# Patient Record
Sex: FEMALE | Race: WHITE | NOT HISPANIC OR LATINO | Employment: OTHER | URBAN - METROPOLITAN AREA
[De-identification: names, ages, dates, MRNs, and addresses within clinical notes are randomized per-mention and may not be internally consistent; named-entity substitution may affect disease eponyms.]

---

## 2019-09-20 ENCOUNTER — HOSPITAL ENCOUNTER (INPATIENT)
Facility: HOSPITAL | Age: 64
LOS: 4 days | Discharge: HOME | DRG: 433 | End: 2019-09-26
Attending: EMERGENCY MEDICINE | Admitting: INTERNAL MEDICINE
Payer: COMMERCIAL

## 2019-09-20 DIAGNOSIS — D64.9 ANEMIA, UNSPECIFIED TYPE: ICD-10-CM

## 2019-09-20 DIAGNOSIS — N30.00 ACUTE CYSTITIS WITHOUT HEMATURIA: ICD-10-CM

## 2019-09-20 DIAGNOSIS — N17.9 AKI (ACUTE KIDNEY INJURY): Primary | ICD-10-CM

## 2019-09-20 DIAGNOSIS — R53.1 WEAKNESS: ICD-10-CM

## 2019-09-20 DIAGNOSIS — K70.31 ALCOHOLIC CIRRHOSIS OF LIVER WITH ASCITES: ICD-10-CM

## 2019-09-20 DIAGNOSIS — K70.31 ASCITES DUE TO ALCOHOLIC CIRRHOSIS: ICD-10-CM

## 2019-09-20 DIAGNOSIS — N17.9 ACUTE RENAL FAILURE SUPERIMPOSED ON STAGE 4 CHRONIC KIDNEY DISEASE: ICD-10-CM

## 2019-09-20 DIAGNOSIS — N18.4 ACUTE RENAL FAILURE SUPERIMPOSED ON STAGE 4 CHRONIC KIDNEY DISEASE: ICD-10-CM

## 2019-09-20 PROBLEM — K74.60 CIRRHOSIS: Status: ACTIVE | Noted: 2019-09-20

## 2019-09-20 PROBLEM — K21.9 ESOPHAGEAL REFLUX DISEASE: Status: ACTIVE | Noted: 2019-09-20

## 2019-09-20 LAB
ALBUMIN SERPL BCP-MCNC: 2.4 G/DL (ref 3.5–5.2)
ALP SERPL-CCNC: 91 U/L (ref 55–135)
ALT SERPL W/O P-5'-P-CCNC: 17 U/L (ref 10–44)
AMMONIA PLAS-SCNC: 31 UMOL/L (ref 10–50)
ANION GAP SERPL CALC-SCNC: 10 MMOL/L (ref 8–16)
AST SERPL-CCNC: 31 U/L (ref 10–40)
BACTERIA #/AREA URNS HPF: ABNORMAL /HPF
BASOPHILS # BLD AUTO: 0.02 K/UL (ref 0–0.2)
BASOPHILS NFR BLD: 0.2 % (ref 0–1.9)
BILIRUB SERPL-MCNC: 0.7 MG/DL (ref 0.1–1)
BILIRUB UR QL STRIP: ABNORMAL
BUN SERPL-MCNC: 32 MG/DL (ref 8–23)
CALCIUM SERPL-MCNC: 8.2 MG/DL (ref 8.7–10.5)
CHLORIDE SERPL-SCNC: 102 MMOL/L (ref 95–110)
CLARITY UR: CLEAR
CO2 SERPL-SCNC: 19 MMOL/L (ref 23–29)
COLOR UR: YELLOW
CREAT SERPL-MCNC: 3.8 MG/DL (ref 0.5–1.4)
CREAT UR-MCNC: 91.2 MG/DL (ref 15–325)
DIFFERENTIAL METHOD: ABNORMAL
EOSINOPHIL # BLD AUTO: 0.3 K/UL (ref 0–0.5)
EOSINOPHIL NFR BLD: 2.9 % (ref 0–8)
ERYTHROCYTE [DISTWIDTH] IN BLOOD BY AUTOMATED COUNT: 16.5 % (ref 11.5–14.5)
EST. GFR  (AFRICAN AMERICAN): 14 ML/MIN/1.73 M^2
EST. GFR  (NON AFRICAN AMERICAN): 12 ML/MIN/1.73 M^2
GLUCOSE SERPL-MCNC: 105 MG/DL (ref 70–110)
GLUCOSE UR QL STRIP: NEGATIVE
HCT VFR BLD AUTO: 24 % (ref 37–48.5)
HGB BLD-MCNC: 8.3 G/DL (ref 12–16)
HGB UR QL STRIP: ABNORMAL
INR PPP: 1.5 (ref 0.8–1.2)
KETONES UR QL STRIP: NEGATIVE
LEUKOCYTE ESTERASE UR QL STRIP: ABNORMAL
LIPASE SERPL-CCNC: 65 U/L (ref 4–60)
LYMPHOCYTES # BLD AUTO: 1.5 K/UL (ref 1–4.8)
LYMPHOCYTES NFR BLD: 15.1 % (ref 18–48)
MCH RBC QN AUTO: 29.5 PG (ref 27–31)
MCHC RBC AUTO-ENTMCNC: 34.6 G/DL (ref 32–36)
MCV RBC AUTO: 85 FL (ref 82–98)
MICROSCOPIC COMMENT: ABNORMAL
MONOCYTES # BLD AUTO: 1.2 K/UL (ref 0.3–1)
MONOCYTES NFR BLD: 12.2 % (ref 4–15)
NEUTROPHILS # BLD AUTO: 6.7 K/UL (ref 1.8–7.7)
NEUTROPHILS NFR BLD: 69.9 % (ref 38–73)
NITRITE UR QL STRIP: POSITIVE
PH UR STRIP: 6 [PH] (ref 5–8)
PLATELET # BLD AUTO: 225 K/UL (ref 150–350)
PMV BLD AUTO: 9.7 FL (ref 9.2–12.9)
POTASSIUM SERPL-SCNC: 3.7 MMOL/L (ref 3.5–5.1)
PROT SERPL-MCNC: 6.3 G/DL (ref 6–8.4)
PROT UR QL STRIP: ABNORMAL
PROTHROMBIN TIME: 15.6 SEC (ref 9–12.5)
RBC # BLD AUTO: 2.81 M/UL (ref 4–5.4)
RBC #/AREA URNS HPF: 1 /HPF (ref 0–4)
SODIUM SERPL-SCNC: 131 MMOL/L (ref 136–145)
SODIUM UR-SCNC: <20 MMOL/L (ref 20–250)
SP GR UR STRIP: 1.02 (ref 1–1.03)
URN SPEC COLLECT METH UR: ABNORMAL
UROBILINOGEN UR STRIP-ACNC: ABNORMAL EU/DL
WBC # BLD AUTO: 9.58 K/UL (ref 3.9–12.7)
WBC #/AREA URNS HPF: 1 /HPF (ref 0–5)
YEAST URNS QL MICRO: ABNORMAL

## 2019-09-20 PROCEDURE — 85025 COMPLETE CBC W/AUTO DIFF WBC: CPT

## 2019-09-20 PROCEDURE — 93010 EKG 12-LEAD: ICD-10-PCS | Mod: ,,, | Performed by: INTERNAL MEDICINE

## 2019-09-20 PROCEDURE — G0378 HOSPITAL OBSERVATION PER HR: HCPCS

## 2019-09-20 PROCEDURE — 36415 COLL VENOUS BLD VENIPUNCTURE: CPT

## 2019-09-20 PROCEDURE — 85610 PROTHROMBIN TIME: CPT

## 2019-09-20 PROCEDURE — 84540 ASSAY OF URINE/UREA-N: CPT

## 2019-09-20 PROCEDURE — 82570 ASSAY OF URINE CREATININE: CPT

## 2019-09-20 PROCEDURE — 93005 ELECTROCARDIOGRAM TRACING: CPT

## 2019-09-20 PROCEDURE — 63600175 PHARM REV CODE 636 W HCPCS: Performed by: EMERGENCY MEDICINE

## 2019-09-20 PROCEDURE — 83690 ASSAY OF LIPASE: CPT

## 2019-09-20 PROCEDURE — 87086 URINE CULTURE/COLONY COUNT: CPT

## 2019-09-20 PROCEDURE — 87077 CULTURE AEROBIC IDENTIFY: CPT

## 2019-09-20 PROCEDURE — 99285 EMERGENCY DEPT VISIT HI MDM: CPT | Mod: 25

## 2019-09-20 PROCEDURE — 87088 URINE BACTERIA CULTURE: CPT

## 2019-09-20 PROCEDURE — 93010 ELECTROCARDIOGRAM REPORT: CPT | Mod: ,,, | Performed by: INTERNAL MEDICINE

## 2019-09-20 PROCEDURE — 87186 SC STD MICRODIL/AGAR DIL: CPT

## 2019-09-20 PROCEDURE — 81000 URINALYSIS NONAUTO W/SCOPE: CPT

## 2019-09-20 PROCEDURE — 82140 ASSAY OF AMMONIA: CPT

## 2019-09-20 PROCEDURE — 96361 HYDRATE IV INFUSION ADD-ON: CPT | Performed by: INTERNAL MEDICINE

## 2019-09-20 PROCEDURE — 84300 ASSAY OF URINE SODIUM: CPT

## 2019-09-20 PROCEDURE — 80053 COMPREHEN METABOLIC PANEL: CPT

## 2019-09-20 PROCEDURE — 63600175 PHARM REV CODE 636 W HCPCS: Performed by: NURSE PRACTITIONER

## 2019-09-20 RX ORDER — LACTULOSE 10 G/15ML
10 SOLUTION ORAL DAILY
Status: DISCONTINUED | OUTPATIENT
Start: 2019-09-21 | End: 2019-09-26 | Stop reason: HOSPADM

## 2019-09-20 RX ORDER — SODIUM CHLORIDE 9 MG/ML
1000 INJECTION, SOLUTION INTRAVENOUS
Status: COMPLETED | OUTPATIENT
Start: 2019-09-20 | End: 2019-09-20

## 2019-09-20 RX ORDER — LACTULOSE 10 G/15ML
SOLUTION ORAL; RECTAL DAILY
COMMUNITY
End: 2020-01-16 | Stop reason: SDUPTHER

## 2019-09-20 RX ORDER — HYDROXYZINE HYDROCHLORIDE 25 MG/1
25 TABLET, FILM COATED ORAL 3 TIMES DAILY PRN
Status: ON HOLD | COMMUNITY
End: 2020-10-31 | Stop reason: HOSPADM

## 2019-09-20 RX ORDER — PANTOPRAZOLE SODIUM 40 MG/1
40 TABLET, DELAYED RELEASE ORAL DAILY
Status: ON HOLD | COMMUNITY
End: 2019-09-20

## 2019-09-20 RX ORDER — FUROSEMIDE 40 MG/1
40 TABLET ORAL 3 TIMES DAILY
Status: ON HOLD | COMMUNITY
End: 2019-09-26 | Stop reason: SDUPTHER

## 2019-09-20 RX ORDER — LANOLIN ALCOHOL/MO/W.PET/CERES
400 CREAM (GRAM) TOPICAL DAILY
COMMUNITY

## 2019-09-20 RX ORDER — SODIUM CHLORIDE 9 MG/ML
INJECTION, SOLUTION INTRAVENOUS CONTINUOUS
Status: DISCONTINUED | OUTPATIENT
Start: 2019-09-20 | End: 2019-09-21

## 2019-09-20 RX ORDER — FLUOXETINE HYDROCHLORIDE 20 MG/1
20 CAPSULE ORAL DAILY
COMMUNITY

## 2019-09-20 RX ORDER — SODIUM CITRATE AND CITRIC ACID MONOHYDRATE 334; 500 MG/5ML; MG/5ML
15 SOLUTION ORAL DAILY
COMMUNITY
End: 2019-10-09

## 2019-09-20 RX ORDER — FLUOXETINE HYDROCHLORIDE 20 MG/1
20 CAPSULE ORAL DAILY
Status: DISCONTINUED | OUTPATIENT
Start: 2019-09-21 | End: 2019-09-26 | Stop reason: HOSPADM

## 2019-09-20 RX ORDER — POTASSIUM CITRATE, TRISODIUM CITRATE DIHYDRATE AND CITRIC ACID MONOHYDRATE 550; 500; 334 MG/5ML; MG/5ML; MG/5ML
15 SOLUTION ORAL
Status: ON HOLD | COMMUNITY
End: 2019-09-20

## 2019-09-20 RX ORDER — MULTIVITAMIN
1 TABLET ORAL DAILY
COMMUNITY

## 2019-09-20 RX ORDER — LACTULOSE 10 G/15ML
20 SOLUTION ORAL DAILY
Status: DISCONTINUED | OUTPATIENT
Start: 2019-09-21 | End: 2019-09-20

## 2019-09-20 RX ORDER — FAMOTIDINE 20 MG/1
20 TABLET, FILM COATED ORAL DAILY
Status: DISCONTINUED | OUTPATIENT
Start: 2019-09-21 | End: 2019-09-21

## 2019-09-20 RX ADMIN — SODIUM CHLORIDE 1000 ML: 0.9 INJECTION, SOLUTION INTRAVENOUS at 03:09

## 2019-09-20 RX ADMIN — SODIUM CHLORIDE 50 ML/HR: 0.9 INJECTION, SOLUTION INTRAVENOUS at 09:09

## 2019-09-20 NOTE — H&P
[facility] Hospital Medicine  History & Physical    Patient Name: Kylah Ortiz  MRN: [id number]  Admission Date: 9/20/2019  Attending Physician: Regino Ceballos MD   Primary Care Provider: Santos Beaver MD         Patient information was obtained from patient, caregiver / friend and ER records.     Subjective:     Principal Problem:DARRIAN (acute kidney injury)    Chief Complaint:   Chief Complaint   Patient presents with    Abnormal Lab        HPI: Ms Ortiz is a 63 year old female with PMHx alcohol cirrhosis, ascites and CKD who presented to [facility] Emergency Room after have abnormal labs reported from clinic. Emergency Room physican  discussed thecase with Dr. Anaya who state d that the patient creatinine went from 1.5 to 3.6 and recommends admission. Associated symptoms include increase abdominal distention and bilateral leg swelling. Patient denies associated symptoms of chest pain, increase shortness of breath, fever, chills, nausea, vomiting or diaphoresis. Sitter is at bedside and assisting in answering questions. She is being placed in Observations under the care of Hospital Medicine.     Past Medical History:   Diagnosis Date    Alcoholic cirrhosis     Alcoholic dementia     Anemia     Ascites     CKD (chronic kidney disease)     Esophageal reflux     Esophageal varices     Hepatic encephalopathy     Hiatal hernia        Past Surgical History:   Procedure Laterality Date    COLONOSCOPY      ESOPHAGOGASTRODUODENOSCOPY      HYSTERECTOMY         Review of patient's allergies indicates:   Allergen Reactions    Hydrocodone-acetaminophen        No current facility-administered medications on file prior to encounter.      Current Outpatient Medications on File Prior to Encounter   Medication Sig    FLUoxetine 20 MG capsule Take 20 mg by mouth once daily.    furosemide (LASIX) 40 MG tablet Take 40 mg by mouth 3 (three) times daily.    hydrOXYzine HCl (ATARAX) 25 MG tablet  Take 25 mg by mouth 3 (three) times daily as needed.     lactulose (CHRONULAC) 10 gram/15 mL solution Take by mouth once daily.     magnesium oxide (MAG-OX) 400 mg (241.3 mg magnesium) tablet Take 400 mg by mouth once daily.    multivitamin (THERAGRAN) per tablet Take 1 tablet by mouth once daily.    ranitidine (ZANTAC) 300 MG tablet Take 300 mg by mouth every evening.    sodium citrate-citric acid 500-334 mg/5 ml (BICITRA) 500-334 mg/5 mL solution Take 15 mLs by mouth once daily.    [DISCONTINUED] pantoprazole (PROTONIX) 40 MG tablet Take 40 mg by mouth once daily.    [DISCONTINUED] potassium & sodium citrate-citric acid (POLYCITRA) 550-500-334 mg/5 mL Soln Take 15 mLs by mouth 4 (four) times daily with meals and nightly.     Family History     Problem Relation (Age of Onset)    Arthritis Mother    Cancer Mother        Tobacco Use    Smoking status: Never Smoker    Smokeless tobacco: Never Used   Substance and Sexual Activity    Alcohol use: Yes    Drug use: Not on file    Sexual activity: Not on file     Review of Systems   Constitutional: Positive for activity change and fatigue. Negative for chills and fever.   HENT: Negative for congestion, rhinorrhea and sinus pressure.    Respiratory: Negative for apnea, cough, choking, chest tightness, shortness of breath, wheezing and stridor.    Cardiovascular: Positive for leg swelling. Negative for chest pain and palpitations.   Gastrointestinal: Positive for abdominal distention. Negative for abdominal pain, diarrhea, nausea and vomiting.   Endocrine: Negative for cold intolerance and heat intolerance.   Genitourinary: Negative for difficulty urinating and hematuria.        Decrease urine output    Musculoskeletal: Negative for arthralgias and joint swelling.   Skin: Negative for color change, pallor and rash.   Neurological: Positive for weakness. Negative for dizziness, seizures, numbness and headaches.   Psychiatric/Behavioral: Negative for agitation. The  patient is not nervous/anxious.      Objective:     Vital Signs (Most Recent):  Temp: 98.3 °F (36.8 °C) (09/20/19 1721)  Pulse: 87 (09/20/19 1721)  Resp: 16 (09/20/19 1721)  BP: 113/71 (09/20/19 1721)  SpO2: 98 % (09/20/19 1721) Vital Signs (24h Range):  Temp:  [98.3 °F (36.8 °C)-98.5 °F (36.9 °C)] 98.3 °F (36.8 °C)  Pulse:  [83-88] 87  Resp:  [13-28] 16  SpO2:  [98 %-99 %] 98 %  BP: (108-127)/(69-78) 113/71     Weight: 77.6 kg (171 lb 1.6 oz)  Body mass index is 27.62 kg/m².    Physical Exam   Constitutional: No distress.   HENT:   Mouth/Throat: No oropharyngeal exudate.   Eyes: Right eye exhibits no discharge. Left eye exhibits no discharge.   Neck: No JVD present.   Abdominal: Bowel sounds are normal. She exhibits distension. She exhibits no mass. There is no tenderness. There is no rebound and no guarding. No hernia.   Significantly increase amount of ascites present    Musculoskeletal: She exhibits edema (+3 pitting edema bilateral  lower ext ).   Skin: Skin is warm. Capillary refill takes less than 2 seconds. She is not diaphoretic. There is pallor.   Nursing note and vitals reviewed.          Significant Labs:   CBC:   Recent Labs   Lab 09/20/19  1335   WBC 9.58   HGB 8.3*   HCT 24.0*        CMP:   Recent Labs   Lab 09/20/19  1335   *   K 3.7      CO2 19*      BUN 32*   CREATININE 3.8*   CALCIUM 8.2*   PROT 6.3   ALBUMIN 2.4*   BILITOT 0.7   ALKPHOS 91   AST 31   ALT 17   ANIONGAP 10   EGFRNONAA 12*     Lipase:   Recent Labs   Lab 09/20/19  1335   LIPASE 65*       Significant Imaging:     Imaging Results          X-Ray Chest AP Portable (Final result)  Result time 09/20/19 14:05:58    Final result by Thuan Pillai MD (09/20/19 14:05:58)                 Impression:      Patchy interstitial opacities within the left lower lobe could reflect early infiltrate.  Small left pleural effusion not excluded.      Electronically signed by: Thuan Pillai,  MD  Date:    09/20/2019  Time:    14:05             Narrative:    EXAMINATION:  XR CHEST AP PORTABLE    CLINICAL HISTORY:  cough;    FINDINGS:  Single view of the chest.    Cardiac silhouette is normal.  Patchy interstitial opacities within the left lower lobe could reflect early infiltrate.  Small left pleural effusion not excluded. No evidence of pneumothorax.  Bones demonstrate mild degenerative changes.  Remote fracture deformity right clavicle suspected.                              Assessment/Plan:     * DARRIAN (acute kidney injury)  Place in observation   Gentle hydration   Consult Nephrology   Urine studies   Avoid Nephrotoxic agent   Hold diuretic       Ascites due to alcoholic cirrhosis  Continue hold lactulose   INR   Ammonia level   Hold lasix due to DARRIAN   Monitor   Low sodium diet   US Guide Paracentesis   Consider GI consult         Esophageal reflux disease  Pepcid         Anemia  HGB 8.4  Probable related to Chronic disease   No current signs of Bleeding   Monitor       VTE Risk Mitigation (From admission, onward)        Ordered     Place sequential compression device  Until discontinued      09/20/19 6082             Eben Jimenez NP  Department of Hospital Medicine   [facility]

## 2019-09-20 NOTE — ED PROVIDER NOTES
SCRIBE #1 NOTE: I, Corinne Mack, am scribing for, and in the presence of, Christopher Masters Do, MD. I have scribed the entire note.      History      Chief Complaint   Patient presents with    Abnormal Lab       Review of patient's allergies indicates:   Allergen Reactions    Hydrocodone-acetaminophen         HPI   HPI    9/20/2019, 1:09 PM   History obtained from the patient      History of Present Illness: Kylah Ortiz is a 63 y.o. female patient who presents to the Emergency Department for evaluation. Pt had blood work yesterday done by her Hepatologist and was referred to the ED for further evaluation because it was shown that the pt's kidney function was worsening. Pt otherwise has no complaints in the ED. No associated sxs. Patient denies any fever, chills, CP, SOB, N/V/D, abd pain, back pain, neck pain, HA, dizziness, confusion, and all other sxs at this time. No prior Tx. No further complaints or concerns at this time.         Arrival mode: Personal vehicle    PCP: Santos Beaver MD       Past Medical History:  Past Medical History:   Diagnosis Date    Alcoholic cirrhosis     Alcoholic dementia     Anemia     Ascites     CKD (chronic kidney disease)     Esophageal reflux     Esophageal varices     Hepatic encephalopathy     Hiatal hernia        Past Surgical History:  Past Surgical History:   Procedure Laterality Date    COLONOSCOPY      ESOPHAGOGASTRODUODENOSCOPY      HYSTERECTOMY           Family History:  Family History   Problem Relation Age of Onset    Cancer Mother     Arthritis Mother        Social History:  Social History     Tobacco Use    Smoking status: Never Smoker    Smokeless tobacco: Never Used   Substance and Sexual Activity    Alcohol use: Yes    Drug use: Not on file    Sexual activity: Not on file       ROS   Review of Systems   Constitutional: Negative for chills and fever.        (+) abnormal labs   Respiratory: Negative for cough and shortness of breath.   "  Cardiovascular: Negative for chest pain and leg swelling.   Gastrointestinal: Negative for abdominal pain, diarrhea, nausea and vomiting.   Musculoskeletal: Negative for back pain, neck pain and neck stiffness.   Skin: Negative for rash and wound.   Neurological: Negative for dizziness, light-headedness, numbness and headaches.   All other systems reviewed and are negative.    Physical Exam      Initial Vitals   BP Pulse Resp Temp SpO2   09/20/19 1311 09/20/19 1311 09/20/19 1311 09/20/19 1316 09/20/19 1311   127/78 88 (!) 28 98.5 °F (36.9 °C) 98 %      MAP       --                 Physical Exam  Nursing Notes and Vital Signs Reviewed.  Constitutional: Patient is in no acute distress. Well-developed and well-nourished.  Head: Atraumatic. Normocephalic.  Eyes: EOM intact. Conjunctivae are not pale. No scleral icterus.  ENT: Mucous membranes are moist. Oropharynx is clear and symmetric.    Neck: Supple. Full ROM. No lymphadenopathy.  Cardiovascular: Regular rate. Regular rhythm. No murmurs, rubs, or gallops. Distal pulses are 2+ and symmetric.  Pulmonary/Chest: No respiratory distress. Clear to auscultation bilaterally. No wheezing or rales.  Abdominal: Soft and distended with fluid.  There is no tenderness.  No rebound, guarding, or rigidity.   Musculoskeletal: Moves all extremities. No obvious deformities. 1-2+ BLE edema.   Skin: Warm and dry.  Neurological:  Alert, awake, and appropriate.  Normal speech.  No acute focal neurological deficits are appreciated.  Psychiatric: Normal affect. Good eye contact. Appropriate in content.    ED Course    Procedures  ED Vital Signs:  Vitals:    09/20/19 1311 09/20/19 1316 09/20/19 1402 09/20/19 1602   BP: 127/78 127/78 110/69 108/71   Pulse: 88 86 83 87   Resp: (!) 28 15 13 15   Temp:  98.5 °F (36.9 °C)     TempSrc:  Oral     SpO2: 98% 98% 99% 99%   Weight:  77.6 kg (171 lb 1.6 oz)     Height:  5' 6" (1.676 m)      09/20/19 1721   BP: 113/71   Pulse: 87   Resp: 16   Temp: " 98.3 °F (36.8 °C)   TempSrc: Oral   SpO2: 98%   Weight:    Height:        Abnormal Lab Results:  Labs Reviewed   CBC W/ AUTO DIFFERENTIAL - Abnormal; Notable for the following components:       Result Value    RBC 2.81 (*)     Hemoglobin 8.3 (*)     Hematocrit 24.0 (*)     RDW 16.5 (*)     Mono # 1.2 (*)     Lymph% 15.1 (*)     All other components within normal limits   COMPREHENSIVE METABOLIC PANEL - Abnormal; Notable for the following components:    Sodium 131 (*)     CO2 19 (*)     BUN, Bld 32 (*)     Creatinine 3.8 (*)     Calcium 8.2 (*)     Albumin 2.4 (*)     eGFR if  14 (*)     eGFR if non  12 (*)     All other components within normal limits   LIPASE - Abnormal; Notable for the following components:    Lipase 65 (*)     All other components within normal limits   URINALYSIS, REFLEX TO URINE CULTURE        All Lab Results:  Results for orders placed or performed during the hospital encounter of 09/20/19   CBC W/ AUTO DIFFERENTIAL   Result Value Ref Range    WBC 9.58 3.90 - 12.70 K/uL    RBC 2.81 (L) 4.00 - 5.40 M/uL    Hemoglobin 8.3 (L) 12.0 - 16.0 g/dL    Hematocrit 24.0 (L) 37.0 - 48.5 %    Mean Corpuscular Volume 85 82 - 98 fL    Mean Corpuscular Hemoglobin 29.5 27.0 - 31.0 pg    Mean Corpuscular Hemoglobin Conc 34.6 32.0 - 36.0 g/dL    RDW 16.5 (H) 11.5 - 14.5 %    Platelets 225 150 - 350 K/uL    MPV 9.7 9.2 - 12.9 fL    Gran # (ANC) 6.7 1.8 - 7.7 K/uL    Lymph # 1.5 1.0 - 4.8 K/uL    Mono # 1.2 (H) 0.3 - 1.0 K/uL    Eos # 0.3 0.0 - 0.5 K/uL    Baso # 0.02 0.00 - 0.20 K/uL    Gran% 69.9 38.0 - 73.0 %    Lymph% 15.1 (L) 18.0 - 48.0 %    Mono% 12.2 4.0 - 15.0 %    Eosinophil% 2.9 0.0 - 8.0 %    Basophil% 0.2 0.0 - 1.9 %    Differential Method Automated    Comp. Metabolic Panel   Result Value Ref Range    Sodium 131 (L) 136 - 145 mmol/L    Potassium 3.7 3.5 - 5.1 mmol/L    Chloride 102 95 - 110 mmol/L    CO2 19 (L) 23 - 29 mmol/L    Glucose 105 70 - 110 mg/dL    BUN, Bld 32  (H) 8 - 23 mg/dL    Creatinine 3.8 (H) 0.5 - 1.4 mg/dL    Calcium 8.2 (L) 8.7 - 10.5 mg/dL    Total Protein 6.3 6.0 - 8.4 g/dL    Albumin 2.4 (L) 3.5 - 5.2 g/dL    Total Bilirubin 0.7 0.1 - 1.0 mg/dL    Alkaline Phosphatase 91 55 - 135 U/L    AST 31 10 - 40 U/L    ALT 17 10 - 44 U/L    Anion Gap 10 8 - 16 mmol/L    eGFR if African American 14 (A) >60 mL/min/1.73 m^2    eGFR if non African American 12 (A) >60 mL/min/1.73 m^2   Lipase   Result Value Ref Range    Lipase 65 (H) 4 - 60 U/L       Imaging Results:  Imaging Results          X-Ray Chest AP Portable (Final result)  Result time 09/20/19 14:05:58    Final result by Thuan Pillai MD (09/20/19 14:05:58)                 Impression:      Patchy interstitial opacities within the left lower lobe could reflect early infiltrate.  Small left pleural effusion not excluded.      Electronically signed by: Thuan Pillai MD  Date:    09/20/2019  Time:    14:05             Narrative:    EXAMINATION:  XR CHEST AP PORTABLE    CLINICAL HISTORY:  cough;    FINDINGS:  Single view of the chest.    Cardiac silhouette is normal.  Patchy interstitial opacities within the left lower lobe could reflect early infiltrate.  Small left pleural effusion not excluded. No evidence of pneumothorax.  Bones demonstrate mild degenerative changes.  Remote fracture deformity right clavicle suspected.                                        The Emergency Provider reviewed the vital signs and test results, which are outlined above.    ED Discussion     2:56 PM: Dr. Lance discussed the pt's case with Dr. Anaya (Hepatology) who states that the pt's creatinine went from 1.5 to 3.6 and recommends admission.    3:30 PM: Discussed case with JOSEMANUEL Powell (Hospital Medicine). Dr. Ceballos agrees with current care and management of pt and accepts admission.   Admitting Service: Hospital medicine   Admitting Physician: Dr. Ceballos  Admit to: Obs Med Tele    3:32 PM: Re-evaluated pt. I have discussed  test results, shared treatment plan, and the need for admission with patient and family at bedside. Pt and family express understanding at this time and agree with all information. All questions answered. Pt and family have no further questions or concerns at this time. Pt is ready for admit.      ED Medication(s):  Medications   0.9%  NaCl infusion (1,000 mLs Intravenous New Bag 9/20/19 1919)          Medication List      ASK your doctor about these medications    FLUoxetine 20 MG capsule     furosemide 40 MG tablet  Commonly known as:  LASIX     hydrOXYzine HCl 25 MG tablet  Commonly known as:  ATARAX     lactulose 10 gram/15 mL solution  Commonly known as:  CHRONULAC     magnesium oxide 400 mg (241.3 mg magnesium) tablet  Commonly known as:  MAG-OX     multivitamin per tablet  Commonly known as:  THERAGRAN     potassium & sodium citrate-citric acid 550-500-334 mg/5 mL Soln  Commonly known as:  POLYCITRA     ranitidine 300 MG tablet  Commonly known as:  ZANTAC                  Medical Decision Making           Medical Decision Making:   Clinical Tests:   Lab Tests: Ordered and Reviewed  Radiological Study: Ordered and Reviewed  Medical Tests: Ordered and Reviewed           Scribe Attestation:   Scribe #1: I performed the above scribed service and the documentation accurately describes the services I performed. I attest to the accuracy of the note 09/20/2019.    Attending:   Physician Attestation Statement for Scribe #1: I, Christopher Masters Do, MD, personally performed the services described in this documentation, as scribed by Corinne Mack, in my presence, and it is both accurate and complete.          Clinical Impression       ICD-10-CM ICD-9-CM   1. DARRIAN (acute kidney injury) N17.9 584.9   2. Weakness R53.1 780.79       Disposition:   Disposition: Placed in Observation  Condition: Fair           Christopher Masters Do, MD  09/20/19 3927

## 2019-09-20 NOTE — ASSESSMENT & PLAN NOTE
Place in observation   Gentle hydration   Consult Nephrology   Urine studies   Avoid Nephrotoxic agent   Hold diuretic

## 2019-09-20 NOTE — ASSESSMENT & PLAN NOTE
Continue hold lactulose   INR   Ammonia level   Hold lasix due to DARRIAN   Monitor   Low sodium diet   US Guide Paracentesis

## 2019-09-20 NOTE — SUBJECTIVE & OBJECTIVE
Past Medical History:   Diagnosis Date    Alcoholic cirrhosis     Alcoholic dementia     Anemia     Ascites     CKD (chronic kidney disease)     Esophageal reflux     Esophageal varices     Hepatic encephalopathy     Hiatal hernia        Past Surgical History:   Procedure Laterality Date    COLONOSCOPY      ESOPHAGOGASTRODUODENOSCOPY      HYSTERECTOMY         Review of patient's allergies indicates:   Allergen Reactions    Hydrocodone-acetaminophen        No current facility-administered medications on file prior to encounter.      Current Outpatient Medications on File Prior to Encounter   Medication Sig    FLUoxetine 20 MG capsule Take 20 mg by mouth once daily.    furosemide (LASIX) 40 MG tablet Take 40 mg by mouth 3 (three) times daily.    hydrOXYzine HCl (ATARAX) 25 MG tablet Take 25 mg by mouth 3 (three) times daily as needed.     lactulose (CHRONULAC) 10 gram/15 mL solution Take by mouth once daily.     magnesium oxide (MAG-OX) 400 mg (241.3 mg magnesium) tablet Take 400 mg by mouth once daily.    multivitamin (THERAGRAN) per tablet Take 1 tablet by mouth once daily.    ranitidine (ZANTAC) 300 MG tablet Take 300 mg by mouth every evening.    sodium citrate-citric acid 500-334 mg/5 ml (BICITRA) 500-334 mg/5 mL solution Take 15 mLs by mouth once daily.    [DISCONTINUED] pantoprazole (PROTONIX) 40 MG tablet Take 40 mg by mouth once daily.    [DISCONTINUED] potassium & sodium citrate-citric acid (POLYCITRA) 550-500-334 mg/5 mL Soln Take 15 mLs by mouth 4 (four) times daily with meals and nightly.     Family History     Problem Relation (Age of Onset)    Arthritis Mother    Cancer Mother        Tobacco Use    Smoking status: Never Smoker    Smokeless tobacco: Never Used   Substance and Sexual Activity    Alcohol use: Yes    Drug use: Not on file    Sexual activity: Not on file     Review of Systems   Constitutional: Positive for activity change and fatigue. Negative for chills and  fever.   HENT: Negative for congestion, rhinorrhea and sinus pressure.    Respiratory: Negative for apnea, cough, choking, chest tightness, shortness of breath, wheezing and stridor.    Cardiovascular: Positive for leg swelling. Negative for chest pain and palpitations.   Gastrointestinal: Positive for abdominal distention. Negative for abdominal pain, diarrhea, nausea and vomiting.   Endocrine: Negative for cold intolerance and heat intolerance.   Genitourinary: Negative for difficulty urinating and hematuria.        Decrease urine output    Musculoskeletal: Negative for arthralgias and joint swelling.   Skin: Negative for color change, pallor and rash.   Neurological: Positive for weakness. Negative for dizziness, seizures, numbness and headaches.   Psychiatric/Behavioral: Negative for agitation. The patient is not nervous/anxious.      Objective:     Vital Signs (Most Recent):  Temp: 98.3 °F (36.8 °C) (09/20/19 1721)  Pulse: 87 (09/20/19 1721)  Resp: 16 (09/20/19 1721)  BP: 113/71 (09/20/19 1721)  SpO2: 98 % (09/20/19 1721) Vital Signs (24h Range):  Temp:  [98.3 °F (36.8 °C)-98.5 °F (36.9 °C)] 98.3 °F (36.8 °C)  Pulse:  [83-88] 87  Resp:  [13-28] 16  SpO2:  [98 %-99 %] 98 %  BP: (108-127)/(69-78) 113/71     Weight: 77.6 kg (171 lb 1.6 oz)  Body mass index is 27.62 kg/m².    Physical Exam   Constitutional: No distress.   HENT:   Mouth/Throat: No oropharyngeal exudate.   Eyes: Right eye exhibits no discharge. Left eye exhibits no discharge.   Neck: No JVD present.   Abdominal: Bowel sounds are normal. She exhibits distension. She exhibits no mass. There is no tenderness. There is no rebound and no guarding. No hernia.   Significantly increase amount of ascites present    Musculoskeletal: She exhibits edema (+3 pitting edema bilateral  lower ext ).   Skin: Skin is warm. Capillary refill takes less than 2 seconds. She is not diaphoretic. There is pallor.   Nursing note and vitals reviewed.          Significant Labs:    CBC:   Recent Labs   Lab 09/20/19  1335   WBC 9.58   HGB 8.3*   HCT 24.0*        CMP:   Recent Labs   Lab 09/20/19  1335   *   K 3.7      CO2 19*      BUN 32*   CREATININE 3.8*   CALCIUM 8.2*   PROT 6.3   ALBUMIN 2.4*   BILITOT 0.7   ALKPHOS 91   AST 31   ALT 17   ANIONGAP 10   EGFRNONAA 12*     Lipase:   Recent Labs   Lab 09/20/19  1335   LIPASE 65*       Significant Imaging:     Imaging Results          X-Ray Chest AP Portable (Final result)  Result time 09/20/19 14:05:58    Final result by Thuan Pillai MD (09/20/19 14:05:58)                 Impression:      Patchy interstitial opacities within the left lower lobe could reflect early infiltrate.  Small left pleural effusion not excluded.      Electronically signed by: Thuan Pillai MD  Date:    09/20/2019  Time:    14:05             Narrative:    EXAMINATION:  XR CHEST AP PORTABLE    CLINICAL HISTORY:  cough;    FINDINGS:  Single view of the chest.    Cardiac silhouette is normal.  Patchy interstitial opacities within the left lower lobe could reflect early infiltrate.  Small left pleural effusion not excluded. No evidence of pneumothorax.  Bones demonstrate mild degenerative changes.  Remote fracture deformity right clavicle suspected.

## 2019-09-20 NOTE — PLAN OF CARE
Problem: Adult Inpatient Plan of Care  Goal: Plan of Care Review  Patient is oriented to person only. Spells of confusion noted. Easily redirected. Coban wrap to IV on left hand, patient has history of pulling out IV. She threatened to pull out this IV. Monitoring closely. Denies pain. Showed signs of discomfort while eating. No issues swallowing. VSS. Nurse's hat in toilet for urine catch. Patient instructed to urinate in hat when she feels the urge. Patient verbalized understanding. Chart check completed. Will continue to monitor.

## 2019-09-20 NOTE — HPI
Ms Ortiz is a 63 year old female with PMHx alcohol cirrhosis, ascites and CKD who presented to [facility] Room after have abnormal labs reported from clinic. Emergency Room physican  discussed thecase with Dr. Anaya who stated that the patient creatinine went from 1.5 to 3.6 and recommends admission. Associated symptoms include increase abdominal distention and bilateral leg swelling. Patient denies associated symptoms of chest pain, increase shortness of breath, fever, chills, nausea, vomiting or diaphoresis. Sitter is at bedside and assisting in answering questions. She is being placed in Observations under the care of Hospital Medicine.

## 2019-09-21 PROBLEM — N39.0 UTI (URINARY TRACT INFECTION): Status: ACTIVE | Noted: 2019-09-21

## 2019-09-21 PROBLEM — N18.4 CHRONIC KIDNEY DISEASE (CKD), STAGE IV (SEVERE): Status: ACTIVE | Noted: 2019-09-21

## 2019-09-21 LAB
ALBUMIN SERPL BCP-MCNC: 2 G/DL (ref 3.5–5.2)
ALP SERPL-CCNC: 73 U/L (ref 55–135)
ALT SERPL W/O P-5'-P-CCNC: 15 U/L (ref 10–44)
ANION GAP SERPL CALC-SCNC: 9 MMOL/L (ref 8–16)
APPEARANCE FLD: NORMAL
AST SERPL-CCNC: 22 U/L (ref 10–40)
BASOPHILS # BLD AUTO: 0.01 K/UL (ref 0–0.2)
BASOPHILS NFR BLD: 0.2 % (ref 0–1.9)
BILIRUB SERPL-MCNC: 0.8 MG/DL (ref 0.1–1)
BODY FLD TYPE: NORMAL
BUN SERPL-MCNC: 31 MG/DL (ref 8–23)
CALCIUM SERPL-MCNC: 7.8 MG/DL (ref 8.7–10.5)
CHLORIDE SERPL-SCNC: 103 MMOL/L (ref 95–110)
CO2 SERPL-SCNC: 22 MMOL/L (ref 23–29)
COLOR FLD: YELLOW
CREAT SERPL-MCNC: 3.6 MG/DL (ref 0.5–1.4)
DIFFERENTIAL METHOD: ABNORMAL
EOSINOPHIL # BLD AUTO: 0.1 K/UL (ref 0–0.5)
EOSINOPHIL NFR BLD: 1.9 % (ref 0–8)
ERYTHROCYTE [DISTWIDTH] IN BLOOD BY AUTOMATED COUNT: 16.4 % (ref 11.5–14.5)
EST. GFR  (AFRICAN AMERICAN): 15 ML/MIN/1.73 M^2
EST. GFR  (NON AFRICAN AMERICAN): 13 ML/MIN/1.73 M^2
GLUCOSE SERPL-MCNC: 91 MG/DL (ref 70–110)
HCT VFR BLD AUTO: 20.8 % (ref 37–48.5)
HGB BLD-MCNC: 7.2 G/DL (ref 12–16)
LYMPHOCYTES # BLD AUTO: 1.3 K/UL (ref 1–4.8)
LYMPHOCYTES NFR BLD: 20 % (ref 18–48)
LYMPHOCYTES NFR FLD MANUAL: 32 %
MCH RBC QN AUTO: 29.6 PG (ref 27–31)
MCHC RBC AUTO-ENTMCNC: 34.6 G/DL (ref 32–36)
MCV RBC AUTO: 86 FL (ref 82–98)
MONOCYTES # BLD AUTO: 0.8 K/UL (ref 0.3–1)
MONOCYTES NFR BLD: 12.3 % (ref 4–15)
MONOS+MACROS NFR FLD MANUAL: 47 %
NEUTROPHILS # BLD AUTO: 4.1 K/UL (ref 1.8–7.7)
NEUTROPHILS NFR BLD: 65.9 % (ref 38–73)
NEUTROPHILS NFR FLD MANUAL: 21 %
PLATELET # BLD AUTO: 188 K/UL (ref 150–350)
PMV BLD AUTO: 9.7 FL (ref 9.2–12.9)
POTASSIUM SERPL-SCNC: 4 MMOL/L (ref 3.5–5.1)
PROT SERPL-MCNC: 5.4 G/DL (ref 6–8.4)
RBC # BLD AUTO: 2.43 M/UL (ref 4–5.4)
SODIUM SERPL-SCNC: 134 MMOL/L (ref 136–145)
UUN UR-MCNC: 201 MG/DL (ref 140–1050)
WBC # BLD AUTO: 6.24 K/UL (ref 3.9–12.7)
WBC # FLD: 70 /CU MM

## 2019-09-21 PROCEDURE — 63600175 PHARM REV CODE 636 W HCPCS: Performed by: NURSE PRACTITIONER

## 2019-09-21 PROCEDURE — 25000003 PHARM REV CODE 250: Performed by: NURSE PRACTITIONER

## 2019-09-21 PROCEDURE — 96374 THER/PROPH/DIAG INJ IV PUSH: CPT | Performed by: INTERNAL MEDICINE

## 2019-09-21 PROCEDURE — 99000 SPECIMEN HANDLING OFFICE-LAB: CPT

## 2019-09-21 PROCEDURE — P9047 ALBUMIN (HUMAN), 25%, 50ML: HCPCS | Mod: JG | Performed by: NURSE PRACTITIONER

## 2019-09-21 PROCEDURE — 99251 PR INITIAL INPATIENT CONSULT,LEVL I: CPT | Mod: ,,, | Performed by: INTERNAL MEDICINE

## 2019-09-21 PROCEDURE — 99205 OFFICE O/P NEW HI 60 MIN: CPT | Mod: ,,, | Performed by: INTERNAL MEDICINE

## 2019-09-21 PROCEDURE — 80053 COMPREHEN METABOLIC PANEL: CPT

## 2019-09-21 PROCEDURE — 99251 PR INITIAL INPATIENT CONSULT,LEVL I: ICD-10-PCS | Mod: ,,, | Performed by: INTERNAL MEDICINE

## 2019-09-21 PROCEDURE — 85025 COMPLETE CBC W/AUTO DIFF WBC: CPT

## 2019-09-21 PROCEDURE — 63600175 PHARM REV CODE 636 W HCPCS: Mod: JG | Performed by: NURSE PRACTITIONER

## 2019-09-21 PROCEDURE — 89051 BODY FLUID CELL COUNT: CPT

## 2019-09-21 PROCEDURE — G0378 HOSPITAL OBSERVATION PER HR: HCPCS

## 2019-09-21 PROCEDURE — 96361 HYDRATE IV INFUSION ADD-ON: CPT | Performed by: INTERNAL MEDICINE

## 2019-09-21 PROCEDURE — 36415 COLL VENOUS BLD VENIPUNCTURE: CPT

## 2019-09-21 PROCEDURE — 99205 PR OFFICE/OUTPT VISIT, NEW, LEVL V, 60-74 MIN: ICD-10-PCS | Mod: ,,, | Performed by: INTERNAL MEDICINE

## 2019-09-21 PROCEDURE — 96375 TX/PRO/DX INJ NEW DRUG ADDON: CPT | Performed by: INTERNAL MEDICINE

## 2019-09-21 RX ORDER — PANTOPRAZOLE SODIUM 40 MG/1
40 TABLET, DELAYED RELEASE ORAL DAILY
Status: DISCONTINUED | OUTPATIENT
Start: 2019-09-21 | End: 2019-09-26 | Stop reason: HOSPADM

## 2019-09-21 RX ORDER — ALBUMIN HUMAN 250 G/1000ML
25 SOLUTION INTRAVENOUS ONCE
Status: COMPLETED | OUTPATIENT
Start: 2019-09-21 | End: 2019-09-21

## 2019-09-21 RX ADMIN — LACTULOSE 10 G: 20 SOLUTION ORAL at 11:09

## 2019-09-21 RX ADMIN — ALBUMIN HUMAN 25 G: 0.25 SOLUTION INTRAVENOUS at 04:09

## 2019-09-21 RX ADMIN — PANTOPRAZOLE SODIUM 40 MG: 40 TABLET, DELAYED RELEASE ORAL at 03:09

## 2019-09-21 RX ADMIN — CEFTRIAXONE SODIUM 2 G: 2 INJECTION, POWDER, FOR SOLUTION INTRAMUSCULAR; INTRAVENOUS at 03:09

## 2019-09-21 RX ADMIN — FLUOXETINE 20 MG: 20 CAPSULE ORAL at 11:09

## 2019-09-21 RX ADMIN — FAMOTIDINE 20 MG: 20 TABLET ORAL at 11:09

## 2019-09-21 RX ADMIN — SODIUM CHLORIDE 50 ML/HR: 0.9 INJECTION, SOLUTION INTRAVENOUS at 11:09

## 2019-09-21 NOTE — PLAN OF CARE
Problem: Adult Inpatient Plan of Care  Goal: Plan of Care Review  Outcome: Ongoing (interventions implemented as appropriate)  Pt resting in bed comfortably. AAO to self, place. Pt not oriented to time. Follows commands.  NSR on heart monitor. VSS.  No pain noted at this time, pt free from falls.  +2, +3 pitting edema to BLE. ABD distended, rounded. Paracentesis in am.  Plan of care reviewed with pt. Pt verbalizes understanding but requires frequent reminders.  Head of bed elevated, wheels locked, side rails up x2, call bell within reach, and instructed to call for assistance.

## 2019-09-21 NOTE — ASSESSMENT & PLAN NOTE
- Hgb 7.2  - No active s/s of bleeding  - Daily CBC  - Monitor and transfuse as needed to keep Hgb >7.0

## 2019-09-21 NOTE — SUBJECTIVE & OBJECTIVE
Past Medical History:   Diagnosis Date    Alcoholic cirrhosis     Alcoholic dementia     Anemia     Ascites     CKD (chronic kidney disease)     Esophageal reflux     Esophageal varices     Hepatic encephalopathy     Hiatal hernia        Past Surgical History:   Procedure Laterality Date    COLONOSCOPY      ESOPHAGOGASTRODUODENOSCOPY      HYSTERECTOMY         Review of patient's allergies indicates:   Allergen Reactions    Hydrocodone-acetaminophen      Family History     Problem Relation (Age of Onset)    Arthritis Mother    Cancer Mother        Tobacco Use    Smoking status: Never Smoker    Smokeless tobacco: Never Used   Substance and Sexual Activity    Alcohol use: Not Currently    Drug use: Not on file    Sexual activity: Not on file     Review of Systems   Constitutional: Negative.    HENT: Negative.    Eyes: Negative.    Respiratory: Negative.    Cardiovascular: Negative.    Gastrointestinal: Negative.    Endocrine: Negative.    Genitourinary: Negative.    Musculoskeletal: Negative.    Allergic/Immunologic: Negative.    Neurological: Negative.    Hematological: Negative.    Psychiatric/Behavioral: Negative.      Objective:     Vital Signs (Most Recent):  Temp: 98.4 °F (36.9 °C) (09/21/19 0746)  Pulse: 82 (09/21/19 0746)  Resp: 20 (09/21/19 0746)  BP: 100/68 (09/21/19 0746)  SpO2: 95 % (09/21/19 0746) Vital Signs (24h Range):  Temp:  [97.9 °F (36.6 °C)-98.7 °F (37.1 °C)] 98.4 °F (36.9 °C)  Pulse:  [82-89] 82  Resp:  [13-28] 20  SpO2:  [93 %-99 %] 95 %  BP: ()/(57-78) 100/68     Weight: 77.6 kg (171 lb 1.6 oz) (09/20/19 1316)  Body mass index is 27.62 kg/m².      Intake/Output Summary (Last 24 hours) at 9/21/2019 1043  Last data filed at 9/20/2019 1900  Gross per 24 hour   Intake 328.33 ml   Output --   Net 328.33 ml       Lines/Drains/Airways     Peripheral Intravenous Line                 Peripheral IV - Single Lumen 09/20/19 1335 20 G Left Forearm less than 1 day                 Physical Exam   Constitutional: She is oriented to person, place, and time. She appears well-developed and well-nourished.   HENT:   Head: Normocephalic and atraumatic.   Eyes: Pupils are equal, round, and reactive to light. EOM are normal.   Neck: Normal range of motion. Neck supple.   Cardiovascular: Normal rate and regular rhythm.   Pulmonary/Chest: Effort normal and breath sounds normal.   Abdominal: Soft. Bowel sounds are normal.   Ascites present   Musculoskeletal: Normal range of motion. She exhibits edema.   Neurological: She is alert and oriented to person, place, and time.   Skin: Skin is warm and dry.   Psychiatric: She has a normal mood and affect. Her behavior is normal.       Significant Labs:  CBC:   Recent Labs   Lab 09/20/19  1335 09/21/19  0504   WBC 9.58 6.24   HGB 8.3* 7.2*   HCT 24.0* 20.8*    188     CMP:   Recent Labs   Lab 09/21/19  0504   GLU 91   CALCIUM 7.8*   ALBUMIN 2.0*   PROT 5.4*   *   K 4.0   CO2 22*      BUN 31*   CREATININE 3.6*   ALKPHOS 73   ALT 15   AST 22   BILITOT 0.8     Coagulation:   Recent Labs   Lab 09/20/19  1825   INR 1.5*       Significant Imaging:  Imaging results within the past 24 hours have been reviewed.

## 2019-09-21 NOTE — HOSPITAL COURSE
"On 9/20 patient was placed in OBS secondary to DARRIAN and ascites due to alcoholic cirrhosis.  Per ER records, Dr. Anaya was contacted and it is reported that patient's Cr increased from 1.5 to 3.6 and GI recommended admission.  Patient's home diuretics were held and she was started on gentle IV hydration with NS at 50 cc/hour.    As of 9/21 Cr decreased from 3.8 to 3.6.  Per Care Everywhere, Cr in August was noted to be 2.4.  Patient is a poor historian and cannot provide any details on her home medications, or which physician she follows with as an outpatient.  No records are noted within the [facility] system.  Per Care Everywhere, patient follows with GI Dr. Rod.  Patient underwent a paracentesis today with a reported 5L removed.  GI consulted for additional recommendations and recommended empiric ABX coverage for UTI +/- PNA, and to continue lactulose, hold diuretics, and obtain US doppler to r/o liver lesion and PVT.  UA shows many bacteria.  UC pending.  Will start on empiric Rocephiin for now and adjust based on cultures.  Nephrology consult pending.      As of 9/22 patient resting in bed with no new complaints.  GI and Nephrology following.  No SBP on paracentesis.  Abdominal US showed patent portal vein; Cirrhosis without solid-appearing mass lesion evident.  Ascites and cholelithiasis.  GI discussed potential need for transplant in the future with patient who would "like to think about it".  Will need outpatient f/u with Dr. Rod upon DC to arrange.  Still with significant ascites and 4+ pitting edema to BLE.  Awaiting additional recs from Nephrology.  UC pending, continue empiric Rocephin for now.     As of 9/23 Cr stable at 3.3.  Patient is s/p repeat paracentesis today with removal of 6L.  GI and Nephrology continues to follow.  Receiving Albumin.  MELD stable.  Still with 4+ pitting edema to BLE.  Final UC shows Ecoli sensitive to Rocephin.  Will plan to treat with 5 days of ABX.    As of 9/24 Hgb " "decreased to 6.4.  No active s/s of bleeding.  VS remain stable.  Will transfuse 1 unit PRBC today.  GI and nephrology continues to follow.  Per GI patient can be discharged home from their standpoint with outpatient follow-up with Dr. Anaya.  Met with caregiver Tatyana who is present at the bedside today.  Tatyana is aware of the need for 24 hr care given concerns for memory loss, and states that she will provide such care upon discharge.  Per GI patient will be arranged outpatient follow-up with Dr. Anaya.  Will plan to monitor overnight and if okay with Nephrology anticipate DC home tomorrow if she remains stable and counts improve.    As of 9/25 Patient required another paracentesis today with removal of 8L.  Case d/w GI, will plan to give Albumin today and if she remains stable anticipate DC home tomorrow.  Hgb improved to 8.3 s/p 1 unit PRBCs.  Patient will need to follow closely with GI and would benefit from scheduled outpatient paracentesis as needed to avoid re-hospitalization.  Per Nephrology recommendations, patient will DC home on Lasix 40 mg daily.       As of 9/26 Patient "feels fine", and is ready to DC home with caregiver Tatyana today.  Cr improved to 2.3.  MELD stable.  VS have remained stable.  BLE edema has resolved.  Patient is s/p repeat paracentesis yesterday with removal of 8L and improvement in ascites.  Case d/w Dr. Urbano, ok to DC home today from Nephrology standpoint with outpatient f/u with her nephrologist Dr. Aaron with [facility].  Per nephrology recs, will DC home on Lasix 40 mg daily instead of TID as before.  Case d/w GI who will arrange outpatient f/u ASAP for close monitoring.  Caregiver Tatyana is aware of the need for 24 hour care and f/u.  Case d/w Dr. Hebert.  Patient seen and examined and deemed medically stable to DC home today.  Medications reconciled for DC home.     Tatyana can be reached at [phone].   "

## 2019-09-21 NOTE — ASSESSMENT & PLAN NOTE
1. DARRIAN on CKD stage 4 :  Baseline serum creatinine about 2.5 mg/dL, acute on chronic kidney failure likely due to raised intra-abdominal pressure from significant ascites, status post large volume paracentesis for about 5 L, probably repeat same again tomorrow, received IV albumin, will stop IV fluids significant peripheral edema noted,  Hepatorenal syndrome is a possibility, urine sodium less than 20, will discuss with hepatology about possible workup for liver and kidney transplant,    2.  Hypertension - blood pressure stable, on low side,    3.  End-stage liver disease due to alcoholic cirrhosis, GI notes reviewed,    4.  Possible urinary tract infection - continue antibiotics, cultures pending,    5.  Anemia - multifactorial, PRBC transfusion when indicated,    6.  Mild hyponatremia - consistent with end-stage liver disease

## 2019-09-21 NOTE — PROGRESS NOTES
[facility] Hospital Medicine  Progress Note    Patient Name: Kylah Ortiz  MRN: [id number]  Patient Class: OP- Observation   Admission Date: 9/20/2019  Length of Stay: 0 days  Attending Physician: Regino Ceballos MD  Primary Care Provider: Santos Beaver MD        Subjective:     Principal Problem:DARRIAN (acute kidney injury)        HPI:  Ms Ortiz is a 63 year old female with PMHx alcohol cirrhosis, ascites and CKD who presented to [facility] Emergency Room after have abnormal labs reported from clinic. Emergency Room physican  discussed thecase with Dr. Anaya who state d that the patient creatinine went from 1.5 to 3.6 and recommends admission. Associated symptoms include increase abdominal distention and bilateral leg swelling. Patient denies associated symptoms of chest pain, increase shortness of breath, fever, chills, nausea, vomiting or diaphoresis. Sitter is at bedside and assisting in answering questions. She is being placed in Observations under the care of Hospital Medicine.     Overview/Hospital Course:  On 9/20 patient was placed in OBS secondary to DARRIAN and ascites due to alcoholic cirrhosis.  Per ER records, Dr. Anaya was contacted and it is reported that patient's Cr increased from 1.5 to 3.6 and GI recommended admission.  Patient's home diuretics were held and she was started on gentle IV hydration with NS at 50 cc/hour.    As of 9/21 Cr decreased from 3.8 to 3.6.  Per Care Everywhere, Cr in August was noted to be 2.4.  Patient is a poor historian and cannot provide any details on her home medications, or which physician she follows with as an outpatient.  No records are noted within the [facility] system.  Per Care Everywhere, patient follows with GI Dr. Rod.  Patient underwent a paracentesis today with a reported 5L removed.  GI consulted for additional recommendations and recommended empiric ABX coverage for UTI +/- PNA, and to continue lactulose, hold diuretics, and  obtain US doppler to r/o liver lesion and PVT.  UA shows many bacteria.  UC pending.  Will start on empiric Rocephiin for now and adjust based on cultures.  Nephrology consult pending.      Interval History:  No acute events overnight.  Currently resting comfortably in bed in NAD s/p paracentesis.  Cr decreased from 3.8 to 3.6.  Per Care Everywhere, Cr in August was noted to be 2.4.  Patient is a poor historian and cannot provide any details on her home medications, or which physician she follows with as an outpatient.  [facility].  Per Care Everywhere, patient follows with GI Dr. oRd.  Patient underwent a paracentesis today with a reported 5L removed.  GI consulted for additional recommendations and recommended empiric ABX coverage for UTI +/- PNA, and to continue lactulose, hold diuretics, and obtain US doppler to r/o liver lesion and PVT.  UA shows many bacteria.  UC pending.  Will start on empiric Rocephiin for now and adjust based on cultures.  Nephrology consult pending.      Review of Systems   Constitutional: Positive for activity change and fatigue. Negative for chills and fever.   HENT: Negative for congestion, rhinorrhea and sinus pressure.    Respiratory: Negative for apnea, cough, choking, chest tightness, shortness of breath, wheezing and stridor.    Cardiovascular: Positive for leg swelling. Negative for chest pain and palpitations.   Gastrointestinal: Positive for abdominal distention. Negative for abdominal pain, diarrhea, nausea and vomiting.   Endocrine: Negative for cold intolerance and heat intolerance.   Genitourinary: Negative for difficulty urinating and hematuria.        Decrease urine output    Musculoskeletal: Negative for arthralgias and joint swelling.   Skin: Negative for color change, pallor and rash.   Neurological: Positive for weakness. Negative for dizziness, seizures, numbness and headaches.   Psychiatric/Behavioral: Negative for agitation.  The patient is not nervous/anxious.      Objective:     Vital Signs (Most Recent):  Temp: 98 °F (36.7 °C) (09/21/19 1249)  Pulse: 80 (09/21/19 1249)  Resp: 18 (09/21/19 1249)  BP: 120/71 (09/21/19 1249)  SpO2: 97 % (09/21/19 1249) Vital Signs (24h Range):  Temp:  [97.9 °F (36.6 °C)-98.7 °F (37.1 °C)] 98 °F (36.7 °C)  Pulse:  [68-89] 80  Resp:  [13-20] 18  SpO2:  [93 %-99 %] 97 %  BP: ()/(57-78) 120/71     Weight: 77.6 kg (171 lb 1.6 oz)  Body mass index is 27.62 kg/m².    Intake/Output Summary (Last 24 hours) at 9/21/2019 1358  Last data filed at 9/21/2019 0800  Gross per 24 hour   Intake 508.33 ml   Output --   Net 508.33 ml      Physical Exam   Constitutional: She appears well-developed and well-nourished. No distress.   HENT:   Head: Normocephalic and atraumatic.   Mouth/Throat: Oropharynx is clear and moist. No oropharyngeal exudate.   Eyes: Pupils are equal, round, and reactive to light. Conjunctivae and EOM are normal. Right eye exhibits no discharge. Left eye exhibits no discharge.   Neck: Normal range of motion. Neck supple. No JVD present.   Cardiovascular: Normal rate, regular rhythm, normal heart sounds and intact distal pulses. Exam reveals no gallop and no friction rub.   No murmur heard.  Pulmonary/Chest: Effort normal and breath sounds normal. No respiratory distress. She has no wheezes. She has no rales. She exhibits no tenderness.   Comfortable on RA.   Abdominal: Soft. Bowel sounds are normal. She exhibits distension. She exhibits no mass. There is no tenderness. There is no rebound and no guarding. No hernia.   + ascites   Musculoskeletal: Normal range of motion. She exhibits edema. She exhibits no tenderness.   4+ pitting edema to BLE.   Neurological: She is alert.   Awake and alert, oriented to person and place.  Follows commands appropriately.     Skin: Skin is warm and dry. Capillary refill takes less than 2 seconds. No rash noted. She is not diaphoretic. No erythema. There is pallor.    Psychiatric: She has a normal mood and affect. Her behavior is normal. Judgment and thought content normal.   Nursing note and vitals reviewed.      Significant Labs:   CBC:   Recent Labs   Lab 09/20/19  1335 09/21/19  0504   WBC 9.58 6.24   HGB 8.3* 7.2*   HCT 24.0* 20.8*    188     CMP:   Recent Labs   Lab 09/20/19  1335 09/21/19  0504   * 134*   K 3.7 4.0    103   CO2 19* 22*    91   BUN 32* 31*   CREATININE 3.8* 3.6*   CALCIUM 8.2* 7.8*   PROT 6.3 5.4*   ALBUMIN 2.4* 2.0*   BILITOT 0.7 0.8   ALKPHOS 91 73   AST 31 22   ALT 17 15   ANIONGAP 10 9   EGFRNONAA 12* 13*     Coagulation:   Recent Labs   Lab 09/20/19  1825   INR 1.5*     Urine Studies:   Recent Labs   Lab 09/20/19  1855   COLORU Yellow   APPEARANCEUA Clear   PHUR 6.0   SPECGRAV 1.020   PROTEINUA Trace*   GLUCUA Negative   KETONESU Negative   BILIRUBINUA 2+*   OCCULTUA 1+*   NITRITE Positive*   UROBILINOGEN 2.0-3.0*   LEUKOCYTESUR Trace*   RBCUA 1   WBCUA 1   BACTERIA Many*       Significant Imaging: I have reviewed all pertinent imaging results/findings within the past 24 hours.      Assessment/Plan:      * DARRIAN (acute kidney injury)  - in setting of chronic liver disease/cirrhosis  - Placed in observation   - Hold home diuretics  - Continue gentle hydration   - Nephrology consult pending for additional recs  - Avoid Nephrotoxic agent   - Daily BMP      Ascites due to alcoholic cirrhosis  - Followed outpatient by Dr. Rod  - s/p paracentesis today with a reported 5L removed; will give Albumin 25 g x1 today   - GI consulted here and recommended to continue Lactulose, titrate to 3-4 BMs/day  - Hold home diuretics given #1  - Doppler US pending to r/o liver lesion and PVT  - Daily labs  - Monitor    MELD-Na score: 25 at 9/21/2019  5:04 AM  MELD score: 23 at 9/21/2019  5:04 AM  Calculated from:  Serum Creatinine: 3.6 mg/dL at 9/21/2019  5:04 AM  Serum Sodium: 134 mmol/L at 9/21/2019  5:04 AM  Total Bilirubin: 0.8 mg/dL  (Rounded to 1 mg/dL) at 9/21/2019  5:04 AM  INR(ratio): 1.5 at 9/20/2019  6:25 PM  Age: 63 years        UTI (urinary tract infection)  - UA shows + nitrites and many bacteria  - UC pending  - Start empiric Rocephin  - Adjust ABX based on final culture      Anemia  - Hgb 7.2  - No active s/s of bleeding  - Daily CBC  - Monitor and transfuse as needed to keep Hgb >7.0      Esophageal reflux disease  - PPI          VTE Risk Mitigation (From admission, onward)        Ordered     Place sequential compression device  Until discontinued      09/20/19 4362                Margarita Nesbitt, NOMI, ACNP-BC  Department of Hospital Medicine   [facility]

## 2019-09-21 NOTE — HPI
Kylah Ortiz is a 63-year-old  woman with history of end-stage liver disease due to alcohol abuse, chronic kidney disease, her serum creatinine was 2.5 mg/dL about a month ago, patient presents to the hospital for generalized weakness, acute on chronic renal failure noted, serum creatinine increased to 3.6 mg/dL, significant ascites noted on exam, also with some peripheral edema, status post paracentesis for about 5 L fluids today, empirically on antibiotics , we were consulted for acute on chronic renal failure.

## 2019-09-21 NOTE — CONSULTS
[facility] -   Nephrology  Consult Note      Patient Name: Kylah Ortiz  MRN: [id number]  Admission Date: 9/20/2019  Hospital Length of Stay: 0 days  Attending Provider: Regino Ceballos MD   Primary Care Physician: Santos Beaver MD  Principal Problem:DARRIAN (acute kidney injury)    Consults  Subjective:     HPI: Kylah Ortiz is a 63-year-old  woman with history of end-stage liver disease due to alcohol abuse, chronic kidney disease, her serum creatinine was 2.5 mg/dL about a month ago, patient presents to the hospital for generalized weakness, acute on chronic renal failure noted, serum creatinine increased to 3.6 mg/dL, significant ascites noted on exam, also with some peripheral edema, status post paracentesis for about 5 L fluids today, empirically on antibiotics , we were consulted for acute on chronic renal failure.    Past Medical History:   Diagnosis Date    Alcoholic cirrhosis     Alcoholic dementia     Anemia     Ascites     CKD (chronic kidney disease)     Esophageal reflux     Esophageal varices     Hepatic encephalopathy     Hiatal hernia        Past Surgical History:   Procedure Laterality Date    COLONOSCOPY      ESOPHAGOGASTRODUODENOSCOPY      HYSTERECTOMY         Review of patient's allergies indicates:   Allergen Reactions    Hydrocodone-acetaminophen      Current Facility-Administered Medications   Medication Frequency    cefTRIAXone (ROCEPHIN) 2 g in dextrose 5 % 50 mL IVPB Q24H    FLUoxetine capsule 20 mg Daily    lactulose 20 gram/30 mL solution Soln 10 g Daily    pantoprazole EC tablet 40 mg Daily     Family History     Problem Relation (Age of Onset)    Arthritis Mother    Cancer Mother        Tobacco Use    Smoking status: Never Smoker    Smokeless tobacco: Never Used   Substance and Sexual Activity    Alcohol use: Not Currently    Drug use: Not on file    Sexual activity: Not on file     Review of Systems   Constitutional: Positive  for activity change. Negative for appetite change, fatigue and fever.   HENT: Negative for congestion, facial swelling, sore throat, trouble swallowing and voice change.    Eyes: Negative for redness and visual disturbance.   Respiratory: Negative for apnea, cough, chest tightness, shortness of breath and wheezing.    Cardiovascular: Negative for chest pain, palpitations and leg swelling.   Gastrointestinal: Positive for abdominal distention. Negative for blood in stool, constipation, diarrhea, nausea and vomiting.   Genitourinary: Negative for decreased urine volume, difficulty urinating, dysuria, flank pain, frequency, hematuria, pelvic pain and urgency.   Musculoskeletal: Negative for back pain, gait problem and joint swelling.   Skin: Negative for color change and rash.   Neurological: Positive for weakness. Negative for dizziness, syncope and headaches.   Hematological: Does not bruise/bleed easily.   Psychiatric/Behavioral: Negative for agitation, behavioral problems and confusion. The patient is not nervous/anxious.      Objective:     Vital Signs (Most Recent):  Temp: 98 °F (36.7 °C) (09/21/19 1624)  Pulse: 76 (09/21/19 1624)  Resp: 16 (09/21/19 1624)  BP: (!) 95/59 (09/21/19 1624)  SpO2: 98 % (09/21/19 1624) Vital Signs (24h Range):  Temp:  [97.9 °F (36.6 °C)-98.7 °F (37.1 °C)] 98 °F (36.7 °C)  Pulse:  [68-89] 76  Resp:  [14-20] 16  SpO2:  [93 %-98 %] 98 %  BP: ()/(57-78) 95/59     Weight: 77.6 kg (171 lb 1.6 oz) (09/20/19 1316)  Body mass index is 27.62 kg/m².  Body surface area is 1.9 meters squared.    I/O last 3 completed shifts:  In: 328.3 [I.V.:328.3]  Out: -     Physical Exam   Constitutional: She is oriented to person, place, and time. She appears well-developed. She appears ill.   HENT:   Head: Normocephalic and atraumatic.   Mouth/Throat: Oropharynx is clear and moist. No oropharyngeal exudate.   Eyes: Pupils are equal, round, and reactive to light. Conjunctivae and EOM are normal. No  scleral icterus.   Neck: Normal range of motion. Neck supple. No JVD present. Carotid bruit is not present. No tracheal deviation present. No thyroid mass and no thyromegaly present.   Cardiovascular: Normal rate, regular rhythm, normal heart sounds and intact distal pulses. Exam reveals no gallop and no friction rub.   No murmur heard.  Pulmonary/Chest: Breath sounds normal. No respiratory distress. She has no wheezes. She has no rales. She exhibits no tenderness.   Reduced BS in bases   Abdominal: Soft. Bowel sounds are normal. She exhibits distension. She exhibits no abdominal bruit, no ascites and no mass. There is no hepatosplenomegaly. There is no tenderness. There is no rebound, no guarding and no CVA tenderness.   Musculoskeletal: She exhibits edema. She exhibits no tenderness.   Lymphadenopathy:     She has no cervical adenopathy.   Neurological: She is oriented to person, place, and time. No cranial nerve deficit. Coordination normal.   Skin: Skin is warm and intact. No rash noted. No erythema. No pallor.   Psychiatric: She has a normal mood and affect.       Significant Labs:  CBC:   Recent Labs   Lab 09/21/19  0504   WBC 6.24   RBC 2.43*   HGB 7.2*   HCT 20.8*      MCV 86   MCH 29.6   MCHC 34.6     CMP:   Recent Labs   Lab 09/21/19  0504   GLU 91   CALCIUM 7.8*   ALBUMIN 2.0*   PROT 5.4*   *   K 4.0   CO2 22*      BUN 31*   CREATININE 3.6*   ALKPHOS 73   ALT 15   AST 22   BILITOT 0.8     Coagulation:   Recent Labs   Lab 09/20/19  1825   INR 1.5*     LFTs:   Recent Labs   Lab 09/21/19  0504   ALT 15   AST 22   ALKPHOS 73   BILITOT 0.8   PROT 5.4*   ALBUMIN 2.0*     All labs within the past 24 hours have been reviewed.    Significant Imaging:  Reviewed     Lab Results   Component Value Date    CALCIUM 7.8 (L) 09/21/2019       Lab Results   Component Value Date    ALBUMIN 2.0 (L) 09/21/2019           Assessment/Plan:     Chronic kidney disease (CKD), stage IV (severe)  1. DARRIAN on CKD stage  4 :  Baseline serum creatinine about 2.5 mg/dL, acute on chronic kidney failure likely due to raised intra-abdominal pressure from significant ascites, status post large volume paracentesis for about 5 L, probably repeat same again tomorrow, received IV albumin, will stop IV fluids significant peripheral edema noted,  Hepatorenal syndrome is a possibility, urine sodium less than 20, will discuss with hepatology about possible workup for liver and kidney transplant,    2.  Hypertension - blood pressure stable, on low side,    3.  End-stage liver disease due to alcoholic cirrhosis, GI notes reviewed,    4.  Possible urinary tract infection - continue antibiotics, cultures pending,    5.  Anemia - multifactorial, PRBC transfusion when indicated,    6.  Mild hyponatremia - consistent with end-stage liver disease        Thank you for your consult. I will follow-up with patient. Please contact us if you have any additional questions.     Total time spent 70 minutes including time needed to review the records,  patient  evaluation, documentation, face-to-face discussion with the patient, primary team,     more than 50% of the time was spent on coordination of care and counseling.       Noe Urbano MD   Nephrology  [facility]

## 2019-09-21 NOTE — ASSESSMENT & PLAN NOTE
- in setting of chronic liver disease/cirrhosis  - Placed in observation   - Hold home diuretics  - Continue gentle hydration   - Nephrology consult pending for additional recs  - Avoid Nephrotoxic agent   - Daily BMP

## 2019-09-21 NOTE — SUBJECTIVE & OBJECTIVE
Past Medical History:   Diagnosis Date    Alcoholic cirrhosis     Alcoholic dementia     Anemia     Ascites     CKD (chronic kidney disease)     Esophageal reflux     Esophageal varices     Hepatic encephalopathy     Hiatal hernia        Past Surgical History:   Procedure Laterality Date    COLONOSCOPY      ESOPHAGOGASTRODUODENOSCOPY      HYSTERECTOMY         Review of patient's allergies indicates:   Allergen Reactions    Hydrocodone-acetaminophen      Current Facility-Administered Medications   Medication Frequency    cefTRIAXone (ROCEPHIN) 2 g in dextrose 5 % 50 mL IVPB Q24H    FLUoxetine capsule 20 mg Daily    lactulose 20 gram/30 mL solution Soln 10 g Daily    pantoprazole EC tablet 40 mg Daily     Family History     Problem Relation (Age of Onset)    Arthritis Mother    Cancer Mother        Tobacco Use    Smoking status: Never Smoker    Smokeless tobacco: Never Used   Substance and Sexual Activity    Alcohol use: Not Currently    Drug use: Not on file    Sexual activity: Not on file     Review of Systems   Constitutional: Positive for activity change. Negative for appetite change, fatigue and fever.   HENT: Negative for congestion, facial swelling, sore throat, trouble swallowing and voice change.    Eyes: Negative for redness and visual disturbance.   Respiratory: Negative for apnea, cough, chest tightness, shortness of breath and wheezing.    Cardiovascular: Negative for chest pain, palpitations and leg swelling.   Gastrointestinal: Positive for abdominal distention. Negative for blood in stool, constipation, diarrhea, nausea and vomiting.   Genitourinary: Negative for decreased urine volume, difficulty urinating, dysuria, flank pain, frequency, hematuria, pelvic pain and urgency.   Musculoskeletal: Negative for back pain, gait problem and joint swelling.   Skin: Negative for color change and rash.   Neurological: Positive for weakness. Negative for dizziness, syncope and headaches.    Hematological: Does not bruise/bleed easily.   Psychiatric/Behavioral: Negative for agitation, behavioral problems and confusion. The patient is not nervous/anxious.      Objective:     Vital Signs (Most Recent):  Temp: 98 °F (36.7 °C) (09/21/19 1624)  Pulse: 76 (09/21/19 1624)  Resp: 16 (09/21/19 1624)  BP: (!) 95/59 (09/21/19 1624)  SpO2: 98 % (09/21/19 1624) Vital Signs (24h Range):  Temp:  [97.9 °F (36.6 °C)-98.7 °F (37.1 °C)] 98 °F (36.7 °C)  Pulse:  [68-89] 76  Resp:  [14-20] 16  SpO2:  [93 %-98 %] 98 %  BP: ()/(57-78) 95/59     Weight: 77.6 kg (171 lb 1.6 oz) (09/20/19 1316)  Body mass index is 27.62 kg/m².  Body surface area is 1.9 meters squared.    I/O last 3 completed shifts:  In: 328.3 [I.V.:328.3]  Out: -     Physical Exam   Constitutional: She is oriented to person, place, and time. She appears well-developed. She appears ill.   HENT:   Head: Normocephalic and atraumatic.   Mouth/Throat: Oropharynx is clear and moist. No oropharyngeal exudate.   Eyes: Pupils are equal, round, and reactive to light. Conjunctivae and EOM are normal. No scleral icterus.   Neck: Normal range of motion. Neck supple. No JVD present. Carotid bruit is not present. No tracheal deviation present. No thyroid mass and no thyromegaly present.   Cardiovascular: Normal rate, regular rhythm, normal heart sounds and intact distal pulses. Exam reveals no gallop and no friction rub.   No murmur heard.  Pulmonary/Chest: Breath sounds normal. No respiratory distress. She has no wheezes. She has no rales. She exhibits no tenderness.   Reduced BS in bases   Abdominal: Soft. Bowel sounds are normal. She exhibits distension. She exhibits no abdominal bruit, no ascites and no mass. There is no hepatosplenomegaly. There is no tenderness. There is no rebound, no guarding and no CVA tenderness.   Musculoskeletal: She exhibits edema. She exhibits no tenderness.   Lymphadenopathy:     She has no cervical adenopathy.   Neurological: She is  oriented to person, place, and time. No cranial nerve deficit. Coordination normal.   Skin: Skin is warm and intact. No rash noted. No erythema. No pallor.   Psychiatric: She has a normal mood and affect.       Significant Labs:  CBC:   Recent Labs   Lab 09/21/19  0504   WBC 6.24   RBC 2.43*   HGB 7.2*   HCT 20.8*      MCV 86   MCH 29.6   MCHC 34.6     CMP:   Recent Labs   Lab 09/21/19  0504   GLU 91   CALCIUM 7.8*   ALBUMIN 2.0*   PROT 5.4*   *   K 4.0   CO2 22*      BUN 31*   CREATININE 3.6*   ALKPHOS 73   ALT 15   AST 22   BILITOT 0.8     Coagulation:   Recent Labs   Lab 09/20/19  1825   INR 1.5*     LFTs:   Recent Labs   Lab 09/21/19  0504   ALT 15   AST 22   ALKPHOS 73   BILITOT 0.8   PROT 5.4*   ALBUMIN 2.0*     All labs within the past 24 hours have been reviewed.    Significant Imaging:  Reviewed     Lab Results   Component Value Date    CALCIUM 7.8 (L) 09/21/2019       Lab Results   Component Value Date    ALBUMIN 2.0 (L) 09/21/2019

## 2019-09-21 NOTE — HPI
"This is a 63 year old  female with a reported history of alcohol cirrhosis (followed by an outside physician) who presents for abnormal creatinine (from 1.5 to 3.8). MELD is 25. She is a poor historian and is having problems remembering her medical history.     UA on admission showed positive nitrates with trace leukocyte esterase. Paracentesis was performed yesterday - WBC/diff pending. CXR shows LLL mild opacity. Urine sodium is < 20. She reports making urine.     She states she lives alone in [place]. She is a retired . She quit tobacco and alcohol "months ago" because she wasn't feeling "well". She previously drank [place] Mist heavily/daily per patient - she cannot quantify an amount at this time. No GI surgical history per patient. She is uncertain of whether she has ever had an EGD or colonoscopy. Family history includes mother with breast cancer.     Reports indicate that she was previously on an ARB, Aldactone, and Lasix. These medications have been held. She denies confusion or known encephalopathy.       "

## 2019-09-21 NOTE — PLAN OF CARE
Problem: Adult Inpatient Plan of Care  Goal: Plan of Care Review  Pt stable and remains injury free. Pt remains pain free. Pt tolerating diet. Pt ambulates with assist. No signs and symptoms of acute distress. Will continue to monitor.

## 2019-09-21 NOTE — ASSESSMENT & PLAN NOTE
- UA shows + nitrites and many bacteria  - UC pending  - Start empiric Rocephin  - Adjust ABX based on final culture

## 2019-09-21 NOTE — ASSESSMENT & PLAN NOTE
Normocytic. No reports or evidence for bleeding. Denies johann blood loss.   · Continue to monitor.   · Will need outpatient investigation to ensure EGD/colonsocopy are up to date.   · Recommend PPI therapy for GI protection.

## 2019-09-21 NOTE — SUBJECTIVE & OBJECTIVE
Interval History:  No acute events overnight.  Currently resting comfortably in bed in NAD s/p paracentesis.  Cr decreased from 3.8 to 3.6.  Per Care Everywhere, Cr in August was noted to be 2.4.  Patient is a poor historian and cannot provide any details on her home medications, or which physician she follows with as an outpatient.  No records are noted within the [facility] system.  Per Care Everywhere, patient follows with GI Dr. Rod.  Patient underwent a paracentesis today with a reported 5L removed.  GI consulted for additional recommendations and recommended empiric ABX coverage for UTI +/- PNA, and to continue lactulose, hold diuretics, and obtain US doppler to r/o liver lesion and PVT.  UA shows many bacteria.  UC pending.  Will start on empiric Rocephiin for now and adjust based on cultures.  Nephrology consult pending.      Review of Systems   Constitutional: Positive for activity change and fatigue. Negative for chills and fever.   HENT: Negative for congestion, rhinorrhea and sinus pressure.    Respiratory: Negative for apnea, cough, choking, chest tightness, shortness of breath, wheezing and stridor.    Cardiovascular: Positive for leg swelling. Negative for chest pain and palpitations.   Gastrointestinal: Positive for abdominal distention. Negative for abdominal pain, diarrhea, nausea and vomiting.   Endocrine: Negative for cold intolerance and heat intolerance.   Genitourinary: Negative for difficulty urinating and hematuria.        Decrease urine output    Musculoskeletal: Negative for arthralgias and joint swelling.   Skin: Negative for color change, pallor and rash.   Neurological: Positive for weakness. Negative for dizziness, seizures, numbness and headaches.   Psychiatric/Behavioral: Negative for agitation. The patient is not nervous/anxious.      Objective:     Vital Signs (Most Recent):  Temp: 98 °F (36.7 °C) (09/21/19 1249)  Pulse: 80 (09/21/19 1249)  Resp: 18 (09/21/19 1249)  BP: 120/71  (09/21/19 1249)  SpO2: 97 % (09/21/19 1249) Vital Signs (24h Range):  Temp:  [97.9 °F (36.6 °C)-98.7 °F (37.1 °C)] 98 °F (36.7 °C)  Pulse:  [68-89] 80  Resp:  [13-20] 18  SpO2:  [93 %-99 %] 97 %  BP: ()/(57-78) 120/71     Weight: 77.6 kg (171 lb 1.6 oz)  Body mass index is 27.62 kg/m².    Intake/Output Summary (Last 24 hours) at 9/21/2019 1358  Last data filed at 9/21/2019 0800  Gross per 24 hour   Intake 508.33 ml   Output --   Net 508.33 ml      Physical Exam   Constitutional: She appears well-developed and well-nourished. No distress.   HENT:   Head: Normocephalic and atraumatic.   Mouth/Throat: Oropharynx is clear and moist. No oropharyngeal exudate.   Eyes: Pupils are equal, round, and reactive to light. Conjunctivae and EOM are normal. Right eye exhibits no discharge. Left eye exhibits no discharge.   Neck: Normal range of motion. Neck supple. No JVD present.   Cardiovascular: Normal rate, regular rhythm, normal heart sounds and intact distal pulses. Exam reveals no gallop and no friction rub.   No murmur heard.  Pulmonary/Chest: Effort normal and breath sounds normal. No respiratory distress. She has no wheezes. She has no rales. She exhibits no tenderness.   Comfortable on RA.   Abdominal: Soft. Bowel sounds are normal. She exhibits distension. She exhibits no mass. There is no tenderness. There is no rebound and no guarding. No hernia.   + ascites   Musculoskeletal: Normal range of motion. She exhibits edema. She exhibits no tenderness.   4+ pitting edema to BLE.   Neurological: She is alert.   Awake and alert, oriented to person and place.  Follows commands appropriately.     Skin: Skin is warm and dry. Capillary refill takes less than 2 seconds. No rash noted. She is not diaphoretic. No erythema. There is pallor.   Psychiatric: She has a normal mood and affect. Her behavior is normal. Judgment and thought content normal.   Nursing note and vitals reviewed.      Significant Labs:   CBC:   Recent Labs    Lab 09/20/19  1335 09/21/19  0504   WBC 9.58 6.24   HGB 8.3* 7.2*   HCT 24.0* 20.8*    188     CMP:   Recent Labs   Lab 09/20/19  1335 09/21/19  0504   * 134*   K 3.7 4.0    103   CO2 19* 22*    91   BUN 32* 31*   CREATININE 3.8* 3.6*   CALCIUM 8.2* 7.8*   PROT 6.3 5.4*   ALBUMIN 2.4* 2.0*   BILITOT 0.7 0.8   ALKPHOS 91 73   AST 31 22   ALT 17 15   ANIONGAP 10 9   EGFRNONAA 12* 13*     Coagulation:   Recent Labs   Lab 09/20/19  1825   INR 1.5*     Urine Studies:   Recent Labs   Lab 09/20/19  1855   COLORU Yellow   APPEARANCEUA Clear   PHUR 6.0   SPECGRAV 1.020   PROTEINUA Trace*   GLUCUA Negative   KETONESU Negative   BILIRUBINUA 2+*   OCCULTUA 1+*   NITRITE Positive*   UROBILINOGEN 2.0-3.0*   LEUKOCYTESUR Trace*   RBCUA 1   WBCUA 1   BACTERIA Many*       Significant Imaging: I have reviewed all pertinent imaging results/findings within the past 24 hours.

## 2019-09-21 NOTE — ASSESSMENT & PLAN NOTE
- Followed outpatient by Dr. Rod  - s/p paracentesis today with a reported 5L removed; will give Albumin 25 g x1 today   - GI consulted here and recommended to continue Lactulose, titrate to 3-4 BMs/day  - Hold home diuretics given #1  - Doppler US pending to r/o liver lesion and PVT  - Daily labs  - Monitor    MELD-Na score: 25 at 9/21/2019  5:04 AM  MELD score: 23 at 9/21/2019  5:04 AM  Calculated from:  Serum Creatinine: 3.6 mg/dL at 9/21/2019  5:04 AM  Serum Sodium: 134 mmol/L at 9/21/2019  5:04 AM  Total Bilirubin: 0.8 mg/dL (Rounded to 1 mg/dL) at 9/21/2019  5:04 AM  INR(ratio): 1.5 at 9/20/2019  6:25 PM  Age: 63 years

## 2019-09-21 NOTE — ASSESSMENT & PLAN NOTE
Acute on chronic decompensated cirrhosis. Suspect secondary to infectious etiology in setting of diuretic therapy. Potential sources include UTI (see UA) and/or pulmonary (possible infiltrate on CXR). Her liver disease is complicated by: ascites with encephalopathy (however this could be worsened with acute infection). MELD 25. She is followed by an outside GI therefore prior liver disease work up is not in our system.   · Recommend empiric antibiotic coverage for UTI +/- PNA.  · Awaiting diagnostic paracentesis work up.  · Hold diuresis at this time.   · Agree with lactulose - titrate to 2-3 BM's per day.   · Low sodium diet.   · Agree with nephrology consultation. She may benefit from albumin therapy given low urine sodium.   · Get US doppler to rule out liver lesion and PVT which can contribute to decompensation.       MELD-Na score: 25 at 9/21/2019  5:04 AM  MELD score: 23 at 9/21/2019  5:04 AM  Calculated from:  Serum Creatinine: 3.6 mg/dL at 9/21/2019  5:04 AM  Serum Sodium: 134 mmol/L at 9/21/2019  5:04 AM  Total Bilirubin: 0.8 mg/dL (Rounded to 1 mg/dL) at 9/21/2019  5:04 AM  INR(ratio): 1.5 at 9/20/2019  6:25 PM  Age: 63 years

## 2019-09-21 NOTE — CONSULTS
"[facility] -   Gastroenterology  Consult Note    Patient Name: Kylah Ortiz  MRN: [id number]  Admission Date: 9/20/2019  Hospital Length of Stay: 0 days  Code Status: Full Code   Attending Provider: Regino Ceballos MD   Consulting Provider: Katharina Vasquez MD  Primary Care Physician: Santos Beaver MD  Principal Problem:DARRIAN (acute kidney injury)    Inpatient consult to Gastroenterology  Consult performed by: Katharina Vasquez MD  Consult ordered by: Margarita Nesbitt NP        Subjective:     HPI:  This is a 63 year old  female with a reported history of alcohol cirrhosis (followed by an outside physician) who presents for abnormal creatinine (from 1.5 to 3.8). MELD is 25. She is a poor historian and is having problems remembering her medical history.     UA on admission showed positive nitrates with trace leukocyte esterase. Paracentesis was performed yesterday - WBC/diff pending. CXR shows LLL mild opacity. Urine sodium is < 20. She reports making urine.     She states she lives alone in [place]. She is a retired . She quit tobacco and alcohol "months ago" because she wasn't feeling "well". She previously drank French Mist heavily/daily per patient - she cannot quantify an amount at this time. No GI surgical history per patient. She is uncertain of whether she has ever had an EGD or colonoscopy. Family history includes mother with breast cancer.     Reports indicate that she was previously on an ARB, Aldactone, and Lasix. These medications have been held. She denies confusion or known encephalopathy.         Past Medical History:   Diagnosis Date    Alcoholic cirrhosis     Alcoholic dementia     Anemia     Ascites     CKD (chronic kidney disease)     Esophageal reflux     Esophageal varices     Hepatic encephalopathy     Hiatal hernia        Past Surgical History:   Procedure Laterality Date    COLONOSCOPY      ESOPHAGOGASTRODUODENOSCOPY      " HYSTERECTOMY         Review of patient's allergies indicates:   Allergen Reactions    Hydrocodone-acetaminophen      Family History     Problem Relation (Age of Onset)    Arthritis Mother    Cancer Mother        Tobacco Use    Smoking status: Never Smoker    Smokeless tobacco: Never Used   Substance and Sexual Activity    Alcohol use: Not Currently    Drug use: Not on file    Sexual activity: Not on file     Review of Systems   Constitutional: Negative.    HENT: Negative.    Eyes: Negative.    Respiratory: Negative.    Cardiovascular: Negative.    Gastrointestinal: Negative.    Endocrine: Negative.    Genitourinary: Negative.    Musculoskeletal: Negative.    Allergic/Immunologic: Negative.    Neurological: Negative.    Hematological: Negative.    Psychiatric/Behavioral: Negative.      Objective:     Vital Signs (Most Recent):  Temp: 98.4 °F (36.9 °C) (09/21/19 0746)  Pulse: 82 (09/21/19 0746)  Resp: 20 (09/21/19 0746)  BP: 100/68 (09/21/19 0746)  SpO2: 95 % (09/21/19 0746) Vital Signs (24h Range):  Temp:  [97.9 °F (36.6 °C)-98.7 °F (37.1 °C)] 98.4 °F (36.9 °C)  Pulse:  [82-89] 82  Resp:  [13-28] 20  SpO2:  [93 %-99 %] 95 %  BP: ()/(57-78) 100/68     Weight: 77.6 kg (171 lb 1.6 oz) (09/20/19 1316)  Body mass index is 27.62 kg/m².      Intake/Output Summary (Last 24 hours) at 9/21/2019 1043  Last data filed at 9/20/2019 1900  Gross per 24 hour   Intake 328.33 ml   Output --   Net 328.33 ml       Lines/Drains/Airways     Peripheral Intravenous Line                 Peripheral IV - Single Lumen 09/20/19 1335 20 G Left Forearm less than 1 day                Physical Exam   Constitutional: She is oriented to person, place, and time. She appears well-developed and well-nourished.   HENT:   Head: Normocephalic and atraumatic.   Eyes: Pupils are equal, round, and reactive to light. EOM are normal.   Neck: Normal range of motion. Neck supple.   Cardiovascular: Normal rate and regular rhythm.   Pulmonary/Chest:  Effort normal and breath sounds normal.   Abdominal: Soft. Bowel sounds are normal.   Ascites present   Musculoskeletal: Normal range of motion. She exhibits edema.   Neurological: She is alert and oriented to person, place, and time.   Skin: Skin is warm and dry.   Psychiatric: She has a normal mood and affect. Her behavior is normal.       Significant Labs:  CBC:   Recent Labs   Lab 09/20/19  1335 09/21/19  0504   WBC 9.58 6.24   HGB 8.3* 7.2*   HCT 24.0* 20.8*    188     CMP:   Recent Labs   Lab 09/21/19  0504   GLU 91   CALCIUM 7.8*   ALBUMIN 2.0*   PROT 5.4*   *   K 4.0   CO2 22*      BUN 31*   CREATININE 3.6*   ALKPHOS 73   ALT 15   AST 22   BILITOT 0.8     Coagulation:   Recent Labs   Lab 09/20/19  1825   INR 1.5*       Significant Imaging:  Imaging results within the past 24 hours have been reviewed.    Assessment/Plan:     Ascites due to alcoholic cirrhosis  Acute on chronic decompensated cirrhosis. Suspect secondary to infectious etiology in setting of diuretic therapy. Potential sources include UTI (see UA) and/or pulmonary (possible infiltrate on CXR). Her liver disease is complicated by: ascites with encephalopathy (however this could be worsened with acute infection). MELD 25. She is followed by an outside GI therefore prior liver disease work up is not in our system.   · Recommend empiric antibiotic coverage for UTI +/- PNA.  · Awaiting diagnostic paracentesis work up.  · Hold diuresis at this time.   · Agree with lactulose - titrate to 2-3 BM's per day.   · Low sodium diet.   · Agree with nephrology consultation. She may benefit from albumin therapy given low urine sodium.   · Get US doppler to rule out liver lesion and PVT which can contribute to decompensation.       MELD-Na score: 25 at 9/21/2019  5:04 AM  MELD score: 23 at 9/21/2019  5:04 AM  Calculated from:  Serum Creatinine: 3.6 mg/dL at 9/21/2019  5:04 AM  Serum Sodium: 134 mmol/L at 9/21/2019  5:04 AM  Total Bilirubin: 0.8  mg/dL (Rounded to 1 mg/dL) at 9/21/2019  5:04 AM  INR(ratio): 1.5 at 9/20/2019  6:25 PM  Age: 63 years      Anemia  Normocytic. No reports or evidence for bleeding. Denies johann blood loss.   · Continue to monitor.   · Will need outpatient investigation to ensure EGD/colonsocopy are up to date.   · Recommend PPI therapy for GI protection.         Thank you for your consult. I will follow-up with patient. Please contact us if you have any additional questions.    Katharina Vasquez MD  Gastroenterology  [facility] -

## 2019-09-21 NOTE — H&P (VIEW-ONLY)
[facility] -   Nephrology  Consult Note      Patient Name: Kylah Ortiz  MRN: [id number]  Admission Date: 9/20/2019  Hospital Length of Stay: 0 days  Attending Provider: Regino Ceballos MD   Primary Care Physician: Santos Beaver MD  Principal Problem:DARRIAN (acute kidney injury)    Consults  Subjective:     HPI: Kylah Ortiz is a 63-year-old  woman with history of end-stage liver disease due to alcohol abuse, chronic kidney disease, her serum creatinine was 2.5 mg/dL about a month ago, patient presents to the hospital for generalized weakness, acute on chronic renal failure noted, serum creatinine increased to 3.6 mg/dL, significant ascites noted on exam, also with some peripheral edema, status post paracentesis for about 5 L fluids today, empirically on antibiotics , we were consulted for acute on chronic renal failure.    Past Medical History:   Diagnosis Date    Alcoholic cirrhosis     Alcoholic dementia     Anemia     Ascites     CKD (chronic kidney disease)     Esophageal reflux     Esophageal varices     Hepatic encephalopathy     Hiatal hernia        Past Surgical History:   Procedure Laterality Date    COLONOSCOPY      ESOPHAGOGASTRODUODENOSCOPY      HYSTERECTOMY         Review of patient's allergies indicates:   Allergen Reactions    Hydrocodone-acetaminophen      Current Facility-Administered Medications   Medication Frequency    cefTRIAXone (ROCEPHIN) 2 g in dextrose 5 % 50 mL IVPB Q24H    FLUoxetine capsule 20 mg Daily    lactulose 20 gram/30 mL solution Soln 10 g Daily    pantoprazole EC tablet 40 mg Daily     Family History     Problem Relation (Age of Onset)    Arthritis Mother    Cancer Mother        Tobacco Use    Smoking status: Never Smoker    Smokeless tobacco: Never Used   Substance and Sexual Activity    Alcohol use: Not Currently    Drug use: Not on file    Sexual activity: Not on file     Review of Systems   Constitutional: Positive  for activity change. Negative for appetite change, fatigue and fever.   HENT: Negative for congestion, facial swelling, sore throat, trouble swallowing and voice change.    Eyes: Negative for redness and visual disturbance.   Respiratory: Negative for apnea, cough, chest tightness, shortness of breath and wheezing.    Cardiovascular: Negative for chest pain, palpitations and leg swelling.   Gastrointestinal: Positive for abdominal distention. Negative for blood in stool, constipation, diarrhea, nausea and vomiting.   Genitourinary: Negative for decreased urine volume, difficulty urinating, dysuria, flank pain, frequency, hematuria, pelvic pain and urgency.   Musculoskeletal: Negative for back pain, gait problem and joint swelling.   Skin: Negative for color change and rash.   Neurological: Positive for weakness. Negative for dizziness, syncope and headaches.   Hematological: Does not bruise/bleed easily.   Psychiatric/Behavioral: Negative for agitation, behavioral problems and confusion. The patient is not nervous/anxious.      Objective:     Vital Signs (Most Recent):  Temp: 98 °F (36.7 °C) (09/21/19 1624)  Pulse: 76 (09/21/19 1624)  Resp: 16 (09/21/19 1624)  BP: (!) 95/59 (09/21/19 1624)  SpO2: 98 % (09/21/19 1624) Vital Signs (24h Range):  Temp:  [97.9 °F (36.6 °C)-98.7 °F (37.1 °C)] 98 °F (36.7 °C)  Pulse:  [68-89] 76  Resp:  [14-20] 16  SpO2:  [93 %-98 %] 98 %  BP: ()/(57-78) 95/59     Weight: 77.6 kg (171 lb 1.6 oz) (09/20/19 1316)  Body mass index is 27.62 kg/m².  Body surface area is 1.9 meters squared.    I/O last 3 completed shifts:  In: 328.3 [I.V.:328.3]  Out: -     Physical Exam   Constitutional: She is oriented to person, place, and time. She appears well-developed. She appears ill.   HENT:   Head: Normocephalic and atraumatic.   Mouth/Throat: Oropharynx is clear and moist. No oropharyngeal exudate.   Eyes: Pupils are equal, round, and reactive to light. Conjunctivae and EOM are normal. No  scleral icterus.   Neck: Normal range of motion. Neck supple. No JVD present. Carotid bruit is not present. No tracheal deviation present. No thyroid mass and no thyromegaly present.   Cardiovascular: Normal rate, regular rhythm, normal heart sounds and intact distal pulses. Exam reveals no gallop and no friction rub.   No murmur heard.  Pulmonary/Chest: Breath sounds normal. No respiratory distress. She has no wheezes. She has no rales. She exhibits no tenderness.   Reduced BS in bases   Abdominal: Soft. Bowel sounds are normal. She exhibits distension. She exhibits no abdominal bruit, no ascites and no mass. There is no hepatosplenomegaly. There is no tenderness. There is no rebound, no guarding and no CVA tenderness.   Musculoskeletal: She exhibits edema. She exhibits no tenderness.   Lymphadenopathy:     She has no cervical adenopathy.   Neurological: She is oriented to person, place, and time. No cranial nerve deficit. Coordination normal.   Skin: Skin is warm and intact. No rash noted. No erythema. No pallor.   Psychiatric: She has a normal mood and affect.       Significant Labs:  CBC:   Recent Labs   Lab 09/21/19  0504   WBC 6.24   RBC 2.43*   HGB 7.2*   HCT 20.8*      MCV 86   MCH 29.6   MCHC 34.6     CMP:   Recent Labs   Lab 09/21/19  0504   GLU 91   CALCIUM 7.8*   ALBUMIN 2.0*   PROT 5.4*   *   K 4.0   CO2 22*      BUN 31*   CREATININE 3.6*   ALKPHOS 73   ALT 15   AST 22   BILITOT 0.8     Coagulation:   Recent Labs   Lab 09/20/19  1825   INR 1.5*     LFTs:   Recent Labs   Lab 09/21/19  0504   ALT 15   AST 22   ALKPHOS 73   BILITOT 0.8   PROT 5.4*   ALBUMIN 2.0*     All labs within the past 24 hours have been reviewed.    Significant Imaging:  Reviewed     Lab Results   Component Value Date    CALCIUM 7.8 (L) 09/21/2019       Lab Results   Component Value Date    ALBUMIN 2.0 (L) 09/21/2019           Assessment/Plan:     Chronic kidney disease (CKD), stage IV (severe)  1. DARRIAN on CKD stage  4 :  Baseline serum creatinine about 2.5 mg/dL, acute on chronic kidney failure likely due to raised intra-abdominal pressure from significant ascites, status post large volume paracentesis for about 5 L, probably repeat same again tomorrow, received IV albumin, will stop IV fluids significant peripheral edema noted,  Hepatorenal syndrome is a possibility, urine sodium less than 20, will discuss with hepatology about possible workup for liver and kidney transplant,    2.  Hypertension - blood pressure stable, on low side,    3.  End-stage liver disease due to alcoholic cirrhosis, GI notes reviewed,    4.  Possible urinary tract infection - continue antibiotics, cultures pending,    5.  Anemia - multifactorial, PRBC transfusion when indicated,    6.  Mild hyponatremia - consistent with end-stage liver disease        Thank you for your consult. I will follow-up with patient. Please contact us if you have any additional questions.     Total time spent 70 minutes including time needed to review the records,  patient  evaluation, documentation, face-to-face discussion with the patient, primary team,     more than 50% of the time was spent on coordination of care and counseling.       Noe Urbano MD   Nephrology  [facility] [place]

## 2019-09-21 NOTE — PROCEDURES
"Kylah Ortiz is a 63 y.o. female patient.    Temp: 98.4 °F (36.9 °C) (09/21/19 0746)  Pulse: 82 (09/21/19 0746)  Resp: 20 (09/21/19 0746)  BP: 100/68 (09/21/19 0746)  SpO2: 95 % (09/21/19 0746)  Weight: 77.6 kg (171 lb 1.6 oz) (09/20/19 1316)  Height: 5' 6" (167.6 cm) (09/20/19 1316)       Procedures   Successful paracentesis without complication. See imaging report for details.     Mickey LOPEZ May  9/21/2019    "

## 2019-09-22 PROBLEM — N18.4 ACUTE RENAL FAILURE SUPERIMPOSED ON STAGE 4 CHRONIC KIDNEY DISEASE: Status: ACTIVE | Noted: 2019-09-20

## 2019-09-22 LAB
ALBUMIN SERPL BCP-MCNC: 2.2 G/DL (ref 3.5–5.2)
ALP SERPL-CCNC: 72 U/L (ref 55–135)
ALT SERPL W/O P-5'-P-CCNC: 15 U/L (ref 10–44)
ANION GAP SERPL CALC-SCNC: 8 MMOL/L (ref 8–16)
AST SERPL-CCNC: 24 U/L (ref 10–40)
BASOPHILS # BLD AUTO: 0.02 K/UL (ref 0–0.2)
BASOPHILS NFR BLD: 0.3 % (ref 0–1.9)
BILIRUB SERPL-MCNC: 0.7 MG/DL (ref 0.1–1)
BUN SERPL-MCNC: 31 MG/DL (ref 8–23)
CALCIUM SERPL-MCNC: 7.9 MG/DL (ref 8.7–10.5)
CHLORIDE SERPL-SCNC: 102 MMOL/L (ref 95–110)
CO2 SERPL-SCNC: 22 MMOL/L (ref 23–29)
CREAT SERPL-MCNC: 3.4 MG/DL (ref 0.5–1.4)
DIFFERENTIAL METHOD: ABNORMAL
EOSINOPHIL # BLD AUTO: 0.2 K/UL (ref 0–0.5)
EOSINOPHIL NFR BLD: 3.4 % (ref 0–8)
ERYTHROCYTE [DISTWIDTH] IN BLOOD BY AUTOMATED COUNT: 16.2 % (ref 11.5–14.5)
EST. GFR  (AFRICAN AMERICAN): 16 ML/MIN/1.73 M^2
EST. GFR  (NON AFRICAN AMERICAN): 14 ML/MIN/1.73 M^2
GLUCOSE SERPL-MCNC: 89 MG/DL (ref 70–110)
HCT VFR BLD AUTO: 23.9 % (ref 37–48.5)
HGB BLD-MCNC: 8.1 G/DL (ref 12–16)
INR PPP: 1.5 (ref 0.8–1.2)
LYMPHOCYTES # BLD AUTO: 1.2 K/UL (ref 1–4.8)
LYMPHOCYTES NFR BLD: 17.6 % (ref 18–48)
MCH RBC QN AUTO: 29.1 PG (ref 27–31)
MCHC RBC AUTO-ENTMCNC: 33.9 G/DL (ref 32–36)
MCV RBC AUTO: 86 FL (ref 82–98)
MONOCYTES # BLD AUTO: 0.7 K/UL (ref 0.3–1)
MONOCYTES NFR BLD: 9.7 % (ref 4–15)
NEUTROPHILS # BLD AUTO: 4.7 K/UL (ref 1.8–7.7)
NEUTROPHILS NFR BLD: 69.1 % (ref 38–73)
PLATELET # BLD AUTO: 213 K/UL (ref 150–350)
PMV BLD AUTO: 9.4 FL (ref 9.2–12.9)
POTASSIUM SERPL-SCNC: 3.8 MMOL/L (ref 3.5–5.1)
PROT SERPL-MCNC: 5.5 G/DL (ref 6–8.4)
PROTHROMBIN TIME: 16.3 SEC (ref 9–12.5)
RBC # BLD AUTO: 2.78 M/UL (ref 4–5.4)
SODIUM SERPL-SCNC: 132 MMOL/L (ref 136–145)
WBC # BLD AUTO: 6.81 K/UL (ref 3.9–12.7)

## 2019-09-22 PROCEDURE — 85610 PROTHROMBIN TIME: CPT

## 2019-09-22 PROCEDURE — 25000003 PHARM REV CODE 250: Performed by: NURSE PRACTITIONER

## 2019-09-22 PROCEDURE — 80053 COMPREHEN METABOLIC PANEL: CPT

## 2019-09-22 PROCEDURE — 11000001 HC ACUTE MED/SURG PRIVATE ROOM

## 2019-09-22 PROCEDURE — 36415 COLL VENOUS BLD VENIPUNCTURE: CPT

## 2019-09-22 PROCEDURE — 99222 PR INITIAL HOSPITAL CARE,LEVL II: ICD-10-PCS | Mod: ,,, | Performed by: INTERNAL MEDICINE

## 2019-09-22 PROCEDURE — 99232 PR SUBSEQUENT HOSPITAL CARE,LEVL II: ICD-10-PCS | Mod: ,,, | Performed by: INTERNAL MEDICINE

## 2019-09-22 PROCEDURE — 85025 COMPLETE CBC W/AUTO DIFF WBC: CPT

## 2019-09-22 PROCEDURE — 63600175 PHARM REV CODE 636 W HCPCS: Performed by: NURSE PRACTITIONER

## 2019-09-22 PROCEDURE — 99222 1ST HOSP IP/OBS MODERATE 55: CPT | Mod: ,,, | Performed by: INTERNAL MEDICINE

## 2019-09-22 PROCEDURE — 99232 SBSQ HOSP IP/OBS MODERATE 35: CPT | Mod: ,,, | Performed by: INTERNAL MEDICINE

## 2019-09-22 RX ADMIN — FLUOXETINE 20 MG: 20 CAPSULE ORAL at 08:09

## 2019-09-22 RX ADMIN — PANTOPRAZOLE SODIUM 40 MG: 40 TABLET, DELAYED RELEASE ORAL at 08:09

## 2019-09-22 RX ADMIN — LACTULOSE 10 G: 20 SOLUTION ORAL at 08:09

## 2019-09-22 RX ADMIN — CEFTRIAXONE SODIUM 2 G: 2 INJECTION, POWDER, FOR SOLUTION INTRAMUSCULAR; INTRAVENOUS at 02:09

## 2019-09-22 NOTE — ASSESSMENT & PLAN NOTE
- Hgb stable at 8.1  - No active s/s of bleeding  - Daily CBC  - Monitor and transfuse as needed to keep Hgb >7.0

## 2019-09-22 NOTE — ASSESSMENT & PLAN NOTE
- UA shows + nitrites and many bacteria  - UC pending  - Continue empiric Rocephin  - Adjust ABX based on final culture

## 2019-09-22 NOTE — SUBJECTIVE & OBJECTIVE
Subjective:     Interval History: No problems overnight. Feeling better.     Review of Systems   Constitutional: Negative.    HENT: Negative.    Eyes: Negative.    Respiratory: Negative.    Cardiovascular: Negative.    Gastrointestinal: Negative.    Endocrine: Negative.    Genitourinary: Negative.    Musculoskeletal: Negative.    Allergic/Immunologic: Negative.    Neurological: Negative.    Hematological: Negative.    Psychiatric/Behavioral: Negative.      Objective:     Vital Signs (Most Recent):  Temp: 98.1 °F (36.7 °C) (09/22/19 0717)  Pulse: 84 (09/22/19 0717)  Resp: 16 (09/22/19 0717)  BP: 106/68 (09/22/19 0717)  SpO2: 96 % (09/22/19 0717) Vital Signs (24h Range):  Temp:  [97.7 °F (36.5 °C)-98.1 °F (36.7 °C)] 98.1 °F (36.7 °C)  Pulse:  [68-85] 84  Resp:  [16-18] 16  SpO2:  [92 %-98 %] 96 %  BP: ()/(58-72) 106/68     Weight: 77.6 kg (171 lb 1.6 oz) (09/20/19 1316)  Body mass index is 27.62 kg/m².      Intake/Output Summary (Last 24 hours) at 9/22/2019 1107  Last data filed at 9/21/2019 1715  Gross per 24 hour   Intake 200 ml   Output 300 ml   Net -100 ml       Lines/Drains/Airways     Peripheral Intravenous Line                 Peripheral IV - Single Lumen 09/20/19 1335 20 G Left Forearm 1 day                Physical Exam   Constitutional: She is oriented to person, place, and time. She appears well-developed and well-nourished.   HENT:   Head: Normocephalic and atraumatic.   Eyes: Pupils are equal, round, and reactive to light. EOM are normal.   Neck: Normal range of motion. Neck supple.   Cardiovascular: Normal rate and regular rhythm.   Pulmonary/Chest: Effort normal and breath sounds normal.   Abdominal: Soft. Bowel sounds are normal.   Ascites present   Musculoskeletal: Normal range of motion. She exhibits edema.   Neurological: She is alert and oriented to person, place, and time.   Skin: Skin is warm and dry.   Psychiatric: She has a normal mood and affect. Her behavior is normal.        Significant Labs:  CBC:   Recent Labs   Lab 09/20/19  1335 09/21/19  0504 09/22/19  0525   WBC 9.58 6.24 6.81   HGB 8.3* 7.2* 8.1*   HCT 24.0* 20.8* 23.9*    188 213     CMP:   Recent Labs   Lab 09/22/19  0525   GLU 89   CALCIUM 7.9*   ALBUMIN 2.2*   PROT 5.5*   *   K 3.8   CO2 22*      BUN 31*   CREATININE 3.4*   ALKPHOS 72   ALT 15   AST 24   BILITOT 0.7     Coagulation:   Recent Labs   Lab 09/22/19  0524   INR 1.5*         Significant Imaging:  Imaging results within the past 24 hours have been reviewed.

## 2019-09-22 NOTE — SUBJECTIVE & OBJECTIVE
"Interval History:  No acute events overnight.  Currently resting comfortably in bed in NAD and with no new complaints.  GI and Nephrology following.  No SBP on paracentesis.  Abdominal US showed patent portal vein; Cirrhosis without solid-appearing mass lesion evident. Ascites and cholelithiasis.  GI discussed potential need for transplant in the future with patient who would "like to think about it".  Will need outpatient f/u with Dr. Rod upon DC to arrange.  Still with significant ascites and 4+ pitting edema to BLE.  Awaiting additional recs from Nephrology.   pending, continue empiric Rocephin for now.     Review of Systems   Constitutional: Positive for activity change and fatigue. Negative for chills and fever.   HENT: Negative for congestion, rhinorrhea and sinus pressure.    Respiratory: Negative for apnea, cough, choking, chest tightness, shortness of breath, wheezing and stridor.    Cardiovascular: Positive for leg swelling. Negative for chest pain and palpitations.   Gastrointestinal: Positive for abdominal distention. Negative for abdominal pain, diarrhea, nausea and vomiting.   Endocrine: Negative for cold intolerance and heat intolerance.   Genitourinary: Negative for difficulty urinating and hematuria.        Decrease urine output    Musculoskeletal: Negative for arthralgias and joint swelling.   Skin: Negative for color change, pallor and rash.   Neurological: Positive for weakness. Negative for dizziness, seizures, numbness and headaches.   Psychiatric/Behavioral: Negative for agitation. The patient is not nervous/anxious.      Objective:     Vital Signs (Most Recent):  Temp: 98.2 °F (36.8 °C) (09/22/19 1156)  Pulse: 96 (09/22/19 1156)  Resp: 16 (09/22/19 1156)  BP: 96/60 (09/22/19 1156)  SpO2: 98 % (09/22/19 1156) Vital Signs (24h Range):  Temp:  [97.7 °F (36.5 °C)-98.2 °F (36.8 °C)] 98.2 °F (36.8 °C)  Pulse:  [75-96] 96  Resp:  [16-18] 16  SpO2:  [92 %-98 %] 98 %  BP: ()/(58-72) 96/60 "     Weight: 77.6 kg (171 lb 1.6 oz)  Body mass index is 27.62 kg/m².    Intake/Output Summary (Last 24 hours) at 9/22/2019 1212  Last data filed at 9/21/2019 1715  Gross per 24 hour   Intake --   Output 300 ml   Net -300 ml      Physical Exam   Constitutional: She appears well-developed and well-nourished. No distress.   HENT:   Head: Normocephalic and atraumatic.   Mouth/Throat: Oropharynx is clear and moist. No oropharyngeal exudate.   Eyes: Pupils are equal, round, and reactive to light. Conjunctivae and EOM are normal. Right eye exhibits no discharge. Left eye exhibits no discharge.   Neck: Normal range of motion. Neck supple. No JVD present.   Cardiovascular: Normal rate, regular rhythm, normal heart sounds and intact distal pulses. Exam reveals no gallop and no friction rub.   No murmur heard.  Pulmonary/Chest: Effort normal and breath sounds normal. No respiratory distress. She has no wheezes. She has no rales. She exhibits no tenderness.   Comfortable on RA.   Abdominal: Soft. Bowel sounds are normal. She exhibits distension. She exhibits no mass. There is no tenderness. There is no rebound and no guarding. No hernia.   + ascites   Musculoskeletal: Normal range of motion. She exhibits edema. She exhibits no tenderness.   4+ pitting edema to BLE.   Neurological: She is alert.   Awake and alert, oriented to person and place.  Follows commands appropriately.     Skin: Skin is warm and dry. Capillary refill takes less than 2 seconds. No rash noted. She is not diaphoretic. No erythema. There is pallor.   Psychiatric: She has a normal mood and affect. Her behavior is normal. Judgment and thought content normal.   Nursing note and vitals reviewed.      Significant Labs:   CBC:   Recent Labs   Lab 09/20/19  1335 09/21/19  0504 09/22/19  0525   WBC 9.58 6.24 6.81   HGB 8.3* 7.2* 8.1*   HCT 24.0* 20.8* 23.9*    188 213     CMP:   Recent Labs   Lab 09/20/19  1335 09/21/19  0504 09/22/19  0525   * 134* 132*    K 3.7 4.0 3.8    103 102   CO2 19* 22* 22*    91 89   BUN 32* 31* 31*   CREATININE 3.8* 3.6* 3.4*   CALCIUM 8.2* 7.8* 7.9*   PROT 6.3 5.4* 5.5*   ALBUMIN 2.4* 2.0* 2.2*   BILITOT 0.7 0.8 0.7   ALKPHOS 91 73 72   AST 31 22 24   ALT 17 15 15   ANIONGAP 10 9 8   EGFRNONAA 12* 13* 14*     Coagulation:   Recent Labs   Lab 09/22/19  0524   INR 1.5*     Urine Culture: No results for input(s): LABURIN in the last 48 hours.    Significant Imaging: I have reviewed all pertinent imaging results/findings within the past 24 hours.

## 2019-09-22 NOTE — ASSESSMENT & PLAN NOTE
"- Followed outpatient by Dr. Rod  - s/p paracentesis on 9/21 with a reported 5L removed; received 25g of Albumin post procedure  - No evidence of SBP  - GI consulted here and recommended to continue Lactulose, titrate to 3-4 BMs/day  - Hold home diuretics given #1  - Repeat paracentesis as needed  - Abdominal US showed patent portal vein; Cirrhosis without solid-appearing mass lesion evident. Ascites and cholelithiasis.    - GI discussed potential need for transplant in the future with patient who would "like to think about it".  Will need outpatient f/u with Dr. Rod upon DC to arrange.   - Daily labs  - Monitor    MELD-Na score: 26 at 9/22/2019  5:25 AM  MELD score: 23 at 9/22/2019  5:25 AM  Calculated from:  Serum Creatinine: 3.4 mg/dL at 9/22/2019  5:25 AM  Serum Sodium: 132 mmol/L at 9/22/2019  5:25 AM  Total Bilirubin: 0.7 mg/dL (Rounded to 1 mg/dL) at 9/22/2019  5:25 AM  INR(ratio): 1.5 at 9/22/2019  5:24 AM  Age: 63 years      "

## 2019-09-22 NOTE — PROGRESS NOTES
[facility]  Nephrology  Progress Note    Patient Name: Kylah Ortiz  MRN: [id number]  Admission Date: 9/20/2019  Hospital Length of Stay: 0 days  Attending Provider: Regino Ceballos MD   Primary Care Physician: Santos Beaver MD  Principal Problem:Acute renal failure superimposed on stage 4 chronic kidney disease    Subjective:     HPI: Kylah Ortiz is a 63-year-old  woman with history of end-stage liver disease due to alcohol abuse, chronic kidney disease, her serum creatinine was 2.5 mg/dL about a month ago, patient presents to the hospital for generalized weakness, acute on chronic renal failure noted, serum creatinine increased to 3.6 mg/dL, significant ascites noted on exam, also with some peripheral edema, status post paracentesis for about 5 L fluids today, empirically on antibiotics , we were consulted for acute on chronic renal failure.    Interval History:     9/22/19 - no acute events, denies complaints     Review of patient's allergies indicates:   Allergen Reactions    Hydrocodone-acetaminophen      Current Facility-Administered Medications   Medication Frequency    cefTRIAXone (ROCEPHIN) 2 g in dextrose 5 % 50 mL IVPB Q24H    FLUoxetine capsule 20 mg Daily    lactulose 20 gram/30 mL solution Soln 10 g Daily    pantoprazole EC tablet 40 mg Daily       Objective:     Vital Signs (Most Recent):  Temp: 98.2 °F (36.8 °C) (09/22/19 1156)  Pulse: 96 (09/22/19 1156)  Resp: 16 (09/22/19 1156)  BP: 96/60 (09/22/19 1156)  SpO2: 98 % (09/22/19 1156) Vital Signs (24h Range):  Temp:  [97.7 °F (36.5 °C)-98.2 °F (36.8 °C)] 98.2 °F (36.8 °C)  Pulse:  [75-96] 96  Resp:  [16-18] 16  SpO2:  [92 %-98 %] 98 %  BP: ()/(58-72) 96/60     Weight: 77.6 kg (171 lb 1.6 oz) (09/20/19 1316)  Body mass index is 27.62 kg/m².  Body surface area is 1.9 meters squared.    I/O last 3 completed shifts:  In: 380 [P.O.:380]  Out: 300 [Urine:300]    Physical Exam   Constitutional: She is  oriented to person, place, and time. She appears well-developed. She appears ill.   HENT:   Head: Normocephalic and atraumatic.   Mouth/Throat: Oropharynx is clear and moist. No oropharyngeal exudate.   Eyes: Pupils are equal, round, and reactive to light. Conjunctivae and EOM are normal. No scleral icterus.   Neck: Normal range of motion. Neck supple. No JVD present. Carotid bruit is not present. No tracheal deviation present. No thyroid mass and no thyromegaly present.   Cardiovascular: Normal rate, regular rhythm, normal heart sounds and intact distal pulses. Exam reveals no gallop and no friction rub.   No murmur heard.  Pulmonary/Chest: Breath sounds normal. No respiratory distress. She has no wheezes. She has no rales. She exhibits no tenderness.   Reduced BS in bases   Abdominal: Soft. Bowel sounds are normal. She exhibits distension. She exhibits no abdominal bruit, no ascites and no mass. There is no hepatosplenomegaly. There is no tenderness. There is no rebound, no guarding and no CVA tenderness.   Musculoskeletal: She exhibits edema. She exhibits no tenderness.   Lymphadenopathy:     She has no cervical adenopathy.   Neurological: She is oriented to person, place, and time. No cranial nerve deficit. Coordination normal.   Skin: Skin is warm and intact. No rash noted. No erythema. No pallor.   Psychiatric: She has a normal mood and affect.       Significant Labs:  CBC:   Recent Labs   Lab 09/22/19 0525   WBC 6.81   RBC 2.78*   HGB 8.1*   HCT 23.9*      MCV 86   MCH 29.1   MCHC 33.9     CMP:   Recent Labs   Lab 09/22/19 0525   GLU 89   CALCIUM 7.9*   ALBUMIN 2.2*   PROT 5.5*   *   K 3.8   CO2 22*      BUN 31*   CREATININE 3.4*   ALKPHOS 72   ALT 15   AST 24   BILITOT 0.7     Coagulation:   Recent Labs   Lab 09/22/19  0524   INR 1.5*     LFTs:   Recent Labs   Lab 09/22/19  0525   ALT 15   AST 24   ALKPHOS 72   BILITOT 0.7   PROT 5.5*   ALBUMIN 2.2*     All labs within the past 24 hours  have been reviewed.     Significant Imaging:  Reviewed    Lab Results   Component Value Date    CALCIUM 7.9 (L) 09/22/2019       Lab Results   Component Value Date    ALBUMIN 2.2 (L) 09/22/2019         Assessment/Plan:     * Acute renal failure superimposed on stage 4 chronic kidney disease  1. DARRIAN on CKD stage 4 :  Baseline serum creatinine about 2.5 mg/dL, acute on chronic kidney failure likely due to raised intra-abdominal pressure from significant ascites, status post large volume paracentesis for about 5 L, needs a  repeat same ,    Hepatorenal syndrome is a possibility, urine sodium less than 20, needs w/u for liver and kidney transplant as out pt ,      2.  Hypertension - blood pressure stable, on low side,     3.  End-stage liver disease due to alcoholic cirrhosis, GI notes reviewed,     4.  E Coli,  urinary tract infection - continue  Rocephin, sensivity pending,     5.  Anemia - multifactorial, PRBC transfusion when indicated,     6.  Mild hyponatremia - consistent with end-stage liver disease           Thank you for your consult. I will follow-up with patient. Please contact us if you have any additional questions.     Total time spent 40 minutes including time needed to review the records,  patient  evaluation, documentation, face-to-face discussion with the patient,  primary team,   more than 50% of the time was spent on coordination of care and counseling.       Noe Urbano MD  Nephrology  [facility]

## 2019-09-22 NOTE — SUBJECTIVE & OBJECTIVE
Interval History:     9/22/19 - no acute events, denies complaints     Review of patient's allergies indicates:   Allergen Reactions    Hydrocodone-acetaminophen      Current Facility-Administered Medications   Medication Frequency    cefTRIAXone (ROCEPHIN) 2 g in dextrose 5 % 50 mL IVPB Q24H    FLUoxetine capsule 20 mg Daily    lactulose 20 gram/30 mL solution Soln 10 g Daily    pantoprazole EC tablet 40 mg Daily       Objective:     Vital Signs (Most Recent):  Temp: 98.2 °F (36.8 °C) (09/22/19 1156)  Pulse: 96 (09/22/19 1156)  Resp: 16 (09/22/19 1156)  BP: 96/60 (09/22/19 1156)  SpO2: 98 % (09/22/19 1156) Vital Signs (24h Range):  Temp:  [97.7 °F (36.5 °C)-98.2 °F (36.8 °C)] 98.2 °F (36.8 °C)  Pulse:  [75-96] 96  Resp:  [16-18] 16  SpO2:  [92 %-98 %] 98 %  BP: ()/(58-72) 96/60     Weight: 77.6 kg (171 lb 1.6 oz) (09/20/19 1316)  Body mass index is 27.62 kg/m².  Body surface area is 1.9 meters squared.    I/O last 3 completed shifts:  In: 380 [P.O.:380]  Out: 300 [Urine:300]    Physical Exam   Constitutional: She is oriented to person, place, and time. She appears well-developed. She appears ill.   HENT:   Head: Normocephalic and atraumatic.   Mouth/Throat: Oropharynx is clear and moist. No oropharyngeal exudate.   Eyes: Pupils are equal, round, and reactive to light. Conjunctivae and EOM are normal. No scleral icterus.   Neck: Normal range of motion. Neck supple. No JVD present. Carotid bruit is not present. No tracheal deviation present. No thyroid mass and no thyromegaly present.   Cardiovascular: Normal rate, regular rhythm, normal heart sounds and intact distal pulses. Exam reveals no gallop and no friction rub.   No murmur heard.  Pulmonary/Chest: Breath sounds normal. No respiratory distress. She has no wheezes. She has no rales. She exhibits no tenderness.   Reduced BS in bases   Abdominal: Soft. Bowel sounds are normal. She exhibits distension. She exhibits no abdominal bruit, no ascites and no  mass. There is no hepatosplenomegaly. There is no tenderness. There is no rebound, no guarding and no CVA tenderness.   Musculoskeletal: She exhibits edema. She exhibits no tenderness.   Lymphadenopathy:     She has no cervical adenopathy.   Neurological: She is oriented to person, place, and time. No cranial nerve deficit. Coordination normal.   Skin: Skin is warm and intact. No rash noted. No erythema. No pallor.   Psychiatric: She has a normal mood and affect.       Significant Labs:  CBC:   Recent Labs   Lab 09/22/19  0525   WBC 6.81   RBC 2.78*   HGB 8.1*   HCT 23.9*      MCV 86   MCH 29.1   MCHC 33.9     CMP:   Recent Labs   Lab 09/22/19 0525   GLU 89   CALCIUM 7.9*   ALBUMIN 2.2*   PROT 5.5*   *   K 3.8   CO2 22*      BUN 31*   CREATININE 3.4*   ALKPHOS 72   ALT 15   AST 24   BILITOT 0.7     Coagulation:   Recent Labs   Lab 09/22/19 0524   INR 1.5*     LFTs:   Recent Labs   Lab 09/22/19  0525   ALT 15   AST 24   ALKPHOS 72   BILITOT 0.7   PROT 5.5*   ALBUMIN 2.2*     All labs within the past 24 hours have been reviewed.     Significant Imaging:  Reviewed    Lab Results   Component Value Date    CALCIUM 7.9 (L) 09/22/2019       Lab Results   Component Value Date    ALBUMIN 2.2 (L) 09/22/2019

## 2019-09-22 NOTE — SIGNIFICANT EVENT
Case d/w charge nurse and with Umm in radiology regarding the need for repeat therapeutic paracentesis given persistent ascites.  Order placed for IR to re-evaluate for repeat paracentesis tomorrow.

## 2019-09-22 NOTE — ASSESSMENT & PLAN NOTE
- in setting of chronic liver disease/cirrhosis  - Cr in August 2.5, currently 3.4  - Hold home diuretics  - Received 25g of Albumin post-paracentesis on 9/21  - Nephrology following; Hepatorenal syndrome is a possibility, awaiting additional recommendations  - Avoid Nephrotoxic agents  - Strict I and O  - Daily BMP

## 2019-09-22 NOTE — ASSESSMENT & PLAN NOTE
Acute on chronic decompensated cirrhosis. Suspect secondary to infectious etiology in setting of diuretic therapy. Potential sources include UTI (see UA) and/or pulmonary (possible infiltrate on CXR). Her liver disease is complicated by: ascites with encephalopathy (however this could be worsened with acute infection). MELD 25--> 26. She is followed by an outside GI therefore prior liver disease work up is not in our system. Doppler us is unremarkable.   · Empiric antibiotic coverage for UTI.  · No SBP on paracentesis.   · Hold diuresis at this time.   · Agree with lactulose - titrate to 2-3 BM's per day.   · Low sodium diet.   · Discussed possibility of future transplant with patient. This was the first conversation she has had regarding transplant and would like to think more about it. She does not think that alcohl cessation would be a problem. We can re-visit this conversation with her in the future, ideally in the outpatient setting.       MELD-Na score: 26 at 9/22/2019  5:25 AM  MELD score: 23 at 9/22/2019  5:25 AM  Calculated from:  Serum Creatinine: 3.4 mg/dL at 9/22/2019  5:25 AM  Serum Sodium: 132 mmol/L at 9/22/2019  5:25 AM  Total Bilirubin: 0.7 mg/dL (Rounded to 1 mg/dL) at 9/22/2019  5:25 AM  INR(ratio): 1.5 at 9/22/2019  5:24 AM  Age: 63 years

## 2019-09-22 NOTE — PROGRESS NOTES
[facility] -   Gastroenterology  Progress Note    Patient Name: Kylah Ortiz  MRN: [id number]  Admission Date: 9/20/2019  Hospital Length of Stay: 0 days  Code Status: Full Code   Attending Provider: Regino Ceballos MD  Consulting Provider: Katharina Vasquez MD  Primary Care Physician: Santos Beaver MD  Principal Problem: DARRIAN (acute kidney injury)      Subjective:     Interval History: No problems overnight. Feeling better.     Review of Systems   Constitutional: Negative.    HENT: Negative.    Eyes: Negative.    Respiratory: Negative.    Cardiovascular: Negative.    Gastrointestinal: Negative.    Endocrine: Negative.    Genitourinary: Negative.    Musculoskeletal: Negative.    Allergic/Immunologic: Negative.    Neurological: Negative.    Hematological: Negative.    Psychiatric/Behavioral: Negative.      Objective:     Vital Signs (Most Recent):  Temp: 98.1 °F (36.7 °C) (09/22/19 0717)  Pulse: 84 (09/22/19 0717)  Resp: 16 (09/22/19 0717)  BP: 106/68 (09/22/19 0717)  SpO2: 96 % (09/22/19 0717) Vital Signs (24h Range):  Temp:  [97.7 °F (36.5 °C)-98.1 °F (36.7 °C)] 98.1 °F (36.7 °C)  Pulse:  [68-85] 84  Resp:  [16-18] 16  SpO2:  [92 %-98 %] 96 %  BP: ()/(58-72) 106/68     Weight: 77.6 kg (171 lb 1.6 oz) (09/20/19 1316)  Body mass index is 27.62 kg/m².      Intake/Output Summary (Last 24 hours) at 9/22/2019 1107  Last data filed at 9/21/2019 1715  Gross per 24 hour   Intake 200 ml   Output 300 ml   Net -100 ml       Lines/Drains/Airways     Peripheral Intravenous Line                 Peripheral IV - Single Lumen 09/20/19 1335 20 G Left Forearm 1 day                Physical Exam   Constitutional: She is oriented to person, place, and time. She appears well-developed and well-nourished.   HENT:   Head: Normocephalic and atraumatic.   Eyes: Pupils are equal, round, and reactive to light. EOM are normal.   Neck: Normal range of motion. Neck supple.   Cardiovascular: Normal rate and regular  rhythm.   Pulmonary/Chest: Effort normal and breath sounds normal.   Abdominal: Soft. Bowel sounds are normal.   Ascites present   Musculoskeletal: Normal range of motion. She exhibits edema.   Neurological: She is alert and oriented to person, place, and time.   Skin: Skin is warm and dry.   Psychiatric: She has a normal mood and affect. Her behavior is normal.       Significant Labs:  CBC:   Recent Labs   Lab 09/20/19  1335 09/21/19  0504 09/22/19  0525   WBC 9.58 6.24 6.81   HGB 8.3* 7.2* 8.1*   HCT 24.0* 20.8* 23.9*    188 213     CMP:   Recent Labs   Lab 09/22/19  0525   GLU 89   CALCIUM 7.9*   ALBUMIN 2.2*   PROT 5.5*   *   K 3.8   CO2 22*      BUN 31*   CREATININE 3.4*   ALKPHOS 72   ALT 15   AST 24   BILITOT 0.7     Coagulation:   Recent Labs   Lab 09/22/19  0524   INR 1.5*         Significant Imaging:  Imaging results within the past 24 hours have been reviewed.    Assessment/Plan:     Ascites due to alcoholic cirrhosis  Acute on chronic decompensated cirrhosis. Suspect secondary to infectious etiology in setting of diuretic therapy. Potential sources include UTI (see UA) and/or pulmonary (possible infiltrate on CXR). Her liver disease is complicated by: ascites with encephalopathy (however this could be worsened with acute infection). MELD 25--> 26. She is followed by an outside GI therefore prior liver disease work up is not in our system. Doppler us is unremarkable.   · Empiric antibiotic coverage for UTI.  · No SBP on paracentesis.   · Hold diuresis at this time.   · Agree with lactulose - titrate to 2-3 BM's per day.   · Low sodium diet.   · Discussed possibility of future transplant with patient. This was the first conversation she has had regarding transplant and would like to think more about it. She does not think that alcohl cessation would be a problem. We can re-visit this conversation with her in the future, ideally in the outpatient setting.       MELD-Na score: 26 at  9/22/2019  5:25 AM  MELD score: 23 at 9/22/2019  5:25 AM  Calculated from:  Serum Creatinine: 3.4 mg/dL at 9/22/2019  5:25 AM  Serum Sodium: 132 mmol/L at 9/22/2019  5:25 AM  Total Bilirubin: 0.7 mg/dL (Rounded to 1 mg/dL) at 9/22/2019  5:25 AM  INR(ratio): 1.5 at 9/22/2019  5:24 AM  Age: 63 years          Thank you for your consult. I will follow-up with patient. Please contact us if you have any additional questions.    Katharina Vasquez MD  Gastroenterology  [facility]

## 2019-09-22 NOTE — PROGRESS NOTES
[facility] Hospital Medicine  Progress Note    Patient Name: Kylah Ortiz  MRN: [id number]  Patient Class: OP- Observation   Admission Date: 9/20/2019  Length of Stay: 0 days  Attending Physician: Regino Ceballos MD  Primary Care Provider: Santos Beaver MD        Subjective:     Principal Problem:Acute renal failure superimposed on stage 4 chronic kidney disease        HPI:  Ms Ortiz is a 63 year old female with PMHx alcohol cirrhosis, ascites and CKD who presented to [facility] Emergency Room after have abnormal labs reported from clinic. Emergency Room physican  discussed thecase with Dr. Anaya who state d that the patient creatinine went from 1.5 to 3.6 and recommends admission. Associated symptoms include increase abdominal distention and bilateral leg swelling. Patient denies associated symptoms of chest pain, increase shortness of breath, fever, chills, nausea, vomiting or diaphoresis. Sitter is at bedside and assisting in answering questions. She is being placed in Observations under the care of Hospital Medicine.     Overview/Hospital Course:  On 9/20 patient was placed in OBS secondary to DARRIAN and ascites due to alcoholic cirrhosis.  Per ER records, Dr. Anaya was contacted and it is reported that patient's Cr increased from 1.5 to 3.6 and GI recommended admission.  Patient's home diuretics were held and she was started on gentle IV hydration with NS at 50 cc/hour.    As of 9/21 Cr decreased from 3.8 to 3.6.  Per Care Everywhere, Cr in August was noted to be 2.4.  Patient is a poor historian and cannot provide any details on her home medications, or which physician she follows with as an outpatient.  No records are noted within the [facility] system.  Per Care Everywhere, patient follows with GI Dr. Rod.  Patient underwent a paracentesis today with a reported 5L removed.  GI consulted for additional recommendations and recommended empiric ABX coverage for UTI +/- PNA, and to  "continue lactulose, hold diuretics, and obtain US doppler to r/o liver lesion and PVT.  UA shows many bacteria.  UC pending.  Will start on empiric Rocephiin for now and adjust based on cultures.  Nephrology consult pending.      As of 9/22 patient resting in bed with no new complaints.  GI and Nephrology following.  No SBP on paracentesis.  Abdominal US showed patent portal vein; Cirrhosis without solid-appearing mass lesion evident.  Ascites and cholelithiasis.  GI discussed potential need for transplant in the future with patient who would "like to think about it".  Will need outpatient f/u with Dr. Rod upon SD to arrange.  Still with significant ascites and 4+ pitting edema to BLE.  Awaiting additional recs from Nephrology.  UC pending, continue empiric Rocephin for now.     Interval History:  No acute events overnight.  Currently resting comfortably in bed in NAD and with no new complaints.  GI and Nephrology following.  No SBP on paracentesis.  Abdominal US showed patent portal vein; Cirrhosis without solid-appearing mass lesion evident. Ascites and cholelithiasis.  GI discussed potential need for transplant in the future with patient who would "like to think about it".  Will need outpatient f/u with Dr. Rod upon DC to arrange.  Still with significant ascites and 4+ pitting edema to BLE.  Awaiting additional recs from Nephrology.  UC pending, continue empiric Rocephin for now.     Review of Systems   Constitutional: Positive for activity change and fatigue. Negative for chills and fever.   HENT: Negative for congestion, rhinorrhea and sinus pressure.    Respiratory: Negative for apnea, cough, choking, chest tightness, shortness of breath, wheezing and stridor.    Cardiovascular: Positive for leg swelling. Negative for chest pain and palpitations.   Gastrointestinal: Positive for abdominal distention. Negative for abdominal pain, diarrhea, nausea and vomiting.   Endocrine: Negative for cold " intolerance and heat intolerance.   Genitourinary: Negative for difficulty urinating and hematuria.        Decrease urine output    Musculoskeletal: Negative for arthralgias and joint swelling.   Skin: Negative for color change, pallor and rash.   Neurological: Positive for weakness. Negative for dizziness, seizures, numbness and headaches.   Psychiatric/Behavioral: Negative for agitation. The patient is not nervous/anxious.      Objective:     Vital Signs (Most Recent):  Temp: 98.2 °F (36.8 °C) (09/22/19 1156)  Pulse: 96 (09/22/19 1156)  Resp: 16 (09/22/19 1156)  BP: 96/60 (09/22/19 1156)  SpO2: 98 % (09/22/19 1156) Vital Signs (24h Range):  Temp:  [97.7 °F (36.5 °C)-98.2 °F (36.8 °C)] 98.2 °F (36.8 °C)  Pulse:  [75-96] 96  Resp:  [16-18] 16  SpO2:  [92 %-98 %] 98 %  BP: ()/(58-72) 96/60     Weight: 77.6 kg (171 lb 1.6 oz)  Body mass index is 27.62 kg/m².    Intake/Output Summary (Last 24 hours) at 9/22/2019 1212  Last data filed at 9/21/2019 1715  Gross per 24 hour   Intake --   Output 300 ml   Net -300 ml      Physical Exam   Constitutional: She appears well-developed and well-nourished. No distress.   HENT:   Head: Normocephalic and atraumatic.   Mouth/Throat: Oropharynx is clear and moist. No oropharyngeal exudate.   Eyes: Pupils are equal, round, and reactive to light. Conjunctivae and EOM are normal. Right eye exhibits no discharge. Left eye exhibits no discharge.   Neck: Normal range of motion. Neck supple. No JVD present.   Cardiovascular: Normal rate, regular rhythm, normal heart sounds and intact distal pulses. Exam reveals no gallop and no friction rub.   No murmur heard.  Pulmonary/Chest: Effort normal and breath sounds normal. No respiratory distress. She has no wheezes. She has no rales. She exhibits no tenderness.   Comfortable on RA.   Abdominal: Soft. Bowel sounds are normal. She exhibits distension. She exhibits no mass. There is no tenderness. There is no rebound and no guarding. No hernia.    + ascites   Musculoskeletal: Normal range of motion. She exhibits edema. She exhibits no tenderness.   4+ pitting edema to BLE.   Neurological: She is alert.   Awake and alert, oriented to person and place.  Follows commands appropriately.     Skin: Skin is warm and dry. Capillary refill takes less than 2 seconds. No rash noted. She is not diaphoretic. No erythema. There is pallor.   Psychiatric: She has a normal mood and affect. Her behavior is normal. Judgment and thought content normal.   Nursing note and vitals reviewed.      Significant Labs:   CBC:   Recent Labs   Lab 09/20/19  1335 09/21/19  0504 09/22/19  0525   WBC 9.58 6.24 6.81   HGB 8.3* 7.2* 8.1*   HCT 24.0* 20.8* 23.9*    188 213     CMP:   Recent Labs   Lab 09/20/19  1335 09/21/19  0504 09/22/19  0525   * 134* 132*   K 3.7 4.0 3.8    103 102   CO2 19* 22* 22*    91 89   BUN 32* 31* 31*   CREATININE 3.8* 3.6* 3.4*   CALCIUM 8.2* 7.8* 7.9*   PROT 6.3 5.4* 5.5*   ALBUMIN 2.4* 2.0* 2.2*   BILITOT 0.7 0.8 0.7   ALKPHOS 91 73 72   AST 31 22 24   ALT 17 15 15   ANIONGAP 10 9 8   EGFRNONAA 12* 13* 14*     Coagulation:   Recent Labs   Lab 09/22/19  0524   INR 1.5*     Urine Culture: No results for input(s): LABURIN in the last 48 hours.    Significant Imaging: I have reviewed all pertinent imaging results/findings within the past 24 hours.      Assessment/Plan:      * Acute renal failure superimposed on stage 4 chronic kidney disease  - in setting of chronic liver disease/cirrhosis  - Cr in August 2.5, currently 3.4  - Hold home diuretics  - Received 25g of Albumin post-paracentesis on 9/21  - Nephrology following; Hepatorenal syndrome is a possibility, awaiting additional recommendations  - Avoid Nephrotoxic agents  - Strict I and O  - Daily BMP      Ascites due to alcoholic cirrhosis  - Followed outpatient by Dr. Rod  - s/p paracentesis on 9/21 with a reported 5L removed; received 25g of Albumin post procedure  - No evidence  "of SBP  - GI consulted here and recommended to continue Lactulose, titrate to 3-4 BMs/day  - Hold home diuretics given #1  - Repeat paracentesis as needed  - Abdominal US showed patent portal vein; Cirrhosis without solid-appearing mass lesion evident. Ascites and cholelithiasis.    - GI discussed potential need for transplant in the future with patient who would "like to think about it".  Will need outpatient f/u with Dr. Rod upon DC to arrange.   - Daily labs  - Monitor    MELD-Na score: 26 at 9/22/2019  5:25 AM  MELD score: 23 at 9/22/2019  5:25 AM  Calculated from:  Serum Creatinine: 3.4 mg/dL at 9/22/2019  5:25 AM  Serum Sodium: 132 mmol/L at 9/22/2019  5:25 AM  Total Bilirubin: 0.7 mg/dL (Rounded to 1 mg/dL) at 9/22/2019  5:25 AM  INR(ratio): 1.5 at 9/22/2019  5:24 AM  Age: 63 years        UTI (urinary tract infection)  - UA shows + nitrites and many bacteria  - UC pending  - Continue empiric Rocephin  - Adjust ABX based on final culture      Anemia  - Hgb stable at 8.1  - No active s/s of bleeding  - Daily CBC  - Monitor and transfuse as needed to keep Hgb >7.0      Esophageal reflux disease  - PPI        VTE Risk Mitigation (From admission, onward)         Ordered     Place sequential compression device  Until discontinued      09/20/19 3771                      Margarita Nesbitt, NOMI, ACNP-BC  Department of Hospital Medicine   [facility]  "

## 2019-09-22 NOTE — ASSESSMENT & PLAN NOTE
1. DARRIAN on CKD stage 4 :  Baseline serum creatinine about 2.5 mg/dL, acute on chronic kidney failure likely due to raised intra-abdominal pressure from significant ascites, status post large volume paracentesis for about 5 L, needs a  repeat same ,    Hepatorenal syndrome is a possibility, urine sodium less than 20, needs w/u for liver and kidney transplant as out pt ,      2.  Hypertension - blood pressure stable, on low side,     3.  End-stage liver disease due to alcoholic cirrhosis, GI notes reviewed,     4.  E Coli,  urinary tract infection - continue Rocephin, sensivity pending,     5.  Anemia - multifactorial, PRBC transfusion when indicated,     6.  Mild hyponatremia - consistent with end-stage liver disease

## 2019-09-23 PROBLEM — K70.31 ALCOHOLIC CIRRHOSIS OF LIVER WITH ASCITES: Status: ACTIVE | Noted: 2019-09-23

## 2019-09-23 LAB
ALBUMIN SERPL BCP-MCNC: 2.1 G/DL (ref 3.5–5.2)
ALP SERPL-CCNC: 70 U/L (ref 55–135)
ALT SERPL W/O P-5'-P-CCNC: 18 U/L (ref 10–44)
ANION GAP SERPL CALC-SCNC: 10 MMOL/L (ref 8–16)
AST SERPL-CCNC: 27 U/L (ref 10–40)
BACTERIA UR CULT: ABNORMAL
BASOPHILS # BLD AUTO: 0.02 K/UL (ref 0–0.2)
BASOPHILS NFR BLD: 0.3 % (ref 0–1.9)
BILIRUB SERPL-MCNC: 0.6 MG/DL (ref 0.1–1)
BUN SERPL-MCNC: 29 MG/DL (ref 8–23)
CALCIUM SERPL-MCNC: 8.2 MG/DL (ref 8.7–10.5)
CHLORIDE SERPL-SCNC: 104 MMOL/L (ref 95–110)
CO2 SERPL-SCNC: 20 MMOL/L (ref 23–29)
CREAT SERPL-MCNC: 3.3 MG/DL (ref 0.5–1.4)
DIFFERENTIAL METHOD: ABNORMAL
EOSINOPHIL # BLD AUTO: 0.3 K/UL (ref 0–0.5)
EOSINOPHIL NFR BLD: 4.2 % (ref 0–8)
ERYTHROCYTE [DISTWIDTH] IN BLOOD BY AUTOMATED COUNT: 16.4 % (ref 11.5–14.5)
EST. GFR  (AFRICAN AMERICAN): 16 ML/MIN/1.73 M^2
EST. GFR  (NON AFRICAN AMERICAN): 14 ML/MIN/1.73 M^2
GLUCOSE SERPL-MCNC: 93 MG/DL (ref 70–110)
HCT VFR BLD AUTO: 22 % (ref 37–48.5)
HGB BLD-MCNC: 7.5 G/DL (ref 12–16)
INR PPP: 1.5 (ref 0.8–1.2)
LYMPHOCYTES # BLD AUTO: 1.2 K/UL (ref 1–4.8)
LYMPHOCYTES NFR BLD: 18.1 % (ref 18–48)
MCH RBC QN AUTO: 29.1 PG (ref 27–31)
MCHC RBC AUTO-ENTMCNC: 34.1 G/DL (ref 32–36)
MCV RBC AUTO: 85 FL (ref 82–98)
MONOCYTES # BLD AUTO: 0.9 K/UL (ref 0.3–1)
MONOCYTES NFR BLD: 13.8 % (ref 4–15)
NEUTROPHILS # BLD AUTO: 4.3 K/UL (ref 1.8–7.7)
NEUTROPHILS NFR BLD: 63.9 % (ref 38–73)
PATH INTERP FLD-IMP: NORMAL
PLATELET # BLD AUTO: 192 K/UL (ref 150–350)
PMV BLD AUTO: 9.8 FL (ref 9.2–12.9)
POTASSIUM SERPL-SCNC: 3.6 MMOL/L (ref 3.5–5.1)
PROT SERPL-MCNC: 5.2 G/DL (ref 6–8.4)
PROTHROMBIN TIME: 16.1 SEC (ref 9–12.5)
RBC # BLD AUTO: 2.58 M/UL (ref 4–5.4)
SODIUM SERPL-SCNC: 134 MMOL/L (ref 136–145)
WBC # BLD AUTO: 6.74 K/UL (ref 3.9–12.7)

## 2019-09-23 PROCEDURE — 99232 SBSQ HOSP IP/OBS MODERATE 35: CPT | Mod: ,,, | Performed by: PHYSICIAN ASSISTANT

## 2019-09-23 PROCEDURE — 85025 COMPLETE CBC W/AUTO DIFF WBC: CPT

## 2019-09-23 PROCEDURE — 63600175 PHARM REV CODE 636 W HCPCS: Mod: JG | Performed by: PHYSICIAN ASSISTANT

## 2019-09-23 PROCEDURE — 63600175 PHARM REV CODE 636 W HCPCS: Performed by: NURSE PRACTITIONER

## 2019-09-23 PROCEDURE — 99232 PR SUBSEQUENT HOSPITAL CARE,LEVL II: ICD-10-PCS | Mod: ,,, | Performed by: PHYSICIAN ASSISTANT

## 2019-09-23 PROCEDURE — 11000001 HC ACUTE MED/SURG PRIVATE ROOM

## 2019-09-23 PROCEDURE — 25000003 PHARM REV CODE 250: Performed by: NURSE PRACTITIONER

## 2019-09-23 PROCEDURE — 80053 COMPREHEN METABOLIC PANEL: CPT

## 2019-09-23 PROCEDURE — 99232 PR SUBSEQUENT HOSPITAL CARE,LEVL II: ICD-10-PCS | Mod: ,,, | Performed by: INTERNAL MEDICINE

## 2019-09-23 PROCEDURE — 36415 COLL VENOUS BLD VENIPUNCTURE: CPT

## 2019-09-23 PROCEDURE — 99232 SBSQ HOSP IP/OBS MODERATE 35: CPT | Mod: ,,, | Performed by: INTERNAL MEDICINE

## 2019-09-23 PROCEDURE — P9047 ALBUMIN (HUMAN), 25%, 50ML: HCPCS | Mod: JG | Performed by: PHYSICIAN ASSISTANT

## 2019-09-23 PROCEDURE — 85610 PROTHROMBIN TIME: CPT

## 2019-09-23 RX ORDER — ALBUMIN HUMAN 250 G/1000ML
300 SOLUTION INTRAVENOUS DAILY
Status: COMPLETED | OUTPATIENT
Start: 2019-09-23 | End: 2019-09-24

## 2019-09-23 RX ORDER — ALBUMIN HUMAN 250 G/1000ML
300 SOLUTION INTRAVENOUS DAILY
Status: DISCONTINUED | OUTPATIENT
Start: 2019-09-23 | End: 2019-09-23

## 2019-09-23 RX ORDER — ALBUMIN HUMAN 250 G/1000ML
1 SOLUTION INTRAVENOUS DAILY
Status: DISCONTINUED | OUTPATIENT
Start: 2019-09-23 | End: 2019-09-23

## 2019-09-23 RX ADMIN — LACTULOSE 10 G: 20 SOLUTION ORAL at 08:09

## 2019-09-23 RX ADMIN — ALBUMIN (HUMAN) 300 ML: 25 SOLUTION INTRAVENOUS at 01:09

## 2019-09-23 RX ADMIN — CEFTRIAXONE SODIUM 2 G: 2 INJECTION, POWDER, FOR SOLUTION INTRAMUSCULAR; INTRAVENOUS at 04:09

## 2019-09-23 RX ADMIN — PANTOPRAZOLE SODIUM 40 MG: 40 TABLET, DELAYED RELEASE ORAL at 08:09

## 2019-09-23 RX ADMIN — FLUOXETINE 20 MG: 20 CAPSULE ORAL at 08:09

## 2019-09-23 NOTE — SUBJECTIVE & OBJECTIVE
Interval History:     9/23/19 - s/p paracentesis, 6 L , feels better ,     9/22/19 - no acute events, denies complaints     Review of patient's allergies indicates:   Allergen Reactions    Hydrocodone-acetaminophen      Current Facility-Administered Medications   Medication Frequency    albumin human 25% bottle 300 mL Daily    cefTRIAXone (ROCEPHIN) 2 g in dextrose 5 % 50 mL IVPB Q24H    FLUoxetine capsule 20 mg Daily    lactulose 20 gram/30 mL solution Soln 10 g Daily    pantoprazole EC tablet 40 mg Daily       Objective:     Vital Signs (Most Recent):  Temp: 98 °F (36.7 °C) (09/23/19 0716)  Pulse: 88 (09/23/19 0716)  Resp: 16 (09/23/19 0716)  BP: 113/69 (09/23/19 0716)  SpO2: 96 % (09/23/19 0716) Vital Signs (24h Range):  Temp:  [97.9 °F (36.6 °C)-98.8 °F (37.1 °C)] 98 °F (36.7 °C)  Pulse:  [79-99] 88  Resp:  [15-18] 16  SpO2:  [96 %-97 %] 96 %  BP: ()/(63-74) 113/69     Weight: 77.6 kg (171 lb 1.6 oz) (09/20/19 1316)  Body mass index is 27.62 kg/m².  Body surface area is 1.9 meters squared.    I/O last 3 completed shifts:  In: 400 [P.O.:300; IV Piggyback:100]  Out: -     Physical Exam   Constitutional: She is oriented to person, place, and time. She appears well-developed.   HENT:   Head: Normocephalic and atraumatic.   Mouth/Throat: Oropharynx is clear and moist. No oropharyngeal exudate.   Eyes: Pupils are equal, round, and reactive to light. Conjunctivae and EOM are normal. No scleral icterus.   Neck: Normal range of motion. Neck supple. No JVD present. Carotid bruit is not present. No tracheal deviation present. No thyroid mass and no thyromegaly present.   Cardiovascular: Normal rate, regular rhythm, normal heart sounds and intact distal pulses. Exam reveals no gallop and no friction rub.   No murmur heard.  Pulmonary/Chest: Breath sounds normal. No respiratory distress. She has no wheezes. She has no rales. She exhibits no tenderness.   Reduced BS in bases   Abdominal: Soft. Bowel sounds are  normal. She exhibits distension. She exhibits no abdominal bruit, no ascites and no mass. There is no hepatosplenomegaly. There is no tenderness. There is no rebound, no guarding and no CVA tenderness.   Musculoskeletal: She exhibits edema. She exhibits no tenderness.   Lymphadenopathy:     She has no cervical adenopathy.   Neurological: She is oriented to person, place, and time. No cranial nerve deficit. Coordination normal.   Skin: Skin is warm and intact. No rash noted. No erythema. No pallor.   Psychiatric: She has a normal mood and affect.       Significant Labs:  CBC:   Recent Labs   Lab 09/23/19 0517   WBC 6.74   RBC 2.58*   HGB 7.5*   HCT 22.0*      MCV 85   MCH 29.1   MCHC 34.1     CMP:   Recent Labs   Lab 09/23/19 0517   GLU 93   CALCIUM 8.2*   ALBUMIN 2.1*   PROT 5.2*   *   K 3.6   CO2 20*      BUN 29*   CREATININE 3.3*   ALKPHOS 70   ALT 18   AST 27   BILITOT 0.6     Coagulation:   Recent Labs   Lab 09/23/19 0517   INR 1.5*     LFTs:   Recent Labs   Lab 09/23/19 0517   ALT 18   AST 27   ALKPHOS 70   BILITOT 0.6   PROT 5.2*   ALBUMIN 2.1*     All labs within the past 24 hours have been reviewed.     Significant Imaging:  Reviewed    Lab Results   Component Value Date    CALCIUM 8.2 (L) 09/23/2019       Lab Results   Component Value Date    ALBUMIN 2.1 (L) 09/23/2019

## 2019-09-23 NOTE — INTERVAL H&P NOTE
The patient has been examined and the H&P has been reviewed:    I concur with the findings and no changes have occurred since H&P was written.        Active Hospital Problems    Diagnosis  POA    *Acute renal failure superimposed on stage 4 chronic kidney disease [N17.9, N18.4]  Yes    UTI (urinary tract infection) [N39.0]  Yes    Anemia [D64.9]  Yes    Ascites due to alcoholic cirrhosis [K70.31]  Yes    Esophageal reflux disease [K21.9]  Yes      Resolved Hospital Problems   No resolved problems to display.

## 2019-09-23 NOTE — OP NOTE
Pre Op Diagnosis: ascites     Post Op Diagnosis: same     Procedure:  para     Procedure performed by: Rosas RAINEY, Sherlyn GREGORY     Written Informed Consent Obtained: Yes     Specimen Removed:  yes     Estimated Blood Loss:  minimal     Findings: Local anesthesia.     The patient tolerated the procedure well and there were no complications.      Sterile technique was performed in the RLQ, lidocaine was used as a local anesthetic.   6 liters of light rajan fluid removed for therapeutic purposes.  Pt tolerated the procedure well without immediate complications.  Please see radiologist report for details. F/u with PCP and/or ordering physician.

## 2019-09-23 NOTE — ASSESSMENT & PLAN NOTE
- in setting of chronic liver disease/cirrhosis  - Cr in August 2.5, currently 3.3  - Hold home diuretics  - Received 25g of Albumin post-paracentesis on 9/21   - Continue Albumin given concern for hepatorenal syndrome  - Nephrology following  - Avoid Nephrotoxic agents  - Strict I and O  - Daily BMP

## 2019-09-23 NOTE — ASSESSMENT & PLAN NOTE
"- Followed outpatient by Dr. Rod  - s/p paracentesis on 9/21 with a reported 5L removed; received 25g of Albumin post procedure  - No evidence of SBP  - Repeat paracentesis on 9/23 with removal of 6L   - Continue Albumin given concern for hepatorenal syndrome  - GI following  - Hold home diuretics given #1  - Repeat paracentesis as needed  - Abdominal US showed patent portal vein; Cirrhosis without solid-appearing mass lesion evident. Ascites and cholelithiasis.    - GI discussed potential need for transplant in the future with patient who would "like to think about it".  Will need outpatient f/u with Dr. Rod upon DC to arrange.   - Daily labs  - Monitor    MELD-Na score: 24 at 9/23/2019  5:17 AM  MELD score: 22 at 9/23/2019  5:17 AM  Calculated from:  Serum Creatinine: 3.3 mg/dL at 9/23/2019  5:17 AM  Serum Sodium: 134 mmol/L at 9/23/2019  5:17 AM  Total Bilirubin: 0.6 mg/dL (Rounded to 1 mg/dL) at 9/23/2019  5:17 AM  INR(ratio): 1.5 at 9/23/2019  5:17 AM  Age: 63 years      "

## 2019-09-23 NOTE — PLAN OF CARE
Pt denies pain. Abdomen is distended. No acute respiratory distress noted. VS are stable. No injuries. Will continue to monitor. 12 hour chart check is completed.

## 2019-09-23 NOTE — PROGRESS NOTES
[facility] Hospital Medicine  Progress Note    Patient Name: Kylah Ortiz  MRN: [id number]  Patient Class: IP- Inpatient   Admission Date: 9/20/2019  Length of Stay: 1 days  Attending Physician: Regino Ceballos MD  Primary Care Provider: Santos Beaver MD        Subjective:     Principal Problem:Acute renal failure superimposed on stage 4 chronic kidney disease        HPI:  Ms Ortiz is a 63 year old female with PMHx alcohol cirrhosis, ascites and CKD who presented to [facility] Emergency Room after have abnormal labs reported from clinic. Emergency Room physican  discussed thecase with Dr. Anaya who state d that the patient creatinine went from 1.5 to 3.6 and recommends admission. Associated symptoms include increase abdominal distention and bilateral leg swelling. Patient denies associated symptoms of chest pain, increase shortness of breath, fever, chills, nausea, vomiting or diaphoresis. Sitter is at bedside and assisting in answering questions. She is being placed in Observations under the care of Hospital Medicine.     Overview/Hospital Course:  On 9/20 patient was placed in OBS secondary to DARRIAN and ascites due to alcoholic cirrhosis.  Per ER records, Dr. Anaya was contacted and it is reported that patient's Cr increased from 1.5 to 3.6 and GI recommended admission.  Patient's home diuretics were held and she was started on gentle IV hydration with NS at 50 cc/hour.    As of 9/21 Cr decreased from 3.8 to 3.6.  Per Care Everywhere, Cr in August was noted to be 2.4.  Patient is a poor historian and cannot provide any details on her home medications, or which physician she follows with as an outpatient.  No records are noted within the [facility] system.  Per Care Everywhere, patient follows with GI Dr. Rod.  Patient underwent a paracentesis today with a reported 5L removed.  GI consulted for additional recommendations and recommended empiric ABX coverage for UTI +/- PNA, and to  "continue lactulose, hold diuretics, and obtain US doppler to r/o liver lesion and PVT.  UA shows many bacteria.  UC pending.  Will start on empiric Rocephiin for now and adjust based on cultures.  Nephrology consult pending.      As of 9/22 patient resting in bed with no new complaints.  GI and Nephrology following.  No SBP on paracentesis.  Abdominal US showed patent portal vein; Cirrhosis without solid-appearing mass lesion evident.  Ascites and cholelithiasis.  GI discussed potential need for transplant in the future with patient who would "like to think about it".  Will need outpatient f/u with Dr. Rod upon [place] to arrange.  Still with significant ascites and 4+ pitting edema to BLE.  Awaiting additional recs from Nephrology.  UC pending, continue empiric Rocephin for now.     As of 9/23 Cr stable at 3.3.  Patient is s/p repeat paracentesis today with removal of 6L.  GI and Nephrology continues to follow.  Receiving Albumin.  MELD stable.  Still with 4+ pitting edema to BLE.  Final UC shows Ecoli sensitive to Rocephin.  Will plan to treat with 5 days of ABX.    Interval History:  No acute events overnight.  Currently resting comfortably in bed in NAD.   Cr stable at 3.3.  Patient is s/p repeat paracentesis today with removal of 6L.  GI and Nephrology continues to follow.  Receiving Albumin.  MELD stable.  Still with 4+ pitting edema to BLE.  Final UC shows Ecoli sensitive to Rocephin.  Will plan to treat with 5 days of ABX.    Review of Systems   Constitutional: Positive for activity change and fatigue. Negative for chills and fever.   HENT: Negative for congestion, rhinorrhea and sinus pressure.    Respiratory: Negative for apnea, cough, choking, chest tightness, shortness of breath, wheezing and stridor.    Cardiovascular: Positive for leg swelling. Negative for chest pain and palpitations.   Gastrointestinal: Positive for abdominal distention. Negative for abdominal pain, diarrhea, nausea and vomiting. "   Endocrine: Negative for cold intolerance and heat intolerance.   Genitourinary: Negative for difficulty urinating and hematuria.        Decrease urine output    Musculoskeletal: Negative for arthralgias and joint swelling.   Skin: Negative for color change, pallor and rash.   Neurological: Positive for weakness. Negative for dizziness, seizures, numbness and headaches.   Psychiatric/Behavioral: Negative for agitation. The patient is not nervous/anxious.      Objective:     Vital Signs (Most Recent):  Temp: 98.2 °F (36.8 °C) (09/23/19 1634)  Pulse: 89 (09/23/19 1634)  Resp: 18 (09/23/19 1634)  BP: (!) 88/54 (09/23/19 1634)  SpO2: 97 % (09/23/19 1634) Vital Signs (24h Range):  Temp:  [97.9 °F (36.6 °C)-98.8 °F (37.1 °C)] 98.2 °F (36.8 °C)  Pulse:  [79-99] 89  Resp:  [15-18] 18  SpO2:  [96 %-97 %] 97 %  BP: ()/(54-74) 88/54     Weight: 77.6 kg (171 lb 1.6 oz)  Body mass index is 27.62 kg/m².    Intake/Output Summary (Last 24 hours) at 9/23/2019 1636  Last data filed at 9/23/2019 0516  Gross per 24 hour   Intake 400 ml   Output --   Net 400 ml      Physical Exam   Constitutional: She appears well-developed and well-nourished. No distress.   HENT:   Head: Normocephalic and atraumatic.   Mouth/Throat: Oropharynx is clear and moist. No oropharyngeal exudate.   Eyes: Pupils are equal, round, and reactive to light. Conjunctivae and EOM are normal. Right eye exhibits no discharge. Left eye exhibits no discharge.   Neck: Normal range of motion. Neck supple. No JVD present.   Cardiovascular: Normal rate, regular rhythm, normal heart sounds and intact distal pulses. Exam reveals no gallop and no friction rub.   No murmur heard.  Pulmonary/Chest: Effort normal and breath sounds normal. No respiratory distress. She has no wheezes. She has no rales. She exhibits no tenderness.   Comfortable on RA.   Abdominal: Soft. Bowel sounds are normal. She exhibits distension. She exhibits no mass. There is no tenderness. There is no  rebound and no guarding. No hernia.   Improved ascites s/p paracentesis.   Musculoskeletal: Normal range of motion. She exhibits edema. She exhibits no tenderness.   4+ pitting edema to BLE.   Neurological: She is alert.   Awake and alert, oriented to person and place.  Follows commands appropriately.     Skin: Skin is warm and dry. Capillary refill takes less than 2 seconds. No rash noted. She is not diaphoretic. No erythema. There is pallor.   Psychiatric: She has a normal mood and affect. Her behavior is normal. Judgment and thought content normal.   Nursing note and vitals reviewed.      Significant Labs:   CBC:   Recent Labs   Lab 09/22/19 0525 09/23/19 0517   WBC 6.81 6.74   HGB 8.1* 7.5*   HCT 23.9* 22.0*    192     CMP:   Recent Labs   Lab 09/22/19 0525 09/23/19 0517   * 134*   K 3.8 3.6    104   CO2 22* 20*   GLU 89 93   BUN 31* 29*   CREATININE 3.4* 3.3*   CALCIUM 7.9* 8.2*   PROT 5.5* 5.2*   ALBUMIN 2.2* 2.1*   BILITOT 0.7 0.6   ALKPHOS 72 70   AST 24 27   ALT 15 18   ANIONGAP 8 10   EGFRNONAA 14* 14*     Coagulation:   Recent Labs   Lab 09/23/19 0517   INR 1.5*     Urine Culture: No results for input(s): LABURIN in the last 48 hours.    Significant Imaging: I have reviewed all pertinent imaging results/findings within the past 24 hours.      Assessment/Plan:      * Acute renal failure superimposed on stage 4 chronic kidney disease  - in setting of chronic liver disease/cirrhosis  - Cr in August 2.5, currently 3.3  - Hold home diuretics  - Received 25g of Albumin post-paracentesis on 9/21   - Continue Albumin given concern for hepatorenal syndrome  - Nephrology following  - Avoid Nephrotoxic agents  - Strict I and O  - Daily BMP      Ascites due to alcoholic cirrhosis  - Followed outpatient by Dr. Rod  - s/p paracentesis on 9/21 with a reported 5L removed; received 25g of Albumin post procedure  - No evidence of SBP  - Repeat paracentesis on 9/23 with removal of 6L   -  "Continue Albumin given concern for hepatorenal syndrome  - GI following  - Hold home diuretics given #1  - Repeat paracentesis as needed  - Abdominal US showed patent portal vein; Cirrhosis without solid-appearing mass lesion evident. Ascites and cholelithiasis.    - GI discussed potential need for transplant in the future with patient who would "like to think about it".  Will need outpatient f/u with Dr. Rod upon DC to arrange.   - Daily labs  - Monitor    MELD-Na score: 24 at 9/23/2019  5:17 AM  MELD score: 22 at 9/23/2019  5:17 AM  Calculated from:  Serum Creatinine: 3.3 mg/dL at 9/23/2019  5:17 AM  Serum Sodium: 134 mmol/L at 9/23/2019  5:17 AM  Total Bilirubin: 0.6 mg/dL (Rounded to 1 mg/dL) at 9/23/2019  5:17 AM  INR(ratio): 1.5 at 9/23/2019  5:17 AM  Age: 63 years        UTI (urinary tract infection)  - Final UC shows Ecoli sensitive to Rocephin  - Continue Rocephin for a total of 5 days        Anemia  - Hgb stable at 7.5  - No active s/s of bleeding  - Daily CBC  - Monitor and transfuse as needed to keep Hgb >7.0      Esophageal reflux disease  - PPI          VTE Risk Mitigation (From admission, onward)         Ordered     Place sequential compression device  Until discontinued      09/20/19 1228                      Margarita Nesbitt, NOMI, ACNP-BC  Department of Hospital Medicine   [facility]  "

## 2019-09-23 NOTE — PLAN OF CARE
Pt sent to the hospital from clinic appointment due to abnormal labs.  Pt also exhibiting weakness and instability.  The pt is alert and oriented to person and place, but memory recall appears affected.  Pt unable to recall events leading up to hospitalization and how she arrived at the hospital.  The pt reports that she was previously living alone and was independent with ADLs.  The pt has a friend who assists the pt at home as needed with basic needs.  At this time the pt does not anticipate any CM needs.  CM provided a transitional care folder, information on advanced directives, information on pharmacy bedside delivery, and discharge planning begins on admission with contact information for any needs/questions.      D/C plan: home        09/23/19 1022   Discharge Assessment   Assessment Type Discharge Planning Assessment   Confirmed/corrected address and phone number on facesheet? Yes   Assessment information obtained from? Patient;Medical Record   Expected Length of Stay (days)   (tbd)   Communicated expected length of stay with patient/caregiver yes   Prior to hospitilization cognitive status: Alert/Oriented   Prior to hospitalization functional status: Independent   Current cognitive status: Alert/Oriented   Current Functional Status: Independent   Facility Arrived From: home    Lives With alone   Able to Return to Prior Arrangements yes   Is patient able to care for self after discharge? Yes   Who are your caregiver(s) and their phone number(s)? Elsa Chavarria, friend: [phone]   Patient's perception of discharge disposition home or selfcare   Readmission Within the Last 30 Days no previous admission in last 30 days   Patient currently being followed by outpatient case management? No   Patient currently receives any other outside agency services? No   Equipment Currently Used at Home none   Do you have any problems affording any of your prescribed medications? No   Is the patient taking medications as  prescribed? yes   Does the patient have transportation home? Yes   Transportation Anticipated family or friend will provide   Does the patient receive services at the Coumadin Clinic? No   Discharge Plan A Home   Discharge Plan B Home   DME Needed Upon Discharge  none   Patient/Family in Agreement with Plan yes

## 2019-09-23 NOTE — ASSESSMENT & PLAN NOTE
Patient with UTI being treated.   Reviewed Nephrology note from yesterday. Urin sodium <20. Will start IV albumin.   Continue to hold diuretics.

## 2019-09-23 NOTE — SUBJECTIVE & OBJECTIVE
Subjective:     Interval History:  No acute events overnight. S/p para this morning with 6 L removed. She continues to have a slow decrease in creatinine. She has no complaints today. She denies nausea, vomiting or abdominal pain. She thinks she had a BM but she's not sure.     Review of Systems   Constitutional:        See Interval History for daily ROS.      Objective:     Vital Signs (Most Recent):  Temp: 98 °F (36.7 °C) (09/23/19 0716)  Pulse: 88 (09/23/19 0716)  Resp: 16 (09/23/19 0716)  BP: 113/69 (09/23/19 0716)  SpO2: 96 % (09/23/19 0716) Vital Signs (24h Range):  Temp:  [97.9 °F (36.6 °C)-98.8 °F (37.1 °C)] 98 °F (36.7 °C)  Pulse:  [79-99] 88  Resp:  [15-18] 16  SpO2:  [96 %-98 %] 96 %  BP: ()/(60-74) 113/69     Weight: 77.6 kg (171 lb 1.6 oz) (09/20/19 1316)  Body mass index is 27.62 kg/m².      Intake/Output Summary (Last 24 hours) at 9/23/2019 1145  Last data filed at 9/23/2019 0516  Gross per 24 hour   Intake 400 ml   Output --   Net 400 ml       Lines/Drains/Airways     Peripheral Intravenous Line                 Peripheral IV - Single Lumen 09/20/19 1335 20 G Left Forearm 2 days                Physical Exam   Constitutional: She is oriented to person, place, and time. She appears well-developed and well-nourished. No distress.   HENT:   Head: Normocephalic and atraumatic.   Eyes: EOM are normal.   Cardiovascular: Normal rate and regular rhythm.   Pulmonary/Chest: Effort normal and breath sounds normal. No respiratory distress. She has no wheezes.   Abdominal: Soft. Bowel sounds are normal. She exhibits no distension. There is no tenderness.   Neurological: She is alert and oriented to person, place, and time. No cranial nerve deficit.   No asterixis.    Skin: She is not diaphoretic.   Psychiatric: Her behavior is normal.       Significant Labs:  CBC:   Recent Labs   Lab 09/22/19 0525 09/23/19  0517   WBC 6.81 6.74   HGB 8.1* 7.5*   HCT 23.9* 22.0*    192     CMP:   Recent Labs   Lab  09/23/19  0517   GLU 93   CALCIUM 8.2*   ALBUMIN 2.1*   PROT 5.2*   *   K 3.6   CO2 20*      BUN 29*   CREATININE 3.3*   ALKPHOS 70   ALT 18   AST 27   BILITOT 0.6     Coagulation:   Recent Labs   Lab 09/23/19  0517   INR 1.5*         Significant Imaging:  Imaging results within the past 24 hours have been reviewed.

## 2019-09-23 NOTE — CONSULTS
Chart reviewed and patient appears to be a candidate for a therapeutic paracentesis.  Will perform asap.  Thank you for the consult.

## 2019-09-23 NOTE — PROGRESS NOTES
[facility]  Gastroenterology  Progress Note    Patient Name: Kylah Ortiz  MRN: [id number]  Admission Date: 9/20/2019  Hospital Length of Stay: 1 days  Code Status: Full Code   Attending Provider: Regino Ceballos MD  Consulting Provider: Milton Sepulveda PA-C  Primary Care Physician: Santos Beaver MD  Principal Problem: Acute renal failure superimposed on stage 4 chronic kidney disease      Subjective:     Interval History:  No acute events overnight. S/p para this morning with 6 L removed. She continues to have a slow decrease in creatinine. She has no complaints today. She denies nausea, vomiting or abdominal pain. She thinks she had a BM but she's not sure.     Review of Systems   Constitutional:        See Interval History for daily ROS.      Objective:     Vital Signs (Most Recent):  Temp: 98 °F (36.7 °C) (09/23/19 0716)  Pulse: 88 (09/23/19 0716)  Resp: 16 (09/23/19 0716)  BP: 113/69 (09/23/19 0716)  SpO2: 96 % (09/23/19 0716) Vital Signs (24h Range):  Temp:  [97.9 °F (36.6 °C)-98.8 °F (37.1 °C)] 98 °F (36.7 °C)  Pulse:  [79-99] 88  Resp:  [15-18] 16  SpO2:  [96 %-98 %] 96 %  BP: ()/(60-74) 113/69     Weight: 77.6 kg (171 lb 1.6 oz) (09/20/19 1316)  Body mass index is 27.62 kg/m².      Intake/Output Summary (Last 24 hours) at 9/23/2019 1145  Last data filed at 9/23/2019 0516  Gross per 24 hour   Intake 400 ml   Output --   Net 400 ml       Lines/Drains/Airways     Peripheral Intravenous Line                 Peripheral IV - Single Lumen 09/20/19 1335 20 G Left Forearm 2 days                Physical Exam   Constitutional: She is oriented to person, place, and time. She appears well-developed and well-nourished. No distress.   HENT:   Head: Normocephalic and atraumatic.   Eyes: EOM are normal.   Cardiovascular: Normal rate and regular rhythm.   Pulmonary/Chest: Effort normal and breath sounds normal. No respiratory distress. She has no wheezes.   Abdominal: Soft. Bowel sounds  are normal. She exhibits no distension. There is no tenderness.   Neurological: She is alert and oriented to person, place, and time. No cranial nerve deficit.   No asterixis.    Skin: She is not diaphoretic.   Psychiatric: Her behavior is normal.       Significant Labs:  CBC:   Recent Labs   Lab 09/22/19 0525 09/23/19 0517   WBC 6.81 6.74   HGB 8.1* 7.5*   HCT 23.9* 22.0*    192     CMP:   Recent Labs   Lab 09/23/19 0517   GLU 93   CALCIUM 8.2*   ALBUMIN 2.1*   PROT 5.2*   *   K 3.6   CO2 20*      BUN 29*   CREATININE 3.3*   ALKPHOS 70   ALT 18   AST 27   BILITOT 0.6     Coagulation:   Recent Labs   Lab 09/23/19 0517   INR 1.5*         Significant Imaging:  Imaging results within the past 24 hours have been reviewed.    Assessment/Plan:     * Acute renal failure superimposed on stage 4 chronic kidney disease  Patient with UTI being treated.   Reviewed Nephrology note from yesterday. Urin sodium <20. Will start IV albumin.   Continue to hold diuretics.      Alcoholic cirrhosis of liver with ascites  Check INR and CMP daily. MELD stable.   S/p paracentesis today. Continue with low sodium diet. Hold diuretics.    She is a poor historian and memory seems poor, but no asterixis and ammonia was normal.     MELD-Na score: 24 at 9/23/2019  5:17 AM  MELD score: 22 at 9/23/2019  5:17 AM  Calculated from:  Serum Creatinine: 3.3 mg/dL at 9/23/2019  5:17 AM  Serum Sodium: 134 mmol/L at 9/23/2019  5:17 AM  Total Bilirubin: 0.6 mg/dL (Rounded to 1 mg/dL) at 9/23/2019  5:17 AM  INR(ratio): 1.5 at 9/23/2019  5:17 AM  Age: 63 years      Anemia  Normocytic. No reports or evidence for bleeding. Denies overt blood loss.   Continue to monitor and transfuse as indicated.         Thank you for your consult. I will follow-up with patient. Please contact us if you have any additional questions.    Milton Sepulveda PA-C  Gastroenterology  [facility]

## 2019-09-23 NOTE — SUBJECTIVE & OBJECTIVE
Interval History:  No acute events overnight.  Currently resting comfortably in bed in NAD.   Cr stable at 3.3.  Patient is s/p repeat paracentesis today with removal of 6L.  GI and Nephrology continues to follow.  Receiving Albumin.  MELD stable.  Still with 4+ pitting edema to BLE.  Final UC shows Ecoli sensitive to Rocephin.  Will plan to treat with 5 days of ABX.    Review of Systems   Constitutional: Positive for activity change and fatigue. Negative for chills and fever.   HENT: Negative for congestion, rhinorrhea and sinus pressure.    Respiratory: Negative for apnea, cough, choking, chest tightness, shortness of breath, wheezing and stridor.    Cardiovascular: Positive for leg swelling. Negative for chest pain and palpitations.   Gastrointestinal: Positive for abdominal distention. Negative for abdominal pain, diarrhea, nausea and vomiting.   Endocrine: Negative for cold intolerance and heat intolerance.   Genitourinary: Negative for difficulty urinating and hematuria.        Decrease urine output    Musculoskeletal: Negative for arthralgias and joint swelling.   Skin: Negative for color change, pallor and rash.   Neurological: Positive for weakness. Negative for dizziness, seizures, numbness and headaches.   Psychiatric/Behavioral: Negative for agitation. The patient is not nervous/anxious.      Objective:     Vital Signs (Most Recent):  Temp: 98.2 °F (36.8 °C) (09/23/19 1634)  Pulse: 89 (09/23/19 1634)  Resp: 18 (09/23/19 1634)  BP: (!) 88/54 (09/23/19 1634)  SpO2: 97 % (09/23/19 1634) Vital Signs (24h Range):  Temp:  [97.9 °F (36.6 °C)-98.8 °F (37.1 °C)] 98.2 °F (36.8 °C)  Pulse:  [79-99] 89  Resp:  [15-18] 18  SpO2:  [96 %-97 %] 97 %  BP: ()/(54-74) 88/54     Weight: 77.6 kg (171 lb 1.6 oz)  Body mass index is 27.62 kg/m².    Intake/Output Summary (Last 24 hours) at 9/23/2019 1636  Last data filed at 9/23/2019 0516  Gross per 24 hour   Intake 400 ml   Output --   Net 400 ml      Physical Exam    Constitutional: She appears well-developed and well-nourished. No distress.   HENT:   Head: Normocephalic and atraumatic.   Mouth/Throat: Oropharynx is clear and moist. No oropharyngeal exudate.   Eyes: Pupils are equal, round, and reactive to light. Conjunctivae and EOM are normal. Right eye exhibits no discharge. Left eye exhibits no discharge.   Neck: Normal range of motion. Neck supple. No JVD present.   Cardiovascular: Normal rate, regular rhythm, normal heart sounds and intact distal pulses. Exam reveals no gallop and no friction rub.   No murmur heard.  Pulmonary/Chest: Effort normal and breath sounds normal. No respiratory distress. She has no wheezes. She has no rales. She exhibits no tenderness.   Comfortable on RA.   Abdominal: Soft. Bowel sounds are normal. She exhibits distension. She exhibits no mass. There is no tenderness. There is no rebound and no guarding. No hernia.   Improved ascites s/p paracentesis.   Musculoskeletal: Normal range of motion. She exhibits edema. She exhibits no tenderness.   4+ pitting edema to BLE.   Neurological: She is alert.   Awake and alert, oriented to person and place.  Follows commands appropriately.     Skin: Skin is warm and dry. Capillary refill takes less than 2 seconds. No rash noted. She is not diaphoretic. No erythema. There is pallor.   Psychiatric: She has a normal mood and affect. Her behavior is normal. Judgment and thought content normal.   Nursing note and vitals reviewed.      Significant Labs:   CBC:   Recent Labs   Lab 09/22/19  0525 09/23/19  0517   WBC 6.81 6.74   HGB 8.1* 7.5*   HCT 23.9* 22.0*    192     CMP:   Recent Labs   Lab 09/22/19 0525 09/23/19  0517   * 134*   K 3.8 3.6    104   CO2 22* 20*   GLU 89 93   BUN 31* 29*   CREATININE 3.4* 3.3*   CALCIUM 7.9* 8.2*   PROT 5.5* 5.2*   ALBUMIN 2.2* 2.1*   BILITOT 0.7 0.6   ALKPHOS 72 70   AST 24 27   ALT 15 18   ANIONGAP 8 10   EGFRNONAA 14* 14*     Coagulation:   Recent Labs    Lab 09/23/19  0517   INR 1.5*     Urine Culture: No results for input(s): LABURIN in the last 48 hours.    Significant Imaging: I have reviewed all pertinent imaging results/findings within the past 24 hours.

## 2019-09-23 NOTE — ASSESSMENT & PLAN NOTE
Normocytic. No reports or evidence for bleeding. Denies overt blood loss.   Continue to monitor and transfuse as indicated.

## 2019-09-23 NOTE — ASSESSMENT & PLAN NOTE
1. DARRIAN on CKD stage 4 :  Baseline serum creatinine about 2.5 mg/dL, acute on chronic kidney failure likely due to raised intra-abdominal pressure from significant ascites, status post large volume paracentesis for about 5 L,  And 6 L , follow renal fn ,     Hepatorenal syndrome is a possibility, urine sodium less than 20,       2.  Hypertension - blood pressure controlled      3.  End-stage liver disease due to alcoholic cirrhosis, GI notes reviewed,     4.  E Coli,  urinary tract infection - continue Rocephin,     5.  Anemia - multifactorial, PRBC transfusion when indicated,     6.  Mild hyponatremia - consistent with end-stage liver disease

## 2019-09-23 NOTE — PROGRESS NOTES
[facility] -   Nephrology  Progress Note    Patient Name: Kylah Ortiz  MRN: [id number]  Admission Date: 9/20/2019  Hospital Length of Stay: 1 days  Attending Provider: Regino Ceballos MD   Primary Care Physician: Santos Beaver MD  Principal Problem:Acute renal failure superimposed on stage 4 chronic kidney disease    Subjective:     HPI: Kylah Ortiz is a 63-year-old  woman with history of end-stage liver disease due to alcohol abuse, chronic kidney disease, her serum creatinine was 2.5 mg/dL about a month ago, patient presents to the hospital for generalized weakness, acute on chronic renal failure noted, serum creatinine increased to 3.6 mg/dL, significant ascites noted on exam, also with some peripheral edema, status post paracentesis for about 5 L fluids today, empirically on antibiotics , we were consulted for acute on chronic renal failure.    Interval History:     9/23/19 - s/p paracentesis, 6 L , feels better ,     9/22/19 - no acute events, denies complaints     Review of patient's allergies indicates:   Allergen Reactions    Hydrocodone-acetaminophen      Current Facility-Administered Medications   Medication Frequency    albumin human 25% bottle 300 mL Daily    cefTRIAXone (ROCEPHIN) 2 g in dextrose 5 % 50 mL IVPB Q24H    FLUoxetine capsule 20 mg Daily    lactulose 20 gram/30 mL solution Soln 10 g Daily    pantoprazole EC tablet 40 mg Daily       Objective:     Vital Signs (Most Recent):  Temp: 98 °F (36.7 °C) (09/23/19 0716)  Pulse: 88 (09/23/19 0716)  Resp: 16 (09/23/19 0716)  BP: 113/69 (09/23/19 0716)  SpO2: 96 % (09/23/19 0716) Vital Signs (24h Range):  Temp:  [97.9 °F (36.6 °C)-98.8 °F (37.1 °C)] 98 °F (36.7 °C)  Pulse:  [79-99] 88  Resp:  [15-18] 16  SpO2:  [96 %-97 %] 96 %  BP: ()/(63-74) 113/69     Weight: 77.6 kg (171 lb 1.6 oz) (09/20/19 1316)  Body mass index is 27.62 kg/m².  Body surface area is 1.9 meters squared.    I/O last 3 completed  shifts:  In: 400 [P.O.:300; IV Piggyback:100]  Out: -     Physical Exam   Constitutional: She is oriented to person, place, and time. She appears well-developed.   HENT:   Head: Normocephalic and atraumatic.   Mouth/Throat: Oropharynx is clear and moist. No oropharyngeal exudate.   Eyes: Pupils are equal, round, and reactive to light. Conjunctivae and EOM are normal. No scleral icterus.   Neck: Normal range of motion. Neck supple. No JVD present. Carotid bruit is not present. No tracheal deviation present. No thyroid mass and no thyromegaly present.   Cardiovascular: Normal rate, regular rhythm, normal heart sounds and intact distal pulses. Exam reveals no gallop and no friction rub.   No murmur heard.  Pulmonary/Chest: Breath sounds normal. No respiratory distress. She has no wheezes. She has no rales. She exhibits no tenderness.   Reduced BS in bases   Abdominal: Soft. Bowel sounds are normal. She exhibits distension. She exhibits no abdominal bruit, no ascites and no mass. There is no hepatosplenomegaly. There is no tenderness. There is no rebound, no guarding and no CVA tenderness.   Musculoskeletal: She exhibits edema. She exhibits no tenderness.   Lymphadenopathy:     She has no cervical adenopathy.   Neurological: She is oriented to person, place, and time. No cranial nerve deficit. Coordination normal.   Skin: Skin is warm and intact. No rash noted. No erythema. No pallor.   Psychiatric: She has a normal mood and affect.       Significant Labs:  CBC:   Recent Labs   Lab 09/23/19 0517   WBC 6.74   RBC 2.58*   HGB 7.5*   HCT 22.0*      MCV 85   MCH 29.1   MCHC 34.1     CMP:   Recent Labs   Lab 09/23/19 0517   GLU 93   CALCIUM 8.2*   ALBUMIN 2.1*   PROT 5.2*   *   K 3.6   CO2 20*      BUN 29*   CREATININE 3.3*   ALKPHOS 70   ALT 18   AST 27   BILITOT 0.6     Coagulation:   Recent Labs   Lab 09/23/19 0517   INR 1.5*     LFTs:   Recent Labs   Lab 09/23/19 0517   ALT 18   AST 27   ALKPHOS 70    BILITOT 0.6   PROT 5.2*   ALBUMIN 2.1*     All labs within the past 24 hours have been reviewed.     Significant Imaging:  Reviewed    Lab Results   Component Value Date    CALCIUM 8.2 (L) 09/23/2019       Lab Results   Component Value Date    ALBUMIN 2.1 (L) 09/23/2019         Assessment/Plan:     * Acute renal failure superimposed on stage 4 chronic kidney disease  1. DARRIAN on CKD stage 4 :  Baseline serum creatinine about 2.5 mg/dL, acute on chronic kidney failure likely due to raised intra-abdominal pressure from significant ascites, status post large volume paracentesis for about 5 L,  And 6 L , follow renal fn ,     Hepatorenal syndrome is a possibility, urine sodium less than 20,       2.  Hypertension - blood pressure controlled      3.  End-stage liver disease due to alcoholic cirrhosis, GI notes reviewed,     4.  E Coli,  urinary tract infection - continue Rocephin,     5.  Anemia - multifactorial, PRBC transfusion when indicated,     6.  Mild hyponatremia - consistent with end-stage liver disease           Thank you for your consult. I will follow-up with patient. Please contact us if you have any additional questions.     Total time spent 30 minutes including time needed to review the records,  patient  evaluation, documentation, face-to-face discussion with the patient, primary team,    more than 50% of the time was spent on coordination of care and counseling.       Noe Urbano MD  Nephrology  [facility] [place]

## 2019-09-24 ENCOUNTER — TELEPHONE (OUTPATIENT)
Dept: GASTROENTEROLOGY | Facility: CLINIC | Age: 64
End: 2019-09-24

## 2019-09-24 LAB
ABO + RH BLD: NORMAL
ALBUMIN SERPL BCP-MCNC: 2.8 G/DL (ref 3.5–5.2)
ALP SERPL-CCNC: 56 U/L (ref 55–135)
ALT SERPL W/O P-5'-P-CCNC: 19 U/L (ref 10–44)
ANION GAP SERPL CALC-SCNC: 9 MMOL/L (ref 8–16)
AST SERPL-CCNC: 30 U/L (ref 10–40)
BASOPHILS # BLD AUTO: 0.01 K/UL (ref 0–0.2)
BASOPHILS NFR BLD: 0.2 % (ref 0–1.9)
BILIRUB SERPL-MCNC: 0.4 MG/DL (ref 0.1–1)
BLD GP AB SCN CELLS X3 SERPL QL: NORMAL
BLD PROD TYP BPU: NORMAL
BLOOD UNIT EXPIRATION DATE: NORMAL
BLOOD UNIT TYPE CODE: 6200
BLOOD UNIT TYPE: NORMAL
BUN SERPL-MCNC: 28 MG/DL (ref 8–23)
CALCIUM SERPL-MCNC: 8 MG/DL (ref 8.7–10.5)
CHLORIDE SERPL-SCNC: 104 MMOL/L (ref 95–110)
CO2 SERPL-SCNC: 21 MMOL/L (ref 23–29)
CODING SYSTEM: NORMAL
CREAT SERPL-MCNC: 3.1 MG/DL (ref 0.5–1.4)
DIFFERENTIAL METHOD: ABNORMAL
DISPENSE STATUS: NORMAL
EOSINOPHIL # BLD AUTO: 0.2 K/UL (ref 0–0.5)
EOSINOPHIL NFR BLD: 3.6 % (ref 0–8)
ERYTHROCYTE [DISTWIDTH] IN BLOOD BY AUTOMATED COUNT: 16.6 % (ref 11.5–14.5)
EST. GFR  (AFRICAN AMERICAN): 18 ML/MIN/1.73 M^2
EST. GFR  (NON AFRICAN AMERICAN): 15 ML/MIN/1.73 M^2
GLUCOSE SERPL-MCNC: 103 MG/DL (ref 70–110)
HCT VFR BLD AUTO: 19.2 % (ref 37–48.5)
HGB BLD-MCNC: 6.4 G/DL (ref 12–16)
INR PPP: 1.7 (ref 0.8–1.2)
LYMPHOCYTES # BLD AUTO: 1 K/UL (ref 1–4.8)
LYMPHOCYTES NFR BLD: 15.6 % (ref 18–48)
MCH RBC QN AUTO: 28.6 PG (ref 27–31)
MCHC RBC AUTO-ENTMCNC: 33.3 G/DL (ref 32–36)
MCV RBC AUTO: 86 FL (ref 82–98)
MONOCYTES # BLD AUTO: 1 K/UL (ref 0.3–1)
MONOCYTES NFR BLD: 15.6 % (ref 4–15)
NEUTROPHILS # BLD AUTO: 4 K/UL (ref 1.8–7.7)
NEUTROPHILS NFR BLD: 65.3 % (ref 38–73)
NUM UNITS TRANS PACKED RBC: NORMAL
PLATELET # BLD AUTO: 180 K/UL (ref 150–350)
PMV BLD AUTO: 9.9 FL (ref 9.2–12.9)
POTASSIUM SERPL-SCNC: 3.4 MMOL/L (ref 3.5–5.1)
PROT SERPL-MCNC: 4.9 G/DL (ref 6–8.4)
PROTHROMBIN TIME: 17.7 SEC (ref 9–12.5)
RBC # BLD AUTO: 2.24 M/UL (ref 4–5.4)
SODIUM SERPL-SCNC: 134 MMOL/L (ref 136–145)
WBC # BLD AUTO: 6.1 K/UL (ref 3.9–12.7)

## 2019-09-24 PROCEDURE — 36415 COLL VENOUS BLD VENIPUNCTURE: CPT

## 2019-09-24 PROCEDURE — P9047 ALBUMIN (HUMAN), 25%, 50ML: HCPCS | Mod: JG | Performed by: PHYSICIAN ASSISTANT

## 2019-09-24 PROCEDURE — 99233 PR SUBSEQUENT HOSPITAL CARE,LEVL III: ICD-10-PCS | Mod: ,,, | Performed by: INTERNAL MEDICINE

## 2019-09-24 PROCEDURE — P9016 RBC LEUKOCYTES REDUCED: HCPCS

## 2019-09-24 PROCEDURE — 85610 PROTHROMBIN TIME: CPT

## 2019-09-24 PROCEDURE — 63600175 PHARM REV CODE 636 W HCPCS: Mod: JG | Performed by: PHYSICIAN ASSISTANT

## 2019-09-24 PROCEDURE — 85025 COMPLETE CBC W/AUTO DIFF WBC: CPT

## 2019-09-24 PROCEDURE — 25000003 PHARM REV CODE 250: Performed by: INTERNAL MEDICINE

## 2019-09-24 PROCEDURE — 99233 SBSQ HOSP IP/OBS HIGH 50: CPT | Mod: ,,, | Performed by: INTERNAL MEDICINE

## 2019-09-24 PROCEDURE — 11000001 HC ACUTE MED/SURG PRIVATE ROOM

## 2019-09-24 PROCEDURE — 86901 BLOOD TYPING SEROLOGIC RH(D): CPT

## 2019-09-24 PROCEDURE — 99223 PR INITIAL HOSPITAL CARE,LEVL III: ICD-10-PCS | Mod: ,,, | Performed by: PHYSICIAN ASSISTANT

## 2019-09-24 PROCEDURE — 36430 TRANSFUSION BLD/BLD COMPNT: CPT

## 2019-09-24 PROCEDURE — 25000003 PHARM REV CODE 250: Performed by: NURSE PRACTITIONER

## 2019-09-24 PROCEDURE — 99223 1ST HOSP IP/OBS HIGH 75: CPT | Mod: ,,, | Performed by: PHYSICIAN ASSISTANT

## 2019-09-24 PROCEDURE — 63600175 PHARM REV CODE 636 W HCPCS: Performed by: NURSE PRACTITIONER

## 2019-09-24 PROCEDURE — 86920 COMPATIBILITY TEST SPIN: CPT

## 2019-09-24 PROCEDURE — 80053 COMPREHEN METABOLIC PANEL: CPT

## 2019-09-24 RX ORDER — HYDROCODONE BITARTRATE AND ACETAMINOPHEN 500; 5 MG/1; MG/1
TABLET ORAL
Status: DISCONTINUED | OUTPATIENT
Start: 2019-09-24 | End: 2019-09-26 | Stop reason: HOSPADM

## 2019-09-24 RX ORDER — POTASSIUM CHLORIDE 20 MEQ/1
40 TABLET, EXTENDED RELEASE ORAL ONCE
Status: COMPLETED | OUTPATIENT
Start: 2019-09-24 | End: 2019-09-24

## 2019-09-24 RX ADMIN — LACTULOSE 10 G: 20 SOLUTION ORAL at 08:09

## 2019-09-24 RX ADMIN — CEFTRIAXONE SODIUM 2 G: 2 INJECTION, POWDER, FOR SOLUTION INTRAMUSCULAR; INTRAVENOUS at 03:09

## 2019-09-24 RX ADMIN — FLUOXETINE 20 MG: 20 CAPSULE ORAL at 08:09

## 2019-09-24 RX ADMIN — POTASSIUM CHLORIDE 40 MEQ: 20 TABLET, EXTENDED RELEASE ORAL at 08:09

## 2019-09-24 RX ADMIN — PANTOPRAZOLE SODIUM 40 MG: 40 TABLET, DELAYED RELEASE ORAL at 08:09

## 2019-09-24 RX ADMIN — ALBUMIN (HUMAN) 300 ML: 25 SOLUTION INTRAVENOUS at 09:09

## 2019-09-24 NOTE — ASSESSMENT & PLAN NOTE
"- Followed outpatient by Dr. Rod, will transition care to Dr. Anaya upon DC  - s/p paracentesis on 9/21 with a reported 5L removed; received 25g of Albumin post procedure  - No evidence of SBP  - Repeat paracentesis on 9/23 with removal of 6L   - Continue Albumin given concern for hepatorenal syndrome  - GI following  - Hold home diuretics given #1  - Repeat paracentesis as needed  - Abdominal US showed patent portal vein; Cirrhosis without solid-appearing mass lesion evident. Ascites and cholelithiasis.    - GI discussed potential need for transplant in the future with patient who would "like to think about it".  Will need outpatient f/u with Dr. Anaya upon DC to arrange.   - Daily labs  - Monitor    MELD-Na score: 25 at 9/24/2019  5:21 AM  MELD score: 23 at 9/24/2019  5:21 AM  Calculated from:  Serum Creatinine: 3.1 mg/dL at 9/24/2019  5:21 AM  Serum Sodium: 134 mmol/L at 9/24/2019  5:21 AM  Total Bilirubin: 0.4 mg/dL (Rounded to 1 mg/dL) at 9/24/2019  5:21 AM  INR(ratio): 1.7 at 9/24/2019  5:21 AM  Age: 63 years      "

## 2019-09-24 NOTE — SUBJECTIVE & OBJECTIVE
Interval History:     9/24/19 - no acute events, receiving pRBC tx     9/23/19 - s/p paracentesis, 6 L , feels better ,     9/22/19 - no acute events, denies complaints     Review of patient's allergies indicates:   Allergen Reactions    Hydrocodone-acetaminophen      Current Facility-Administered Medications   Medication Frequency    0.9%  NaCl infusion (for blood administration) Q24H PRN    cefTRIAXone (ROCEPHIN) 2 g in dextrose 5 % 50 mL IVPB Q24H    FLUoxetine capsule 20 mg Daily    lactulose 20 gram/30 mL solution Soln 10 g Daily    pantoprazole EC tablet 40 mg Daily       Objective:     Vital Signs (Most Recent):  Temp: 98.7 °F (37.1 °C) (09/24/19 1128)  Pulse: 86 (09/24/19 1128)  Resp: 20 (09/24/19 1128)  BP: 115/71 (09/24/19 1128)  SpO2: 97 % (09/24/19 1128)  O2 Device (Oxygen Therapy): room air (09/24/19 1128) Vital Signs (24h Range):  Temp:  [97.9 °F (36.6 °C)-98.7 °F (37.1 °C)] 98.7 °F (37.1 °C)  Pulse:  [] 86  Resp:  [15-20] 20  SpO2:  [97 %-100 %] 97 %  BP: ()/(53-76) 115/71     Weight: 77.6 kg (171 lb 1.6 oz) (09/20/19 1316)  Body mass index is 27.62 kg/m².  Body surface area is 1.9 meters squared.    I/O last 3 completed shifts:  In: 1060 [P.O.:960; IV Piggyback:100]  Out: 1 [Stool:1]    Physical Exam   Constitutional: She is oriented to person, place, and time. She appears well-developed.   HENT:   Head: Normocephalic and atraumatic.   Mouth/Throat: Oropharynx is clear and moist. No oropharyngeal exudate.   Eyes: Pupils are equal, round, and reactive to light. Conjunctivae and EOM are normal. No scleral icterus.   Neck: Normal range of motion. Neck supple. No JVD present. Carotid bruit is not present. No tracheal deviation present. No thyroid mass and no thyromegaly present.   Cardiovascular: Normal rate, regular rhythm, normal heart sounds and intact distal pulses. Exam reveals no gallop and no friction rub.   No murmur heard.  Pulmonary/Chest: Breath sounds normal. No respiratory  distress. She has no wheezes. She has no rales. She exhibits no tenderness.   Reduced BS in bases   Abdominal: Soft. Bowel sounds are normal. She exhibits distension. She exhibits no abdominal bruit, no ascites and no mass. There is no hepatosplenomegaly. There is no tenderness. There is no rebound, no guarding and no CVA tenderness.   Musculoskeletal: She exhibits edema. She exhibits no tenderness.   Lymphadenopathy:     She has no cervical adenopathy.   Neurological: She is oriented to person, place, and time. No cranial nerve deficit. Coordination normal.   Skin: Skin is warm and intact. No rash noted. No erythema. No pallor.   Psychiatric: She has a normal mood and affect.       Significant Labs:  CBC:   Recent Labs   Lab 09/24/19 0521   WBC 6.10   RBC 2.24*   HGB 6.4*   HCT 19.2*      MCV 86   MCH 28.6   MCHC 33.3     CMP:   Recent Labs   Lab 09/24/19 0521      CALCIUM 8.0*   ALBUMIN 2.8*   PROT 4.9*   *   K 3.4*   CO2 21*      BUN 28*   CREATININE 3.1*   ALKPHOS 56   ALT 19   AST 30   BILITOT 0.4     Coagulation:   Recent Labs   Lab 09/24/19 0521   INR 1.7*     LFTs:   Recent Labs   Lab 09/24/19 0521   ALT 19   AST 30   ALKPHOS 56   BILITOT 0.4   PROT 4.9*   ALBUMIN 2.8*     All labs within the past 24 hours have been reviewed.     Significant Imaging:  Reviewed    Lab Results   Component Value Date    CALCIUM 8.0 (L) 09/24/2019       Lab Results   Component Value Date    ALBUMIN 2.8 (L) 09/24/2019

## 2019-09-24 NOTE — ASSESSMENT & PLAN NOTE
Patient with UTI being treated.   Urine sodium <20. Today is day 2 of IV albumin.   Continue to hold diuretics.    Nephrology following.

## 2019-09-24 NOTE — TELEPHONE ENCOUNTER
Per Dr. Anaya, please schedule the patient with Louise for a post-hospital follow up and Héctor for a second follow up in a few weeks.   Thanks,   Milton

## 2019-09-24 NOTE — SUBJECTIVE & OBJECTIVE
Subjective:     Interval History:   No acute events overnight. She is tolerating her diet. She had a decreased in Hgb after albumin. No overt bleeding reported. She is getting one unit of blood. Creatinine continues to decrease.     Review of Systems   Constitutional:        See Interval History for daily ROS.      Objective:     Vital Signs (Most Recent):  Temp: 98.6 °F (37 °C) (09/24/19 1247)  Pulse: 86 (09/24/19 1247)  Resp: 19 (09/24/19 1247)  BP: 110/68 (09/24/19 1247)  SpO2: 99 % (09/24/19 1247) Vital Signs (24h Range):  Temp:  [97.9 °F (36.6 °C)-98.7 °F (37.1 °C)] 98.6 °F (37 °C)  Pulse:  [] 86  Resp:  [15-20] 19  SpO2:  [97 %-100 %] 99 %  BP: ()/(53-76) 110/68     Weight: 77.6 kg (171 lb 1.6 oz) (09/20/19 1316)  Body mass index is 27.62 kg/m².      Intake/Output Summary (Last 24 hours) at 9/24/2019 1259  Last data filed at 9/24/2019 1247  Gross per 24 hour   Intake 925 ml   Output 1 ml   Net 924 ml       Lines/Drains/Airways     Peripheral Intravenous Line                 Peripheral IV - Single Lumen 09/20/19 1335 20 G Left Forearm 3 days                Physical Exam   Constitutional: She is oriented to person, place, and time. She appears well-developed and well-nourished. No distress.   HENT:   Head: Normocephalic and atraumatic.   Eyes: EOM are normal.   Cardiovascular: Normal rate and regular rhythm.   Pulmonary/Chest: Effort normal and breath sounds normal. No respiratory distress. She has no wheezes.   Abdominal: Soft. Bowel sounds are normal. She exhibits distension. There is no tenderness.   Musculoskeletal: She exhibits edema.   Neurological: She is alert and oriented to person, place, and time. No cranial nerve deficit.   Skin: She is not diaphoretic.   Psychiatric: Her behavior is normal.       Significant Labs:  CBC:   Recent Labs   Lab 09/23/19  0517 09/24/19  0521   WBC 6.74 6.10   HGB 7.5* 6.4*   HCT 22.0* 19.2*    180     CMP:   Recent Labs   Lab 09/24/19  0521       CALCIUM 8.0*   ALBUMIN 2.8*   PROT 4.9*   *   K 3.4*   CO2 21*      BUN 28*   CREATININE 3.1*   ALKPHOS 56   ALT 19   AST 30   BILITOT 0.4     Coagulation:   Recent Labs   Lab 09/24/19  0521   INR 1.7*         Significant Imaging:  Imaging results within the past 24 hours have been reviewed.

## 2019-09-24 NOTE — PROGRESS NOTES
[facility] -   Nephrology  Progress Note    Patient Name: Kylah Ortiz  MRN: [id number]  Admission Date: 9/20/2019  Hospital Length of Stay: 2 days  Attending Provider: Regino Ceballos MD   Primary Care Physician: Santos Beaver MD  Principal Problem:Acute renal failure superimposed on stage 4 chronic kidney disease    Subjective:     HPI: Kylah Ortiz is a 63-year-old  woman with history of end-stage liver disease due to alcohol abuse, chronic kidney disease, her serum creatinine was 2.5 mg/dL about a month ago, patient presents to the hospital for generalized weakness, acute on chronic renal failure noted, serum creatinine increased to 3.6 mg/dL, significant ascites noted on exam, also with some peripheral edema, status post paracentesis for about 5 L fluids today, empirically on antibiotics , we were consulted for acute on chronic renal failure.    Interval History:     9/24/19 - no acute events, receiving pRBC tx     9/23/19 - s/p paracentesis, 6 L , feels better ,     9/22/19 - no acute events, denies complaints     Review of patient's allergies indicates:   Allergen Reactions    Hydrocodone-acetaminophen      Current Facility-Administered Medications   Medication Frequency    0.9%  NaCl infusion (for blood administration) Q24H PRN    cefTRIAXone (ROCEPHIN) 2 g in dextrose 5 % 50 mL IVPB Q24H    FLUoxetine capsule 20 mg Daily    lactulose 20 gram/30 mL solution Soln 10 g Daily    pantoprazole EC tablet 40 mg Daily       Objective:     Vital Signs (Most Recent):  Temp: 98.7 °F (37.1 °C) (09/24/19 1128)  Pulse: 86 (09/24/19 1128)  Resp: 20 (09/24/19 1128)  BP: 115/71 (09/24/19 1128)  SpO2: 97 % (09/24/19 1128)  O2 Device (Oxygen Therapy): room air (09/24/19 1128) Vital Signs (24h Range):  Temp:  [97.9 °F (36.6 °C)-98.7 °F (37.1 °C)] 98.7 °F (37.1 °C)  Pulse:  [] 86  Resp:  [15-20] 20  SpO2:  [97 %-100 %] 97 %  BP: ()/(53-76) 115/71     Weight: 77.6 kg  (171 lb 1.6 oz) (09/20/19 1316)  Body mass index is 27.62 kg/m².  Body surface area is 1.9 meters squared.    I/O last 3 completed shifts:  In: 1060 [P.O.:960; IV Piggyback:100]  Out: 1 [Stool:1]    Physical Exam   Constitutional: She is oriented to person, place, and time. She appears well-developed.   HENT:   Head: Normocephalic and atraumatic.   Mouth/Throat: Oropharynx is clear and moist. No oropharyngeal exudate.   Eyes: Pupils are equal, round, and reactive to light. Conjunctivae and EOM are normal. No scleral icterus.   Neck: Normal range of motion. Neck supple. No JVD present. Carotid bruit is not present. No tracheal deviation present. No thyroid mass and no thyromegaly present.   Cardiovascular: Normal rate, regular rhythm, normal heart sounds and intact distal pulses. Exam reveals no gallop and no friction rub.   No murmur heard.  Pulmonary/Chest: Breath sounds normal. No respiratory distress. She has no wheezes. She has no rales. She exhibits no tenderness.   Reduced BS in bases   Abdominal: Soft. Bowel sounds are normal. She exhibits distension. She exhibits no abdominal bruit, no ascites and no mass. There is no hepatosplenomegaly. There is no tenderness. There is no rebound, no guarding and no CVA tenderness.   Musculoskeletal: She exhibits edema. She exhibits no tenderness.   Lymphadenopathy:     She has no cervical adenopathy.   Neurological: She is oriented to person, place, and time. No cranial nerve deficit. Coordination normal.   Skin: Skin is warm and intact. No rash noted. No erythema. No pallor.   Psychiatric: She has a normal mood and affect.       Significant Labs:  CBC:   Recent Labs   Lab 09/24/19  0521   WBC 6.10   RBC 2.24*   HGB 6.4*   HCT 19.2*      MCV 86   MCH 28.6   MCHC 33.3     CMP:   Recent Labs   Lab 09/24/19  0521      CALCIUM 8.0*   ALBUMIN 2.8*   PROT 4.9*   *   K 3.4*   CO2 21*      BUN 28*   CREATININE 3.1*   ALKPHOS 56   ALT 19   AST 30   BILITOT  0.4     Coagulation:   Recent Labs   Lab 09/24/19  0521   INR 1.7*     LFTs:   Recent Labs   Lab 09/24/19  0521   ALT 19   AST 30   ALKPHOS 56   BILITOT 0.4   PROT 4.9*   ALBUMIN 2.8*     All labs within the past 24 hours have been reviewed.     Significant Imaging:  Reviewed    Lab Results   Component Value Date    CALCIUM 8.0 (L) 09/24/2019       Lab Results   Component Value Date    ALBUMIN 2.8 (L) 09/24/2019         Assessment/Plan:     * Acute renal failure superimposed on stage 4 chronic kidney disease  1. DARRIAN on CKD stage 4 :  Baseline serum creatinine about 2.5 mg/dL, acute on chronic kidney failure likely due to raised intra-abdominal pressure from significant ascites, status post large volume paracentesis for about 5 L,  And 6 L , follow renal fn , slightly better, Cr 3 today ,     Hepatorenal syndrome is a possibility, urine sodium less than 20,       2.  Hypertension - blood pressure controlled      3.  End-stage liver disease due to alcoholic cirrhosis, GI notes reviewed,     4.  E Coli,  urinary tract infection - continue Rocephin,     5.  Anemia - multifactorial, PRBC transfusion today ,      6.  Mild hyponatremia - consistent with end-stage liver disease    7. Hypokalemia - replete k            Thank you for your consult. I will follow-up with patient. Please contact us if you have any additional questions.     Total time spent 40 minutes including time needed to review the records,  patient  evaluation, documentation, face-to-face discussion with the patient,  care giver, primary team, more than 50% of the time was spent on coordination of care and counseling.       Noe Urbano MD  Nephrology  [facility] -

## 2019-09-24 NOTE — PROGRESS NOTES
[facility] Hospital Medicine  Progress Note    Patient Name: Kylah Ortiz  MRN: [id number]  Patient Class: IP- Inpatient   Admission Date: 9/20/2019  Length of Stay: 2 days  Attending Physician: Regino Ceballos MD  Primary Care Provider: Santos Beaver MD        Subjective:     Principal Problem:Acute renal failure superimposed on stage 4 chronic kidney disease        HPI:  Ms Ortiz is a 63 year old female with PMHx alcohol cirrhosis, ascites and CKD who presented to [facility] Emergency Room after have abnormal labs reported from clinic. Emergency Room physican  discussed thecase with Dr. Anaya who state d that the patient creatinine went from 1.5 to 3.6 and recommends admission. Associated symptoms include increase abdominal distention and bilateral leg swelling. Patient denies associated symptoms of chest pain, increase shortness of breath, fever, chills, nausea, vomiting or diaphoresis. Sitter is at bedside and assisting in answering questions. She is being placed in Observations under the care of Hospital Medicine.     Overview/Hospital Course:  On 9/20 patient was placed in OBS secondary to DARRIAN and ascites due to alcoholic cirrhosis.  Per ER records, Dr. Anaya was contacted and it is reported that patient's Cr increased from 1.5 to 3.6 and GI recommended admission.  Patient's home diuretics were held and she was started on gentle IV hydration with NS at 50 cc/hour.    As of 9/21 Cr decreased from 3.8 to 3.6.  Per Care Everywhere, Cr in August was noted to be 2.4.  Patient is a poor historian and cannot provide any details on her home medications, or which physician she follows with as an outpatient.  No records are noted within the [facility] system.  Per Care Everywhere, patient follows with GI Dr. Rod.  Patient underwent a paracentesis today with a reported 5L removed.  GI consulted for additional recommendations and recommended empiric ABX coverage for UTI +/- PNA, and to  "continue lactulose, hold diuretics, and obtain US doppler to r/o liver lesion and PVT.  UA shows many bacteria.  UC pending.  Will start on empiric Rocephiin for now and adjust based on cultures.  Nephrology consult pending.      As of 9/22 patient resting in bed with no new complaints.  GI and Nephrology following.  No SBP on paracentesis.  Abdominal US showed patent portal vein; Cirrhosis without solid-appearing mass lesion evident.  Ascites and cholelithiasis.  GI discussed potential need for transplant in the future with patient who would "like to think about it".  Will need outpatient f/u with Dr. Rod upon DC to arrange.  Still with significant ascites and 4+ pitting edema to BLE.  Awaiting additional recs from Nephrology.  UC pending, continue empiric Rocephin for now.     As of 9/23 Cr stable at 3.3.  Patient is s/p repeat paracentesis today with removal of 6L.  GI and Nephrology continues to follow.  Receiving Albumin.  MELD stable.  Still with 4+ pitting edema to BLE.  Final UC shows Ecoli sensitive to Rocephin.  Will plan to treat with 5 days of ABX.    As of 9/24 Hgb decreased to 6.4.  No active s/s of bleeding.  VS remain stable.  Will transfuse 1 unit PRBC today.  GI and nephrology continues to follow.  Per GI patient can be discharged home from their standpoint with outpatient follow-up with Dr. Anaya.  Met with caregiver Tatyana who is present at the bedside today.  Tatyana is aware of the need for 24 hr care given concerns for memory loss, and states that she will provide such care upon discharge.  Per GI patient will be arranged outpatient follow-up with Dr. nAaya.  Will plan to monitor overnight and if okay with Nephrology anticipate DC home tomorrow if she remains stable and counts improve.    Interval History:  No acute events overnight.  Resting comfortably in bed with caregiver Tatyana at bedside.   Hgb decreased to 6.4.  No active s/s of bleeding.  VS remain stable.  Will transfuse 1 unit " PRBC today.  GI and nephrology continues to follow.  Per GI patient can be discharged home from their standpoint with outpatient follow-up with Dr. Anaya.  Met with caregiver Tatyana who is present at the bedside today.  Tatyana is aware of the need for 24 hr care given concerns for memory loss, and states that she will provide such care upon discharge.  Per GI patient will be arranged outpatient follow-up with Dr. Anaya.  Will plan to monitor overnight and if okay with Nephrology anticipate DC home tomorrow if she remains stable and counts improve.    Review of Systems   Constitutional: Positive for activity change and fatigue. Negative for chills and fever.   HENT: Negative for congestion, rhinorrhea and sinus pressure.    Respiratory: Negative for apnea, cough, choking, chest tightness, shortness of breath, wheezing and stridor.    Cardiovascular: Positive for leg swelling. Negative for chest pain and palpitations.   Gastrointestinal: Positive for abdominal distention. Negative for abdominal pain, diarrhea, nausea and vomiting.   Endocrine: Negative for cold intolerance and heat intolerance.   Genitourinary: Negative for difficulty urinating and hematuria.        Decrease urine output    Musculoskeletal: Negative for arthralgias and joint swelling.   Skin: Negative for color change, pallor and rash.   Neurological: Positive for weakness. Negative for dizziness, seizures, numbness and headaches.   Psychiatric/Behavioral: Negative for agitation. The patient is not nervous/anxious.      Objective:     Vital Signs (Most Recent):  Temp: 98.9 °F (37.2 °C) (09/24/19 1555)  Pulse: 86 (09/24/19 1555)  Resp: 18 (09/24/19 1555)  BP: 111/67 (09/24/19 1555)  SpO2: 98 % (09/24/19 1555) Vital Signs (24h Range):  Temp:  [97.9 °F (36.6 °C)-98.9 °F (37.2 °C)] 98.9 °F (37.2 °C)  Pulse:  [] 86  Resp:  [15-21] 18  SpO2:  [97 %-100 %] 98 %  BP: ()/(53-76) 111/67     Weight: 77.6 kg (171 lb 1.6 oz)  Body mass index is 27.62  kg/m².    Intake/Output Summary (Last 24 hours) at 9/24/2019 1654  Last data filed at 9/24/2019 1600  Gross per 24 hour   Intake 1045 ml   Output 400 ml   Net 645 ml      Physical Exam   Constitutional: She appears well-developed and well-nourished. No distress.   HENT:   Head: Normocephalic and atraumatic.   Mouth/Throat: Oropharynx is clear and moist. No oropharyngeal exudate.   Eyes: Pupils are equal, round, and reactive to light. Conjunctivae and EOM are normal. Right eye exhibits no discharge. Left eye exhibits no discharge.   Neck: Normal range of motion. Neck supple. No JVD present.   Cardiovascular: Normal rate, regular rhythm, normal heart sounds and intact distal pulses. Exam reveals no gallop and no friction rub.   No murmur heard.  Pulmonary/Chest: Effort normal and breath sounds normal. No respiratory distress. She has no wheezes. She has no rales. She exhibits no tenderness.   Comfortable on RA.   Abdominal: Soft. Bowel sounds are normal. She exhibits distension. She exhibits no mass. There is no tenderness. There is no rebound and no guarding. No hernia.   Improved ascites s/p paracentesis.   Musculoskeletal: Normal range of motion. She exhibits edema. She exhibits no tenderness.   4+ pitting edema to BLE.   Neurological: She is alert.   Awake and alert, oriented to person and place.  Follows commands appropriately.     Skin: Skin is warm and dry. Capillary refill takes less than 2 seconds. No rash noted. She is not diaphoretic. No erythema. There is pallor.   Psychiatric: She has a normal mood and affect. Her behavior is normal. Judgment and thought content normal.   Nursing note and vitals reviewed.      Significant Labs:   CBC:   Recent Labs   Lab 09/23/19  0517 09/24/19 0521   WBC 6.74 6.10   HGB 7.5* 6.4*   HCT 22.0* 19.2*    180     CMP:   Recent Labs   Lab 09/23/19 0517 09/24/19 0521   * 134*   K 3.6 3.4*    104   CO2 20* 21*   GLU 93 103   BUN 29* 28*   CREATININE 3.3*  "3.1*   CALCIUM 8.2* 8.0*   PROT 5.2* 4.9*   ALBUMIN 2.1* 2.8*   BILITOT 0.6 0.4   ALKPHOS 70 56   AST 27 30   ALT 18 19   ANIONGAP 10 9   EGFRNONAA 14* 15*     Coagulation:   Recent Labs   Lab 09/24/19  0521   INR 1.7*       Significant Imaging: I have reviewed all pertinent imaging results/findings within the past 24 hours.      Assessment/Plan:      * Acute renal failure superimposed on stage 4 chronic kidney disease  - in setting of chronic liver disease/cirrhosis  - Cr in August 2.5, currently 3.1  - Hold home diuretics  - Received 25g of Albumin post-paracentesis on 9/21   - Continue Albumin given concern for hepatorenal syndrome  - Nephrology following with additional recommendations pending  - Avoid Nephrotoxic agents  - Strict I and O  - Daily BMP  - Will plan to monitor overnight and if okay with Nephrology anticipate DC home tomorrow if she remains stable and counts improve.        Ascites due to alcoholic cirrhosis  - Followed outpatient by Dr. Rod, will transition care to Dr. Anaya upon DC  - s/p paracentesis on 9/21 with a reported 5L removed; received 25g of Albumin post procedure  - No evidence of SBP  - Repeat paracentesis on 9/23 with removal of 6L   - Continue Albumin given concern for hepatorenal syndrome  - GI following  - Hold home diuretics given #1  - Repeat paracentesis as needed  - Abdominal US showed patent portal vein; Cirrhosis without solid-appearing mass lesion evident. Ascites and cholelithiasis.    - GI discussed potential need for transplant in the future with patient who would "like to think about it".  Will need outpatient f/u with Dr. Anaya upon DC to arrange.   - Daily labs  - Monitor    MELD-Na score: 25 at 9/24/2019  5:21 AM  MELD score: 23 at 9/24/2019  5:21 AM  Calculated from:  Serum Creatinine: 3.1 mg/dL at 9/24/2019  5:21 AM  Serum Sodium: 134 mmol/L at 9/24/2019  5:21 AM  Total Bilirubin: 0.4 mg/dL (Rounded to 1 mg/dL) at 9/24/2019  5:21 AM  INR(ratio): 1.7 at " 9/24/2019  5:21 AM  Age: 63 years        UTI (urinary tract infection)  - Final UC shows Ecoli sensitive to Rocephin  - Continue Rocephin for a total of 5 days        Anemia  - Hgb 6.4  - No active s/s of bleeding  - Transfuse 1 unit PRBC today  - Daily CBC  - Monitor and transfuse as needed to keep Hgb >7.0  - Will plan to monitor overnight and if okay with Nephrology anticipate DC home tomorrow if she remains stable and counts improve.        Esophageal reflux disease  - PPI        VTE Risk Mitigation (From admission, onward)         Ordered     Place sequential compression device  Until discontinued      09/20/19 2873                      Margarita Nesbitt, NOMI, ACNP-BC  Department of Hospital Medicine   [facility]

## 2019-09-24 NOTE — PROGRESS NOTES
[facility]  Gastroenterology  Progress Note    Patient Name: Kylah Ortiz  MRN: [id number]  Admission Date: 9/20/2019  Hospital Length of Stay: 2 days  Code Status: Full Code   Attending Provider: Regino Ceballos MD  Consulting Provider: Milton Sepulveda PA-C  Primary Care Physician: Santos Beaver MD  Principal Problem: Acute renal failure superimposed on stage 4 chronic kidney disease      Subjective:     Interval History:   No acute events overnight. She is tolerating her diet. She had a decreased in Hgb after albumin. No overt bleeding reported. She is getting one unit of blood. Creatinine continues to decrease.     Review of Systems   Constitutional:        See Interval History for daily ROS.      Objective:     Vital Signs (Most Recent):  Temp: 98.6 °F (37 °C) (09/24/19 1247)  Pulse: 86 (09/24/19 1247)  Resp: 19 (09/24/19 1247)  BP: 110/68 (09/24/19 1247)  SpO2: 99 % (09/24/19 1247) Vital Signs (24h Range):  Temp:  [97.9 °F (36.6 °C)-98.7 °F (37.1 °C)] 98.6 °F (37 °C)  Pulse:  [] 86  Resp:  [15-20] 19  SpO2:  [97 %-100 %] 99 %  BP: ()/(53-76) 110/68     Weight: 77.6 kg (171 lb 1.6 oz) (09/20/19 1316)  Body mass index is 27.62 kg/m².      Intake/Output Summary (Last 24 hours) at 9/24/2019 1259  Last data filed at 9/24/2019 1247  Gross per 24 hour   Intake 925 ml   Output 1 ml   Net 924 ml       Lines/Drains/Airways     Peripheral Intravenous Line                 Peripheral IV - Single Lumen 09/20/19 1335 20 G Left Forearm 3 days                Physical Exam   Constitutional: She is oriented to person, place, and time. She appears well-developed and well-nourished. No distress.   HENT:   Head: Normocephalic and atraumatic.   Eyes: EOM are normal.   Cardiovascular: Normal rate and regular rhythm.   Pulmonary/Chest: Effort normal and breath sounds normal. No respiratory distress. She has no wheezes.   Abdominal: Soft. Bowel sounds are normal. She exhibits distension. There  is no tenderness.   Musculoskeletal: She exhibits edema.   Neurological: She is alert and oriented to person, place, and time. No cranial nerve deficit.   Skin: She is not diaphoretic.   Psychiatric: Her behavior is normal.       Significant Labs:  CBC:   Recent Labs   Lab 09/23/19 0517 09/24/19 0521   WBC 6.74 6.10   HGB 7.5* 6.4*   HCT 22.0* 19.2*    180     CMP:   Recent Labs   Lab 09/24/19 0521      CALCIUM 8.0*   ALBUMIN 2.8*   PROT 4.9*   *   K 3.4*   CO2 21*      BUN 28*   CREATININE 3.1*   ALKPHOS 56   ALT 19   AST 30   BILITOT 0.4     Coagulation:   Recent Labs   Lab 09/24/19 0521   INR 1.7*         Significant Imaging:  Imaging results within the past 24 hours have been reviewed.    Assessment/Plan:     * Acute renal failure superimposed on stage 4 chronic kidney disease  Patient with UTI being treated.   Urine sodium <20. Today is day 2 of IV albumin.   Continue to hold diuretics.    Nephrology following.     Alcoholic cirrhosis of liver with ascites  Check INR and CMP daily.  S/p paracentesis yesterday, but becoming distended again. Continue with low sodium diet. Hold diuretics.    Ok with discharge when Nephrology thinks appropriate.     MELD-Na score: 25 at 9/24/2019  5:21 AM  MELD score: 23 at 9/24/2019  5:21 AM  Calculated from:  Serum Creatinine: 3.1 mg/dL at 9/24/2019  5:21 AM  Serum Sodium: 134 mmol/L at 9/24/2019  5:21 AM  Total Bilirubin: 0.4 mg/dL (Rounded to 1 mg/dL) at 9/24/2019  5:21 AM  INR(ratio): 1.7 at 9/24/2019  5:21 AM  Age: 63 years      Anemia  Hgb down some overnight, likely related to albumin. No overt bleeding reported.   Agree with transfusion. Continue to monitor and transfuse additional units as indicated.         Thank you for your consult. I will follow-up with patient. Please contact us if you have any additional questions.    Milton Sepulveda PA-C  Gastroenterology  [facility]

## 2019-09-24 NOTE — ASSESSMENT & PLAN NOTE
1. DARRIAN on CKD stage 4 :  Baseline serum creatinine about 2.5 mg/dL, acute on chronic kidney failure likely due to raised intra-abdominal pressure from significant ascites, status post large volume paracentesis for about 5 L,  And 6 L , follow renal fn , slightly better, Cr 3 today ,     Hepatorenal syndrome is a possibility, urine sodium less than 20,       2.  Hypertension - blood pressure controlled      3.  End-stage liver disease due to alcoholic cirrhosis, GI notes reviewed,     4.  E Coli,  urinary tract infection - continue Rocephin,     5.  Anemia - multifactorial, PRBC transfusion today ,      6.  Mild hyponatremia - consistent with end-stage liver disease    7. Hypokalemia - replete k

## 2019-09-24 NOTE — ASSESSMENT & PLAN NOTE
- Hgb 6.4  - No active s/s of bleeding  - Transfuse 1 unit PRBC today  - Daily CBC  - Monitor and transfuse as needed to keep Hgb >7.0  - Will plan to monitor overnight and if okay with Nephrology anticipate DC home tomorrow if she remains stable and counts improve.

## 2019-09-24 NOTE — NURSING
Blood consent signed. Dr Ceballos on floor and stated to give 1 bottle of albumin now and then infuse 1 unit PRBCs then resume other 2 bottles of albumin following

## 2019-09-24 NOTE — ASSESSMENT & PLAN NOTE
Check INR and CMP daily.  S/p paracentesis yesterday, but becoming distended again. Continue with low sodium diet. Hold diuretics.      MELD-Na score: 25 at 9/24/2019  5:21 AM  MELD score: 23 at 9/24/2019  5:21 AM  Calculated from:  Serum Creatinine: 3.1 mg/dL at 9/24/2019  5:21 AM  Serum Sodium: 134 mmol/L at 9/24/2019  5:21 AM  Total Bilirubin: 0.4 mg/dL (Rounded to 1 mg/dL) at 9/24/2019  5:21 AM  INR(ratio): 1.7 at 9/24/2019  5:21 AM  Age: 63 years

## 2019-09-24 NOTE — ASSESSMENT & PLAN NOTE
Hgb down some overnight, likely related to albumin. No overt bleeding reported.   Agree with transfusion. Continue to monitor and transfuse additional units as indicated.

## 2019-09-24 NOTE — SUBJECTIVE & OBJECTIVE
Interval History:  No acute events overnight.  Resting comfortably in bed with caregiver Tatyana at bedside.   Hgb decreased to 6.4.  No active s/s of bleeding.  VS remain stable.  Will transfuse 1 unit PRBC today.  GI and nephrology continues to follow.  Per GI patient can be discharged home from their standpoint with outpatient follow-up with Dr. Anaya.  Met with caregiver Tatyana who is present at the bedside today.  Tatyana is aware of the need for 24 hr care given concerns for memory loss, and states that she will provide such care upon discharge.  Per GI patient will be arranged outpatient follow-up with Dr. Anaya.  Will plan to monitor overnight and if okay with Nephrology anticipate DC home tomorrow if she remains stable and counts improve.    Review of Systems   Constitutional: Positive for activity change and fatigue. Negative for chills and fever.   HENT: Negative for congestion, rhinorrhea and sinus pressure.    Respiratory: Negative for apnea, cough, choking, chest tightness, shortness of breath, wheezing and stridor.    Cardiovascular: Positive for leg swelling. Negative for chest pain and palpitations.   Gastrointestinal: Positive for abdominal distention. Negative for abdominal pain, diarrhea, nausea and vomiting.   Endocrine: Negative for cold intolerance and heat intolerance.   Genitourinary: Negative for difficulty urinating and hematuria.        Decrease urine output    Musculoskeletal: Negative for arthralgias and joint swelling.   Skin: Negative for color change, pallor and rash.   Neurological: Positive for weakness. Negative for dizziness, seizures, numbness and headaches.   Psychiatric/Behavioral: Negative for agitation. The patient is not nervous/anxious.      Objective:     Vital Signs (Most Recent):  Temp: 98.9 °F (37.2 °C) (09/24/19 1555)  Pulse: 86 (09/24/19 1555)  Resp: 18 (09/24/19 1555)  BP: 111/67 (09/24/19 1555)  SpO2: 98 % (09/24/19 1555) Vital Signs (24h Range):  Temp:  [97.9 °F  (36.6 °C)-98.9 °F (37.2 °C)] 98.9 °F (37.2 °C)  Pulse:  [] 86  Resp:  [15-21] 18  SpO2:  [97 %-100 %] 98 %  BP: ()/(53-76) 111/67     Weight: 77.6 kg (171 lb 1.6 oz)  Body mass index is 27.62 kg/m².    Intake/Output Summary (Last 24 hours) at 9/24/2019 1654  Last data filed at 9/24/2019 1600  Gross per 24 hour   Intake 1045 ml   Output 400 ml   Net 645 ml      Physical Exam   Constitutional: She appears well-developed and well-nourished. No distress.   HENT:   Head: Normocephalic and atraumatic.   Mouth/Throat: Oropharynx is clear and moist. No oropharyngeal exudate.   Eyes: Pupils are equal, round, and reactive to light. Conjunctivae and EOM are normal. Right eye exhibits no discharge. Left eye exhibits no discharge.   Neck: Normal range of motion. Neck supple. No JVD present.   Cardiovascular: Normal rate, regular rhythm, normal heart sounds and intact distal pulses. Exam reveals no gallop and no friction rub.   No murmur heard.  Pulmonary/Chest: Effort normal and breath sounds normal. No respiratory distress. She has no wheezes. She has no rales. She exhibits no tenderness.   Comfortable on RA.   Abdominal: Soft. Bowel sounds are normal. She exhibits distension. She exhibits no mass. There is no tenderness. There is no rebound and no guarding. No hernia.   Improved ascites s/p paracentesis.   Musculoskeletal: Normal range of motion. She exhibits edema. She exhibits no tenderness.   4+ pitting edema to BLE.   Neurological: She is alert.   Awake and alert, oriented to person and place.  Follows commands appropriately.     Skin: Skin is warm and dry. Capillary refill takes less than 2 seconds. No rash noted. She is not diaphoretic. No erythema. There is pallor.   Psychiatric: She has a normal mood and affect. Her behavior is normal. Judgment and thought content normal.   Nursing note and vitals reviewed.      Significant Labs:   CBC:   Recent Labs   Lab 09/23/19  0517 09/24/19  0521   WBC 6.74 6.10   HGB  7.5* 6.4*   HCT 22.0* 19.2*    180     CMP:   Recent Labs   Lab 09/23/19  0517 09/24/19  0521   * 134*   K 3.6 3.4*    104   CO2 20* 21*   GLU 93 103   BUN 29* 28*   CREATININE 3.3* 3.1*   CALCIUM 8.2* 8.0*   PROT 5.2* 4.9*   ALBUMIN 2.1* 2.8*   BILITOT 0.6 0.4   ALKPHOS 70 56   AST 27 30   ALT 18 19   ANIONGAP 10 9   EGFRNONAA 14* 15*     Coagulation:   Recent Labs   Lab 09/24/19  0521   INR 1.7*       Significant Imaging: I have reviewed all pertinent imaging results/findings within the past 24 hours.

## 2019-09-24 NOTE — ASSESSMENT & PLAN NOTE
- in setting of chronic liver disease/cirrhosis  - Cr in August 2.5, currently 3.1  - Hold home diuretics  - Received 25g of Albumin post-paracentesis on 9/21   - Continue Albumin given concern for hepatorenal syndrome  - Nephrology following with additional recommendations pending  - Avoid Nephrotoxic agents  - Strict I and O  - Daily BMP  - Will plan to monitor overnight and if okay with Nephrology anticipate DC home tomorrow if she remains stable and counts improve.

## 2019-09-24 NOTE — PLAN OF CARE
Fall precautions maintained. Pt free from injuries/falls.  Ambulates and repositions  C/o generalized pain and weakness. Blood infusion completed w/o reactions  POC and meds discussed w/ pt. Pt verbalized understanding.  Chart check done.   Will cont to monitor.

## 2019-09-25 LAB
ALBUMIN SERPL BCP-MCNC: 2.7 G/DL (ref 3.5–5.2)
ALP SERPL-CCNC: 63 U/L (ref 55–135)
ALT SERPL W/O P-5'-P-CCNC: 20 U/L (ref 10–44)
ANION GAP SERPL CALC-SCNC: 11 MMOL/L (ref 8–16)
AST SERPL-CCNC: 32 U/L (ref 10–40)
BASOPHILS # BLD AUTO: 0.02 K/UL (ref 0–0.2)
BASOPHILS NFR BLD: 0.3 % (ref 0–1.9)
BILIRUB SERPL-MCNC: 0.6 MG/DL (ref 0.1–1)
BUN SERPL-MCNC: 27 MG/DL (ref 8–23)
CALCIUM SERPL-MCNC: 8.4 MG/DL (ref 8.7–10.5)
CHLORIDE SERPL-SCNC: 107 MMOL/L (ref 95–110)
CO2 SERPL-SCNC: 16 MMOL/L (ref 23–29)
CREAT SERPL-MCNC: 2.7 MG/DL (ref 0.5–1.4)
DIFFERENTIAL METHOD: ABNORMAL
EOSINOPHIL # BLD AUTO: 0.3 K/UL (ref 0–0.5)
EOSINOPHIL NFR BLD: 4.4 % (ref 0–8)
ERYTHROCYTE [DISTWIDTH] IN BLOOD BY AUTOMATED COUNT: 17.8 % (ref 11.5–14.5)
EST. GFR  (AFRICAN AMERICAN): 21 ML/MIN/1.73 M^2
EST. GFR  (NON AFRICAN AMERICAN): 18 ML/MIN/1.73 M^2
GLUCOSE SERPL-MCNC: 87 MG/DL (ref 70–110)
HCT VFR BLD AUTO: 24.8 % (ref 37–48.5)
HGB BLD-MCNC: 8.3 G/DL (ref 12–16)
INR PPP: 1.5 (ref 0.8–1.2)
LYMPHOCYTES # BLD AUTO: 1.3 K/UL (ref 1–4.8)
LYMPHOCYTES NFR BLD: 18.4 % (ref 18–48)
MCH RBC QN AUTO: 27.9 PG (ref 27–31)
MCHC RBC AUTO-ENTMCNC: 33.5 G/DL (ref 32–36)
MCV RBC AUTO: 83 FL (ref 82–98)
MONOCYTES # BLD AUTO: 1.2 K/UL (ref 0.3–1)
MONOCYTES NFR BLD: 17.1 % (ref 4–15)
NEUTROPHILS # BLD AUTO: 4.1 K/UL (ref 1.8–7.7)
NEUTROPHILS NFR BLD: 60.1 % (ref 38–73)
PLATELET # BLD AUTO: 173 K/UL (ref 150–350)
PMV BLD AUTO: 9.6 FL (ref 9.2–12.9)
POTASSIUM SERPL-SCNC: 3.7 MMOL/L (ref 3.5–5.1)
PROT SERPL-MCNC: 5.2 G/DL (ref 6–8.4)
PROTHROMBIN TIME: 16.2 SEC (ref 9–12.5)
RBC # BLD AUTO: 2.98 M/UL (ref 4–5.4)
SODIUM SERPL-SCNC: 134 MMOL/L (ref 136–145)
WBC # BLD AUTO: 6.8 K/UL (ref 3.9–12.7)

## 2019-09-25 PROCEDURE — 11000001 HC ACUTE MED/SURG PRIVATE ROOM

## 2019-09-25 PROCEDURE — 85610 PROTHROMBIN TIME: CPT

## 2019-09-25 PROCEDURE — 99232 SBSQ HOSP IP/OBS MODERATE 35: CPT | Mod: ,,, | Performed by: INTERNAL MEDICINE

## 2019-09-25 PROCEDURE — 25000003 PHARM REV CODE 250: Performed by: NURSE PRACTITIONER

## 2019-09-25 PROCEDURE — 85025 COMPLETE CBC W/AUTO DIFF WBC: CPT

## 2019-09-25 PROCEDURE — 99232 PR SUBSEQUENT HOSPITAL CARE,LEVL II: ICD-10-PCS | Mod: ,,, | Performed by: NURSE PRACTITIONER

## 2019-09-25 PROCEDURE — 63600175 PHARM REV CODE 636 W HCPCS: Mod: JG | Performed by: NURSE PRACTITIONER

## 2019-09-25 PROCEDURE — 36415 COLL VENOUS BLD VENIPUNCTURE: CPT

## 2019-09-25 PROCEDURE — 80053 COMPREHEN METABOLIC PANEL: CPT

## 2019-09-25 PROCEDURE — P9047 ALBUMIN (HUMAN), 25%, 50ML: HCPCS | Mod: JG | Performed by: NURSE PRACTITIONER

## 2019-09-25 PROCEDURE — 99232 PR SUBSEQUENT HOSPITAL CARE,LEVL II: ICD-10-PCS | Mod: ,,, | Performed by: INTERNAL MEDICINE

## 2019-09-25 PROCEDURE — 99232 SBSQ HOSP IP/OBS MODERATE 35: CPT | Mod: ,,, | Performed by: NURSE PRACTITIONER

## 2019-09-25 RX ORDER — ALBUMIN HUMAN 250 G/1000ML
150 SOLUTION INTRAVENOUS ONCE
Status: COMPLETED | OUTPATIENT
Start: 2019-09-25 | End: 2019-09-25

## 2019-09-25 RX ADMIN — FLUOXETINE 20 MG: 20 CAPSULE ORAL at 08:09

## 2019-09-25 RX ADMIN — CEFTRIAXONE SODIUM 2 G: 2 INJECTION, POWDER, FOR SOLUTION INTRAMUSCULAR; INTRAVENOUS at 03:09

## 2019-09-25 RX ADMIN — PANTOPRAZOLE SODIUM 40 MG: 40 TABLET, DELAYED RELEASE ORAL at 08:09

## 2019-09-25 RX ADMIN — ALBUMIN (HUMAN) 150 ML: 25 SOLUTION INTRAVENOUS at 04:09

## 2019-09-25 RX ADMIN — LACTULOSE 10 G: 20 SOLUTION ORAL at 08:09

## 2019-09-25 NOTE — PROGRESS NOTES
[facility] -   Gastroenterology  Progress Note    Patient Name: Kylah Ortiz  MRN: [id number]  Admission Date: 9/20/2019  Hospital Length of Stay: 3 days  Code Status: Full Code   Attending Provider: Sumeet Stone, *  Consulting Provider: Stephanie Dejesus NP  Primary Care Physician: Santos Beaver MD  Principal Problem: Ascites due to alcoholic cirrhosis      Subjective:     Interval History: No acute events overnight. Will be discharged today. Fluid is recollecting. Repeat paracentesis.     Review of Systems   Constitutional: Negative for activity change and appetite change.        As per interval history above   Respiratory: Negative for cough and shortness of breath.    Cardiovascular: Negative for chest pain.   Gastrointestinal: Positive for abdominal distention. Negative for abdominal pain, anal bleeding, blood in stool, constipation, diarrhea, nausea and vomiting.   Skin: Negative for color change and rash.     Objective:     Vital Signs (Most Recent):  Temp: 98.2 °F (36.8 °C) (09/25/19 0703)  Pulse: 82 (09/25/19 0703)  Resp: 20 (09/25/19 0703)  BP: 105/72 (09/25/19 0703)  SpO2: 96 % (09/25/19 0400) Vital Signs (24h Range):  Temp:  [98.1 °F (36.7 °C)-98.9 °F (37.2 °C)] 98.2 °F (36.8 °C)  Pulse:  [82-91] 82  Resp:  [16-20] 20  SpO2:  [96 %-98 %] 96 %  BP: (100-111)/(63-72) 105/72     Weight: 77.6 kg (171 lb 1.6 oz) (09/20/19 1316)  Body mass index is 27.62 kg/m².      Intake/Output Summary (Last 24 hours) at 9/25/2019 1338  Last data filed at 9/25/2019 1214  Gross per 24 hour   Intake 1060 ml   Output 400 ml   Net 660 ml       Lines/Drains/Airways     Peripheral Intravenous Line                 Peripheral IV - Single Lumen 09/20/19 1335 20 G Left Forearm 5 days                Physical Exam   Constitutional: She appears well-developed. No distress.   Cardiovascular: Normal rate, regular rhythm and normal heart sounds. Exam reveals no friction rub.   No murmur  heard.  Pulmonary/Chest: Effort normal and breath sounds normal. No stridor. No respiratory distress.   Abdominal: Bowel sounds are normal. She exhibits distension.   Neurological: She is alert.   Skin: Skin is warm and dry.   Psychiatric: She has a normal mood and affect.       Significant Labs:  CBC:   Recent Labs   Lab 09/24/19  0521 09/25/19  0529   WBC 6.10 6.80   HGB 6.4* 8.3*   HCT 19.2* 24.8*    173     CMP:   Recent Labs   Lab 09/25/19 0529   GLU 87   CALCIUM 8.4*   ALBUMIN 2.7*   PROT 5.2*   *   K 3.7   CO2 16*      BUN 27*   CREATININE 2.7*   ALKPHOS 63   ALT 20   AST 32   BILITOT 0.6     Coagulation:   Recent Labs   Lab 09/25/19 0529   INR 1.5*         Significant Imaging:  Imaging results within the past 24 hours have been reviewed.    Assessment/Plan:     * Ascites due to alcoholic cirrhosis  Check INR and CMP daily. MELD trending down.   S/p paracentesis Monday, but becoming distended again. Continue with low sodium diet.     Discussed case with nephrology. Will restart Lasix at 40 mg daily. Creatinine improving. Could be diuretic resistant ascites. Recommend scheduled paracentesis.     MELD-Na score: 22 at 9/25/2019  5:29 AM  MELD score: 20 at 9/25/2019  5:29 AM  Calculated from:  Serum Creatinine: 2.7 mg/dL at 9/25/2019  5:29 AM  Serum Sodium: 134 mmol/L at 9/25/2019  5:29 AM  Total Bilirubin: 0.6 mg/dL (Rounded to 1 mg/dL) at 9/25/2019  5:29 AM  INR(ratio): 1.5 at 9/25/2019  5:29 AM  Age: 63 years    Alcoholic cirrhosis of liver with ascites  Check INR and CMP daily. MELD trending down.   S/p paracentesis Monday, but becoming distended again. Continue with low sodium diet.     Discussed case with nephrology. Will restart Lasix at 40 mg daily. Creatinine improving. Could be diuretic resistant ascites. Recommend scheduled paracentesis.   She will need follow up with hepatology next week. Message sent to hepatology staff to arrange follow up visit.      MELD-Na score: 22 at  9/25/2019  5:29 AM  MELD score: 20 at 9/25/2019  5:29 AM  Calculated from:  Serum Creatinine: 2.7 mg/dL at 9/25/2019  5:29 AM  Serum Sodium: 134 mmol/L at 9/25/2019  5:29 AM  Total Bilirubin: 0.6 mg/dL (Rounded to 1 mg/dL) at 9/25/2019  5:29 AM  INR(ratio): 1.5 at 9/25/2019  5:29 AM  Age: 63 years      Anemia  Received blood transfusion yesterday. Hgb increased to 8.3    Acute renal failure superimposed on stage 4 chronic kidney disease  Patient with UTI being treated.   Urine sodium <20.     Nephrology following. Will restart Lasix 40 mg daily       Thank you for your consult. I will sign off. Please contact us if you have any additional questions.    Stephanie Dejesus NP  Gastroenterology  [facility]

## 2019-09-25 NOTE — PLAN OF CARE
Fall precautions maintained. Pt free from injuries/falls.  Ambulates and repositions  Patient to have paracentesis today  POC and meds discussed w/ pt. Pt verbalized understanding.  Chart check done.   Will cont to monitor.

## 2019-09-25 NOTE — SUBJECTIVE & OBJECTIVE
Interval History:  No acute events overnight.  Currently resting comfortably in bed with loni Joe at .  Patient required another paracentesis today with removal of 8L.  Case d/w GI, will plan to give Albumin today and if she remains stable anticipate DC home tomorrow.  Patient will need to follow closely with GI and would benefit from scheduled outpatient paracentesis as needed to avoid re-hospitalization.  Per Nephrology recommendations, patient will DC home on Lasix 40 mg daily.       Review of Systems   Constitutional: Positive for activity change and fatigue. Negative for chills and fever.   HENT: Negative for congestion, rhinorrhea and sinus pressure.    Respiratory: Negative for apnea, cough, choking, chest tightness, shortness of breath, wheezing and stridor.    Cardiovascular: Positive for leg swelling. Negative for chest pain and palpitations.   Gastrointestinal: Positive for abdominal distention. Negative for abdominal pain, diarrhea, nausea and vomiting.   Endocrine: Negative for cold intolerance and heat intolerance.   Genitourinary: Negative for difficulty urinating and hematuria.        Decrease urine output    Musculoskeletal: Negative for arthralgias and joint swelling.   Skin: Negative for color change, pallor and rash.   Neurological: Positive for weakness. Negative for dizziness, seizures, numbness and headaches.   Psychiatric/Behavioral: Negative for agitation. The patient is not nervous/anxious.      Objective:     Vital Signs (Most Recent):  Temp: 98.1 °F (36.7 °C) (09/25/19 1523)  Pulse: 88 (09/25/19 1523)  Resp: 17 (09/25/19 1542)  BP: (!) 100/58 (09/25/19 1542)  SpO2: 97 % (09/25/19 1542) Vital Signs (24h Range):  Temp:  [98.1 °F (36.7 °C)-98.9 °F (37.2 °C)] 98.1 °F (36.7 °C)  Pulse:  [82-91] 88  Resp:  [16-20] 17  SpO2:  [96 %-98 %] 97 %  BP: ()/(56-72) 100/58     Weight: 77.6 kg (171 lb 1.6 oz)  Body mass index is 27.62 kg/m².    Intake/Output Summary (Last 24 hours) at  9/25/2019 1553  Last data filed at 9/25/2019 1214  Gross per 24 hour   Intake 1060 ml   Output --   Net 1060 ml      Physical Exam   Constitutional: She appears well-developed and well-nourished. No distress.   HENT:   Head: Normocephalic and atraumatic.   Mouth/Throat: Oropharynx is clear and moist. No oropharyngeal exudate.   Eyes: Pupils are equal, round, and reactive to light. Conjunctivae and EOM are normal. Right eye exhibits no discharge. Left eye exhibits no discharge.   Neck: Normal range of motion. Neck supple. No JVD present.   Cardiovascular: Normal rate, regular rhythm, normal heart sounds and intact distal pulses. Exam reveals no gallop and no friction rub.   No murmur heard.  Pulmonary/Chest: Effort normal and breath sounds normal. No respiratory distress. She has no wheezes. She has no rales. She exhibits no tenderness.   Comfortable on RA.   Abdominal: Soft. Bowel sounds are normal. She exhibits distension. She exhibits no mass. There is no tenderness. There is no rebound and no guarding. No hernia.   Improved ascites s/p paracentesis.   Musculoskeletal: Normal range of motion. She exhibits edema. She exhibits no tenderness.   4+ pitting edema to BLE.   Neurological: She is alert.   Awake and alert, oriented to person and place.  Follows commands appropriately.     Skin: Skin is warm and dry. Capillary refill takes less than 2 seconds. No rash noted. She is not diaphoretic. No erythema. There is pallor.   Psychiatric: She has a normal mood and affect. Her behavior is normal. Judgment and thought content normal.   Nursing note and vitals reviewed.      Significant Labs:   CBC:   Recent Labs   Lab 09/24/19  0521 09/25/19  0529   WBC 6.10 6.80   HGB 6.4* 8.3*   HCT 19.2* 24.8*    173     CMP:   Recent Labs   Lab 09/24/19  0521 09/25/19  0529   * 134*   K 3.4* 3.7    107   CO2 21* 16*    87   BUN 28* 27*   CREATININE 3.1* 2.7*   CALCIUM 8.0* 8.4*   PROT 4.9* 5.2*   ALBUMIN 2.8*  2.7*   BILITOT 0.4 0.6   ALKPHOS 56 63   AST 30 32   ALT 19 20   ANIONGAP 9 11   EGFRNONAA 15* 18*     Coagulation:   Recent Labs   Lab 09/25/19  0529   INR 1.5*       Significant Imaging: I have reviewed all pertinent imaging results/findings within the past 24 hours.

## 2019-09-25 NOTE — CHAPLAIN
Initial visit with patient.  Provided ministries of listening, presence, and prayer.  Took time to assess for any other spiritual needs and she currently didn't have any.  Spiritual care remains available as needed.    Chaplain Brian Soriano M.Div., [facility]

## 2019-09-25 NOTE — NURSING
Patient sitting up in bed watching tv. Requested breakfast tray be warmed up and orange juice. Requests recieved Denies other need or compalints

## 2019-09-25 NOTE — ASSESSMENT & PLAN NOTE
- Hgb 8.3 s/p 1 unit of PRBCs  - No active s/s of bleeding  - Daily CBC  - Monitor and transfuse as needed to keep Hgb >7.0  - Will plan to monitor overnight and if okay with Nephrology anticipate DC home tomorrow if she remains stable and counts improve.

## 2019-09-25 NOTE — PROGRESS NOTES
[facility]  Nephrology  Progress Note    Patient Name: Kylah Ortiz  MRN: [id number]  Admission Date: 9/20/2019  Hospital Length of Stay: 3 days  Attending Provider: Sumeet Stone, *   Primary Care Physician: Santos Beaver MD  Principal Problem:Acute renal failure superimposed on stage 4 chronic kidney disease    Subjective:     HPI: Kylah Ortiz is a 63-year-old  woman with history of end-stage liver disease due to alcohol abuse, chronic kidney disease, her serum creatinine was 2.5 mg/dL about a month ago, patient presents to the hospital for generalized weakness, acute on chronic renal failure noted, serum creatinine increased to 3.6 mg/dL, significant ascites noted on exam, also with some peripheral edema, status post paracentesis for about 5 L fluids today, empirically on antibiotics , we were consulted for acute on chronic renal failure.    Interval History:     9/25/19 - no acute events,     9/24/19 - no acute events, receiving pRBC tx     9/23/19 - s/p paracentesis, 6 L , feels better ,     9/22/19 - no acute events, denies complaints     Review of patient's allergies indicates:   Allergen Reactions    Hydrocodone-acetaminophen      Current Facility-Administered Medications   Medication Frequency    0.9%  NaCl infusion (for blood administration) Q24H PRN    cefTRIAXone (ROCEPHIN) 2 g in dextrose 5 % 50 mL IVPB Q24H    FLUoxetine capsule 20 mg Daily    lactulose 20 gram/30 mL solution Soln 10 g Daily    pantoprazole EC tablet 40 mg Daily       Objective:     Vital Signs (Most Recent):  Temp: 98.2 °F (36.8 °C) (09/25/19 0703)  Pulse: 82 (09/25/19 0703)  Resp: 20 (09/25/19 0703)  BP: 105/72 (09/25/19 0703)  SpO2: 96 % (09/25/19 0400)  O2 Device (Oxygen Therapy): room air (09/24/19 1247) Vital Signs (24h Range):  Temp:  [98.1 °F (36.7 °C)-98.9 °F (37.2 °C)] 98.2 °F (36.8 °C)  Pulse:  [82-91] 82  Resp:  [16-21] 20  SpO2:  [96 %-99 %] 96 %  BP:  (100-116)/(63-72) 105/72     Weight: 77.6 kg (171 lb 1.6 oz) (09/20/19 1316)  Body mass index is 27.62 kg/m².  Body surface area is 1.9 meters squared.    I/O last 3 completed shifts:  In: 1385 [P.O.:1020; Blood:265; IV Piggyback:100]  Out: 400 [Urine:400]    Physical Exam   Constitutional: She is oriented to person, place, and time. She appears well-developed.   HENT:   Head: Normocephalic and atraumatic.   Mouth/Throat: Oropharynx is clear and moist. No oropharyngeal exudate.   Eyes: Pupils are equal, round, and reactive to light. Conjunctivae and EOM are normal. No scleral icterus.   Neck: Normal range of motion. Neck supple. No JVD present. Carotid bruit is not present. No tracheal deviation present. No thyroid mass and no thyromegaly present.   Cardiovascular: Normal rate, regular rhythm, normal heart sounds and intact distal pulses. Exam reveals no gallop and no friction rub.   No murmur heard.  Pulmonary/Chest: Breath sounds normal. No respiratory distress. She has no wheezes. She has no rales. She exhibits no tenderness.   Reduced BS in bases   Abdominal: Soft. Bowel sounds are normal. She exhibits distension. She exhibits no abdominal bruit, no ascites and no mass. There is no hepatosplenomegaly. There is no tenderness. There is no rebound, no guarding and no CVA tenderness.   Musculoskeletal: She exhibits edema. She exhibits no tenderness.   Lymphadenopathy:     She has no cervical adenopathy.   Neurological: She is oriented to person, place, and time. No cranial nerve deficit. Coordination normal.   Skin: Skin is warm and intact. No rash noted. No erythema. No pallor.   Psychiatric: She has a normal mood and affect.       Significant Labs:  CBC:   Recent Labs   Lab 09/25/19  0529   WBC 6.80   RBC 2.98*   HGB 8.3*   HCT 24.8*      MCV 83   MCH 27.9   MCHC 33.5     CMP:   Recent Labs   Lab 09/25/19  0529   GLU 87   CALCIUM 8.4*   ALBUMIN 2.7*   PROT 5.2*   *   K 3.7   CO2 16*      BUN 27*    CREATININE 2.7*   ALKPHOS 63   ALT 20   AST 32   BILITOT 0.6     Coagulation:   Recent Labs   Lab 09/25/19  0529   INR 1.5*     LFTs:   Recent Labs   Lab 09/25/19  0529   ALT 20   AST 32   ALKPHOS 63   BILITOT 0.6   PROT 5.2*   ALBUMIN 2.7*     All labs within the past 24 hours have been reviewed.     Significant Imaging:  Reviewed    Lab Results   Component Value Date    CALCIUM 8.4 (L) 09/25/2019       Lab Results   Component Value Date    ALBUMIN 2.7 (L) 09/25/2019     Assessment/Plan:     * Acute renal failure superimposed on stage 4 chronic kidney disease  1. DARRIAN on CKD stage 4 :  Baseline serum creatinine about 2.5 mg/dL, acute on chronic kidney failure likely due to raised intra-abdominal pressure from significant ascites, status post large volume paracentesis for about 5 L,  And 6 L , follow renal fn , renal function improved, serum creatinine almost back to baseline at 2.7 mg/dL will, will discuss with GI department about 1 more paracentesis has her abdominal is distended,    She may have diuretic resistant ascites, patient was on furosemide 40 mg 3 times a day, will decrease dose to 40 mg daily at discharge to prevent intravascular volume depletion and acute kidney injury from dehydration,     Hepatorenal syndrome is a possibility, urine sodium less than 20,       2.  Hypertension - blood pressure controlled      3.  End-stage liver disease due to alcoholic cirrhosis, GI notes reviewed,     4.  E Coli,  urinary tract infection - continue Rocephin,     5.  Anemia - multifactorial, PRBC transfusion today ,      6.  Mild hyponatremia - consistent with end-stage liver disease    7. Hypokalemia - K better    8.  Okay to discharge home after paracentesis today if clinically stable and follow-up in the clinic with her nephrologist, I think she is following with Dr[facility],           Thank you for your consult. I will follow-up with patient. Please contact us if you have any additional  questions.     Total time spent 30 minutes including time needed to review the records,  patient  evaluation, documentation, face-to-face discussion with the patient, primary team and GI service, more than 50% of the time was spent on coordination of care and counseling.       Noe Urbano MD  Nephrology  [facility]

## 2019-09-25 NOTE — ASSESSMENT & PLAN NOTE
Check INR and CMP daily. MELD trending down.   S/p paracentesis Monday, but becoming distended again. Continue with low sodium diet.     Discussed case with nephrology. Will restart Lasix at 40 mg daily. Creatinine improving. Could be diuretic resistant ascites. Recommend scheduled paracentesis.   She will need follow up with hepatology next week. Message sent to hepatology staff to arrange follow up visit.      MELD-Na score: 22 at 9/25/2019  5:29 AM  MELD score: 20 at 9/25/2019  5:29 AM  Calculated from:  Serum Creatinine: 2.7 mg/dL at 9/25/2019  5:29 AM  Serum Sodium: 134 mmol/L at 9/25/2019  5:29 AM  Total Bilirubin: 0.6 mg/dL (Rounded to 1 mg/dL) at 9/25/2019  5:29 AM  INR(ratio): 1.5 at 9/25/2019  5:29 AM  Age: 63 years

## 2019-09-25 NOTE — SUBJECTIVE & OBJECTIVE
Interval History:     9/25/19 - no acute events,     9/24/19 - no acute events, receiving pRBC tx     9/23/19 - s/p paracentesis, 6 L , feels better ,     9/22/19 - no acute events, denies complaints     Review of patient's allergies indicates:   Allergen Reactions    Hydrocodone-acetaminophen      Current Facility-Administered Medications   Medication Frequency    0.9%  NaCl infusion (for blood administration) Q24H PRN    cefTRIAXone (ROCEPHIN) 2 g in dextrose 5 % 50 mL IVPB Q24H    FLUoxetine capsule 20 mg Daily    lactulose 20 gram/30 mL solution Soln 10 g Daily    pantoprazole EC tablet 40 mg Daily       Objective:     Vital Signs (Most Recent):  Temp: 98.2 °F (36.8 °C) (09/25/19 0703)  Pulse: 82 (09/25/19 0703)  Resp: 20 (09/25/19 0703)  BP: 105/72 (09/25/19 0703)  SpO2: 96 % (09/25/19 0400)  O2 Device (Oxygen Therapy): room air (09/24/19 1247) Vital Signs (24h Range):  Temp:  [98.1 °F (36.7 °C)-98.9 °F (37.2 °C)] 98.2 °F (36.8 °C)  Pulse:  [82-91] 82  Resp:  [16-21] 20  SpO2:  [96 %-99 %] 96 %  BP: (100-116)/(63-72) 105/72     Weight: 77.6 kg (171 lb 1.6 oz) (09/20/19 1316)  Body mass index is 27.62 kg/m².  Body surface area is 1.9 meters squared.    I/O last 3 completed shifts:  In: 1385 [P.O.:1020; Blood:265; IV Piggyback:100]  Out: 400 [Urine:400]    Physical Exam   Constitutional: She is oriented to person, place, and time. She appears well-developed.   HENT:   Head: Normocephalic and atraumatic.   Mouth/Throat: Oropharynx is clear and moist. No oropharyngeal exudate.   Eyes: Pupils are equal, round, and reactive to light. Conjunctivae and EOM are normal. No scleral icterus.   Neck: Normal range of motion. Neck supple. No JVD present. Carotid bruit is not present. No tracheal deviation present. No thyroid mass and no thyromegaly present.   Cardiovascular: Normal rate, regular rhythm, normal heart sounds and intact distal pulses. Exam reveals no gallop and no friction rub.   No murmur  heard.  Pulmonary/Chest: Breath sounds normal. No respiratory distress. She has no wheezes. She has no rales. She exhibits no tenderness.   Reduced BS in bases   Abdominal: Soft. Bowel sounds are normal. She exhibits distension. She exhibits no abdominal bruit, no ascites and no mass. There is no hepatosplenomegaly. There is no tenderness. There is no rebound, no guarding and no CVA tenderness.   Musculoskeletal: She exhibits edema. She exhibits no tenderness.   Lymphadenopathy:     She has no cervical adenopathy.   Neurological: She is oriented to person, place, and time. No cranial nerve deficit. Coordination normal.   Skin: Skin is warm and intact. No rash noted. No erythema. No pallor.   Psychiatric: She has a normal mood and affect.       Significant Labs:  CBC:   Recent Labs   Lab 09/25/19 0529   WBC 6.80   RBC 2.98*   HGB 8.3*   HCT 24.8*      MCV 83   MCH 27.9   MCHC 33.5     CMP:   Recent Labs   Lab 09/25/19 0529   GLU 87   CALCIUM 8.4*   ALBUMIN 2.7*   PROT 5.2*   *   K 3.7   CO2 16*      BUN 27*   CREATININE 2.7*   ALKPHOS 63   ALT 20   AST 32   BILITOT 0.6     Coagulation:   Recent Labs   Lab 09/25/19 0529   INR 1.5*     LFTs:   Recent Labs   Lab 09/25/19 0529   ALT 20   AST 32   ALKPHOS 63   BILITOT 0.6   PROT 5.2*   ALBUMIN 2.7*     All labs within the past 24 hours have been reviewed.     Significant Imaging:  Reviewed    Lab Results   Component Value Date    CALCIUM 8.4 (L) 09/25/2019       Lab Results   Component Value Date    ALBUMIN 2.7 (L) 09/25/2019

## 2019-09-25 NOTE — ASSESSMENT & PLAN NOTE
Check INR and CMP daily. MELD trending down.   S/p paracentesis Monday, but becoming distended again. Continue with low sodium diet.     Discussed case with nephrology. Will restart Lasix at 40 mg daily. Creatinine improving. Could be diuretic resistant ascites. Recommend scheduled paracentesis.     MELD-Na score: 22 at 9/25/2019  5:29 AM  MELD score: 20 at 9/25/2019  5:29 AM  Calculated from:  Serum Creatinine: 2.7 mg/dL at 9/25/2019  5:29 AM  Serum Sodium: 134 mmol/L at 9/25/2019  5:29 AM  Total Bilirubin: 0.6 mg/dL (Rounded to 1 mg/dL) at 9/25/2019  5:29 AM  INR(ratio): 1.5 at 9/25/2019  5:29 AM  Age: 63 years

## 2019-09-25 NOTE — NURSING
Patient sitting up in bed watching t.v. Denies needs or complaints. Paracentesis site LRQ dry and intact w band aid in place.BP remains stable .

## 2019-09-25 NOTE — SUBJECTIVE & OBJECTIVE
Subjective:     Interval History: No acute events overnight. Will be discharged today. Fluid is recollecting. Repeat paracentesis.     Review of Systems   Constitutional: Negative for activity change and appetite change.        As per interval history above   Respiratory: Negative for cough and shortness of breath.    Cardiovascular: Negative for chest pain.   Gastrointestinal: Positive for abdominal distention. Negative for abdominal pain, anal bleeding, blood in stool, constipation, diarrhea, nausea and vomiting.   Skin: Negative for color change and rash.     Objective:     Vital Signs (Most Recent):  Temp: 98.2 °F (36.8 °C) (09/25/19 0703)  Pulse: 82 (09/25/19 0703)  Resp: 20 (09/25/19 0703)  BP: 105/72 (09/25/19 0703)  SpO2: 96 % (09/25/19 0400) Vital Signs (24h Range):  Temp:  [98.1 °F (36.7 °C)-98.9 °F (37.2 °C)] 98.2 °F (36.8 °C)  Pulse:  [82-91] 82  Resp:  [16-20] 20  SpO2:  [96 %-98 %] 96 %  BP: (100-111)/(63-72) 105/72     Weight: 77.6 kg (171 lb 1.6 oz) (09/20/19 1316)  Body mass index is 27.62 kg/m².      Intake/Output Summary (Last 24 hours) at 9/25/2019 1338  Last data filed at 9/25/2019 1214  Gross per 24 hour   Intake 1060 ml   Output 400 ml   Net 660 ml       Lines/Drains/Airways     Peripheral Intravenous Line                 Peripheral IV - Single Lumen 09/20/19 1335 20 G Left Forearm 5 days                Physical Exam   Constitutional: She appears well-developed. No distress.   Cardiovascular: Normal rate, regular rhythm and normal heart sounds. Exam reveals no friction rub.   No murmur heard.  Pulmonary/Chest: Effort normal and breath sounds normal. No stridor. No respiratory distress.   Abdominal: Bowel sounds are normal. She exhibits distension.   Neurological: She is alert.   Skin: Skin is warm and dry.   Psychiatric: She has a normal mood and affect.       Significant Labs:  CBC:   Recent Labs   Lab 09/24/19  0521 09/25/19  0529   WBC 6.10 6.80   HGB 6.4* 8.3*   HCT 19.2* 24.8*     173     CMP:   Recent Labs   Lab 09/25/19  0529   GLU 87   CALCIUM 8.4*   ALBUMIN 2.7*   PROT 5.2*   *   K 3.7   CO2 16*      BUN 27*   CREATININE 2.7*   ALKPHOS 63   ALT 20   AST 32   BILITOT 0.6     Coagulation:   Recent Labs   Lab 09/25/19  0529   INR 1.5*         Significant Imaging:  Imaging results within the past 24 hours have been reviewed.

## 2019-09-25 NOTE — PROGRESS NOTES
[facility] Hospital Medicine  Progress Note    Patient Name: Kylah Ortiz  MRN: [id number]  Patient Class: IP- Inpatient   Admission Date: 9/20/2019  Length of Stay: 3 days  Attending Physician: Sumeet Stone, *  Primary Care Provider: Santos Beaver MD        Subjective:     Principal Problem:Ascites due to alcoholic cirrhosis        HPI:  Ms Ortiz is a 63 year old female with PMHx alcohol cirrhosis, ascites and CKD who presented to [facility] Emergency Room after have abnormal labs reported from clinic. Emergency Room physican  discussed thecase with Dr. Anaya who state d that the patient creatinine went from 1.5 to 3.6 and recommends admission. Associated symptoms include increase abdominal distention and bilateral leg swelling. Patient denies associated symptoms of chest pain, increase shortness of breath, fever, chills, nausea, vomiting or diaphoresis. Sitter is at bedside and assisting in answering questions. She is being placed in Observations under the care of Hospital Medicine.     Overview/Hospital Course:  On 9/20 patient was placed in OBS secondary to DARRIAN and ascites due to alcoholic cirrhosis.  Per ER records, Dr. Anaya was contacted and it is reported that patient's Cr increased from 1.5 to 3.6 and GI recommended admission.  Patient's home diuretics were held and she was started on gentle IV hydration with NS at 50 cc/hour.    As of 9/21 Cr decreased from 3.8 to 3.6.  Per Care Everywhere, Cr in August was noted to be 2.4.  Patient is a poor historian and cannot provide any details on her home medications, or which physician she follows with as an outpatient.  No records are noted within the [facility] system.  Per Care Everywhere, patient follows with GI Dr. Rod.  Patient underwent a paracentesis today with a reported 5L removed.  GI consulted for additional recommendations and recommended empiric ABX coverage for UTI +/- PNA, and to continue lactulose, hold diuretics,  "and obtain US doppler to r/o liver lesion and PVT.  UA shows many bacteria.  UC pending.  Will start on empiric Rocephiin for now and adjust based on cultures.  Nephrology consult pending.      As of 9/22 patient resting in bed with no new complaints.  GI and Nephrology following.  No SBP on paracentesis.  Abdominal US showed patent portal vein; Cirrhosis without solid-appearing mass lesion evident.  Ascites and cholelithiasis.  GI discussed potential need for transplant in the future with patient who would "like to think about it".  Will need outpatient f/u with Dr. Rod upon DC to arrange.  Still with significant ascites and 4+ pitting edema to BLE.  Awaiting additional recs from Nephrology.  UC pending, continue empiric Rocephin for now.     As of 9/23 Cr stable at 3.3.  Patient is s/p repeat paracentesis today with removal of 6L.  GI and Nephrology continues to follow.  Receiving Albumin.  MELD stable.  Still with 4+ pitting edema to BLE.  Final UC shows Ecoli sensitive to Rocephin.  Will plan to treat with 5 days of ABX.    As of 9/24 Hgb decreased to 6.4.  No active s/s of bleeding.  VS remain stable.  Will transfuse 1 unit PRBC today.  GI and nephrology continues to follow.  Per GI patient can be discharged home from their standpoint with outpatient follow-up with Dr. Anaya.  Met with caregiver Tatyana who is present at the bedside today.  Tatyana is aware of the need for 24 hr care given concerns for memory loss, and states that she will provide such care upon discharge.  Per GI patient will be arranged outpatient follow-up with Dr. Anaya.  Will plan to monitor overnight and if okay with Nephrology anticipate DC home tomorrow if she remains stable and counts improve.    As of 9/25 Patient required another paracentesis today with removal of 8L.  Case d/w GI, will plan to give Albumin today and if she remains stable anticipate DC home tomorrow.  Hgb improved to 8.3 s/p 1 unit PRBCs.  Patient will need to " follow closely with GI and would benefit from scheduled outpatient paracentesis as needed to avoid re-hospitalization.  Per Nephrology recommendations, patient will DC home on Lasix 40 mg daily.       Interval History:  No acute events overnight.  Currently resting comfortably in bed with loni Joe at .  Patient required another paracentesis today with removal of 8L.  Case d/w GI, will plan to give Albumin today and if she remains stable anticipate DC home tomorrow.  Patient will need to follow closely with GI and would benefit from scheduled outpatient paracentesis as needed to avoid re-hospitalization.  Per Nephrology recommendations, patient will DC home on Lasix 40 mg daily.       Review of Systems   Constitutional: Positive for activity change and fatigue. Negative for chills and fever.   HENT: Negative for congestion, rhinorrhea and sinus pressure.    Respiratory: Negative for apnea, cough, choking, chest tightness, shortness of breath, wheezing and stridor.    Cardiovascular: Positive for leg swelling. Negative for chest pain and palpitations.   Gastrointestinal: Positive for abdominal distention. Negative for abdominal pain, diarrhea, nausea and vomiting.   Endocrine: Negative for cold intolerance and heat intolerance.   Genitourinary: Negative for difficulty urinating and hematuria.        Decrease urine output    Musculoskeletal: Negative for arthralgias and joint swelling.   Skin: Negative for color change, pallor and rash.   Neurological: Positive for weakness. Negative for dizziness, seizures, numbness and headaches.   Psychiatric/Behavioral: Negative for agitation. The patient is not nervous/anxious.      Objective:     Vital Signs (Most Recent):  Temp: 98.1 °F (36.7 °C) (09/25/19 1523)  Pulse: 88 (09/25/19 1523)  Resp: 17 (09/25/19 1542)  BP: (!) 100/58 (09/25/19 1542)  SpO2: 97 % (09/25/19 1542) Vital Signs (24h Range):  Temp:  [98.1 °F (36.7 °C)-98.9 °F (37.2 °C)] 98.1 °F (36.7 °C)  Pulse:   [82-91] 88  Resp:  [16-20] 17  SpO2:  [96 %-98 %] 97 %  BP: ()/(56-72) 100/58     Weight: 77.6 kg (171 lb 1.6 oz)  Body mass index is 27.62 kg/m².    Intake/Output Summary (Last 24 hours) at 9/25/2019 1553  Last data filed at 9/25/2019 1214  Gross per 24 hour   Intake 1060 ml   Output --   Net 1060 ml      Physical Exam   Constitutional: She appears well-developed and well-nourished. No distress.   HENT:   Head: Normocephalic and atraumatic.   Mouth/Throat: Oropharynx is clear and moist. No oropharyngeal exudate.   Eyes: Pupils are equal, round, and reactive to light. Conjunctivae and EOM are normal. Right eye exhibits no discharge. Left eye exhibits no discharge.   Neck: Normal range of motion. Neck supple. No JVD present.   Cardiovascular: Normal rate, regular rhythm, normal heart sounds and intact distal pulses. Exam reveals no gallop and no friction rub.   No murmur heard.  Pulmonary/Chest: Effort normal and breath sounds normal. No respiratory distress. She has no wheezes. She has no rales. She exhibits no tenderness.   Comfortable on RA.   Abdominal: Soft. Bowel sounds are normal. She exhibits distension. She exhibits no mass. There is no tenderness. There is no rebound and no guarding. No hernia.   +ascites   Musculoskeletal: Normal range of motion. She exhibits edema. She exhibits no tenderness.   2+ pitting edema to BLE, markedly improved since admit  Neurological: She is alert.   Awake and alert, oriented to person and place.  Follows commands appropriately.     Skin: Skin is warm and dry. Capillary refill takes less than 2 seconds. No rash noted. She is not diaphoretic. No erythema. There is pallor.   Psychiatric: She has a normal mood and affect. Her behavior is normal. Judgment and thought content normal.   Nursing note and vitals reviewed.      Significant Labs:   CBC:   Recent Labs   Lab 09/24/19  0521 09/25/19  0529   WBC 6.10 6.80   HGB 6.4* 8.3*   HCT 19.2* 24.8*    173     CMP:  "  Recent Labs   Lab 09/24/19  0521 09/25/19  0529   * 134*   K 3.4* 3.7    107   CO2 21* 16*    87   BUN 28* 27*   CREATININE 3.1* 2.7*   CALCIUM 8.0* 8.4*   PROT 4.9* 5.2*   ALBUMIN 2.8* 2.7*   BILITOT 0.4 0.6   ALKPHOS 56 63   AST 30 32   ALT 19 20   ANIONGAP 9 11   EGFRNONAA 15* 18*     Coagulation:   Recent Labs   Lab 09/25/19  0529   INR 1.5*       Significant Imaging: I have reviewed all pertinent imaging results/findings within the past 24 hours.      Assessment/Plan:      * Ascites due to alcoholic cirrhosis  - Followed outpatient by Dr. Rod, will transition care to Dr. Anaya upon DC  - s/p paracentesis on 9/21 with a reported 5L removed  - No evidence of SBP   - Repeat paracentesis on 9/23 with removal of 6L   - Repeat paracentesis on 9/25 with removal of 8L  - Albumin post paracentesis today  - GI following  - Hold home diuretics given #1 and DC on Lasix 40 mg daily  - Repeat paracentesis as needed  - Abdominal US showed patent portal vein; Cirrhosis without solid-appearing mass lesion evident. Ascites and cholelithiasis.    - GI discussed potential need for transplant in the future with patient who would "like to think about it".  Will need outpatient f/u with Dr. Anaya upon DC to arrange.   - Daily labs  - Monitor    MELD-Na score: 22 at 9/25/2019  5:29 AM  MELD score: 20 at 9/25/2019  5:29 AM  Calculated from:  Serum Creatinine: 2.7 mg/dL at 9/25/2019  5:29 AM  Serum Sodium: 134 mmol/L at 9/25/2019  5:29 AM  Total Bilirubin: 0.6 mg/dL (Rounded to 1 mg/dL) at 9/25/2019  5:29 AM  INR(ratio): 1.5 at 9/25/2019  5:29 AM  Age: 63 years        Acute renal failure superimposed on stage 4 chronic kidney disease  - in setting of chronic liver disease/cirrhosis  - Cr in August 2.5, currently 2.7  - Hold home diuretics, per Nephrology plan is to DC on Lasix 40 mg daily in place of TID as before  - Nephrology following  - Avoid Nephrotoxic agents  - Strict I and O  - Daily BMP  - Will plan to " monitor overnight and if okay with Nephrology anticipate DC home tomorrow if she remains stable and counts improve.        UTI (urinary tract infection)  - Final UC shows Ecoli sensitive to Rocephin  - Continue Rocephin for a total of 5 days, to be completed today        Anemia  - Hgb 8.3 s/p 1 unit of PRBCs  - No active s/s of bleeding  - Daily CBC  - Monitor and transfuse as needed to keep Hgb >7.0  - Will plan to monitor overnight and if okay with Nephrology anticipate DC home tomorrow if she remains stable and counts improve.        Esophageal reflux disease  - PPI        VTE Risk Mitigation (From admission, onward)         Ordered     Place sequential compression device  Until discontinued      09/20/19 8705                      Margarita Nesbitt, NOMI, ACNP-BC  Department of Hospital Medicine   [facility]

## 2019-09-25 NOTE — ASSESSMENT & PLAN NOTE
"- Followed outpatient by Dr. Rod, will transition care to Dr. Anaya upon DC  - s/p paracentesis on 9/21 with a reported 5L removed  - No evidence of SBP   - Repeat paracentesis on 9/23 with removal of 6L   - Repeat paracentesis on 9/25 with removal of 8L  - Albumin post paracentesis today  - GI following  - Hold home diuretics given #1 and DC on Lasix 40 mg daily  - Repeat paracentesis as needed  - Abdominal US showed patent portal vein; Cirrhosis without solid-appearing mass lesion evident. Ascites and cholelithiasis.    - GI discussed potential need for transplant in the future with patient who would "like to think about it".  Will need outpatient f/u with Dr. Aanya upon DC to arrange.   - Daily labs  - Monitor    MELD-Na score: 22 at 9/25/2019  5:29 AM  MELD score: 20 at 9/25/2019  5:29 AM  Calculated from:  Serum Creatinine: 2.7 mg/dL at 9/25/2019  5:29 AM  Serum Sodium: 134 mmol/L at 9/25/2019  5:29 AM  Total Bilirubin: 0.6 mg/dL (Rounded to 1 mg/dL) at 9/25/2019  5:29 AM  INR(ratio): 1.5 at 9/25/2019  5:29 AM  Age: 63 years      "

## 2019-09-25 NOTE — ASSESSMENT & PLAN NOTE
- in setting of chronic liver disease/cirrhosis  - Cr in August 2.5, currently 2.7  - Hold home diuretics, per Nephrology plan is to DC on Lasix 40 mg daily in place of TID as before  - Nephrology following  - Avoid Nephrotoxic agents  - Strict I and O  - Daily BMP  - Will plan to monitor overnight and if okay with Nephrology anticipate DC home tomorrow if she remains stable and counts improve.

## 2019-09-25 NOTE — OP NOTE
Pre Op Diagnosis: ascites     Post Op Diagnosis: same     Procedure:  para     Procedure performed by: Rosas RAINEY, Sherlyn GREGORY     Written Informed Consent Obtained: Yes     Specimen Removed:  yes     Estimated Blood Loss:  minimal     Findings: Local anesthesia.     The patient tolerated the procedure well and there were no complications.      Sterile technique was performed in the LLQ, lidocaine was used as a local anesthetic.   7.5 liters of rajan fluid removed for therapeutic purposes.  Pt tolerated the procedure well without immediate complications.  Please see radiologist report for details. F/u with PCP and/or ordering physician.

## 2019-09-25 NOTE — ASSESSMENT & PLAN NOTE
1. DARRIAN on CKD stage 4 :  Baseline serum creatinine about 2.5 mg/dL, acute on chronic kidney failure likely due to raised intra-abdominal pressure from significant ascites, status post large volume paracentesis for about 5 L,  And 6 L , follow renal fn , renal function improved, serum creatinine almost back to baseline at 2.7 mg/dL will, will discuss with GI department about 1 more paracentesis has her abdominal is distended,    She may have diuretic resistant ascites, patient was on furosemide 40 mg 3 times a day, will decrease dose to 40 mg daily at discharge to prevent intravascular volume depletion and acute kidney injury from dehydration,     Hepatorenal syndrome is a possibility, urine sodium less than 20,       2.  Hypertension - blood pressure controlled      3.  End-stage liver disease due to alcoholic cirrhosis, GI notes reviewed,     4.  E Coli,  urinary tract infection - continue Rocephin,     5.  Anemia - multifactorial, PRBC transfusion today ,      6.  Mild hyponatremia - consistent with end-stage liver disease    7. Hypokalemia - K better    8.  Okay to discharge home after paracentesis today if clinically stable and follow-up in the clinic with her nephrologist, I think she is following with Dr. Aaron from [facility],

## 2019-09-25 NOTE — NURSING
Patient lying on left side sleeping . No s/s of distress noted. Call light in reach. Bed alarm in use. Bed in low locked position

## 2019-09-25 NOTE — PLAN OF CARE
Remains free from injury. Denies pain. Monitoring lab work. Vital signs stable. Chart reviewed. Will continue to monitor.

## 2019-09-25 NOTE — ASSESSMENT & PLAN NOTE
Patient with UTI being treated.   Urine sodium <20.     Nephrology following. Will restart Lasix 40 mg daily

## 2019-09-25 NOTE — NURSING
Paracentesis complete. Will hold DC until am due to 8L fluid being pulled off. Amdin albumin and monitor BP . Patient sitting up in bed watching tv denies pain. Requested and received vanilla pudding.

## 2019-09-25 NOTE — ASSESSMENT & PLAN NOTE
- Final UC shows Ecoli sensitive to Rocephin  - Continue Rocephin for a total of 5 days, to be completed today

## 2019-09-26 VITALS
RESPIRATION RATE: 18 BRPM | WEIGHT: 171.13 LBS | HEART RATE: 90 BPM | BODY MASS INDEX: 27.5 KG/M2 | SYSTOLIC BLOOD PRESSURE: 98 MMHG | OXYGEN SATURATION: 95 % | DIASTOLIC BLOOD PRESSURE: 63 MMHG | HEIGHT: 66 IN | TEMPERATURE: 99 F

## 2019-09-26 LAB
ALBUMIN SERPL BCP-MCNC: 2.6 G/DL (ref 3.5–5.2)
ALP SERPL-CCNC: 59 U/L (ref 55–135)
ALT SERPL W/O P-5'-P-CCNC: 20 U/L (ref 10–44)
ANION GAP SERPL CALC-SCNC: 9 MMOL/L (ref 8–16)
AST SERPL-CCNC: 34 U/L (ref 10–40)
BASOPHILS # BLD AUTO: 0.01 K/UL (ref 0–0.2)
BASOPHILS NFR BLD: 0.2 % (ref 0–1.9)
BILIRUB SERPL-MCNC: 0.5 MG/DL (ref 0.1–1)
BUN SERPL-MCNC: 26 MG/DL (ref 8–23)
CALCIUM SERPL-MCNC: 8.2 MG/DL (ref 8.7–10.5)
CHLORIDE SERPL-SCNC: 106 MMOL/L (ref 95–110)
CO2 SERPL-SCNC: 19 MMOL/L (ref 23–29)
CREAT SERPL-MCNC: 2.3 MG/DL (ref 0.5–1.4)
DIFFERENTIAL METHOD: ABNORMAL
EOSINOPHIL # BLD AUTO: 0.2 K/UL (ref 0–0.5)
EOSINOPHIL NFR BLD: 3.4 % (ref 0–8)
ERYTHROCYTE [DISTWIDTH] IN BLOOD BY AUTOMATED COUNT: 17.9 % (ref 11.5–14.5)
EST. GFR  (AFRICAN AMERICAN): 25 ML/MIN/1.73 M^2
EST. GFR  (NON AFRICAN AMERICAN): 22 ML/MIN/1.73 M^2
GLUCOSE SERPL-MCNC: 90 MG/DL (ref 70–110)
HCT VFR BLD AUTO: 24.3 % (ref 37–48.5)
HGB BLD-MCNC: 8.3 G/DL (ref 12–16)
INR PPP: 1.5 (ref 0.8–1.2)
LYMPHOCYTES # BLD AUTO: 1.1 K/UL (ref 1–4.8)
LYMPHOCYTES NFR BLD: 17.6 % (ref 18–48)
MCH RBC QN AUTO: 28.6 PG (ref 27–31)
MCHC RBC AUTO-ENTMCNC: 34.2 G/DL (ref 32–36)
MCV RBC AUTO: 84 FL (ref 82–98)
MONOCYTES # BLD AUTO: 1 K/UL (ref 0.3–1)
MONOCYTES NFR BLD: 15.7 % (ref 4–15)
NEUTROPHILS # BLD AUTO: 4.1 K/UL (ref 1.8–7.7)
NEUTROPHILS NFR BLD: 63.1 % (ref 38–73)
PLATELET # BLD AUTO: 153 K/UL (ref 150–350)
PMV BLD AUTO: 9.7 FL (ref 9.2–12.9)
POTASSIUM SERPL-SCNC: 3.6 MMOL/L (ref 3.5–5.1)
PROT SERPL-MCNC: 4.6 G/DL (ref 6–8.4)
PROTHROMBIN TIME: 16.4 SEC (ref 9–12.5)
RBC # BLD AUTO: 2.9 M/UL (ref 4–5.4)
SODIUM SERPL-SCNC: 134 MMOL/L (ref 136–145)
WBC # BLD AUTO: 6.48 K/UL (ref 3.9–12.7)

## 2019-09-26 PROCEDURE — 80053 COMPREHEN METABOLIC PANEL: CPT

## 2019-09-26 PROCEDURE — 99232 PR SUBSEQUENT HOSPITAL CARE,LEVL II: ICD-10-PCS | Mod: ,,, | Performed by: INTERNAL MEDICINE

## 2019-09-26 PROCEDURE — 36415 COLL VENOUS BLD VENIPUNCTURE: CPT

## 2019-09-26 PROCEDURE — 25000003 PHARM REV CODE 250: Performed by: NURSE PRACTITIONER

## 2019-09-26 PROCEDURE — 99232 SBSQ HOSP IP/OBS MODERATE 35: CPT | Mod: ,,, | Performed by: INTERNAL MEDICINE

## 2019-09-26 PROCEDURE — 85610 PROTHROMBIN TIME: CPT

## 2019-09-26 PROCEDURE — 85025 COMPLETE CBC W/AUTO DIFF WBC: CPT

## 2019-09-26 RX ORDER — FUROSEMIDE 40 MG/1
40 TABLET ORAL DAILY
Qty: 30 TABLET | Refills: 0 | Status: SHIPPED | OUTPATIENT
Start: 2019-09-26 | End: 2019-11-19

## 2019-09-26 RX ADMIN — FLUOXETINE 20 MG: 20 CAPSULE ORAL at 09:09

## 2019-09-26 RX ADMIN — LACTULOSE 10 G: 20 SOLUTION ORAL at 09:09

## 2019-09-26 RX ADMIN — PANTOPRAZOLE SODIUM 40 MG: 40 TABLET, DELAYED RELEASE ORAL at 09:09

## 2019-09-26 NOTE — PLAN OF CARE
Fall precautions maintained. Pt free from injuries/falls.  Ambulates and repositions  Adequate for dc home  POC and meds discussed w/ pt. Pt verbalized understanding.  Chart check done.   Will cont to monitor.

## 2019-09-26 NOTE — SUBJECTIVE & OBJECTIVE
Interval History:     9/26/19 - feels better, s/p paracentesis ,     9/25/19 - no acute events,     9/24/19 - no acute events, receiving pRBC tx     9/23/19 - s/p paracentesis, 6 L , feels better ,     9/22/19 - no acute events, denies complaints     Review of patient's allergies indicates:   Allergen Reactions    Hydrocodone-acetaminophen      Current Facility-Administered Medications   Medication Frequency    0.9%  NaCl infusion (for blood administration) Q24H PRN    FLUoxetine capsule 20 mg Daily    lactulose 20 gram/30 mL solution Soln 10 g Daily    pantoprazole EC tablet 40 mg Daily       Objective:     Vital Signs (Most Recent):  Temp: 98.6 °F (37 °C) (09/26/19 0727)  Pulse: 79 (09/26/19 0727)  Resp: 18 (09/26/19 0727)  BP: 108/64 (09/26/19 0727)  SpO2: 98 % (09/26/19 0727)  O2 Device (Oxygen Therapy): room air (09/24/19 1247) Vital Signs (24h Range):  Temp:  [97.4 °F (36.3 °C)-98.6 °F (37 °C)] 98.6 °F (37 °C)  Pulse:  [79-92] 79  Resp:  [17-18] 18  SpO2:  [97 %-98 %] 98 %  BP: ()/(50-66) 108/64     Weight: 77.6 kg (171 lb 1.6 oz) (09/20/19 1316)  Body mass index is 27.62 kg/m².  Body surface area is 1.9 meters squared.    I/O last 3 completed shifts:  In: 700 [P.O.:600; IV Piggyback:100]  Out: -     Physical Exam   Constitutional: She is oriented to person, place, and time. She appears well-developed.   HENT:   Head: Normocephalic and atraumatic.   Mouth/Throat: Oropharynx is clear and moist. No oropharyngeal exudate.   Eyes: Pupils are equal, round, and reactive to light. Conjunctivae and EOM are normal. No scleral icterus.   Neck: Normal range of motion. Neck supple. No JVD present. Carotid bruit is not present. No tracheal deviation present. No thyroid mass and no thyromegaly present.   Cardiovascular: Normal rate, regular rhythm, normal heart sounds and intact distal pulses. Exam reveals no gallop and no friction rub.   No murmur heard.  Pulmonary/Chest: Breath sounds normal. No respiratory  distress. She has no wheezes. She has no rales. She exhibits no tenderness.   Reduced BS in bases   Abdominal: Soft. Bowel sounds are normal. She exhibits distension. She exhibits no abdominal bruit, no ascites and no mass. There is no hepatosplenomegaly. There is no tenderness. There is no rebound, no guarding and no CVA tenderness.   Musculoskeletal: She exhibits edema. She exhibits no tenderness.   Lymphadenopathy:     She has no cervical adenopathy.   Neurological: She is oriented to person, place, and time. No cranial nerve deficit. Coordination normal.   Skin: Skin is warm and intact. No rash noted. No erythema. No pallor.   Psychiatric: She has a normal mood and affect.       Significant Labs:  CBC:   Recent Labs   Lab 09/26/19 0441   WBC 6.48   RBC 2.90*   HGB 8.3*   HCT 24.3*      MCV 84   MCH 28.6   MCHC 34.2     CMP:   Recent Labs   Lab 09/26/19 0441   GLU 90   CALCIUM 8.2*   ALBUMIN 2.6*   PROT 4.6*   *   K 3.6   CO2 19*      BUN 26*   CREATININE 2.3*   ALKPHOS 59   ALT 20   AST 34   BILITOT 0.5     Coagulation:   Recent Labs   Lab 09/26/19 0441   INR 1.5*     LFTs:   Recent Labs   Lab 09/26/19 0441   ALT 20   AST 34   ALKPHOS 59   BILITOT 0.5   PROT 4.6*   ALBUMIN 2.6*     All labs within the past 24 hours have been reviewed.     Significant Imaging:  Reviewed    Lab Results   Component Value Date    CALCIUM 8.2 (L) 09/26/2019       Lab Results   Component Value Date    ALBUMIN 2.6 (L) 09/26/2019

## 2019-09-26 NOTE — PLAN OF CARE
No acute distress noted. Safety measures are in place. Call bell within reach. Pain being managed. Pt free of falls. Will continue to monitor. Chart check complete.

## 2019-09-26 NOTE — PROGRESS NOTES
[facility] -   Nephrology  Progress Note    Patient Name: Kylah Ortiz  MRN: [id number]  Admission Date: 9/20/2019  Hospital Length of Stay: 4 days  Attending Provider: Sumeet Stone, *   Primary Care Physician: Santos Beaver MD  Principal Problem:Ascites due to alcoholic cirrhosis    Subjective:     HPI: Kylah Ortiz is a 63-year-old  woman with history of end-stage liver disease due to alcohol abuse, chronic kidney disease, her serum creatinine was 2.5 mg/dL about a month ago, patient presents to the hospital for generalized weakness, acute on chronic renal failure noted, serum creatinine increased to 3.6 mg/dL, significant ascites noted on exam, also with some peripheral edema, status post paracentesis for about 5 L fluids today, empirically on antibiotics , we were consulted for acute on chronic renal failure.    Interval History:     9/26/19 - feels better, s/p paracentesis ,     9/25/19 - no acute events,     9/24/19 - no acute events, receiving pRBC tx     9/23/19 - s/p paracentesis, 6 L , feels better ,     9/22/19 - no acute events, denies complaints     Review of patient's allergies indicates:   Allergen Reactions    Hydrocodone-acetaminophen      Current Facility-Administered Medications   Medication Frequency    0.9%  NaCl infusion (for blood administration) Q24H PRN    FLUoxetine capsule 20 mg Daily    lactulose 20 gram/30 mL solution Soln 10 g Daily    pantoprazole EC tablet 40 mg Daily       Objective:     Vital Signs (Most Recent):  Temp: 98.6 °F (37 °C) (09/26/19 0727)  Pulse: 79 (09/26/19 0727)  Resp: 18 (09/26/19 0727)  BP: 108/64 (09/26/19 0727)  SpO2: 98 % (09/26/19 0727)  O2 Device (Oxygen Therapy): room air (09/24/19 1247) Vital Signs (24h Range):  Temp:  [97.4 °F (36.3 °C)-98.6 °F (37 °C)] 98.6 °F (37 °C)  Pulse:  [79-92] 79  Resp:  [17-18] 18  SpO2:  [97 %-98 %] 98 %  BP: ()/(50-66) 108/64     Weight: 77.6 kg (171 lb 1.6 oz) (09/20/19  1316)  Body mass index is 27.62 kg/m².  Body surface area is 1.9 meters squared.    I/O last 3 completed shifts:  In: 700 [P.O.:600; IV Piggyback:100]  Out: -     Physical Exam   Constitutional: She is oriented to person, place, and time. She appears well-developed.   HENT:   Head: Normocephalic and atraumatic.   Mouth/Throat: Oropharynx is clear and moist. No oropharyngeal exudate.   Eyes: Pupils are equal, round, and reactive to light. Conjunctivae and EOM are normal. No scleral icterus.   Neck: Normal range of motion. Neck supple. No JVD present. Carotid bruit is not present. No tracheal deviation present. No thyroid mass and no thyromegaly present.   Cardiovascular: Normal rate, regular rhythm, normal heart sounds and intact distal pulses. Exam reveals no gallop and no friction rub.   No murmur heard.  Pulmonary/Chest: Breath sounds normal. No respiratory distress. She has no wheezes. She has no rales. She exhibits no tenderness.   Reduced BS in bases   Abdominal: Soft. Bowel sounds are normal. She exhibits distension. She exhibits no abdominal bruit, no ascites and no mass. There is no hepatosplenomegaly. There is no tenderness. There is no rebound, no guarding and no CVA tenderness.   Musculoskeletal: She exhibits edema. She exhibits no tenderness.   Lymphadenopathy:     She has no cervical adenopathy.   Neurological: She is oriented to person, place, and time. No cranial nerve deficit. Coordination normal.   Skin: Skin is warm and intact. No rash noted. No erythema. No pallor.   Psychiatric: She has a normal mood and affect.       Significant Labs:  CBC:   Recent Labs   Lab 09/26/19  0441   WBC 6.48   RBC 2.90*   HGB 8.3*   HCT 24.3*      MCV 84   MCH 28.6   MCHC 34.2     CMP:   Recent Labs   Lab 09/26/19  0441   GLU 90   CALCIUM 8.2*   ALBUMIN 2.6*   PROT 4.6*   *   K 3.6   CO2 19*      BUN 26*   CREATININE 2.3*   ALKPHOS 59   ALT 20   AST 34   BILITOT 0.5     Coagulation:   Recent Labs    Lab 09/26/19  0441   INR 1.5*     LFTs:   Recent Labs   Lab 09/26/19  0441   ALT 20   AST 34   ALKPHOS 59   BILITOT 0.5   PROT 4.6*   ALBUMIN 2.6*     All labs within the past 24 hours have been reviewed.     Significant Imaging:  Reviewed    Lab Results   Component Value Date    CALCIUM 8.2 (L) 09/26/2019       Lab Results   Component Value Date    ALBUMIN 2.6 (L) 09/26/2019         Assessment/Plan:     Acute renal failure superimposed on stage 4 chronic kidney disease  1. DARRIAN on CKD stage 4 :  Baseline serum creatinine about 2.5 mg/dL, acute on chronic kidney failure likely due to raised intra-abdominal pressure from significant ascites, status post large volume paracentesis for about 5 L,  And 6 L , follow renal fn , renal function improved, serum creatinine almost back to baseline at 2.2 mg/dl,     Will need regular paracentesis on discharge , likely weekly atleast at this time,     She may have diuretic resistant ascites, patient was on furosemide 40 mg 3 times a day, will decrease dose to 40 mg daily at discharge to prevent intravascular volume depletion and acute kidney injury from dehydration,      2.  Hypertension - blood pressure controlled      3.  End-stage liver disease due to alcoholic cirrhosis, GI notes reviewed,     4.  E Coli,  urinary tract infection - continue Rocephin,     5.  Anemia - multifactorial,  s/p PRBC transfusion     6.  Mild hyponatremia - consistent with end-stage liver disease    7. Hypokalemia - K better    8.  Okay to discharge home , I think she is following with [facility],           Thank you for your consult. I will sign off. Please contact us if you have any additional questions.     Total time spent 30 minutes including time needed to review the records,  patient  evaluation, documentation, face-to-face discussion with the patient, primary team,    more than 50% of the time was spent on coordination of care and counseling.       Noe Urbano,  MD  Nephrology  [facility]

## 2019-09-26 NOTE — DISCHARGE SUMMARY
[facility] Hospital Medicine  Discharge Summary      Patient Name: Kylah Ortiz  MRN: [id number]  Admission Date: 9/20/2019  Hospital Length of Stay: 4 days  Discharge Date and Time:  09/26/2019 3:47 PM  Attending Physician: Sumeet Stone, *   Discharging Provider: Margarita Nesbitt NP  Primary Care Provider: Santos Beaver MD      HPI:   Ms Ortiz is a 63 year old female with PMHx alcohol cirrhosis, ascites and CKD who presented to [facility] Emergency Room after have abnormal labs reported from clinic. Emergency Room physican  discussed thecase with Dr. Anaya who state d that the patient creatinine went from 1.5 to 3.6 and recommends admission. Associated symptoms include increase abdominal distention and bilateral leg swelling. Patient denies associated symptoms of chest pain, increase shortness of breath, fever, chills, nausea, vomiting or diaphoresis. Sitter is at bedside and assisting in answering questions. She is being placed in Observations under the care of Hospital Medicine.     * No surgery found *      Hospital Course:   On 9/20 patient was placed in OBS secondary to DARRIAN and ascites due to alcoholic cirrhosis.  Per ER records, Dr. Anaya was contacted and it is reported that patient's Cr increased from 1.5 to 3.6 and GI recommended admission.  Patient's home diuretics were held and she was started on gentle IV hydration with NS at 50 cc/hour.    As of 9/21 Cr decreased from 3.8 to 3.6.  Per Care Everywhere, Cr in August was noted to be 2.4.  Patient is a poor historian and cannot provide any details on her home medications, or which physician she follows with as an outpatient.  No records are noted within the [facility] system.  Per Care Everywhere, patient follows with GI Dr. Rod.  Patient underwent a paracentesis today with a reported 5L removed.  GI consulted for additional recommendations and recommended empiric ABX coverage for UTI +/- PNA, and to continue  "lactulose, hold diuretics, and obtain US doppler to r/o liver lesion and PVT.  UA shows many bacteria.  UC pending.  Will start on empiric Rocephiin for now and adjust based on cultures.  Nephrology consult pending.      As of 9/22 patient resting in bed with no new complaints.  GI and Nephrology following.  No SBP on paracentesis.  Abdominal US showed patent portal vein; Cirrhosis without solid-appearing mass lesion evident.  Ascites and cholelithiasis.  GI discussed potential need for transplant in the future with patient who would "like to think about it".  Will need outpatient f/u with Dr. Rod upon DC to arrange.  Still with significant ascites and 4+ pitting edema to BLE.  Awaiting additional recs from Nephrology.  UC pending, continue empiric Rocephin for now.     As of 9/23 Cr stable at 3.3.  Patient is s/p repeat paracentesis today with removal of 6L.  GI and Nephrology continues to follow.  Receiving Albumin.  MELD stable.  Still with 4+ pitting edema to BLE.  Final UC shows Ecoli sensitive to Rocephin.  Will plan to treat with 5 days of ABX.    As of 9/24 Hgb decreased to 6.4.  No active s/s of bleeding.  VS remain stable.  Will transfuse 1 unit PRBC today.  GI and nephrology continues to follow.  Per GI patient can be discharged home from their standpoint with outpatient follow-up with Dr. Anaya.  Met with caregiver Tatyana who is present at the bedside today.  Tatyana is aware of the need for 24 hr care given concerns for memory loss, and states that she will provide such care upon discharge.  Per GI patient will be arranged outpatient follow-up with Dr. Anaya.  Will plan to monitor overnight and if okay with Nephrology anticipate DC home tomorrow if she remains stable and counts improve.    As of 9/25 Patient required another paracentesis today with removal of 8L.  Case d/w GI, will plan to give Albumin today and if she remains stable anticipate DC home tomorrow.  Hgb improved to 8.3 s/p 1 unit " "PRBCs.  Patient will need to follow closely with GI and would benefit from scheduled outpatient paracentesis as needed to avoid re-hospitalization.  Per Nephrology recommendations, patient will DC home on Lasix 40 mg daily.       As of 9/26 Patient "feels fine", and is ready to DC home with caregiver Tatyana today.  Cr improved to 2.3.  MELD stable.  VS have remained stable.  BLE edema has resolved.  Patient is s/p repeat paracentesis yesterday with removal of 8L and improvement in ascites.  Case d/w Dr. Urbano ok to DC home today from Nephrology standpoint with outpatient f/u with her nephrologist Dr. Aaron with [facility].  Per nephrology recs, will DC home on Lasix 40 mg daily instead of TID as before.  Case d/w GI who will arrange outpatient f/u ASAP for close monitoring.  Caregiver Tatyana is aware of the need for 24 hour care and f/u.  Case d/w Dr. Hebert.  Patient seen and examined and deemed medically stable to DC home today.  Medications reconciled for DC home.     Tatyana can be reached at [phone].      Consults:   Consults (From admission, onward)        Status Ordering Provider     Inpatient consult to Gastroenterology  Once     Provider:  Katharina Vasquez MD    Completed LESA ARREDONDO     Inpatient consult to Interventional Radiology  Once     Provider:  (Not yet assigned)    Completed LESA ARREDONDO          No new Assessment & Plan notes have been filed under this hospital service since the last note was generated.  Service: Hospital Medicine    Final Active Diagnoses:    Diagnosis Date Noted POA    PRINCIPAL PROBLEM:  Ascites due to alcoholic cirrhosis [K70.31] 09/20/2019 Yes    Acute renal failure superimposed on stage 4 chronic kidney disease [N17.9, N18.4] 09/20/2019 Yes    UTI (urinary tract infection) [N39.0] 09/21/2019 Yes    Anemia [D64.9] 09/20/2019 Yes    Esophageal reflux disease [K21.9] 09/20/2019 Yes      Problems Resolved During this Admission: "       Discharged Condition: good    Disposition: Home or Self Care    Follow Up:  Follow-up Information     Santos Beaver MD In 3 days.    Specialty:  Nuclear Medicine  Why:  Follow up with PCP within 3 days for post hospital evaluation and monitoring.  Contact information:  [address] [address]  [phone]             GIA Martell In 3 days.    Specialty:  Gastroenterology  Why:  Follow up with GI as soon as possible for post hospital evaluation and monitoring.  Contact information:  [address] [facility]  [place] [address] [phone]             Frandy Aaron MD In 1 week.    Specialty:  Nephrology  Why:  Follow up with nephrology within 1 week for post hospital evaluation and monitoring.  Contact information:  [address]  [facility]  [place] [address] [phone]                 Patient Instructions:      Diet Cardiac   Order Comments: Limit sodium intake to 2 grams every 24 hours.     Notify your health care provider if you experience any of the following:  temperature >100.4     Notify your health care provider if you experience any of the following:  persistent nausea and vomiting or diarrhea     Notify your health care provider if you experience any of the following:  severe uncontrolled pain     Notify your health care provider if you experience any of the following:  difficulty breathing or increased cough     Notify your health care provider if you experience any of the following:  persistent dizziness, light-headedness, or visual disturbances     Notify your health care provider if you experience any of the following:  increased confusion or weakness     Notify your health care provider if you experience any of the following:   Order Comments: Worsening lower extremity or abdominal swelling.     Activity as tolerated       Significant Diagnostic Studies: Labs:   BMP:   Recent Labs   Lab 09/25/19  0529 09/26/19  0441   GLU 87 90   * 134*    K 3.7 3.6    106   CO2 16* 19*   BUN 27* 26*   CREATININE 2.7* 2.3*   CALCIUM 8.4* 8.2*   , CMP   Recent Labs   Lab 09/25/19  0529 09/26/19  0441   * 134*   K 3.7 3.6    106   CO2 16* 19*   GLU 87 90   BUN 27* 26*   CREATININE 2.7* 2.3*   CALCIUM 8.4* 8.2*   PROT 5.2* 4.6*   ALBUMIN 2.7* 2.6*   BILITOT 0.6 0.5   ALKPHOS 63 59   AST 32 34   ALT 20 20   ANIONGAP 11 9   ESTGFRAFRICA 21* 25*   EGFRNONAA 18* 22*   , CBC   Recent Labs   Lab 09/25/19  0529 09/26/19 0441   WBC 6.80 6.48   HGB 8.3* 8.3*   HCT 24.8* 24.3*    153    and INR   Lab Results   Component Value Date    INR 1.5 (H) 09/26/2019    INR 1.5 (H) 09/25/2019    INR 1.7 (H) 09/24/2019       Pending Diagnostic Studies:     Procedure Component Value Units Date/Time    Freeze and Hold, [facility] [[id number]] Collected:  09/21/19 0915    Order Status:  Sent Lab Status:  No result     Specimen:  Body Fluid from Peritoneal Fluid          Imaging Results          X-Ray Chest AP Portable (Final result)  Result time 09/20/19 14:05:58    Final result by Thuan Pillai MD (09/20/19 14:05:58)                 Impression:      Patchy interstitial opacities within the left lower lobe could reflect early infiltrate.  Small left pleural effusion not excluded.      Electronically signed by: Thuan Pillai MD  Date:    09/20/2019  Time:    14:05             Narrative:    EXAMINATION:  XR CHEST AP PORTABLE    CLINICAL HISTORY:  cough;    FINDINGS:  Single view of the chest.    Cardiac silhouette is normal.  Patchy interstitial opacities within the left lower lobe could reflect early infiltrate.  Small left pleural effusion not excluded. No evidence of pneumothorax.  Bones demonstrate mild degenerative changes.  Remote fracture deformity right clavicle suspected.                                Medications:  Reconciled Home Medications:      Medication List      CHANGE how you take these medications    furosemide 40 MG tablet  Commonly known as:  LASIX  Take  1 tablet (40 mg total) by mouth once daily.  What changed:  when to take this        CONTINUE taking these medications    FLUoxetine 20 MG capsule  Take 20 mg by mouth once daily.     hydrOXYzine HCl 25 MG tablet  Commonly known as:  ATARAX  Take 25 mg by mouth 3 (three) times daily as needed.     lactulose 10 gram/15 mL solution  Commonly known as:  CHRONULAC  Take by mouth once daily.     magnesium oxide 400 mg (241.3 mg magnesium) tablet  Commonly known as:  MAG-OX  Take 400 mg by mouth once daily.     multivitamin per tablet  Commonly known as:  THERAGRAN  Take 1 tablet by mouth once daily.     ranitidine 300 MG tablet  Commonly known as:  ZANTAC  Take 300 mg by mouth every evening.     sodium citrate-citric acid 500-334 mg/5 ml 500-334 mg/5 mL solution  Commonly known as:  BICITRA  Take 15 mLs by mouth once daily.        STOP taking these medications    potassium & sodium citrate-citric acid 550-500-334 mg/5 mL Soln  Commonly known as:  POLYCITRA            Indwelling Lines/Drains at time of discharge:   Lines/Drains/Airways     None                 Time spent on the discharge of patient: 45 minutes  Patient was seen and examined on the date of discharge and determined to be suitable for discharge.         Margarita Nesbitt, NOMI, ACNP-BC  Department of Hospital Medicine  [facility]

## 2019-09-26 NOTE — ASSESSMENT & PLAN NOTE
1. DARRIAN on CKD stage 4 :  Baseline serum creatinine about 2.5 mg/dL, acute on chronic kidney failure likely due to raised intra-abdominal pressure from significant ascites, status post large volume paracentesis for about 5 L,  And 6 L , follow renal fn , renal function improved, serum creatinine almost back to baseline at 2.2 mg/dl,     Will need regular paracentesis on discharge , likely weekly atleast at this time,     She may have diuretic resistant ascites, patient was on furosemide 40 mg 3 times a day, will decrease dose to 40 mg daily at discharge to prevent intravascular volume depletion and acute kidney injury from dehydration,      2.  Hypertension - blood pressure controlled      3.  End-stage liver disease due to alcoholic cirrhosis, GI notes reviewed,     4.  E Coli,  urinary tract infection - continue Rocephin,     5.  Anemia - multifactorial, s/p PRBC transfusion     6.  Mild hyponatremia - consistent with end-stage liver disease    7. Hypokalemia - K better    8.  Okay to discharge home , I think she is following with Dr. Aaron from [facility],

## 2019-09-27 ENCOUNTER — PATIENT OUTREACH (OUTPATIENT)
Dept: ADMINISTRATIVE | Facility: CLINIC | Age: 64
End: 2019-09-27

## 2019-09-27 NOTE — PATIENT INSTRUCTIONS
Discharge Instructions for Cirrhosis of the Liver  You have been diagnosed with cirrhosis of the liver. This is a long-term (chronic) problem. It occurs when liver tissue is destroyed and replaced by scar tissue. Causes of cirrhosis include:  · Infection such as viral hepatitis  · Chronic alcoholism  · The bodys immune system attacks healthy cells (autoimmune disorders)  · Obesity  · Medicine side effects  · Genetic diseases  Sometimes the exact cause is unknown. You may not have any symptoms at first. Or your symptoms may be mild. But they usually get worse. Cirrhosis is likely to occur if you have a history of long-term alcohol abuse. Cirrhosis cant be cured. But it can be treated.   Home care  Alcohol  · People with liver disease should not drink alcohol. If you stop drinking, you may feel better and live longer.  · If you are a chronic alcohol user, you will have withdrawal symptoms. Talk with your healthcare provider for more information.    · If alcohol is a problem, ask your provider about medicine that can help you quit drinking.    · Find a local Alcoholics Anonymous support group online at www.aa.org.  Diet  · Ask your provider what kind of diet you should follow. You may be asked to limit or not eat certain foods. Do not limit your protein intake.  · Weigh yourself daily and keep a weight log. If you have a sudden change in weight, call your provider.  · Cut back on salt:  ¨ Limit canned, dried, packaged, and fast foods.  ¨ Dont add salt to your food at the table.  ¨ Season foods with herbs instead of salt when you cook.  Medicines, supplements, and vaccines  · Take your medicines exactly as directed.  · Talk to your provider before taking vitamins, over-the-counter medicines, or herbal supplements. Many herbal supplements may be poisonous (toxic) to the liver.  · Avoid aspirin and other blood-thinning medicines.  · Discuss vitamin supplements and deficiencies with your provider.  · Ask your provider  about getting vaccinations for viruses that can cause liver diseases.  Follow-up care  Follow up with your healthcare provider, or as advised. You will likely have the following tests:  · Lab tests  · Blood tests for liver cancer  · Ultrasound of your liver every 6 months  · Endoscopy to check for swollen veins (varices) in your digestive tract  When to call your provider  Call your healthcare provider right away if you have any of the following:  · Fever of 100.4°F (38.0°C) or higher  · Extreme tiredness (fatigue), weakness, or lack of appetite  · Vomiting (with or without blood)  · Yellowing of your skin or eyes (jaundice)  · Itching  · Swelling in your belly or legs  · Black or tarry stools  · Skin that bruises easily  · Confusion or trouble thinking clearly   Date Last Reviewed: 8/1/2016  © 7422-4158 [employer]. [address] [place] [address], [place] [address]. All rights reserved. This information is not intended as a substitute for professional medical care. Always follow your healthcare professional's instructions.

## 2019-09-27 NOTE — TELEPHONE ENCOUNTER
C3 nurse attempted to contact patient. No answer. The following message was left for the patient to return the call:  Good afternoon I am a nurse calling on behalf of [facility] from the Care Coordination Center.  This is a Transitional Care Call for Kylah Ortiz. When you have a moment please contact us at [phone] or [phone] Monday through Friday, between the hours of 8 am to 4 pm. We look forward to speaking with you. On behalf of [facility] have a nice day.    The patient does not have a scheduled HOSFU appointment within 7-14 days post hospital discharge date 9/26/19.

## 2019-10-09 ENCOUNTER — LAB VISIT (OUTPATIENT)
Dept: LAB | Facility: HOSPITAL | Age: 64
End: 2019-10-09
Attending: NURSE PRACTITIONER
Payer: COMMERCIAL

## 2019-10-09 ENCOUNTER — OFFICE VISIT (OUTPATIENT)
Dept: GASTROENTEROLOGY | Facility: CLINIC | Age: 64
End: 2019-10-09
Payer: COMMERCIAL

## 2019-10-09 VITALS
RESPIRATION RATE: 17 BRPM | HEART RATE: 72 BPM | SYSTOLIC BLOOD PRESSURE: 108 MMHG | HEIGHT: 66 IN | BODY MASS INDEX: 24.98 KG/M2 | DIASTOLIC BLOOD PRESSURE: 68 MMHG | WEIGHT: 155.44 LBS

## 2019-10-09 DIAGNOSIS — N17.9 AKI (ACUTE KIDNEY INJURY): ICD-10-CM

## 2019-10-09 DIAGNOSIS — K70.31 ASCITES DUE TO ALCOHOLIC CIRRHOSIS: Primary | ICD-10-CM

## 2019-10-09 DIAGNOSIS — K70.31 ASCITES DUE TO ALCOHOLIC CIRRHOSIS: ICD-10-CM

## 2019-10-09 LAB
ALBUMIN SERPL BCP-MCNC: 2.9 G/DL (ref 3.5–5.2)
ALP SERPL-CCNC: 98 U/L (ref 55–135)
ALT SERPL W/O P-5'-P-CCNC: 28 U/L (ref 10–44)
ANION GAP SERPL CALC-SCNC: 12 MMOL/L (ref 8–16)
AST SERPL-CCNC: 37 U/L (ref 10–40)
BASOPHILS # BLD AUTO: 0.03 K/UL (ref 0–0.2)
BASOPHILS NFR BLD: 0.4 % (ref 0–1.9)
BILIRUB SERPL-MCNC: 0.9 MG/DL (ref 0.1–1)
BUN SERPL-MCNC: 33 MG/DL (ref 8–23)
CALCIUM SERPL-MCNC: 8.5 MG/DL (ref 8.7–10.5)
CHLORIDE SERPL-SCNC: 103 MMOL/L (ref 95–110)
CO2 SERPL-SCNC: 19 MMOL/L (ref 23–29)
CREAT SERPL-MCNC: 2 MG/DL (ref 0.5–1.4)
DIFFERENTIAL METHOD: ABNORMAL
EOSINOPHIL # BLD AUTO: 0.1 K/UL (ref 0–0.5)
EOSINOPHIL NFR BLD: 2 % (ref 0–8)
ERYTHROCYTE [DISTWIDTH] IN BLOOD BY AUTOMATED COUNT: 17.9 % (ref 11.5–14.5)
EST. GFR  (AFRICAN AMERICAN): 29.8 ML/MIN/1.73 M^2
EST. GFR  (NON AFRICAN AMERICAN): 25.8 ML/MIN/1.73 M^2
GLUCOSE SERPL-MCNC: 96 MG/DL (ref 70–110)
HCT VFR BLD AUTO: 28.1 % (ref 37–48.5)
HGB BLD-MCNC: 9 G/DL (ref 12–16)
IMM GRANULOCYTES # BLD AUTO: 0.03 K/UL (ref 0–0.04)
IMM GRANULOCYTES NFR BLD AUTO: 0.4 % (ref 0–0.5)
INR PPP: 1.5 (ref 0.8–1.2)
LYMPHOCYTES # BLD AUTO: 1.1 K/UL (ref 1–4.8)
LYMPHOCYTES NFR BLD: 16.2 % (ref 18–48)
MCH RBC QN AUTO: 29.1 PG (ref 27–31)
MCHC RBC AUTO-ENTMCNC: 32 G/DL (ref 32–36)
MCV RBC AUTO: 91 FL (ref 82–98)
MONOCYTES # BLD AUTO: 0.6 K/UL (ref 0.3–1)
MONOCYTES NFR BLD: 8.6 % (ref 4–15)
NEUTROPHILS # BLD AUTO: 5 K/UL (ref 1.8–7.7)
NEUTROPHILS NFR BLD: 72.4 % (ref 38–73)
NRBC BLD-RTO: 0 /100 WBC
PLATELET # BLD AUTO: 222 K/UL (ref 150–350)
PMV BLD AUTO: 10.2 FL (ref 9.2–12.9)
POTASSIUM SERPL-SCNC: 4 MMOL/L (ref 3.5–5.1)
PROT SERPL-MCNC: 6.2 G/DL (ref 6–8.4)
PROTHROMBIN TIME: 15.2 SEC (ref 9–12.5)
RBC # BLD AUTO: 3.09 M/UL (ref 4–5.4)
SODIUM SERPL-SCNC: 134 MMOL/L (ref 136–145)
WBC # BLD AUTO: 6.86 K/UL (ref 3.9–12.7)

## 2019-10-09 PROCEDURE — 85025 COMPLETE CBC W/AUTO DIFF WBC: CPT

## 2019-10-09 PROCEDURE — 85610 PROTHROMBIN TIME: CPT

## 2019-10-09 PROCEDURE — 99999 PR PBB SHADOW E&M-EST. PATIENT-LVL III: CPT | Mod: PBBFAC,,, | Performed by: NURSE PRACTITIONER

## 2019-10-09 PROCEDURE — 82105 ALPHA-FETOPROTEIN SERUM: CPT

## 2019-10-09 PROCEDURE — 3008F BODY MASS INDEX DOCD: CPT | Mod: CPTII,S$GLB,, | Performed by: NURSE PRACTITIONER

## 2019-10-09 PROCEDURE — 3008F PR BODY MASS INDEX (BMI) DOCUMENTED: ICD-10-PCS | Mod: CPTII,S$GLB,, | Performed by: NURSE PRACTITIONER

## 2019-10-09 PROCEDURE — 99214 PR OFFICE/OUTPT VISIT, EST, LEVL IV, 30-39 MIN: ICD-10-PCS | Mod: S$GLB,,, | Performed by: NURSE PRACTITIONER

## 2019-10-09 PROCEDURE — 99999 PR PBB SHADOW E&M-EST. PATIENT-LVL III: ICD-10-PCS | Mod: PBBFAC,,, | Performed by: NURSE PRACTITIONER

## 2019-10-09 PROCEDURE — 80053 COMPREHEN METABOLIC PANEL: CPT

## 2019-10-09 PROCEDURE — 99214 OFFICE O/P EST MOD 30 MIN: CPT | Mod: S$GLB,,, | Performed by: NURSE PRACTITIONER

## 2019-10-09 PROCEDURE — 36415 COLL VENOUS BLD VENIPUNCTURE: CPT

## 2019-10-10 LAB — AFP SERPL-MCNC: 4.1 NG/ML (ref 0–8.4)

## 2019-10-10 NOTE — H&P (VIEW-ONLY)
Clinic Follow Up:  [facility] Follow Up Note    Reason for Follow Up:  The primary encounter diagnosis was Ascites due to alcoholic cirrhosis. A diagnosis of DARRIAN (acute kidney injury) was also pertinent to this visit.    PCP: Santos Beaver       HPI:  This is a 63 y.o. female here for follow up of the above  Pt previously managed by Dr. Rod for cirrhosis.  However, over the last few weeks, she has been hospitalized for DARRIAN with worsening ascites  Paracentesis was completed and no SBP noted.   She was restarted on Lasix daily which she has been taking, however, the ascites has returned.  She has an appt with her nephrologist next week for continued management of DARRIAN.   She has hx of EV with bleeding.  Currently being managed at [facility] with Dr. Cabral  Today she states that she is feeling overall well with the exception of the ascites.   No worsening confusion.       Review of Systems   Constitutional: Positive for malaise/fatigue. Negative for chills, fever and weight loss.   Respiratory: Negative for cough.    Cardiovascular: Negative for chest pain.   Gastrointestinal:        Per HPI   Musculoskeletal: Negative for myalgias.   Skin: Negative for itching and rash.   Neurological: Negative for headaches.   Psychiatric/Behavioral: The patient is not nervous/anxious.        Medical History:  Past Medical History:   Diagnosis Date    Alcoholic cirrhosis     Alcoholic dementia     Anemia     Ascites     CKD (chronic kidney disease)     Esophageal reflux     Esophageal varices     Hepatic encephalopathy     Hiatal hernia        Surgical History:   Past Surgical History:   Procedure Laterality Date    COLONOSCOPY      ESOPHAGOGASTRODUODENOSCOPY      HYSTERECTOMY         Family History:   Family History   Problem Relation Age of Onset    Cancer Mother     Arthritis Mother        Social History:   Social History     Tobacco Use    Smoking status: Never Smoker    Smokeless  "tobacco: Never Used   Substance Use Topics    Alcohol use: Not Currently    Drug use: Not on file       Allergies: Reviewed    Home Medications:  Current Outpatient Medications on File Prior to Visit   Medication Sig Dispense Refill    FLUoxetine 20 MG capsule Take 20 mg by mouth once daily.      furosemide (LASIX) 40 MG tablet Take 1 tablet (40 mg total) by mouth once daily. 30 tablet 0    lactulose (CHRONULAC) 10 gram/15 mL solution Take by mouth once daily.       magnesium oxide (MAG-OX) 400 mg (241.3 mg magnesium) tablet Take 400 mg by mouth once daily.      multivitamin (THERAGRAN) per tablet Take 1 tablet by mouth once daily.      ranitidine (ZANTAC) 300 MG tablet Take 300 mg by mouth every evening.      hydrOXYzine HCl (ATARAX) 25 MG tablet Take 25 mg by mouth 3 (three) times daily as needed.        No current facility-administered medications on file prior to visit.        Physical Exam:  Vital Signs:  /68 (BP Location: Right arm, Patient Position: Sitting, BP Method: Medium (Manual))   Pulse 72   Resp 17   Ht 5' 6" (1.676 m)   Wt 70.5 kg (155 lb 6.8 oz)   BMI 25.09 kg/m²   Body mass index is 25.09 kg/m².  Physical Exam   Constitutional: She is oriented to person, place, and time. She appears well-developed and well-nourished.   HENT:   Head: Normocephalic.   Eyes: No scleral icterus.   Neck: Normal range of motion.   Cardiovascular: Normal rate.   Pulmonary/Chest: Effort normal.   Abdominal: Soft. She exhibits distension (ascites). There is tenderness.   Musculoskeletal: Normal range of motion.   Neurological: She is alert and oriented to person, place, and time.   Skin: Skin is warm and dry.   Psychiatric: She has a normal mood and affect.   Vitals reviewed.      Labs: Pertinent labs reviewed.  MELD-Na score: 20 at 10/9/2019  2:50 PM  MELD score: 18 at 10/9/2019  2:50 PM  Calculated from:  Serum Creatinine: 2.0 mg/dL at 10/9/2019  2:50 PM  Serum Sodium: 134 mmol/L at 10/9/2019  2:50 " PM  Total Bilirubin: 0.9 mg/dL (Rounded to 1 mg/dL) at 10/9/2019  2:50 PM  INR(ratio): 1.5 at 10/9/2019  2:50 PM  Age: 63 years    Assessment:  1. Ascites due to alcoholic cirrhosis    2. DARRIAN (acute kidney injury)        Recommendations:  - continue current diuretics.  Will adjust based on recommendation per nephrology  - will plan for a repeat paracentesis  - Low sodium diet discussed.   - Continue with ETOH abstinence.   - will follow up in 4 weeks for close monitoring  - continue with plan for EGD with Dr. Cabral as previously planned.     Return to Clinic:    Follow up in about 4 weeks (around 11/6/2019).

## 2019-10-15 ENCOUNTER — HOSPITAL ENCOUNTER (OUTPATIENT)
Dept: RADIOLOGY | Facility: HOSPITAL | Age: 64
Discharge: HOME | End: 2019-10-15
Attending: NURSE PRACTITIONER
Payer: COMMERCIAL

## 2019-10-15 VITALS
BODY MASS INDEX: 25.23 KG/M2 | RESPIRATION RATE: 16 BRPM | OXYGEN SATURATION: 100 % | HEIGHT: 66 IN | SYSTOLIC BLOOD PRESSURE: 98 MMHG | DIASTOLIC BLOOD PRESSURE: 67 MMHG | WEIGHT: 157 LBS | HEART RATE: 70 BPM

## 2019-10-15 DIAGNOSIS — K70.31 ASCITES DUE TO ALCOHOLIC CIRRHOSIS: ICD-10-CM

## 2019-10-15 DIAGNOSIS — N17.9 AKI (ACUTE KIDNEY INJURY): ICD-10-CM

## 2019-10-15 PROCEDURE — 63600175 PHARM REV CODE 636 W HCPCS: Mod: JG | Performed by: RADIOLOGY

## 2019-10-15 PROCEDURE — P9047 ALBUMIN (HUMAN), 25%, 50ML: HCPCS | Mod: JG | Performed by: RADIOLOGY

## 2019-10-15 PROCEDURE — A7048 VACUUM DRAIN BOTTLE/TUBE KIT: HCPCS

## 2019-10-15 PROCEDURE — 49083 ABD PARACENTESIS W/IMAGING: CPT

## 2019-10-15 RX ORDER — ALBUMIN HUMAN 250 G/1000ML
50 SOLUTION INTRAVENOUS ONCE
Status: COMPLETED | OUTPATIENT
Start: 2019-10-15 | End: 2019-10-15

## 2019-10-15 RX ADMIN — ALBUMIN (HUMAN) 50 G: 25 SOLUTION INTRAVENOUS at 02:10

## 2019-10-15 NOTE — PLAN OF CARE
9.5 liters of rajan fluid removed from R side abdomen puncture site, band aid placed, site WDL.  Pt d/c home in stable condition with caregiver.  Verbalized understanding of d/c instructions.  Pt voiced no complaints at this time, verbalized relief of abdominal distention. Pt stood at side of bed, walked steps with no new motor or sensory deficits.

## 2019-10-15 NOTE — DISCHARGE INSTRUCTIONS

## 2019-10-15 NOTE — DISCHARGE SUMMARY
Pre Op Diagnosis: ascites     Post Op Diagnosis: same     Procedure:  para     Procedure performed by: Rosas RAINEY, Sherlyn GREGORY     Written Informed Consent Obtained: Yes     Specimen Removed:  yes     Estimated Blood Loss:  minimal     Findings: Local anesthesia.     The patient tolerated the procedure well and there were no complications.      Sterile technique was performed in the RLQ, lidocaine was used as a local anesthetic.   9.5 liters of rajan fluid removed for therapeutic purposes.  Pt tolerated the procedure well without immediate complications.  Please see radiologist report for details. F/u with PCP and/or ordering physician.

## 2019-10-25 ENCOUNTER — TELEPHONE (OUTPATIENT)
Dept: GASTROENTEROLOGY | Facility: CLINIC | Age: 64
End: 2019-10-25

## 2019-10-25 DIAGNOSIS — K70.31 ASCITES DUE TO ALCOHOLIC CIRRHOSIS: Primary | ICD-10-CM

## 2019-10-25 NOTE — TELEPHONE ENCOUNTER
Returned patients call and she states that last time she had a para done they informed her that if she retained more fluid she could go back. Patient states she needs an order. I advised patient I would let Louise know and give her a call back. She verbalized understanding.

## 2019-10-25 NOTE — TELEPHONE ENCOUNTER
Called to inform patient that patient has a paracentesis scheduled on 10/29 at 1:30pm. No answer left voicemail to call back if needed.

## 2019-10-25 NOTE — TELEPHONE ENCOUNTER
----- Message from Savita Andrew sent at 10/25/2019  9:35 AM CDT -----  Type:  Needs Medical Advice    Who Called:  Pt caregiver  (Tatyana)  Symptoms (please be specific):    Fluid in pt's stomach   How long has patient had these symptoms:     Pharmacy name and phone #:    Would the patient rather a call back or a response via [facility]?  Call back  Best Call Back Number:    [phone]  Additional Information:   States she is calling to talk with your office regarding removing the fluid//please call asap//lisa/rin

## 2019-10-29 ENCOUNTER — HOSPITAL ENCOUNTER (OUTPATIENT)
Dept: RADIOLOGY | Facility: HOSPITAL | Age: 64
Discharge: HOME | End: 2019-10-29
Attending: NURSE PRACTITIONER
Payer: COMMERCIAL

## 2019-10-29 VITALS
WEIGHT: 157 LBS | OXYGEN SATURATION: 98 % | SYSTOLIC BLOOD PRESSURE: 107 MMHG | DIASTOLIC BLOOD PRESSURE: 59 MMHG | HEART RATE: 84 BPM | BODY MASS INDEX: 25.23 KG/M2 | HEIGHT: 66 IN | RESPIRATION RATE: 16 BRPM

## 2019-10-29 DIAGNOSIS — K70.31 ASCITES DUE TO ALCOHOLIC CIRRHOSIS: ICD-10-CM

## 2019-10-29 PROCEDURE — 63600175 PHARM REV CODE 636 W HCPCS: Mod: JG | Performed by: RADIOLOGY

## 2019-10-29 PROCEDURE — 49083 ABD PARACENTESIS W/IMAGING: CPT

## 2019-10-29 PROCEDURE — P9047 ALBUMIN (HUMAN), 25%, 50ML: HCPCS | Mod: JG | Performed by: RADIOLOGY

## 2019-10-29 PROCEDURE — A7048 VACUUM DRAIN BOTTLE/TUBE KIT: HCPCS

## 2019-10-29 RX ORDER — ALBUMIN HUMAN 250 G/1000ML
75 SOLUTION INTRAVENOUS ONCE
Status: COMPLETED | OUTPATIENT
Start: 2019-10-29 | End: 2019-10-29

## 2019-10-29 RX ADMIN — ALBUMIN (HUMAN) 75 G: 25 SOLUTION INTRAVENOUS at 02:10

## 2019-10-29 NOTE — DISCHARGE INSTRUCTIONS

## 2019-10-29 NOTE — DISCHARGE SUMMARY
Pre Op Diagnosis: ascites     Post Op Diagnosis: same     Procedure:  para     Procedure performed by: Rosas RAINEY, Sherlyn GREGORY     Written Informed Consent Obtained: Yes     Specimen Removed:  yes     Estimated Blood Loss:  minimal     Findings: Local anesthesia.     The patient tolerated the procedure well and there were no complications.      Sterile technique was performed in the LLQ, lidocaine was used as a local anesthetic.   11 liters of rajan fluid removed for therapeutic purposes.  Pt tolerated the procedure well without immediate complications.  Please see radiologist report for details. F/u with PCP and/or ordering physician.

## 2019-10-29 NOTE — PLAN OF CARE
11 liters of rajan fluid removed from left side abdomen puncture site, site WDL.  Pt d/c home in stable condition via wheelchair with caregiver.  Caregiver and patient Verbalized understanding of d/c instructions, handout given.  Pt voiced no complaints at this time, verbalized relief of abdominal distention.

## 2019-10-29 NOTE — H&P
Clinic Follow Up:  [facility] Follow Up Note    Reason for Follow Up:  The encounter diagnosis was Ascites due to alcoholic cirrhosis.    PCP: Santos Beaver       HPI:  This is a 63 y.o. female here for follow up of the above  Pt previously managed by Dr. Rod for cirrhosis.  However, over the last few weeks, she has been hospitalized for DARRIAN with worsening ascites  Paracentesis was completed and no SBP noted.   She was restarted on Lasix daily which she has been taking, however, the ascites has returned.  She has an appt with her nephrologist next week for continued management of DARRIAN.   She has hx of EV with bleeding.  Currently being managed at [facility] with Dr. Cabral  Today she states that she is feeling overall well with the exception of the ascites.   No worsening confusion.       Review of Systems   Constitutional: Positive for malaise/fatigue. Negative for chills, fever and weight loss.   Respiratory: Negative for cough.    Cardiovascular: Negative for chest pain.   Gastrointestinal:        Per HPI   Musculoskeletal: Negative for myalgias.   Skin: Negative for itching and rash.   Neurological: Negative for headaches.   Psychiatric/Behavioral: The patient is not nervous/anxious.        Medical History:  Past Medical History:   Diagnosis Date    Alcoholic cirrhosis     Alcoholic dementia     Anemia     Ascites     CKD (chronic kidney disease)     Esophageal reflux     Esophageal varices     Hepatic encephalopathy     Hiatal hernia        Surgical History:   Past Surgical History:   Procedure Laterality Date    COLONOSCOPY      ESOPHAGOGASTRODUODENOSCOPY      HYSTERECTOMY         Family History:   Family History   Problem Relation Age of Onset    Cancer Mother     Arthritis Mother        Social History:   Social History     Tobacco Use    Smoking status: Never Smoker    Smokeless tobacco: Never Used   Substance Use Topics    Alcohol use: Not Currently    Drug  "use: Not on file       Allergies: Reviewed    Home Medications:  Current Outpatient Medications on File Prior to Encounter   Medication Sig Dispense Refill    FLUoxetine 20 MG capsule Take 20 mg by mouth once daily.      furosemide (LASIX) 40 MG tablet Take 1 tablet (40 mg total) by mouth once daily. 30 tablet 0    hydrOXYzine HCl (ATARAX) 25 MG tablet Take 25 mg by mouth 3 (three) times daily as needed.       lactulose (CHRONULAC) 10 gram/15 mL solution Take by mouth once daily.       magnesium oxide (MAG-OX) 400 mg (241.3 mg magnesium) tablet Take 400 mg by mouth once daily.      multivitamin (THERAGRAN) per tablet Take 1 tablet by mouth once daily.      ranitidine (ZANTAC) 300 MG tablet Take 300 mg by mouth every evening.       No current facility-administered medications on file prior to encounter.        Physical Exam:  Vital Signs:  BP (!) 107/59   Pulse 84   Resp 16   Ht 5' 6" (1.676 m)   Wt 71.2 kg (157 lb)   SpO2 98%   BMI 25.34 kg/m²   Body mass index is 25.34 kg/m².  Physical Exam   Constitutional: She is oriented to person, place, and time. She appears well-developed and well-nourished.   HENT:   Head: Normocephalic.   Eyes: No scleral icterus.   Neck: Normal range of motion.   Cardiovascular: Normal rate.   Pulmonary/Chest: Effort normal.   Abdominal: Soft. She exhibits distension (ascites). There is tenderness.   Musculoskeletal: Normal range of motion.   Neurological: She is alert and oriented to person, place, and time.   Skin: Skin is warm and dry.   Psychiatric: She has a normal mood and affect.   Vitals reviewed.      Labs: Pertinent labs reviewed.  MELD-Na score: 20 at 10/9/2019  2:50 PM  MELD score: 18 at 10/9/2019  2:50 PM  Calculated from:  Serum Creatinine: 2.0 mg/dL at 10/9/2019  2:50 PM  Serum Sodium: 134 mmol/L at 10/9/2019  2:50 PM  Total Bilirubin: 0.9 mg/dL (Rounded to 1 mg/dL) at 10/9/2019  2:50 PM  INR(ratio): 1.5 at 10/9/2019  2:50 PM  Age: 63 years    Assessment:  1. " Ascites due to alcoholic cirrhosis        Recommendations:  - continue current diuretics.  Will adjust based on recommendation per nephrology  - will plan for a repeat paracentesis  - Low sodium diet discussed.   - Continue with ETOH abstinence.   - will follow up in 4 weeks for close monitoring  - continue with plan for EGD with Dr. Cabral as previously planned.     Return to Clinic:    No follow-ups on file.

## 2019-11-06 ENCOUNTER — OFFICE VISIT (OUTPATIENT)
Dept: GASTROENTEROLOGY | Facility: CLINIC | Age: 64
End: 2019-11-06
Payer: MEDICARE

## 2019-11-06 VITALS
HEART RATE: 95 BPM | HEIGHT: 66 IN | BODY MASS INDEX: 26.96 KG/M2 | DIASTOLIC BLOOD PRESSURE: 60 MMHG | WEIGHT: 167.75 LBS | SYSTOLIC BLOOD PRESSURE: 100 MMHG

## 2019-11-06 DIAGNOSIS — N17.9 AKI (ACUTE KIDNEY INJURY): ICD-10-CM

## 2019-11-06 DIAGNOSIS — K70.31 ASCITES DUE TO ALCOHOLIC CIRRHOSIS: Primary | ICD-10-CM

## 2019-11-06 DIAGNOSIS — I85.11 SECONDARY ESOPHAGEAL VARICES WITH BLEEDING: ICD-10-CM

## 2019-11-06 PROCEDURE — 99214 PR OFFICE/OUTPT VISIT, EST, LEVL IV, 30-39 MIN: ICD-10-PCS | Mod: S$PBB,,, | Performed by: NURSE PRACTITIONER

## 2019-11-06 PROCEDURE — 99999 PR PBB SHADOW E&M-EST. PATIENT-LVL III: CPT | Mod: PBBFAC,,, | Performed by: NURSE PRACTITIONER

## 2019-11-06 PROCEDURE — 99213 OFFICE O/P EST LOW 20 MIN: CPT | Mod: PBBFAC | Performed by: NURSE PRACTITIONER

## 2019-11-06 PROCEDURE — 99999 PR PBB SHADOW E&M-EST. PATIENT-LVL III: ICD-10-PCS | Mod: PBBFAC,,, | Performed by: NURSE PRACTITIONER

## 2019-11-06 PROCEDURE — 99214 OFFICE O/P EST MOD 30 MIN: CPT | Mod: S$PBB,,, | Performed by: NURSE PRACTITIONER

## 2019-11-06 NOTE — PROGRESS NOTES
Clinic Follow Up:  [facility] Follow Up Note    Reason for Follow Up:  The primary encounter diagnosis was Ascites due to alcoholic cirrhosis. Diagnoses of DARRIAN (acute kidney injury) and Secondary esophageal varices with bleeding were also pertinent to this visit.    PCP: Santos Beaver       HPI:  This is a 64 y.o. female here for follow up of the above  Since her last visit, she has continued with ascites.  Last paracentesis one week ago with 11 liters removed. The ascites has returned since then.  She is scheduled for a para this week.   She has not seen nephrology for CKD/DARRIAN.  She did not have the EGD completed for EV banding at [facility] due to the ascites.   She denies any upper GI bleeding.  No worsening confusion.   No new medications or changes  She states that she is following a low sodium diet and fluid restriction.           Review of Systems   Constitutional: Positive for malaise/fatigue. Negative for chills, fever and weight loss.   Respiratory: Negative for cough.    Cardiovascular: Negative for chest pain.   Gastrointestinal:        Per HPI   Musculoskeletal: Negative for myalgias.   Skin: Negative for itching and rash.   Neurological: Negative for headaches.   Psychiatric/Behavioral: The patient is not nervous/anxious.        Medical History:  Past Medical History:   Diagnosis Date    Alcoholic cirrhosis     Alcoholic dementia     Anemia     Ascites     CKD (chronic kidney disease)     Esophageal reflux     Esophageal varices     Hepatic encephalopathy     Hiatal hernia        Surgical History:   Past Surgical History:   Procedure Laterality Date    COLONOSCOPY      ESOPHAGOGASTRODUODENOSCOPY      HYSTERECTOMY         Family History:   Family History   Problem Relation Age of Onset    Cancer Mother     Arthritis Mother        Social History:   Social History     Tobacco Use    Smoking status: Never Smoker    Smokeless tobacco: Never Used   Substance Use  "Topics    Alcohol use: Not Currently    Drug use: Not on file       Allergies: Reviewed    Home Medications:  Current Outpatient Medications on File Prior to Visit   Medication Sig Dispense Refill    FLUoxetine 20 MG capsule Take 20 mg by mouth once daily.      furosemide (LASIX) 40 MG tablet Take 1 tablet (40 mg total) by mouth once daily. 30 tablet 0    hydrOXYzine HCl (ATARAX) 25 MG tablet Take 25 mg by mouth 3 (three) times daily as needed.       lactulose (CHRONULAC) 10 gram/15 mL solution Take by mouth once daily.       magnesium oxide (MAG-OX) 400 mg (241.3 mg magnesium) tablet Take 400 mg by mouth once daily.      multivitamin (THERAGRAN) per tablet Take 1 tablet by mouth once daily.      ranitidine (ZANTAC) 300 MG tablet Take 300 mg by mouth every evening.       No current facility-administered medications on file prior to visit.        Physical Exam:  Vital Signs:  /60   Pulse 95   Ht 5' 6" (1.676 m)   Wt 76.1 kg (167 lb 12.3 oz)   BMI 27.08 kg/m²   Body mass index is 27.08 kg/m².  Physical Exam   Constitutional: She is oriented to person, place, and time. She appears well-developed and well-nourished.   HENT:   Head: Normocephalic.   Eyes: No scleral icterus.   Neck: Normal range of motion.   Cardiovascular: Normal rate.   Pulmonary/Chest: Effort normal.   Abdominal: She exhibits distension (large ascites). There is tenderness.   Musculoskeletal: Normal range of motion. She exhibits edema (improved from previous ).   Neurological: She is alert and oriented to person, place, and time.   Skin: Skin is warm and dry.   Psychiatric: She has a normal mood and affect.   Vitals reviewed.      Labs: Pertinent labs reviewed.  MELD-Na score: 20 at 10/9/2019  2:50 PM  MELD score: 18 at 10/9/2019  2:50 PM  Calculated from:  Serum Creatinine: 2.0 mg/dL at 10/9/2019  2:50 PM  Serum Sodium: 134 mmol/L at 10/9/2019  2:50 PM  Total Bilirubin: 0.9 mg/dL (Rounded to 1 mg/dL) at 10/9/2019  2:50 " PM  INR(ratio): 1.5 at 10/9/2019  2:50 PM  Age: 63 years   EV: EGD at [facility] with previous banding for bleeding.  HCC: US 9/19 with multiple cysts, stable in comparison to previous cross-sectional imaging seen in Care Everywhere    Assessment:  1. Ascites due to alcoholic cirrhosis    2. DARRIAN (acute kidney injury)    3. Secondary esophageal varices with bleeding        Recommendations:  - stable with continued ascites   - not able to titrate diuretics given the DARRIAN/CKD  - pt care giver has requested a referral to nephrology here at [facility], referral placed  - Pt has requested further endoscopy to be completed here at [facility], orders placed  - will continue current diuretics until pt is seen by nephrology and adjustments can be made if OKd by them      Return to Clinic:    Follow up in about 6 weeks (around 12/18/2019).

## 2019-11-06 NOTE — H&P (VIEW-ONLY)
Clinic Follow Up:  [facility] Follow Up Note    Reason for Follow Up:  The primary encounter diagnosis was Ascites due to alcoholic cirrhosis. Diagnoses of DARRIAN (acute kidney injury) and Secondary esophageal varices with bleeding were also pertinent to this visit.    PCP: Santos Beaver       HPI:  This is a 64 y.o. female here for follow up of the above  Since her last visit, she has continued with ascites.  Last paracentesis one week ago with 11 liters removed. The ascites has returned since then.  She is scheduled for a para this week.   She has not seen nephrology for CKD/DARRIAN.  She did not have the EGD completed for EV banding at [facility] due to the ascites.   She denies any upper GI bleeding.  No worsening confusion.   No new medications or changes  She states that she is following a low sodium diet and fluid restriction.           Review of Systems   Constitutional: Positive for malaise/fatigue. Negative for chills, fever and weight loss.   Respiratory: Negative for cough.    Cardiovascular: Negative for chest pain.   Gastrointestinal:        Per HPI   Musculoskeletal: Negative for myalgias.   Skin: Negative for itching and rash.   Neurological: Negative for headaches.   Psychiatric/Behavioral: The patient is not nervous/anxious.        Medical History:  Past Medical History:   Diagnosis Date    Alcoholic cirrhosis     Alcoholic dementia     Anemia     Ascites     CKD (chronic kidney disease)     Esophageal reflux     Esophageal varices     Hepatic encephalopathy     Hiatal hernia        Surgical History:   Past Surgical History:   Procedure Laterality Date    COLONOSCOPY      ESOPHAGOGASTRODUODENOSCOPY      HYSTERECTOMY         Family History:   Family History   Problem Relation Age of Onset    Cancer Mother     Arthritis Mother        Social History:   Social History     Tobacco Use    Smoking status: Never Smoker    Smokeless tobacco: Never Used   Substance Use  "Topics    Alcohol use: Not Currently    Drug use: Not on file       Allergies: Reviewed    Home Medications:  Current Outpatient Medications on File Prior to Visit   Medication Sig Dispense Refill    FLUoxetine 20 MG capsule Take 20 mg by mouth once daily.      furosemide (LASIX) 40 MG tablet Take 1 tablet (40 mg total) by mouth once daily. 30 tablet 0    hydrOXYzine HCl (ATARAX) 25 MG tablet Take 25 mg by mouth 3 (three) times daily as needed.       lactulose (CHRONULAC) 10 gram/15 mL solution Take by mouth once daily.       magnesium oxide (MAG-OX) 400 mg (241.3 mg magnesium) tablet Take 400 mg by mouth once daily.      multivitamin (THERAGRAN) per tablet Take 1 tablet by mouth once daily.      ranitidine (ZANTAC) 300 MG tablet Take 300 mg by mouth every evening.       No current facility-administered medications on file prior to visit.        Physical Exam:  Vital Signs:  /60   Pulse 95   Ht 5' 6" (1.676 m)   Wt 76.1 kg (167 lb 12.3 oz)   BMI 27.08 kg/m²   Body mass index is 27.08 kg/m².  Physical Exam   Constitutional: She is oriented to person, place, and time. She appears well-developed and well-nourished.   HENT:   Head: Normocephalic.   Eyes: No scleral icterus.   Neck: Normal range of motion.   Cardiovascular: Normal rate.   Pulmonary/Chest: Effort normal.   Abdominal: She exhibits distension (large ascites). There is tenderness.   Musculoskeletal: Normal range of motion. She exhibits edema (improved from previous ).   Neurological: She is alert and oriented to person, place, and time.   Skin: Skin is warm and dry.   Psychiatric: She has a normal mood and affect.   Vitals reviewed.      Labs: Pertinent labs reviewed.  MELD-Na score: 20 at 10/9/2019  2:50 PM  MELD score: 18 at 10/9/2019  2:50 PM  Calculated from:  Serum Creatinine: 2.0 mg/dL at 10/9/2019  2:50 PM  Serum Sodium: 134 mmol/L at 10/9/2019  2:50 PM  Total Bilirubin: 0.9 mg/dL (Rounded to 1 mg/dL) at 10/9/2019  2:50 " PM  INR(ratio): 1.5 at 10/9/2019  2:50 PM  Age: 63 years   EV: EGD at [facility] with previous banding for bleeding.  HCC: US 9/19 with multiple cysts, stable in comparison to previous cross-sectional imaging seen in Care Everywhere    Assessment:  1. Ascites due to alcoholic cirrhosis    2. DARRIAN (acute kidney injury)    3. Secondary esophageal varices with bleeding        Recommendations:  - stable with continued ascites   - not able to titrate diuretics given the DARRIAN/CKD  - pt care giver has requested a referral to nephrology here at [facility], referral placed  - Pt has requested further endoscopy to be completed here at [facility], orders placed  - will continue current diuretics until pt is seen by nephrology and adjustments can be made if OKd by them      Return to Clinic:    Follow up in about 6 weeks (around 12/18/2019).

## 2019-11-08 ENCOUNTER — HOSPITAL ENCOUNTER (OUTPATIENT)
Dept: RADIOLOGY | Facility: HOSPITAL | Age: 64
Discharge: HOME | End: 2019-11-08
Attending: NURSE PRACTITIONER
Payer: MEDICARE

## 2019-11-08 VITALS
OXYGEN SATURATION: 100 % | BODY MASS INDEX: 26.68 KG/M2 | DIASTOLIC BLOOD PRESSURE: 51 MMHG | RESPIRATION RATE: 16 BRPM | WEIGHT: 166 LBS | HEIGHT: 66 IN | HEART RATE: 82 BPM | SYSTOLIC BLOOD PRESSURE: 92 MMHG

## 2019-11-08 DIAGNOSIS — K70.31 ASCITES DUE TO ALCOHOLIC CIRRHOSIS: ICD-10-CM

## 2019-11-08 PROCEDURE — 63600175 PHARM REV CODE 636 W HCPCS: Mod: JG | Performed by: RADIOLOGY

## 2019-11-08 PROCEDURE — P9047 ALBUMIN (HUMAN), 25%, 50ML: HCPCS | Mod: JG | Performed by: RADIOLOGY

## 2019-11-08 PROCEDURE — 49083 ABD PARACENTESIS W/IMAGING: CPT

## 2019-11-08 RX ORDER — ALBUMIN HUMAN 250 G/1000ML
75 SOLUTION INTRAVENOUS ONCE
Status: COMPLETED | OUTPATIENT
Start: 2019-11-08 | End: 2019-11-08

## 2019-11-08 RX ADMIN — ALBUMIN (HUMAN) 75 G: 25 SOLUTION INTRAVENOUS at 01:11

## 2019-11-08 NOTE — DISCHARGE SUMMARY
Procedure was performed Sloan Maurer.  Sterile technique was performed in the LLQ, lidocaine was used as a local anesthetic.  Removed 11.5 liters of light yellow fluid.  Pt tolerated the procedure well without immediate complications.  Please see radiologist report for details. F/u with PCP and/or ordering physician.

## 2019-11-08 NOTE — PLAN OF CARE
11.5 liters of rajan fluid removed from left side abdomen puncture site, site WDL, band aid placed.  Pt d/c home in stable condition via wheelchair with ride.  Verbalized understanding of d/c instructions.  Pt voiced no complaints at this time, verbalized relief of abdominal distention.

## 2019-11-14 ENCOUNTER — TELEPHONE (OUTPATIENT)
Dept: RADIOLOGY | Facility: HOSPITAL | Age: 64
End: 2019-11-14

## 2019-11-14 DIAGNOSIS — K70.31 ASCITES DUE TO ALCOHOLIC CIRRHOSIS: ICD-10-CM

## 2019-11-14 DIAGNOSIS — D64.9 ANEMIA, UNSPECIFIED TYPE: Primary | ICD-10-CM

## 2019-11-15 ENCOUNTER — HOSPITAL ENCOUNTER (OUTPATIENT)
Dept: RADIOLOGY | Facility: HOSPITAL | Age: 64
Discharge: HOME | End: 2019-11-15
Attending: NURSE PRACTITIONER
Payer: MEDICARE

## 2019-11-15 VITALS
SYSTOLIC BLOOD PRESSURE: 104 MMHG | DIASTOLIC BLOOD PRESSURE: 70 MMHG | HEIGHT: 66 IN | BODY MASS INDEX: 25.71 KG/M2 | HEART RATE: 82 BPM | OXYGEN SATURATION: 100 % | RESPIRATION RATE: 15 BRPM | WEIGHT: 160 LBS

## 2019-11-15 DIAGNOSIS — K70.31 ASCITES DUE TO ALCOHOLIC CIRRHOSIS: ICD-10-CM

## 2019-11-15 PROCEDURE — P9047 ALBUMIN (HUMAN), 25%, 50ML: HCPCS | Mod: JG | Performed by: NURSE PRACTITIONER

## 2019-11-15 PROCEDURE — 49083 ABD PARACENTESIS W/IMAGING: CPT

## 2019-11-15 PROCEDURE — 63600175 PHARM REV CODE 636 W HCPCS: Mod: JG | Performed by: NURSE PRACTITIONER

## 2019-11-15 RX ORDER — ALBUMIN HUMAN 250 G/1000ML
60 SOLUTION INTRAVENOUS ONCE
Status: COMPLETED | OUTPATIENT
Start: 2019-11-15 | End: 2019-11-15

## 2019-11-15 RX ADMIN — ALBUMIN (HUMAN) 60 G: 25 SOLUTION INTRAVENOUS at 01:11

## 2019-11-15 NOTE — DISCHARGE SUMMARY
Procedure was performed radilogist.  Sterile technique was performed in the RLQ, lidocaine was used as a local anesthetic.  Removed 10 liters of rajan fluid.  Pt tolerated the procedure well without immediate complications.  Please see radiologist report for details. F/u with PCP and/or ordering physician.

## 2019-11-15 NOTE — PLAN OF CARE
Procedure complete.  VSS.  Patient tolerated well.  10 Liters of rajan fluid removed.  Pressure dressing to puncture site on right abdomen.  Discussed DC instructions and patient verbalized understanding.  Patient dc'd ambulatory to lobby with caretaker.

## 2019-11-15 NOTE — H&P
[facility] -   History & Physical    Subjective:      Chief Complaint/Reason for Admission: Ascites    Kylah Ortiz is a 64 y.o. female. Here for paracentesis.     Past Medical History:   Diagnosis Date    Alcoholic cirrhosis     Alcoholic dementia     Anemia     Ascites     CKD (chronic kidney disease)     Esophageal reflux     Esophageal varices     Hepatic encephalopathy     Hiatal hernia      Past Surgical History:   Procedure Laterality Date    COLONOSCOPY      ESOPHAGOGASTRODUODENOSCOPY      HYSTERECTOMY       Family History   Problem Relation Age of Onset    Cancer Mother     Arthritis Mother      Social History     Tobacco Use    Smoking status: Never Smoker    Smokeless tobacco: Never Used   Substance Use Topics    Alcohol use: Not Currently    Drug use: Not on file         (Not in a hospital admission)  Review of patient's allergies indicates:   Allergen Reactions    Hydrocodone-acetaminophen         Review of Systems   Gastrointestinal: Positive for abdominal pain.       Objective:      Vital Signs (Most Recent)  Pulse: 84 (11/15/19 1352)  Resp: 16 (11/15/19 1352)  BP: 117/74 (11/15/19 1352)  SpO2: 99 % (11/15/19 1352)    Vital Signs Range (Last 24H):  Pulse:  [84]   Resp:  [16]   BP: (117)/(74)   SpO2:  [99 %]     Physical Exam   Abdominal: She exhibits distension.           Assessment:    Ascites    Plan:    Perform Paracentesis

## 2019-11-15 NOTE — DISCHARGE INSTRUCTIONS

## 2019-11-19 ENCOUNTER — LAB VISIT (OUTPATIENT)
Dept: LAB | Facility: HOSPITAL | Age: 64
End: 2019-11-19
Attending: INTERNAL MEDICINE
Payer: MEDICARE

## 2019-11-19 ENCOUNTER — OFFICE VISIT (OUTPATIENT)
Dept: NEPHROLOGY | Facility: CLINIC | Age: 64
End: 2019-11-19
Payer: MEDICARE

## 2019-11-19 VITALS
RESPIRATION RATE: 20 BRPM | HEIGHT: 66 IN | DIASTOLIC BLOOD PRESSURE: 64 MMHG | SYSTOLIC BLOOD PRESSURE: 100 MMHG | BODY MASS INDEX: 25.26 KG/M2 | HEART RATE: 64 BPM | WEIGHT: 157.19 LBS

## 2019-11-19 DIAGNOSIS — E87.20 METABOLIC ACIDOSIS: ICD-10-CM

## 2019-11-19 DIAGNOSIS — N17.9 ACUTE KIDNEY INJURY: Primary | ICD-10-CM

## 2019-11-19 DIAGNOSIS — E87.1 HYPONATREMIA: ICD-10-CM

## 2019-11-19 DIAGNOSIS — I95.89 OTHER SPECIFIED HYPOTENSION: ICD-10-CM

## 2019-11-19 DIAGNOSIS — K70.31 ALCOHOLIC CIRRHOSIS OF LIVER WITH ASCITES: ICD-10-CM

## 2019-11-19 DIAGNOSIS — K76.7 HEPATORENAL SYNDROME: ICD-10-CM

## 2019-11-19 DIAGNOSIS — N17.9 ACUTE KIDNEY INJURY: ICD-10-CM

## 2019-11-19 LAB
ALBUMIN SERPL BCP-MCNC: 3.1 G/DL (ref 3.5–5.2)
ANION GAP SERPL CALC-SCNC: 8 MMOL/L (ref 8–16)
BUN SERPL-MCNC: 38 MG/DL (ref 8–23)
CALCIUM SERPL-MCNC: 8.9 MG/DL (ref 8.7–10.5)
CHLORIDE SERPL-SCNC: 100 MMOL/L (ref 95–110)
CO2 SERPL-SCNC: 29 MMOL/L (ref 23–29)
CREAT SERPL-MCNC: 1.9 MG/DL (ref 0.5–1.4)
EST. GFR  (AFRICAN AMERICAN): 31.4 ML/MIN/1.73 M^2
EST. GFR  (NON AFRICAN AMERICAN): 27.3 ML/MIN/1.73 M^2
GLUCOSE SERPL-MCNC: 105 MG/DL (ref 70–110)
PHOSPHATE SERPL-MCNC: 4.6 MG/DL (ref 2.7–4.5)
POTASSIUM SERPL-SCNC: 4.1 MMOL/L (ref 3.5–5.1)
SODIUM SERPL-SCNC: 137 MMOL/L (ref 136–145)

## 2019-11-19 PROCEDURE — 99213 OFFICE O/P EST LOW 20 MIN: CPT | Mod: PBBFAC | Performed by: INTERNAL MEDICINE

## 2019-11-19 PROCEDURE — 99215 OFFICE O/P EST HI 40 MIN: CPT | Mod: S$PBB,,, | Performed by: INTERNAL MEDICINE

## 2019-11-19 PROCEDURE — 99999 PR PBB SHADOW E&M-EST. PATIENT-LVL III: CPT | Mod: PBBFAC,,, | Performed by: INTERNAL MEDICINE

## 2019-11-19 PROCEDURE — 36415 COLL VENOUS BLD VENIPUNCTURE: CPT

## 2019-11-19 PROCEDURE — 99999 PR PBB SHADOW E&M-EST. PATIENT-LVL III: ICD-10-PCS | Mod: PBBFAC,,, | Performed by: INTERNAL MEDICINE

## 2019-11-19 PROCEDURE — 80069 RENAL FUNCTION PANEL: CPT

## 2019-11-19 PROCEDURE — 99215 PR OFFICE/OUTPT VISIT, EST, LEVL V, 40-54 MIN: ICD-10-PCS | Mod: S$PBB,,, | Performed by: INTERNAL MEDICINE

## 2019-11-19 RX ORDER — FUROSEMIDE 20 MG/1
20 TABLET ORAL DAILY
Qty: 60 TABLET | Refills: 12 | Status: SHIPPED | OUTPATIENT
Start: 2019-11-19 | End: 2020-07-14 | Stop reason: SDUPTHER

## 2019-11-19 RX ORDER — PANTOPRAZOLE SODIUM 20 MG/1
20 TABLET, DELAYED RELEASE ORAL
COMMUNITY
End: 2021-05-03 | Stop reason: SDUPTHER

## 2019-11-19 NOTE — H&P (VIEW-ONLY)
Kylah Ortiz is a 64 y.o. female for whom nephrology consult has been requested to evaluate and give opinion.   Renal clinic consult note:  Date of consult and pt visit: 11/19/19  Reason for consult: acute kidney injury  Referring provider: Louise Álvarez NP    Special note: pt is being seen for the first time as a new consult by me today.    HPI: Thank you for referring the pt to us, chart and h/o were reviewed. Pt was seen and examined. Pt presents with a care giver. Pt is a 63 y/o female with h/o of advanced liver disease due to alcoholism who has elevated s Cr. Records were reviewed. s Cr about 1 month ago was 2.0 mg/dl and about 5 wks ago was as high as 3.8. However, going further back, s Cr was 1.0 on 5/8/19. s Cr slowly worsened to 3.8 since May 2019. At the same period of time, pt has several episodes of hepatic decompensation, requiting hospital admissions. She has complications of liver disease, including h/o of hepatic encephalopathy, varices with GI bleed, and ascites requiring frequent paracentesis. Diet was extensively discussed. Pt's diet is not low in salt. Pt uses lasix 40 mg po qd. Labs and meds reviewed.    PAST MEDICAL HISTORY:  DARRIAN (suspect hepatorenal syndrome, hypotension, Alcoholic cirrhosis, Alcoholic dementia, Anemia, Ascites, CKD (chronic kidney disease), Esophageal reflux, Esophageal varices, Hepatic encephalopathy, and Hiatal hernia.    PAST SURGICAL HISTORY:  She  has a past surgical history that includes Hysterectomy; Esophagogastroduodenoscopy; and Colonoscopy.    SOCIAL HISTORY:  She  reports that she has never smoked. She has never used smokeless tobacco. She reports that she drank alcohol.    FAMILY MEDICAL HISTORY:  Her family history includes Arthritis in her mother; Cancer in her mother.    Review of patient's allergies indicates:   Allergen Reactions    Hydrocodone-acetaminophen            Prior to Admission medications    Medication Sig Start Date End Date Taking?  "Authorizing Provider   FLUoxetine 20 MG capsule Take 20 mg by mouth once daily.   Yes Historical Provider, MD   furosemide (LASIX) 20 MG tablet Take 1 tablet (20 mg total) by mouth once daily. 11/19/19  Yes Hector Sanchez MD   hydrOXYzine HCl (ATARAX) 25 MG tablet Take 25 mg by mouth 3 (three) times daily as needed.    Yes Historical Provider, MD   lactulose (CHRONULAC) 10 gram/15 mL solution Take by mouth once daily.    Yes Historical Provider, MD   magnesium oxide (MAG-OX) 400 mg (241.3 mg magnesium) tablet Take 400 mg by mouth once daily.   Yes Historical Provider, MD   multivitamin (THERAGRAN) per tablet Take 1 tablet by mouth once daily.   Yes Historical Provider, MD   pantoprazole (PROTONIX) 20 MG tablet Take 20 mg by mouth 2 (two) times daily before meals.   Yes Historical Provider, MD   ranitidine (ZANTAC) 300 MG tablet Take 300 mg by mouth every evening.   Yes Historical Provider, MD   furosemide (LASIX) 40 MG tablet Take 1 tablet (40 mg total) by mouth once daily. 9/26/19 11/19/19 Yes Margarita Nesbitt NP        REVIEW OF SYSTEMS:  Patient has no fever, fatigue, visual changes, chest pain, edema, cough, dyspnea, nausea, vomiting, constipation, diarrhea, arthralgias, pruritis, dizziness, weakness, depression, confusion.    PHYSICAL EXAM:   height is 5' 6" (1.676 m) and weight is 71.3 kg (157 lb 3 oz). Her blood pressure is 100/64 and her pulse is 64. Her respiration is 20.   Gen: WDWN female in no apparent distress  Psych: Normal mood and affect  Skin: No rashes or ulcers  Eyes: Normal conjunctiva and lids, PERRLA  ENT: Normal hearing with no oropharyngeal lesions  Neck: No JVD  Chest: Clear with no rales, rhonchi, wheezing with normal effort  CV: Regular with no murmurs, gallops or rubs  Abd: Soft, nontender, + distension/ascites, positive bowel sounds  Ext: 2+ edema    Labs: reviewed  BMP  Lab Results   Component Value Date     (L) 10/09/2019    K 4.0 10/09/2019     10/09/2019    CO2 19 (L) " 10/09/2019    BUN 33 (H) 10/09/2019    CREATININE 2.0 (H) 10/09/2019    CALCIUM 8.5 (L) 10/09/2019    ANIONGAP 12 10/09/2019    ESTGFRAFRICA 29.8 (A) 10/09/2019    EGFRNONAA 25.8 (A) 10/09/2019     Lab Results   Component Value Date    WBC 6.99 11/15/2019    HGB 8.6 (L) 11/15/2019    HCT 27.8 (L) 11/15/2019    MCV 91 11/15/2019     11/15/2019         IMPRESSION AND RECOMMENDATIONS: 65 y/o female with DARRIAN and fluctuations in s Cr and alcoholic cirrhosis:    1. Renal: Pt had normal s Cr recently in May 2019.  The rise in s Cr is concomitant with hepatic decompensation.  The rise in s Cr is c/w with DARRIAN, not with CKD  DARRIAN suspect due to hepatorenal syndrome  BP is low due to liver disease (splanchnic vasodilatation), hypotension causing further decrease in renal blood flow  K normal  Metabolic acidosis c/w renal insufficiency  Hyponatremia: dilutional, due to liver disease    2. Liver disease.  Pt has significant 3rd spacing, LE edema and ascites  Pt on lasix 40 mg po qd, but diet is salty and pt does not restrict fluid intake  Qd lasix with above will encourage post-diuretic anti-diuresis, causing further salt and water re-absorption by the kidneys.  Best to change lasix to bid, even tid  Pt needs to grasp that diet needs to be low in salt, and fluid intake needs to be low  Spent a good amount of time with pt and care giver, suggesting low salty foods (boiled eggs, cooked chicken, hamburgers, tofu)  Needs to avoid foods such as cheese and processed foods.    Plans and recommendations:  As discussed above  Opportunity for questions provided  Repeat labs today  Total time spent 60 minutes including time needed to review the records, the   patient evaluation, documentation, face-to-face discussion with the patient,   more than 50% of the time was spent on coordination of care and counseling.    Level V visit.  RTC 2 months    Hector Sanchez MD

## 2019-11-19 NOTE — LETTER
November 19, 2019      Louise Álvarez, [facility]  [address]  [place] [address]           [facility]  [address]  [place] [address]  Phone: [phone]  Fax: [phone]          Patient: Kylah Ortiz   MR Number: [id number]   YOB: 1955   Date of Visit: 11/19/2019       Dear Louise Álvarez:    Thank you for referring Kylah Ortiz to me for evaluation. Attached you will find relevant portions of my assessment and plan of care.    If you have questions, please do not hesitate to call me. I look forward to following Kylah Ortiz along with you.    Sincerely,    Hector Sanchez MD    Enclosure  CC:  No Recipients    If you would like to receive this communication electronically, please contact [email or web address] or [phone] to request more information on [facility] access.    For providers and/or their staff who would like to refer a patient to [facility], please contact us through our one-stop-shop provider referral line, [facility] , at [phone].    If you feel you have received this communication in error or would no longer like to receive these types of communications, please e-mail [email or web address]

## 2019-11-19 NOTE — PROGRESS NOTES
Kylah Ortiz is a 64 y.o. female for whom nephrology consult has been requested to evaluate and give opinion.   Renal clinic consult note:  Date of consult and pt visit: 11/19/19  Reason for consult: acute kidney injury  Referring provider: Louise Álvarez NP    Special note: pt is being seen for the first time as a new consult by me today.    HPI: Thank you for referring the pt to us, chart and h/o were reviewed. Pt was seen and examined. Pt presents with a care giver. Pt is a 63 y/o female with h/o of advanced liver disease due to alcoholism who has elevated s Cr. Records were reviewed. s Cr about 1 month ago was 2.0 mg/dl and about 5 wks ago was as high as 3.8. However, going further back, s Cr was 1.0 on 5/8/19. s Cr slowly worsened to 3.8 since May 2019. At the same period of time, pt has several episodes of hepatic decompensation, requiting hospital admissions. She has complications of liver disease, including h/o of hepatic encephalopathy, varices with GI bleed, and ascites requiring frequent paracentesis. Diet was extensively discussed. Pt's diet is not low in salt. Pt uses lasix 40 mg po qd. Labs and meds reviewed.    PAST MEDICAL HISTORY:  DARRIAN (suspect hepatorenal syndrome, hypotension, Alcoholic cirrhosis, Alcoholic dementia, Anemia, Ascites, CKD (chronic kidney disease), Esophageal reflux, Esophageal varices, Hepatic encephalopathy, and Hiatal hernia.    PAST SURGICAL HISTORY:  She  has a past surgical history that includes Hysterectomy; Esophagogastroduodenoscopy; and Colonoscopy.    SOCIAL HISTORY:  She  reports that she has never smoked. She has never used smokeless tobacco. She reports that she drank alcohol.    FAMILY MEDICAL HISTORY:  Her family history includes Arthritis in her mother; Cancer in her mother.    Review of patient's allergies indicates:   Allergen Reactions    Hydrocodone-acetaminophen            Prior to Admission medications    Medication Sig Start Date End Date Taking?  "Authorizing Provider   FLUoxetine 20 MG capsule Take 20 mg by mouth once daily.   Yes Historical Provider, MD   furosemide (LASIX) 20 MG tablet Take 1 tablet (20 mg total) by mouth once daily. 11/19/19  Yes Hector Sanchez MD   hydrOXYzine HCl (ATARAX) 25 MG tablet Take 25 mg by mouth 3 (three) times daily as needed.    Yes Historical Provider, MD   lactulose (CHRONULAC) 10 gram/15 mL solution Take by mouth once daily.    Yes Historical Provider, MD   magnesium oxide (MAG-OX) 400 mg (241.3 mg magnesium) tablet Take 400 mg by mouth once daily.   Yes Historical Provider, MD   multivitamin (THERAGRAN) per tablet Take 1 tablet by mouth once daily.   Yes Historical Provider, MD   pantoprazole (PROTONIX) 20 MG tablet Take 20 mg by mouth 2 (two) times daily before meals.   Yes Historical Provider, MD   ranitidine (ZANTAC) 300 MG tablet Take 300 mg by mouth every evening.   Yes Historical Provider, MD   furosemide (LASIX) 40 MG tablet Take 1 tablet (40 mg total) by mouth once daily. 9/26/19 11/19/19 Yes Margarita Nesbitt NP        REVIEW OF SYSTEMS:  Patient has no fever, fatigue, visual changes, chest pain, edema, cough, dyspnea, nausea, vomiting, constipation, diarrhea, arthralgias, pruritis, dizziness, weakness, depression, confusion.    PHYSICAL EXAM:   height is 5' 6" (1.676 m) and weight is 71.3 kg (157 lb 3 oz). Her blood pressure is 100/64 and her pulse is 64. Her respiration is 20.   Gen: WDWN female in no apparent distress  Psych: Normal mood and affect  Skin: No rashes or ulcers  Eyes: Normal conjunctiva and lids, PERRLA  ENT: Normal hearing with no oropharyngeal lesions  Neck: No JVD  Chest: Clear with no rales, rhonchi, wheezing with normal effort  CV: Regular with no murmurs, gallops or rubs  Abd: Soft, nontender, + distension/ascites, positive bowel sounds  Ext: 2+ edema    Labs: reviewed  BMP  Lab Results   Component Value Date     (L) 10/09/2019    K 4.0 10/09/2019     10/09/2019    CO2 19 (L) " 10/09/2019    BUN 33 (H) 10/09/2019    CREATININE 2.0 (H) 10/09/2019    CALCIUM 8.5 (L) 10/09/2019    ANIONGAP 12 10/09/2019    ESTGFRAFRICA 29.8 (A) 10/09/2019    EGFRNONAA 25.8 (A) 10/09/2019     Lab Results   Component Value Date    WBC 6.99 11/15/2019    HGB 8.6 (L) 11/15/2019    HCT 27.8 (L) 11/15/2019    MCV 91 11/15/2019     11/15/2019         IMPRESSION AND RECOMMENDATIONS: 63 y/o female with DARRIAN and fluctuations in s Cr and alcoholic cirrhosis:    1. Renal: Pt had normal s Cr recently in May 2019.  The rise in s Cr is concomitant with hepatic decompensation.  The rise in s Cr is c/w with DARRIAN, not with CKD  DARRIAN suspect due to hepatorenal syndrome  BP is low due to liver disease (splanchnic vasodilatation), hypotension causing further decrease in renal blood flow  K normal  Metabolic acidosis c/w renal insufficiency  Hyponatremia: dilutional, due to liver disease    2. Liver disease.  Pt has significant 3rd spacing, LE edema and ascites  Pt on lasix 40 mg po qd, but diet is salty and pt does not restrict fluid intake  Qd lasix with above will encourage post-diuretic anti-diuresis, causing further salt and water re-absorption by the kidneys.  Best to change lasix to bid, even tid  Pt needs to grasp that diet needs to be low in salt, and fluid intake needs to be low  Spent a good amount of time with pt and care giver, suggesting low salty foods (boiled eggs, cooked chicken, hamburgers, tofu)  Needs to avoid foods such as cheese and processed foods.    Plans and recommendations:  As discussed above  Opportunity for questions provided  Repeat labs today  Total time spent 60 minutes including time needed to review the records, the   patient evaluation, documentation, face-to-face discussion with the patient,   more than 50% of the time was spent on coordination of care and counseling.    Level V visit.  RTC 2 months    Hector Sanchez MD

## 2019-11-22 ENCOUNTER — HOSPITAL ENCOUNTER (OUTPATIENT)
Dept: RADIOLOGY | Facility: HOSPITAL | Age: 64
Discharge: HOME | End: 2019-11-22
Attending: NURSE PRACTITIONER
Payer: MEDICARE

## 2019-11-22 VITALS
DIASTOLIC BLOOD PRESSURE: 69 MMHG | BODY MASS INDEX: 25.23 KG/M2 | WEIGHT: 157 LBS | SYSTOLIC BLOOD PRESSURE: 112 MMHG | HEIGHT: 66 IN | HEART RATE: 82 BPM | RESPIRATION RATE: 16 BRPM | OXYGEN SATURATION: 99 %

## 2019-11-22 DIAGNOSIS — K70.31 ASCITES DUE TO ALCOHOLIC CIRRHOSIS: ICD-10-CM

## 2019-11-22 PROCEDURE — A7048 VACUUM DRAIN BOTTLE/TUBE KIT: HCPCS

## 2019-11-22 PROCEDURE — P9047 ALBUMIN (HUMAN), 25%, 50ML: HCPCS | Mod: JG | Performed by: RADIOLOGY

## 2019-11-22 PROCEDURE — 49083 ABD PARACENTESIS W/IMAGING: CPT

## 2019-11-22 PROCEDURE — 63600175 PHARM REV CODE 636 W HCPCS: Mod: JG | Performed by: RADIOLOGY

## 2019-11-22 RX ORDER — ALBUMIN HUMAN 250 G/1000ML
50 SOLUTION INTRAVENOUS ONCE
Status: COMPLETED | OUTPATIENT
Start: 2019-11-22 | End: 2019-11-22

## 2019-11-22 RX ADMIN — ALBUMIN (HUMAN) 50 G: 25 SOLUTION INTRAVENOUS at 01:11

## 2019-11-22 NOTE — DISCHARGE SUMMARY
Pre Op Diagnosis: ascites     Post Op Diagnosis: same     Procedure:  para     Procedure performed by: Rosas RAINEY, Sherlyn GREGORY     Written Informed Consent Obtained: Yes     Specimen Removed:  yes     Estimated Blood Loss:  minimal     Findings: Local anesthesia.     The patient tolerated the procedure well and there were no complications.      Sterile technique was performed in the RLQ, lidocaine was used as a local anesthetic.   9 liters of rajan fluid removed for therapeutic purposes.  Pt tolerated the procedure well without immediate complications.  Please see radiologist report for details. F/u with PCP and/or ordering physician.

## 2019-11-22 NOTE — DISCHARGE INSTRUCTIONS

## 2019-11-22 NOTE — H&P
Clinic Follow Up:  [facility] Follow Up Note    Reason for Follow Up:  The encounter diagnosis was Ascites due to alcoholic cirrhosis.    PCP: Santos Beaver       HPI:  This is a 64 y.o. female here for follow up of the above  Pt previously managed by Dr. Rod for cirrhosis.  However, over the last few weeks, she has been hospitalized for DARRIAN with worsening ascites  Paracentesis was completed and no SBP noted.   She was restarted on Lasix daily which she has been taking, however, the ascites has returned.  She has an appt with her nephrologist next week for continued management of DARRIAN.   She has hx of EV with bleeding.  Currently being managed at [facility] with Dr. Cabral  Today she states that she is feeling overall well with the exception of the ascites.   No worsening confusion.       Review of Systems   Constitutional: Positive for malaise/fatigue. Negative for chills, fever and weight loss.   Respiratory: Negative for cough.    Cardiovascular: Negative for chest pain.   Gastrointestinal:        Per HPI   Musculoskeletal: Negative for myalgias.   Skin: Negative for itching and rash.   Neurological: Negative for headaches.   Psychiatric/Behavioral: The patient is not nervous/anxious.        Medical History:  Past Medical History:   Diagnosis Date    Alcoholic cirrhosis     Alcoholic dementia     Anemia     Ascites     CKD (chronic kidney disease)     Esophageal reflux     Esophageal varices     Hepatic encephalopathy     Hiatal hernia        Surgical History:   Past Surgical History:   Procedure Laterality Date    COLONOSCOPY      ESOPHAGOGASTRODUODENOSCOPY      HYSTERECTOMY         Family History:   Family History   Problem Relation Age of Onset    Cancer Mother     Arthritis Mother        Social History:   Social History     Tobacco Use    Smoking status: Never Smoker    Smokeless tobacco: Never Used   Substance Use Topics    Alcohol use: Not Currently    Drug  use: Not on file       Allergies: Reviewed    Home Medications:  Current Outpatient Medications on File Prior to Encounter   Medication Sig Dispense Refill    FLUoxetine 20 MG capsule Take 20 mg by mouth once daily.      furosemide (LASIX) 20 MG tablet Take 1 tablet (20 mg total) by mouth once daily. 60 tablet 12    hydrOXYzine HCl (ATARAX) 25 MG tablet Take 25 mg by mouth 3 (three) times daily as needed.       lactulose (CHRONULAC) 10 gram/15 mL solution Take by mouth once daily.       magnesium oxide (MAG-OX) 400 mg (241.3 mg magnesium) tablet Take 400 mg by mouth once daily.      multivitamin (THERAGRAN) per tablet Take 1 tablet by mouth once daily.      pantoprazole (PROTONIX) 20 MG tablet Take 20 mg by mouth 2 (two) times daily before meals.      ranitidine (ZANTAC) 300 MG tablet Take 300 mg by mouth every evening.       No current facility-administered medications on file prior to encounter.        Physical Exam:  Vital Signs:  There were no vitals taken for this visit.  There is no height or weight on file to calculate BMI.  Physical Exam   Constitutional: She is oriented to person, place, and time. She appears well-developed and well-nourished.   HENT:   Head: Normocephalic.   Eyes: No scleral icterus.   Neck: Normal range of motion.   Cardiovascular: Normal rate.   Pulmonary/Chest: Effort normal.   Abdominal: Soft. She exhibits distension (ascites). There is tenderness.   Musculoskeletal: Normal range of motion.   Neurological: She is alert and oriented to person, place, and time.   Skin: Skin is warm and dry.   Psychiatric: She has a normal mood and affect.   Vitals reviewed.      Labs: Pertinent labs reviewed.  MELD-Na score: 20 at 10/9/2019  2:50 PM  MELD score: 18 at 10/9/2019  2:50 PM  Calculated from:  Serum Creatinine: 2.0 mg/dL at 10/9/2019  2:50 PM  Serum Sodium: 134 mmol/L at 10/9/2019  2:50 PM  Total Bilirubin: 0.9 mg/dL (Rounded to 1 mg/dL) at 10/9/2019  2:50 PM  INR(ratio): 1.5 at  10/9/2019  2:50 PM  Age: 63 years    Assessment:  1. Ascites due to alcoholic cirrhosis        Recommendations:  - continue current diuretics.  Will adjust based on recommendation per nephrology  - will plan for a repeat paracentesis  - Low sodium diet discussed.   - Continue with ETOH abstinence.   - will follow up in 4 weeks for close monitoring  - continue with plan for EGD with Dr. Cabral as previously planned.     Return to Clinic:    No follow-ups on file.

## 2019-11-22 NOTE — PLAN OF CARE
9 liters of rajan fluid removed from r side abdomen puncture site,site WDL, band aid placed.  Pt d/c home in stable condition with ride.  Verbalized understanding of d/c instructions.  Pt voiced no complaints at this time, verbalized relief of abdominal distention.

## 2019-11-27 ENCOUNTER — HOSPITAL ENCOUNTER (OUTPATIENT)
Dept: RADIOLOGY | Facility: HOSPITAL | Age: 64
Discharge: HOME | End: 2019-11-27
Attending: NURSE PRACTITIONER
Payer: MEDICARE

## 2019-11-27 ENCOUNTER — TELEPHONE (OUTPATIENT)
Dept: GASTROENTEROLOGY | Facility: CLINIC | Age: 64
End: 2019-11-27

## 2019-11-27 ENCOUNTER — TELEPHONE (OUTPATIENT)
Dept: RADIOLOGY | Facility: HOSPITAL | Age: 64
End: 2019-11-27

## 2019-11-27 VITALS
SYSTOLIC BLOOD PRESSURE: 110 MMHG | HEIGHT: 66 IN | HEART RATE: 78 BPM | DIASTOLIC BLOOD PRESSURE: 76 MMHG | WEIGHT: 157 LBS | OXYGEN SATURATION: 100 % | RESPIRATION RATE: 16 BRPM | BODY MASS INDEX: 25.23 KG/M2

## 2019-11-27 DIAGNOSIS — K70.31 ASCITES DUE TO ALCOHOLIC CIRRHOSIS: Primary | ICD-10-CM

## 2019-11-27 DIAGNOSIS — K70.31 ASCITES DUE TO ALCOHOLIC CIRRHOSIS: ICD-10-CM

## 2019-11-27 PROCEDURE — 49083 ABD PARACENTESIS W/IMAGING: CPT

## 2019-11-27 PROCEDURE — 63600175 PHARM REV CODE 636 W HCPCS: Mod: JG | Performed by: RADIOLOGY

## 2019-11-27 PROCEDURE — P9047 ALBUMIN (HUMAN), 25%, 50ML: HCPCS | Mod: JG | Performed by: RADIOLOGY

## 2019-11-27 RX ORDER — ALBUMIN HUMAN 250 G/1000ML
50 SOLUTION INTRAVENOUS ONCE
Status: COMPLETED | OUTPATIENT
Start: 2019-11-27 | End: 2019-11-27

## 2019-11-27 RX ADMIN — ALBUMIN (HUMAN) 50 G: 25 SOLUTION INTRAVENOUS at 01:11

## 2019-11-27 NOTE — H&P
Clinic Follow Up:  [facility] Follow Up Note    Reason for Follow Up:  The encounter diagnosis was Ascites due to alcoholic cirrhosis.    PCP: Santos Beaver       HPI:  This is a 64 y.o. female here for follow up of the above  Pt previously managed by Dr. Rod for cirrhosis.  However, over the last few weeks, she has been hospitalized for DARRIAN with worsening ascites  Paracentesis was completed and no SBP noted.   She was restarted on Lasix daily which she has been taking, however, the ascites has returned.  She has an appt with her nephrologist next week for continued management of DARRIAN.   She has hx of EV with bleeding.  Currently being managed at [facility] with Dr. Cabral  Today she states that she is feeling overall well with the exception of the ascites.   No worsening confusion.       Review of Systems   Constitutional: Positive for malaise/fatigue. Negative for chills, fever and weight loss.   Eyes: Negative.    Respiratory: Negative for cough.    Cardiovascular: Negative.  Negative for chest pain.   Gastrointestinal:        Per HPI   Musculoskeletal: Negative for myalgias.   Skin: Negative for itching and rash.   Neurological: Negative for headaches.   Psychiatric/Behavioral: The patient is not nervous/anxious.        Medical History:  Past Medical History:   Diagnosis Date    Alcoholic cirrhosis     Alcoholic dementia     Anemia     Ascites     CKD (chronic kidney disease)     Esophageal reflux     Esophageal varices     Hepatic encephalopathy     Hiatal hernia        Surgical History:   Past Surgical History:   Procedure Laterality Date    COLONOSCOPY      ESOPHAGOGASTRODUODENOSCOPY      HYSTERECTOMY         Family History:   Family History   Problem Relation Age of Onset    Cancer Mother     Arthritis Mother        Social History:   Social History     Tobacco Use    Smoking status: Never Smoker    Smokeless tobacco: Never Used   Substance Use Topics    Alcohol  use: Not Currently    Drug use: Not on file       Allergies: Reviewed    Home Medications:  Current Outpatient Medications on File Prior to Encounter   Medication Sig Dispense Refill    FLUoxetine 20 MG capsule Take 20 mg by mouth once daily.      furosemide (LASIX) 20 MG tablet Take 1 tablet (20 mg total) by mouth once daily. 60 tablet 12    hydrOXYzine HCl (ATARAX) 25 MG tablet Take 25 mg by mouth 3 (three) times daily as needed.       lactulose (CHRONULAC) 10 gram/15 mL solution Take by mouth once daily.       magnesium oxide (MAG-OX) 400 mg (241.3 mg magnesium) tablet Take 400 mg by mouth once daily.      multivitamin (THERAGRAN) per tablet Take 1 tablet by mouth once daily.      pantoprazole (PROTONIX) 20 MG tablet Take 20 mg by mouth 2 (two) times daily before meals.      ranitidine (ZANTAC) 300 MG tablet Take 300 mg by mouth every evening.       No current facility-administered medications on file prior to encounter.        Physical Exam:  Vital Signs:  /76   Pulse 78   Resp 16   SpO2 100%   Breastfeeding? No   There is no height or weight on file to calculate BMI.  Physical Exam   Constitutional: She is oriented to person, place, and time. She appears well-developed and well-nourished.   HENT:   Head: Normocephalic.   Eyes: No scleral icterus.   Neck: Normal range of motion.   Cardiovascular: Normal rate.   Pulmonary/Chest: Effort normal.   Abdominal: Soft. She exhibits distension (ascites). There is tenderness.   Musculoskeletal: Normal range of motion.   Neurological: She is alert and oriented to person, place, and time.   Skin: Skin is warm and dry.   Psychiatric: She has a normal mood and affect.   Vitals reviewed.      Labs: Pertinent labs reviewed.  MELD-Na score: 20 at 10/9/2019  2:50 PM  MELD score: 18 at 10/9/2019  2:50 PM  Calculated from:  Serum Creatinine: 2.0 mg/dL at 10/9/2019  2:50 PM  Serum Sodium: 134 mmol/L at 10/9/2019  2:50 PM  Total Bilirubin: 0.9 mg/dL (Rounded to 1  mg/dL) at 10/9/2019  2:50 PM  INR(ratio): 1.5 at 10/9/2019  2:50 PM  Age: 63 years    Assessment:  1. Ascites due to alcoholic cirrhosis        Recommendations:  - continue current diuretics.  Will adjust based on recommendation per nephrology  - will plan for a repeat paracentesis  - Low sodium diet discussed.   - Continue with ETOH abstinence.   - will follow up in 4 weeks for close monitoring  - continue with plan for EGD with Dr. Cabral as previously planned.     Return to Clinic:    No follow-ups on file.

## 2019-11-27 NOTE — TELEPHONE ENCOUNTER
----- Message from Ani Spicer sent at 11/27/2019  1:40 PM CST -----  Can you please add US Guided Paracentesis Orders for patient please?  Thanks so much!!!

## 2019-11-27 NOTE — PLAN OF CARE
8 liters of rajan fluid removed from r side abdomen puncture site, site WDL.  Band aid placed.  Pt d/c home in stable condition with ride.  Verbalized understanding of d/c instructions, handout given.  Pt voiced no complaints at this time, verbalized relief of abdominal distention.

## 2019-11-27 NOTE — DISCHARGE INSTRUCTIONS

## 2019-11-27 NOTE — DISCHARGE SUMMARY
Pre Op Diagnosis: ascites     Post Op Diagnosis: same     Procedure:  para     Procedure performed by: Rosas RAINEY, Sherlyn GREGORY     Written Informed Consent Obtained: Yes     Specimen Removed:  yes     Estimated Blood Loss:  minimal     Findings: Local anesthesia.     The patient tolerated the procedure well and there were no complications.      Sterile technique was performed in the RLQ, lidocaine was used as a local anesthetic.   8 liters of rajan fluid removed for therapeutic purposes.  Pt tolerated the procedure well without immediate complications.  Please see radiologist report for details. F/u with PCP and/or ordering physician.

## 2019-12-04 ENCOUNTER — HOSPITAL ENCOUNTER (OUTPATIENT)
Dept: RADIOLOGY | Facility: HOSPITAL | Age: 64
Discharge: HOME | End: 2019-12-04
Attending: INTERNAL MEDICINE
Payer: MEDICARE

## 2019-12-04 VITALS
DIASTOLIC BLOOD PRESSURE: 79 MMHG | BODY MASS INDEX: 25.71 KG/M2 | HEIGHT: 66 IN | HEART RATE: 81 BPM | WEIGHT: 160 LBS | OXYGEN SATURATION: 100 % | RESPIRATION RATE: 18 BRPM | SYSTOLIC BLOOD PRESSURE: 115 MMHG

## 2019-12-04 DIAGNOSIS — K70.31 ASCITES DUE TO ALCOHOLIC CIRRHOSIS: ICD-10-CM

## 2019-12-04 PROCEDURE — 63600175 PHARM REV CODE 636 W HCPCS: Mod: JG | Performed by: RADIOLOGY

## 2019-12-04 PROCEDURE — P9047 ALBUMIN (HUMAN), 25%, 50ML: HCPCS | Mod: JG | Performed by: RADIOLOGY

## 2019-12-04 PROCEDURE — A7048 VACUUM DRAIN BOTTLE/TUBE KIT: HCPCS

## 2019-12-04 PROCEDURE — 49083 ABD PARACENTESIS W/IMAGING: CPT

## 2019-12-04 RX ORDER — ALBUMIN HUMAN 250 G/1000ML
50 SOLUTION INTRAVENOUS ONCE
Status: COMPLETED | OUTPATIENT
Start: 2019-12-04 | End: 2019-12-04

## 2019-12-04 RX ADMIN — ALBUMIN (HUMAN) 50 G: 25 SOLUTION INTRAVENOUS at 01:12

## 2019-12-04 NOTE — DISCHARGE INSTRUCTIONS

## 2019-12-04 NOTE — PLAN OF CARE
8.5 liters of rajan fluid removed from r side abdomen puncture site, site WDL.  Band aid placed.  Pt d/c home in stable condition with ride.  Verbalized understanding of d/c instructions.  Pt voiced no complaints at this time, verbalized relief of abdominal distention.

## 2019-12-04 NOTE — DISCHARGE SUMMARY
Pre Op Diagnosis: ascites     Post Op Diagnosis: same     Procedure:  para     Procedure performed by: Rosas RAINEY, Sherlyn GREGORY     Written Informed Consent Obtained: Yes     Specimen Removed:  yes     Estimated Blood Loss:  minimal     Findings: Local anesthesia.     The patient tolerated the procedure well and there were no complications.      Sterile technique was performed in the RLQ, lidocaine was used as a local anesthetic.   8.5 liters of rajan fluid removed for therapeutic purposes.  Pt tolerated the procedure well without immediate complications.  Please see radiologist report for details. F/u with PCP and/or ordering physician.

## 2019-12-12 ENCOUNTER — HOSPITAL ENCOUNTER (OUTPATIENT)
Dept: RADIOLOGY | Facility: HOSPITAL | Age: 64
Discharge: HOME | End: 2019-12-12
Attending: NURSE PRACTITIONER
Payer: MEDICARE

## 2019-12-12 ENCOUNTER — TELEPHONE (OUTPATIENT)
Dept: RADIOLOGY | Facility: HOSPITAL | Age: 64
End: 2019-12-12

## 2019-12-12 VITALS
HEART RATE: 84 BPM | OXYGEN SATURATION: 100 % | SYSTOLIC BLOOD PRESSURE: 76 MMHG | DIASTOLIC BLOOD PRESSURE: 46 MMHG | RESPIRATION RATE: 16 BRPM

## 2019-12-12 DIAGNOSIS — K70.31 ASCITES DUE TO ALCOHOLIC CIRRHOSIS: ICD-10-CM

## 2019-12-12 PROCEDURE — A7048 VACUUM DRAIN BOTTLE/TUBE KIT: HCPCS

## 2019-12-12 PROCEDURE — 49083 ABD PARACENTESIS W/IMAGING: CPT

## 2019-12-12 NOTE — PLAN OF CARE
Procedure and recovery complete.  VSS,  No complaints at this time.  Discussed dc instructions with patient and caregiver, both verbalized understanding.  Next appt made for 12/23 at 1430 - aleena Law.  5.75 Liters of rajan fluid removed, band aid to puncture site - C/D/I.  Patient dc'd via WC with caretaker to home.

## 2019-12-12 NOTE — DISCHARGE INSTRUCTIONS

## 2019-12-12 NOTE — DISCHARGE SUMMARY
Procedure was performed [place] ABERNATHY.  Sterile technique was performed in the RLQ, lidocaine was used as a local anesthetic.  Removed 5.75 liters rajan fluid.  Pt tolerated the procedure well without immediate complications.  Please see radiologist report for details. F/u with PCP and/or ordering physician.

## 2019-12-18 ENCOUNTER — OFFICE VISIT (OUTPATIENT)
Dept: GASTROENTEROLOGY | Facility: CLINIC | Age: 64
End: 2019-12-18
Payer: COMMERCIAL

## 2019-12-18 VITALS
DIASTOLIC BLOOD PRESSURE: 78 MMHG | SYSTOLIC BLOOD PRESSURE: 112 MMHG | HEART RATE: 71 BPM | BODY MASS INDEX: 23.81 KG/M2 | HEIGHT: 66 IN | WEIGHT: 148.13 LBS

## 2019-12-18 DIAGNOSIS — K70.31 ALCOHOLIC CIRRHOSIS OF LIVER WITH ASCITES: ICD-10-CM

## 2019-12-18 DIAGNOSIS — I85.11 SECONDARY ESOPHAGEAL VARICES WITH BLEEDING: ICD-10-CM

## 2019-12-18 DIAGNOSIS — K70.31 ASCITES DUE TO ALCOHOLIC CIRRHOSIS: Primary | ICD-10-CM

## 2019-12-18 DIAGNOSIS — F10.27 DEMENTIA ASSOCIATED WITH ALCOHOLISM WITHOUT BEHAVIORAL DISTURBANCE: ICD-10-CM

## 2019-12-18 DIAGNOSIS — N17.9 AKI (ACUTE KIDNEY INJURY): ICD-10-CM

## 2019-12-18 PROCEDURE — 99214 OFFICE O/P EST MOD 30 MIN: CPT | Mod: S$PBB,,, | Performed by: NURSE PRACTITIONER

## 2019-12-18 PROCEDURE — 99999 PR PBB SHADOW E&M-EST. PATIENT-LVL III: CPT | Mod: PBBFAC,,, | Performed by: NURSE PRACTITIONER

## 2019-12-18 PROCEDURE — 99999 PR PBB SHADOW E&M-EST. PATIENT-LVL III: ICD-10-PCS | Mod: PBBFAC,,, | Performed by: NURSE PRACTITIONER

## 2019-12-18 PROCEDURE — 99214 PR OFFICE/OUTPT VISIT, EST, LEVL IV, 30-39 MIN: ICD-10-PCS | Mod: S$PBB,,, | Performed by: NURSE PRACTITIONER

## 2019-12-18 PROCEDURE — 99213 OFFICE O/P EST LOW 20 MIN: CPT | Mod: PBBFAC | Performed by: NURSE PRACTITIONER

## 2019-12-19 PROBLEM — F10.27 DEMENTIA ASSOCIATED WITH ALCOHOLISM WITHOUT BEHAVIORAL DISTURBANCE: Status: ACTIVE | Noted: 2019-12-19

## 2019-12-19 PROBLEM — I85.11 SECONDARY ESOPHAGEAL VARICES WITH BLEEDING: Status: ACTIVE | Noted: 2019-12-19

## 2019-12-19 NOTE — PROGRESS NOTES
Clinic Follow Up:  [facility] Follow Up Note    Reason for Follow Up:  The primary encounter diagnosis was Ascites due to alcoholic cirrhosis. Diagnoses of Secondary esophageal varices with bleeding, DARRIAN (acute kidney injury), Alcoholic cirrhosis of liver with ascites, and Dementia associated with alcoholism without behavioral disturbance were also pertinent to this visit.    PCP: Santos Beaver       HPI:  This is a 64 y.o. female here for follow up of the above  Since her last visit, she has been feeling overall stable.  She was seen by nephrology and Lasix increased to BID.  DARRIAN thought to be caused by her advanced liver disease.   She continues with need for frequent paracentesis with 5-8 liters removed each time.   She denies any upper GI bleeding.   Confusion is stable.     Review of Systems   Constitutional: Negative for chills, fever, malaise/fatigue and weight loss.   Respiratory: Negative for cough.    Cardiovascular: Negative for chest pain.   Gastrointestinal:        Per HPI   Musculoskeletal: Negative for myalgias.   Skin: Negative for itching and rash.   Neurological: Negative for headaches.   Psychiatric/Behavioral: The patient is not nervous/anxious.        Medical History:  Past Medical History:   Diagnosis Date    Alcoholic cirrhosis     Alcoholic dementia     Anemia     Ascites     CKD (chronic kidney disease)     Esophageal reflux     Esophageal varices     Hepatic encephalopathy     Hiatal hernia        Surgical History:   Past Surgical History:   Procedure Laterality Date    COLONOSCOPY      ESOPHAGOGASTRODUODENOSCOPY      HYSTERECTOMY         Family History:   Family History   Problem Relation Age of Onset    Cancer Mother     Arthritis Mother        Social History:   Social History     Tobacco Use    Smoking status: Never Smoker    Smokeless tobacco: Never Used   Substance Use Topics    Alcohol use: Not Currently    Drug use: Not on file  "      Allergies: Reviewed    Home Medications:  Current Outpatient Medications on File Prior to Visit   Medication Sig Dispense Refill    FLUoxetine 20 MG capsule Take 20 mg by mouth once daily.      furosemide (LASIX) 20 MG tablet Take 1 tablet (20 mg total) by mouth once daily. 60 tablet 12    hydrOXYzine HCl (ATARAX) 25 MG tablet Take 25 mg by mouth 3 (three) times daily as needed.       lactulose (CHRONULAC) 10 gram/15 mL solution Take by mouth once daily.       magnesium oxide (MAG-OX) 400 mg (241.3 mg magnesium) tablet Take 400 mg by mouth once daily.      multivitamin (THERAGRAN) per tablet Take 1 tablet by mouth once daily.      pantoprazole (PROTONIX) 20 MG tablet Take 20 mg by mouth 2 (two) times daily before meals.      ranitidine (ZANTAC) 300 MG tablet Take 300 mg by mouth every evening.       No current facility-administered medications on file prior to visit.        Physical Exam:  Vital Signs:  /78   Pulse 71   Ht 5' 6" (1.676 m)   Wt 67.2 kg (148 lb 2.4 oz)   BMI 23.91 kg/m²   Body mass index is 23.91 kg/m².  Physical Exam   Constitutional: She is oriented to person, place, and time. She appears well-developed and well-nourished.   HENT:   Head: Normocephalic.   Eyes: No scleral icterus.   Neck: Normal range of motion.   Cardiovascular: Normal rate.   Pulmonary/Chest: Effort normal.   Abdominal: She exhibits no distension. There is no tenderness.   Musculoskeletal: Normal range of motion.   Neurological: She is alert and oriented to person, place, and time.   Skin: Skin is warm and dry.   Psychiatric: She has a normal mood and affect.   Vitals reviewed.      Labs: Pertinent labs reviewed.  MELD-Na score: 20 at 10/9/2019  2:50 PM  MELD score: 18 at 10/9/2019  2:50 PM  Calculated from:  Serum Creatinine: 2.0 mg/dL at 10/9/2019  2:50 PM  Serum Sodium: 134 mmol/L at 10/9/2019  2:50 PM  Total Bilirubin: 0.9 mg/dL (Rounded to 1 mg/dL) at 10/9/2019  2:50 PM  INR(ratio): 1.5 at 10/9/2019  " 2:50 PM  Age: 63 years      Assessment:  1. Ascites due to alcoholic cirrhosis    2. Secondary esophageal varices with bleeding    3. DARRIAN (acute kidney injury)    4. Alcoholic cirrhosis of liver with ascites    5. Dementia associated with alcoholism without behavioral disturbance        Recommendations:  - MELD remains elevated, will discuss possible transplant evaluation with Dr. Anaya  - Will discuss with nephrology possibility of adding Aldactone to better manage ascites  - Needs Cipro for prophylaxis.  Will confirm OK with current kidney function.   - Repeat MELD labs in 3 months with an appt.     Return to Clinic:  3 months with pre-clinic labs .

## 2019-12-23 ENCOUNTER — HOSPITAL ENCOUNTER (OUTPATIENT)
Dept: RADIOLOGY | Facility: HOSPITAL | Age: 64
Discharge: HOME | End: 2019-12-23
Attending: NURSE PRACTITIONER
Payer: MEDICARE

## 2019-12-23 VITALS
OXYGEN SATURATION: 100 % | WEIGHT: 148 LBS | DIASTOLIC BLOOD PRESSURE: 74 MMHG | HEART RATE: 78 BPM | SYSTOLIC BLOOD PRESSURE: 105 MMHG | RESPIRATION RATE: 16 BRPM | HEIGHT: 66 IN | BODY MASS INDEX: 23.78 KG/M2

## 2019-12-23 DIAGNOSIS — K70.31 ASCITES DUE TO ALCOHOLIC CIRRHOSIS: ICD-10-CM

## 2019-12-23 PROCEDURE — 49083 ABD PARACENTESIS W/IMAGING: CPT

## 2019-12-23 PROCEDURE — P9047 ALBUMIN (HUMAN), 25%, 50ML: HCPCS | Mod: JG | Performed by: RADIOLOGY

## 2019-12-23 PROCEDURE — 63600175 PHARM REV CODE 636 W HCPCS: Mod: JG | Performed by: RADIOLOGY

## 2019-12-23 RX ORDER — ALBUMIN HUMAN 250 G/1000ML
25 SOLUTION INTRAVENOUS ONCE
Status: COMPLETED | OUTPATIENT
Start: 2019-12-23 | End: 2019-12-23

## 2019-12-23 RX ADMIN — ALBUMIN (HUMAN) 25 G: 25 SOLUTION INTRAVENOUS at 03:12

## 2019-12-23 NOTE — DISCHARGE INSTRUCTIONS

## 2019-12-23 NOTE — PLAN OF CARE
3 liters rajan fluid removed. Dressing applied. Site WDL, Vitals stable: Ambulated in room.  PIV removed. D/C  instructions reviewed. Pt and care giver verbalized understanding.

## 2019-12-23 NOTE — DISCHARGE SUMMARY
Pre Op Diagnosis: ascites     Post Op Diagnosis: same     Procedure:  para     Procedure performed by: Rosas RAINEY, Sherlyn GREGORY     Written Informed Consent Obtained: Yes     Specimen Removed:  yes     Estimated Blood Loss:  minimal     Findings: Local anesthesia.     The patient tolerated the procedure well and there were no complications.      Sterile technique was performed in the RLQ, lidocaine was used as a local anesthetic.   3 liters of rajan fluid removed for therapeutic purposes.  Pt tolerated the procedure well without immediate complications.  Please see radiologist report for details. F/u with PCP and/or ordering physician.

## 2019-12-31 ENCOUNTER — HOSPITAL ENCOUNTER (OUTPATIENT)
Dept: RADIOLOGY | Facility: HOSPITAL | Age: 64
Discharge: HOME | End: 2019-12-31
Attending: NURSE PRACTITIONER
Payer: MEDICARE

## 2019-12-31 VITALS
HEART RATE: 82 BPM | RESPIRATION RATE: 16 BRPM | DIASTOLIC BLOOD PRESSURE: 61 MMHG | SYSTOLIC BLOOD PRESSURE: 107 MMHG | OXYGEN SATURATION: 100 %

## 2019-12-31 DIAGNOSIS — K70.31 ASCITES DUE TO ALCOHOLIC CIRRHOSIS: ICD-10-CM

## 2019-12-31 PROCEDURE — 63600175 PHARM REV CODE 636 W HCPCS: Mod: JG | Performed by: NURSE PRACTITIONER

## 2019-12-31 PROCEDURE — 49083 ABD PARACENTESIS W/IMAGING: CPT

## 2019-12-31 PROCEDURE — P9047 ALBUMIN (HUMAN), 25%, 50ML: HCPCS | Mod: JG | Performed by: NURSE PRACTITIONER

## 2019-12-31 PROCEDURE — A7048 VACUUM DRAIN BOTTLE/TUBE KIT: HCPCS

## 2019-12-31 RX ORDER — ALBUMIN HUMAN 250 G/1000ML
60 SOLUTION INTRAVENOUS ONCE
Status: COMPLETED | OUTPATIENT
Start: 2019-12-31 | End: 2019-12-31

## 2019-12-31 RX ADMIN — ALBUMIN (HUMAN) 60 G: 25 SOLUTION INTRAVENOUS at 01:12

## 2019-12-31 NOTE — H&P
[facility] -   History & Physical    Subjective:      Chief Complaint/Reason for Admission: Abdominal Distension    Kylah Ortiz is a 64 y.o. female.Here for Paracentesis.    Past Medical History:   Diagnosis Date    Alcoholic cirrhosis     Alcoholic dementia     Anemia     Ascites     CKD (chronic kidney disease)     Esophageal reflux     Esophageal varices     Hepatic encephalopathy     Hiatal hernia      Past Surgical History:   Procedure Laterality Date    COLONOSCOPY      ESOPHAGOGASTRODUODENOSCOPY      HYSTERECTOMY       Family History   Problem Relation Age of Onset    Cancer Mother     Arthritis Mother      Social History     Tobacco Use    Smoking status: Never Smoker    Smokeless tobacco: Never Used   Substance Use Topics    Alcohol use: Not Currently    Drug use: Not on file         (Not in a hospital admission)  Review of patient's allergies indicates:   Allergen Reactions    Hydrocodone-acetaminophen         Review of Systems   Gastrointestinal: Positive for abdominal pain.       Objective:      Vital Signs (Most Recent)       Vital Signs Range (Last 24H):       Physical Exam   Abdominal: She exhibits distension.           Assessment:      Ascites    Plan:    Paracentesis

## 2019-12-31 NOTE — DISCHARGE INSTRUCTIONS

## 2019-12-31 NOTE — DISCHARGE SUMMARY
Procedure was performed [place] ABERNATHY.  Sterile technique was performed in the LLQ, lidocaine was used as a local anesthetic.  Removed 10.0 liters of rajan fluid.  Pt tolerated the procedure well without immediate complications.  Please see radiologist report for details. F/u with PCP and/or ordering physician.

## 2019-12-31 NOTE — PLAN OF CARE
Procedure complete.  VSS.  Patient tolerated well.  10 Liters of rajan fluid removed.  Band Aid to puncture site - C/D/I.  Discussed DC instructions with aptient and caregiver - both verbalized understanding.  Next 2 appts made.  Patient dc'd ambulatory with caretaker

## 2020-01-06 ENCOUNTER — ANESTHESIA (OUTPATIENT)
Dept: ENDOSCOPY | Facility: HOSPITAL | Age: 65
End: 2020-01-06
Payer: MEDICARE

## 2020-01-06 ENCOUNTER — HOSPITAL ENCOUNTER (OUTPATIENT)
Facility: HOSPITAL | Age: 65
Discharge: HOME | End: 2020-01-06
Attending: INTERNAL MEDICINE | Admitting: INTERNAL MEDICINE
Payer: MEDICARE

## 2020-01-06 ENCOUNTER — ANESTHESIA EVENT (OUTPATIENT)
Dept: ENDOSCOPY | Facility: HOSPITAL | Age: 65
End: 2020-01-06
Payer: MEDICARE

## 2020-01-06 VITALS
BODY MASS INDEX: 25.97 KG/M2 | OXYGEN SATURATION: 99 % | HEIGHT: 66 IN | RESPIRATION RATE: 13 BRPM | DIASTOLIC BLOOD PRESSURE: 72 MMHG | SYSTOLIC BLOOD PRESSURE: 112 MMHG | WEIGHT: 161.63 LBS | TEMPERATURE: 98 F | HEART RATE: 84 BPM

## 2020-01-06 DIAGNOSIS — K70.31 ALCOHOLIC CIRRHOSIS OF LIVER WITH ASCITES: ICD-10-CM

## 2020-01-06 DIAGNOSIS — I85.11 SECONDARY ESOPHAGEAL VARICES WITH BLEEDING: Primary | ICD-10-CM

## 2020-01-06 PROCEDURE — 63600175 PHARM REV CODE 636 W HCPCS: Performed by: NURSE ANESTHETIST, CERTIFIED REGISTERED

## 2020-01-06 PROCEDURE — 43235 EGD DIAGNOSTIC BRUSH WASH: CPT | Mod: ,,, | Performed by: INTERNAL MEDICINE

## 2020-01-06 PROCEDURE — 43235 EGD DIAGNOSTIC BRUSH WASH: CPT | Performed by: INTERNAL MEDICINE

## 2020-01-06 PROCEDURE — 25000003 PHARM REV CODE 250: Performed by: NURSE ANESTHETIST, CERTIFIED REGISTERED

## 2020-01-06 PROCEDURE — 43235 PR EGD, FLEX, DIAGNOSTIC: ICD-10-PCS | Mod: ,,, | Performed by: INTERNAL MEDICINE

## 2020-01-06 PROCEDURE — 37000008 HC ANESTHESIA 1ST 15 MINUTES: Performed by: INTERNAL MEDICINE

## 2020-01-06 RX ORDER — SODIUM CHLORIDE, SODIUM LACTATE, POTASSIUM CHLORIDE, CALCIUM CHLORIDE 600; 310; 30; 20 MG/100ML; MG/100ML; MG/100ML; MG/100ML
INJECTION, SOLUTION INTRAVENOUS CONTINUOUS PRN
Status: DISCONTINUED | OUTPATIENT
Start: 2020-01-06 | End: 2020-01-06

## 2020-01-06 RX ORDER — SODIUM CHLORIDE, SODIUM LACTATE, POTASSIUM CHLORIDE, CALCIUM CHLORIDE 600; 310; 30; 20 MG/100ML; MG/100ML; MG/100ML; MG/100ML
INJECTION, SOLUTION INTRAVENOUS CONTINUOUS
Status: DISCONTINUED | OUTPATIENT
Start: 2020-01-06 | End: 2020-01-06 | Stop reason: HOSPADM

## 2020-01-06 RX ORDER — LIDOCAINE HCL/PF 100 MG/5ML
SYRINGE (ML) INTRAVENOUS
Status: DISCONTINUED | OUTPATIENT
Start: 2020-01-06 | End: 2020-01-06

## 2020-01-06 RX ORDER — PROPOFOL 10 MG/ML
INJECTION, EMULSION INTRAVENOUS
Status: DISCONTINUED | OUTPATIENT
Start: 2020-01-06 | End: 2020-01-06

## 2020-01-06 RX ORDER — SODIUM CHLORIDE 0.9 % (FLUSH) 0.9 %
10 SYRINGE (ML) INJECTION
Status: DISCONTINUED | OUTPATIENT
Start: 2020-01-06 | End: 2020-01-06 | Stop reason: HOSPADM

## 2020-01-06 RX ADMIN — SODIUM CHLORIDE, SODIUM LACTATE, POTASSIUM CHLORIDE, AND CALCIUM CHLORIDE: 600; 310; 30; 20 INJECTION, SOLUTION INTRAVENOUS at 12:01

## 2020-01-06 RX ADMIN — PROPOFOL 30 MG: 10 INJECTION, EMULSION INTRAVENOUS at 12:01

## 2020-01-06 RX ADMIN — PROPOFOL 50 MG: 10 INJECTION, EMULSION INTRAVENOUS at 12:01

## 2020-01-06 RX ADMIN — LIDOCAINE HYDROCHLORIDE 50 MG: 20 INJECTION, SOLUTION INTRAVENOUS at 12:01

## 2020-01-06 NOTE — TRANSFER OF CARE
"Anesthesia Transfer of Care Note    Patient: Kylah Ortiz    Procedure(s) Performed: Procedure(s) (LRB):  EGD (ESOPHAGOGASTRODUODENOSCOPY) (N/A)    Patient location: PACU    Anesthesia Type: MAC    Transport from OR: Transported from OR on room air with adequate spontaneous ventilation    Post pain: adequate analgesia    Post assessment: no apparent anesthetic complications    Post vital signs: stable    Level of consciousness: responds to stimulation    Nausea/Vomiting: no nausea/vomiting    Complications: none    Transfer of care protocol was followed      Last vitals:   Visit Vitals  /66   Pulse 85   Temp 36.8 °C (98.2 °F)   Resp 16   Ht 5' 6" (1.676 m)   Wt 73.3 kg (161 lb 9.6 oz)   SpO2 99%   Breastfeeding? No   BMI 26.08 kg/m²     "

## 2020-01-06 NOTE — DISCHARGE INSTRUCTIONS

## 2020-01-06 NOTE — DISCHARGE SUMMARY
Endoscopy Discharge Summary      Admit Date: 1/6/2020    Discharge Date and Time:  1/6/2020 12:29 PM    Attending Physician: Monique Wood MD     Discharge Physician: Monique Wood MD     Principal Admitting Diagnoses: Secondary esophageal varices with bleeding         Discharge Diagnosis: The primary encounter diagnosis was Secondary esophageal varices with bleeding. A diagnosis of Alcoholic cirrhosis of liver with ascites was also pertinent to this visit.     Discharged Condition: Good    Indication for Admission: Secondary esophageal varices with bleeding     Hospital Course: Patient was admitted for an inpatient procedure and tolerated the procedure well with no complications.    Significant Diagnostic Studies: EGD    Pathology (if any):  Specimen (12h ago, onward)    None          Estimated Blood Loss: 0 ml.    Discussed with: patient.    Disposition: Home.    Follow Up/Patient Instructions:   Current Discharge Medication List      CONTINUE these medications which have NOT CHANGED    Details   FLUoxetine 20 MG capsule Take 20 mg by mouth once daily.      furosemide (LASIX) 20 MG tablet Take 1 tablet (20 mg total) by mouth once daily.  Qty: 60 tablet, Refills: 12      hydrOXYzine HCl (ATARAX) 25 MG tablet Take 25 mg by mouth 3 (three) times daily as needed.       lactulose (CHRONULAC) 10 gram/15 mL solution Take by mouth once daily.       magnesium oxide (MAG-OX) 400 mg (241.3 mg magnesium) tablet Take 400 mg by mouth once daily.      multivitamin (THERAGRAN) per tablet Take 1 tablet by mouth once daily.      pantoprazole (PROTONIX) 20 MG tablet Take 20 mg by mouth 2 (two) times daily before meals.         STOP taking these medications       ranitidine (ZANTAC) 300 MG tablet Comments:   Reason for Stopping:               No discharge procedures on file.        Monique Wood MD  Gastroenterology and Hepatology

## 2020-01-06 NOTE — ANESTHESIA PREPROCEDURE EVALUATION
01/06/2020  Kylah Ortiz is a 64 y.o., female.    Anesthesia Evaluation    I have reviewed the Patient Summary Reports.    I have reviewed the Nursing Notes.   I have reviewed the Medications.     Review of Systems  Anesthesia Hx:  No problems with previous Anesthesia  Denies Family Hx of Anesthesia complications.   Denies Personal Hx of Anesthesia complications.   Social:  Non-Smoker    Hematology/Oncology:     Oncology Normal    -- Anemia:   EENT/Dental:EENT/Dental Normal   Cardiovascular:  Cardiovascular Normal     Pulmonary:  Pulmonary Normal    Renal/:   Chronic Renal Disease, CRI    Hepatic/GI:   Hiatal Hernia, GERD Liver Disease,    Musculoskeletal:  Musculoskeletal Normal    Neurological:  Neurology Normal    Endocrine:  Endocrine Normal    Dermatological:  Skin Normal    Psych:   Psychiatric History          Physical Exam  General:  Well nourished    Airway/Jaw/Neck:  Airway Findings: Mouth Opening: Normal Tongue: Normal  General Airway Assessment: Adult, Average  Mallampati: II  TM Distance: Normal, at least 6 cm      Dental:  Dental Findings: In tact   Chest/Lungs:  Chest/Lungs Findings: Clear to auscultation, Normal Respiratory Rate     Heart/Vascular:  Heart Findings: Rate: Normal  Rhythm: Regular Rhythm        Mental Status:  Mental Status Findings:  Cooperative, Alert and Oriented         Anesthesia Plan  Type of Anesthesia, risks & benefits discussed:  Anesthesia Type:  MAC  Patient's Preference:   Intra-op Monitoring Plan: standard ASA monitors  Intra-op Monitoring Plan Comments:   Post Op Pain Control Plan:   Post Op Pain Control Plan Comments:   Induction:   IV  Beta Blocker:  Patient is not currently on a Beta-Blocker (No further documentation required).       Informed Consent: Patient understands risks and agrees with Anesthesia plan.  Questions answered. Anesthesia consent signed  with patient.  ASA Score: 3     Day of Surgery Review of History & Physical: I have interviewed and examined the patient. I have reviewed the patient's H&P dated:  There are no significant changes.          Ready For Surgery From Anesthesia Perspective.

## 2020-01-06 NOTE — ANESTHESIA RELEASE NOTE
"Anesthesia Release from PACU Note    Patient: Kylah Ortiz    Procedure(s) Performed: Procedure(s) (LRB):  EGD (ESOPHAGOGASTRODUODENOSCOPY) (N/A)    Anesthesia type: MAC    Post pain: Adequate analgesia    Post assessment: no apparent anesthetic complications, tolerated procedure well and no evidence of recall    Last Vitals:   Visit Vitals  /66   Pulse 85   Temp 36.8 °C (98.2 °F)   Resp 16   Ht 5' 6" (1.676 m)   Wt 73.3 kg (161 lb 9.6 oz)   SpO2 99%   Breastfeeding? No   BMI 26.08 kg/m²       Post vital signs: stable    Level of consciousness: responds to stimulation    Nausea/Vomiting: no nausea/no vomiting    Complications: none    Airway Patency: patent    Respiratory: unassisted    Cardiovascular: stable and blood pressure at baseline    Hydration: euvolemic     "

## 2020-01-06 NOTE — H&P
Short Stay Endoscopy History and Physical    PCP - Santos Beaver MD    Procedure - EGD  ASA - 3  Mallampati - per anesthesia  History of Anesthesia problems - no  Family history Anesthesia problems -  no     HPI:  This is a 64 y.o. female here for evaluation of :   Active Hospital Problems    Diagnosis  POA    *Secondary esophageal varices with bleeding [I85.11]  Yes    Alcoholic cirrhosis of liver with ascites [K70.31]  Yes      Resolved Hospital Problems   No resolved problems to display.         Reflux - no  Dysphagia - no  Abdominal pain - no  Diarrhea - no  Anemia - no  GI bleeding - no    ROS:  CONSTITUTIONAL: Denies weight change,  fatigue, fevers, chills, night sweats.  CARDIOVASCULAR: Denies chest pain, shortness of breath, orthopnea and edema.  RESPIRATORY: Denies cough, hemoptysis, dyspnea, and wheezing.  GI: See HPI.    Medical History:   Past Medical History:   Diagnosis Date    Alcoholic cirrhosis     Alcoholic dementia     Anemia     Ascites     CKD (chronic kidney disease)     Esophageal reflux     Esophageal varices     Hepatic encephalopathy     Hiatal hernia        Surgical History:   Past Surgical History:   Procedure Laterality Date    COLONOSCOPY      ESOPHAGOGASTRODUODENOSCOPY      HYSTERECTOMY         Family History:  Family History   Problem Relation Age of Onset    Cancer Mother     Arthritis Mother        Social History:   Social History     Tobacco Use    Smoking status: Never Smoker    Smokeless tobacco: Never Used   Substance Use Topics    Alcohol use: Yes     Alcohol/week: 2.0 standard drinks     Types: 2 Shots of liquor per week     Comment: some days    Drug use: Not on file       Allergy  Review of patient's allergies indicates:   Allergen Reactions    Hydrocodone-acetaminophen        Medications:   No current facility-administered medications on file prior to encounter.      Current Outpatient Medications on File Prior to Encounter   Medication Sig Dispense  Refill    FLUoxetine 20 MG capsule Take 20 mg by mouth once daily.      hydrOXYzine HCl (ATARAX) 25 MG tablet Take 25 mg by mouth 3 (three) times daily as needed.       lactulose (CHRONULAC) 10 gram/15 mL solution Take by mouth once daily.       magnesium oxide (MAG-OX) 400 mg (241.3 mg magnesium) tablet Take 400 mg by mouth once daily.      multivitamin (THERAGRAN) per tablet Take 1 tablet by mouth once daily.      ranitidine (ZANTAC) 300 MG tablet Take 300 mg by mouth every evening.         Physical Exam:  Vital Signs: There were no vitals filed for this visit.  General Appearance: Well appearing in no acute distress  ENT: OP clear  Chest: CTA B  CV: RRR, no m/r/g  Abd: s/nt/nd/nabs  Ext: no edema    Labs:  Reviewed    IMP:  Active Hospital Problems    Diagnosis  POA    *Secondary esophageal varices with bleeding [I85.11]  Yes    Alcoholic cirrhosis of liver with ascites [K70.31]  Yes      Resolved Hospital Problems   No resolved problems to display.         Plan:  I have explained the risks and benefits of endoscopy procedures to the patient including but not limited to bleeding, perforation, infection, and death. The patient wishes to proceed.

## 2020-01-06 NOTE — ANESTHESIA POSTPROCEDURE EVALUATION
Anesthesia Post Evaluation    Patient: Kylah Ortiz    Procedure(s) Performed: Procedure(s) (LRB):  EGD (ESOPHAGOGASTRODUODENOSCOPY) (N/A)    Final Anesthesia Type: MAC    Patient location during evaluation: PACU  Patient participation: Yes- Able to Participate  Level of consciousness: awake and alert and awake  Post-procedure vital signs: reviewed and stable  Pain management: adequate  Airway patency: patent    PONV status at discharge: No PONV  Anesthetic complications: no      Cardiovascular status: blood pressure returned to baseline  Respiratory status: unassisted and spontaneous ventilation  Hydration status: euvolemic  Follow-up not needed.          Vitals Value Taken Time   /66 1/6/2020 11:54 AM   Temp 36.8 °C (98.2 °F) 1/6/2020 11:54 AM   Pulse 85 1/6/2020 11:54 AM   Resp 16 1/6/2020 11:54 AM   SpO2 99 % 1/6/2020 11:54 AM         No case tracking events are documented in the log.      Pain/Polo Score: No data recorded

## 2020-01-06 NOTE — [employer] PATIENT INSTRUCTIONS
Discharge Summary/Instructions after an Endoscopic Procedure  Patient Name: Kylah Ortiz  Patient MRN: [id number]  Patient YOB: 1955 Monday, January 06, 2020 Monique Wood MD  RESTRICTIONS:  During your procedure today, you received medications for sedation.  These   medications may affect your judgment, balance and coordination.  Therefore,   for 24 hours, you have the following restrictions:   - DO NOT drive a car, operate machinery, make legal/financial decisions,   sign important papers or drink alcohol.    ACTIVITY:  Today: no heavy lifting, straining or running due to procedural   sedation/anesthesia.  The following day: return to full activity including work.  DIET:  Eat and drink normally unless instructed otherwise.     TREATMENT FOR COMMON SIDE EFFECTS:  - Mild abdominal pain, nausea, belching, bloating or excessive gas:  rest,   eat lightly and use a heating pad.  - Sore Throat: treat with throat lozenges and/or gargle with warm salt   water.  - Because air was used during the procedure, expelling large amounts of air   from your rectum or belching is normal.  - If a bowel prep was taken, you may not have a bowel movement for 1-3 days.    This is normal.  SYMPTOMS TO WATCH FOR AND REPORT TO YOUR PHYSICIAN:  1. Abdominal pain or bloating, other than gas cramps.  2. Chest pain.  3. Back pain.  4. Signs of infection such as: chills or fever occurring within 24 hours   after the procedure.  5. Rectal bleeding, which would show as bright red, maroon, or black stools.   (A tablespoon of blood from the rectum is not serious, especially if   hemorrhoids are present.)  6. Vomiting.  7. Weakness or dizziness.  GO DIRECTLY TO THE NEAREST EMERGENCY ROOM IF YOU HAVE ANY OF THE FOLLOWING:      Difficulty breathing              Chills and/or fever over 101 F   Persistent vomiting and/or vomiting blood   Severe abdominal pain   Severe chest pain   Black, tarry stools   Bleeding- more than one  tablespoon   Any other symptom or condition that you feel may need urgent attention  Your doctor recommends these additional instructions:  If any biopsies were taken, your doctors clinic will contact you in 1 to 2   weeks with any results.  - Discharge patient to home (via wheelchair).   - Resume previous diet.   - Continue present medications.   - Repeat upper endoscopy in 1 year for surveillance.   - Patient has a contact number available for emergencies.  The signs and   symptoms of potential delayed complications were discussed with the   patient.  Return to normal activities tomorrow.  Written discharge   instructions were provided to the patient.  For questions, problems or results please call your physician Monique Wood MD at Work:  [phone]  If you have any questions about the above instructions, call the GI   department at [phone] or call the endoscopy unit at [phone]   from 7am until 3 pm.  [facility] [place], EMERGENCY ROOM PHONE NUMBER:   [phone]  IF A COMPLICATION OR EMERGENCY SITUATION ARISES AND YOU ARE UNABLE TO REACH   YOUR PHYSICIAN - GO DIRECTLY TO THE EMERGENCY ROOM.  I have read or have had read to me these discharge instructions for my   procedure and have received a written copy.  I understand these   instructions and will follow-up with my physician if I have any questions.     __________________________________       _____________________________________  Nurse Signature                                          Patient/Designated   Responsible Party Signature  MD Monique Woods MD  1/6/2020 12:28:50 PM  [employer]

## 2020-01-09 ENCOUNTER — HOSPITAL ENCOUNTER (OUTPATIENT)
Dept: RADIOLOGY | Facility: HOSPITAL | Age: 65
Discharge: HOME | End: 2020-01-09
Attending: NURSE PRACTITIONER
Payer: MEDICARE

## 2020-01-09 VITALS
BODY MASS INDEX: 26.08 KG/M2 | SYSTOLIC BLOOD PRESSURE: 121 MMHG | HEART RATE: 80 BPM | HEIGHT: 66 IN | RESPIRATION RATE: 18 BRPM | DIASTOLIC BLOOD PRESSURE: 67 MMHG | OXYGEN SATURATION: 99 % | TEMPERATURE: 98 F

## 2020-01-09 DIAGNOSIS — K70.31 ASCITES DUE TO ALCOHOLIC CIRRHOSIS: ICD-10-CM

## 2020-01-09 PROCEDURE — 49083 ABD PARACENTESIS W/IMAGING: CPT

## 2020-01-09 PROCEDURE — P9047 ALBUMIN (HUMAN), 25%, 50ML: HCPCS | Mod: JG | Performed by: NURSE PRACTITIONER

## 2020-01-09 PROCEDURE — 63600175 PHARM REV CODE 636 W HCPCS: Mod: JG | Performed by: NURSE PRACTITIONER

## 2020-01-09 RX ORDER — ALBUMIN HUMAN 250 G/1000ML
48 SOLUTION INTRAVENOUS ONCE
Status: COMPLETED | OUTPATIENT
Start: 2020-01-09 | End: 2020-01-09

## 2020-01-09 RX ADMIN — ALBUMIN (HUMAN) 48 G: 25 SOLUTION INTRAVENOUS at 02:01

## 2020-01-09 NOTE — PLAN OF CARE
Pt no longer draining, JOSEMANUEL Collado at bs to assess, needle removed and bandaid applied, no bleeding noted tolerated well pt stable

## 2020-01-09 NOTE — DISCHARGE INSTRUCTIONS

## 2020-01-09 NOTE — PLAN OF CARE
9L light rajan fluid noted to cannister, pt abd not as distended, sig amount decreased, denies pain/discomfort, bandaid to site at rt abd CDI, 50g albumin given as per orders

## 2020-01-13 ENCOUNTER — LAB VISIT (OUTPATIENT)
Dept: LAB | Facility: HOSPITAL | Age: 65
End: 2020-01-13
Attending: INTERNAL MEDICINE
Payer: MEDICARE

## 2020-01-13 DIAGNOSIS — N17.9 ACUTE KIDNEY INJURY: ICD-10-CM

## 2020-01-13 LAB
ALBUMIN SERPL BCP-MCNC: 3.2 G/DL (ref 3.5–5.2)
ANION GAP SERPL CALC-SCNC: 8 MMOL/L (ref 8–16)
BUN SERPL-MCNC: 33 MG/DL (ref 8–23)
CALCIUM SERPL-MCNC: 8.8 MG/DL (ref 8.7–10.5)
CHLORIDE SERPL-SCNC: 102 MMOL/L (ref 95–110)
CO2 SERPL-SCNC: 28 MMOL/L (ref 23–29)
CREAT SERPL-MCNC: 1.8 MG/DL (ref 0.5–1.4)
EST. GFR  (AFRICAN AMERICAN): 33.6 ML/MIN/1.73 M^2
EST. GFR  (NON AFRICAN AMERICAN): 29.1 ML/MIN/1.73 M^2
GLUCOSE SERPL-MCNC: 89 MG/DL (ref 70–110)
PHOSPHATE SERPL-MCNC: 4.2 MG/DL (ref 2.7–4.5)
POTASSIUM SERPL-SCNC: 4.9 MMOL/L (ref 3.5–5.1)
SODIUM SERPL-SCNC: 138 MMOL/L (ref 136–145)

## 2020-01-13 PROCEDURE — 80069 RENAL FUNCTION PANEL: CPT

## 2020-01-13 PROCEDURE — 36415 COLL VENOUS BLD VENIPUNCTURE: CPT

## 2020-01-16 ENCOUNTER — OFFICE VISIT (OUTPATIENT)
Dept: NEPHROLOGY | Facility: CLINIC | Age: 65
End: 2020-01-16
Payer: COMMERCIAL

## 2020-01-16 ENCOUNTER — HOSPITAL ENCOUNTER (OUTPATIENT)
Dept: RADIOLOGY | Facility: HOSPITAL | Age: 65
Discharge: HOME | End: 2020-01-16
Attending: NURSE PRACTITIONER
Payer: MEDICARE

## 2020-01-16 VITALS
RESPIRATION RATE: 19 BRPM | HEART RATE: 88 BPM | DIASTOLIC BLOOD PRESSURE: 59 MMHG | SYSTOLIC BLOOD PRESSURE: 110 MMHG | OXYGEN SATURATION: 97 % | TEMPERATURE: 98 F | HEIGHT: 66 IN | BODY MASS INDEX: 25.51 KG/M2

## 2020-01-16 VITALS
SYSTOLIC BLOOD PRESSURE: 120 MMHG | HEIGHT: 66 IN | HEART RATE: 80 BPM | DIASTOLIC BLOOD PRESSURE: 70 MMHG | WEIGHT: 158.06 LBS | BODY MASS INDEX: 25.4 KG/M2 | RESPIRATION RATE: 20 BRPM

## 2020-01-16 DIAGNOSIS — E87.1 HYPONATREMIA: ICD-10-CM

## 2020-01-16 DIAGNOSIS — Z71.89 ENCOUNTER FOR MEDICATION REVIEW AND COUNSELING: ICD-10-CM

## 2020-01-16 DIAGNOSIS — K70.31 ASCITES DUE TO ALCOHOLIC CIRRHOSIS: ICD-10-CM

## 2020-01-16 DIAGNOSIS — K70.11 ALCOHOLIC HEPATITIS WITH ASCITES: ICD-10-CM

## 2020-01-16 DIAGNOSIS — N17.9 ACUTE KIDNEY INJURY: ICD-10-CM

## 2020-01-16 DIAGNOSIS — K76.7 HEPATORENAL SYNDROME: Primary | ICD-10-CM

## 2020-01-16 PROCEDURE — 49083 ABD PARACENTESIS W/IMAGING: CPT

## 2020-01-16 PROCEDURE — 99999 PR PBB SHADOW E&M-EST. PATIENT-LVL III: ICD-10-PCS | Mod: PBBFAC,,, | Performed by: INTERNAL MEDICINE

## 2020-01-16 PROCEDURE — 99215 OFFICE O/P EST HI 40 MIN: CPT | Mod: S$PBB,,, | Performed by: INTERNAL MEDICINE

## 2020-01-16 PROCEDURE — 99999 PR PBB SHADOW E&M-EST. PATIENT-LVL III: CPT | Mod: PBBFAC,,, | Performed by: INTERNAL MEDICINE

## 2020-01-16 PROCEDURE — 63600175 PHARM REV CODE 636 W HCPCS: Mod: JG | Performed by: NURSE PRACTITIONER

## 2020-01-16 PROCEDURE — P9047 ALBUMIN (HUMAN), 25%, 50ML: HCPCS | Mod: JG | Performed by: NURSE PRACTITIONER

## 2020-01-16 PROCEDURE — 99215 PR OFFICE/OUTPT VISIT, EST, LEVL V, 40-54 MIN: ICD-10-PCS | Mod: S$PBB,,, | Performed by: INTERNAL MEDICINE

## 2020-01-16 PROCEDURE — 99213 OFFICE O/P EST LOW 20 MIN: CPT | Mod: PBBFAC,25 | Performed by: INTERNAL MEDICINE

## 2020-01-16 RX ORDER — LACTULOSE 10 G/15ML
20 SOLUTION ORAL; RECTAL 2 TIMES DAILY
Qty: 1800 ML | Refills: 5 | Status: SHIPPED | OUTPATIENT
Start: 2020-01-16 | End: 2020-08-17

## 2020-01-16 RX ORDER — ALBUMIN HUMAN 250 G/1000ML
50 SOLUTION INTRAVENOUS ONCE
Status: COMPLETED | OUTPATIENT
Start: 2020-01-16 | End: 2020-01-16

## 2020-01-16 RX ORDER — SPIRONOLACTONE 25 MG/1
25 TABLET ORAL 2 TIMES DAILY
Qty: 60 TABLET | Refills: 11 | Status: SHIPPED | OUTPATIENT
Start: 2020-01-16 | End: 2020-02-13

## 2020-01-16 RX ADMIN — ALBUMIN (HUMAN) 50 G: 25 SOLUTION INTRAVENOUS at 01:01

## 2020-01-16 NOTE — DISCHARGE INSTRUCTIONS

## 2020-01-16 NOTE — DISCHARGE SUMMARY
Pre Op Diagnosis: ascites     Post Op Diagnosis: same     Procedure:  para     Procedure performed by: Rosas RAINEY, Sherlyn GREGORY     Written Informed Consent Obtained: Yes     Specimen Removed:  yes     Estimated Blood Loss:  minimal     Findings: Local anesthesia.     The patient tolerated the procedure well and there were no complications.      Sterile technique was performed in the RLQ, lidocaine was used as a local anesthetic.   6.5 liters of light rajan fluid removed for therapeutic purposes.  Pt tolerated the procedure well without immediate complications.  Please see radiologist report for details. F/u with PCP and/or ordering physician.

## 2020-01-16 NOTE — PROGRESS NOTES
Renal clinic consult note:  Date of pt visit: 1/16/20  Reason for consult: DARRIAN and cKD  Referring provider: Louise Álvarez NP     HPI: Pt is a 63 y/o female with h/o of advanced liver disease due to alcoholism who presents for f/u. Pt has elevated s Cr with some fluctuations, suspected related to hemodynamic changes and hepatorenal syndrome. Pt has required large paracentesis approximately once per week with removal of about 7-8 L on average each time. Pt was last seen by us in Nov 2019. She was advised to keep salt and fluid low. Reason was explained to her. Pt presents with a care giver. She lived at home. Per care giver, she has made no attempts what so ever to control her salt and fluid intake. Pt apparently does her own shopping and she purchases things like cheese, pizza, and sodas. Pt has had several episodes of hepatic decompensation, requiting hospital admissions. She has complications of liver disease, including h/o of hepatic encephalopathy, varices with GI bleed, and ascites requiring frequent paracentesis. Diet was extensively discussed. Pt's diet is not low in salt. Pt uses lasix 20 mg po qd. Labs and meds reviewed. She was using lasix 40 mg po qd last visit. Dose was changed to 20 mg po bid to reduce post-diuresis anti-diuresis effect of qd lasix. But she now takes it qd at the lower dose.     PAST MEDICAL HISTORY:  DARRIAN (suspect hepatorenal syndrome, hypotension, Alcoholic cirrhosis, Alcoholic dementia, Anemia, Ascites, CKD (chronic kidney disease), Esophageal reflux, Esophageal varices, Hepatic encephalopathy, and Hiatal hernia.     PAST SURGICAL HISTORY:  She  has a past surgical history that includes Hysterectomy; Esophagogastroduodenoscopy; and Colonoscopy.     SOCIAL HISTORY:  She  reports that she has never smoked. She has never used smokeless tobacco. She reports that she drank alcohol.     FAMILY MEDICAL HISTORY:  Her family history includes Arthritis in her mother; Cancer in her mother.         "  Review of patient's allergies indicates:   Allergen Reactions    Hydrocodone-acetaminophen        Meds reviewed    Current Outpatient Medications:     FLUoxetine 20 MG capsule, Take 20 mg by mouth once daily., Disp: , Rfl:     furosemide (LASIX) 20 MG tablet, Take 1 tablet (20 mg total) by mouth once daily., Disp: 60 tablet, Rfl: 12    hydrOXYzine HCl (ATARAX) 25 MG tablet, Take 25 mg by mouth 3 (three) times daily as needed. , Disp: , Rfl:     lactulose (CHRONULAC) 10 gram/15 mL solution, Take by mouth once daily. , Disp: , Rfl:     magnesium oxide (MAG-OX) 400 mg (241.3 mg magnesium) tablet, Take 400 mg by mouth once daily., Disp: , Rfl:     multivitamin (THERAGRAN) per tablet, Take 1 tablet by mouth once daily., Disp: , Rfl:     pantoprazole (PROTONIX) 20 MG tablet, Take 20 mg by mouth 2 (two) times daily before meals., Disp: , Rfl:     spironolactone (ALDACTONE) 25 MG tablet, Take 1 tablet (25 mg total) by mouth 2 (two) times daily., Disp: 60 tablet, Rfl: 11        REVIEW OF SYSTEMS:  Patient has no fever, fatigue, visual changes, chest pain, edema, cough, dyspnea, nausea, vomiting, constipation, diarrhea, arthralgias, pruritis, dizziness, weakness, depression, confusion.     PHYSICAL EXAM:  Blood pressure 120/70, pulse 80, resp. rate 20, height 5' 6" (1.676 m), weight 71.7 kg (158 lb 1.1 oz), last visit 157 lbs  Gen:    WDWN female in no apparent distress  Psych: Normal mood and affect  Skin:    No rashes or ulcers  Eyes:   Normal conjunctiva and lids, PERRLA  ENT:    Normal hearing with no oropharyngeal lesions  Neck:   No JVD  Chest:  Clear with no rales, rhonchi, wheezing with normal effort  CV:      Regular with no murmurs, gallops or rubs  Abd:     Soft, nontender, + distension/ascites, positive bowel sounds  Ext:      2+ edema     Labs: reviewed  BMP  Lab Results   Component Value Date     01/13/2020    K 4.9 01/13/2020     01/13/2020    CO2 28 01/13/2020    BUN 33 (H) 01/13/2020    " CREATININE 1.8 (H) 01/13/2020    CALCIUM 8.8 01/13/2020    ANIONGAP 8 01/13/2020    ESTGFRAFRICA 33.6 (A) 01/13/2020    EGFRNONAA 29.1 (A) 01/13/2020     Lab Results   Component Value Date    WBC 6.49 12/23/2019    HGB 7.2 (L) 12/23/2019    HCT 23.7 (L) 12/23/2019     (H) 12/23/2019     12/23/2019         IMPRESSION AND RECOMMENDATIONS: 63 y/o female with DARRIAN and fluctuations in s Cr and alcoholic cirrhosis:     1. Renal: s Cr was normal recently in May 2019.  s Cr at this point is stable and has not further worsned  The fluctuations in s Cr are due to hepatic disease and decompensation.  The rise in s Cr is c/w with DARRIAN, not with CKD  DARRIAN suspect due to hepatorenal syndrome  BP is low due to liver disease (splanchnic vasodilatation), hypotension causing further decrease in renal blood flow  K normal  Hyponatremia: dilutional, due to liver disease, improved and stable with lasix     2. Hypotension: BP normal to low as expected with liver disease and HRS  Low BP may limit the use of diuretics    3. Liver disease. Has required multiple large volume paracenteses (once per week, 7-8 L each)  Large 3rd spacing, LE edema and ascites  Pt on lasix 20 mg po qd, this does not work because diet is salty and pt does not restrict fluid intake  Will re-start spironolactone 25 mg po bid (watch for hyperkalemia)    Attempt was made to tell pt that she needs to restrict salt and fluid in diet  Not sure if she understand ort agrees  Pt appears to have low self control and low intuition, which may actually be complicated by hepatic encephalopathy.   High salt foods described  Also, K may go up with aldactone, pt was given a printed list of high K foods to avoid.    4. Anemia: noted normo to macrocytic  Unsure if due to lover disease or if dilutional  Doubt related to kidneys  Will defer to GI    Plans and recommendations:  As discussed above  Opportunity for questions provided  Add aldactone 25 mg po bid  Will need close  f/u after start of this med in 1 month with repeat labs  Total time spent 40 minutes including time needed to review the records, the   patient evaluation, documentation, face-to-face discussion with the patient,   more than 50% of the time was spent on coordination of care and counseling.    Level V visit.     Hector Sanchez MD

## 2020-01-16 NOTE — PLAN OF CARE
1342  50g albumin given IV as per orders, pt tolerated well      1355  Pt stable, dc instructions reviewed, verbalized understanding, copy given with AVS, new appt sched with ANGELIA Salazar, iv dc'd no bleeding noted cath intact tolerated well, pt dc'd to  with sitter via wc

## 2020-01-16 NOTE — NURSING
Drainage stopped, staff at bs to asses, removed and placed bandaid to site, CDI, 6.5L of light rajan fluid noted to pavithra, pt tolerated well.

## 2020-01-16 NOTE — H&P (VIEW-ONLY)
Renal clinic consult note:  Date of pt visit: 1/16/20  Reason for consult: DARRIAN and cKD  Referring provider: Louise Álvarez NP     HPI: Pt is a 63 y/o female with h/o of advanced liver disease due to alcoholism who presents for f/u. Pt has elevated s Cr with some fluctuations, suspected related to hemodynamic changes and hepatorenal syndrome. Pt has required large paracentesis approximately once per week with removal of about 7-8 L on average each time. Pt was last seen by us in Nov 2019. She was advised to keep salt and fluid low. Reason was explained to her. Pt presents with a care giver. She lived at home. Per care giver, she has made no attempts what so ever to control her salt and fluid intake. Pt apparently does her own shopping and she purchases things like cheese, pizza, and sodas. Pt has had several episodes of hepatic decompensation, requiting hospital admissions. She has complications of liver disease, including h/o of hepatic encephalopathy, varices with GI bleed, and ascites requiring frequent paracentesis. Diet was extensively discussed. Pt's diet is not low in salt. Pt uses lasix 20 mg po qd. Labs and meds reviewed. She was using lasix 40 mg po qd last visit. Dose was changed to 20 mg po bid to reduce post-diuresis anti-diuresis effect of qd lasix. But she now takes it qd at the lower dose.     PAST MEDICAL HISTORY:  DARRIAN (suspect hepatorenal syndrome, hypotension, Alcoholic cirrhosis, Alcoholic dementia, Anemia, Ascites, CKD (chronic kidney disease), Esophageal reflux, Esophageal varices, Hepatic encephalopathy, and Hiatal hernia.     PAST SURGICAL HISTORY:  She  has a past surgical history that includes Hysterectomy; Esophagogastroduodenoscopy; and Colonoscopy.     SOCIAL HISTORY:  She  reports that she has never smoked. She has never used smokeless tobacco. She reports that she drank alcohol.     FAMILY MEDICAL HISTORY:  Her family history includes Arthritis in her mother; Cancer in her mother.         "  Review of patient's allergies indicates:   Allergen Reactions    Hydrocodone-acetaminophen        Meds reviewed    Current Outpatient Medications:     FLUoxetine 20 MG capsule, Take 20 mg by mouth once daily., Disp: , Rfl:     furosemide (LASIX) 20 MG tablet, Take 1 tablet (20 mg total) by mouth once daily., Disp: 60 tablet, Rfl: 12    hydrOXYzine HCl (ATARAX) 25 MG tablet, Take 25 mg by mouth 3 (three) times daily as needed. , Disp: , Rfl:     lactulose (CHRONULAC) 10 gram/15 mL solution, Take by mouth once daily. , Disp: , Rfl:     magnesium oxide (MAG-OX) 400 mg (241.3 mg magnesium) tablet, Take 400 mg by mouth once daily., Disp: , Rfl:     multivitamin (THERAGRAN) per tablet, Take 1 tablet by mouth once daily., Disp: , Rfl:     pantoprazole (PROTONIX) 20 MG tablet, Take 20 mg by mouth 2 (two) times daily before meals., Disp: , Rfl:     spironolactone (ALDACTONE) 25 MG tablet, Take 1 tablet (25 mg total) by mouth 2 (two) times daily., Disp: 60 tablet, Rfl: 11        REVIEW OF SYSTEMS:  Patient has no fever, fatigue, visual changes, chest pain, edema, cough, dyspnea, nausea, vomiting, constipation, diarrhea, arthralgias, pruritis, dizziness, weakness, depression, confusion.     PHYSICAL EXAM:  Blood pressure 120/70, pulse 80, resp. rate 20, height 5' 6" (1.676 m), weight 71.7 kg (158 lb 1.1 oz), last visit 157 lbs  Gen:    WDWN female in no apparent distress  Psych: Normal mood and affect  Skin:    No rashes or ulcers  Eyes:   Normal conjunctiva and lids, PERRLA  ENT:    Normal hearing with no oropharyngeal lesions  Neck:   No JVD  Chest:  Clear with no rales, rhonchi, wheezing with normal effort  CV:      Regular with no murmurs, gallops or rubs  Abd:     Soft, nontender, + distension/ascites, positive bowel sounds  Ext:      2+ edema     Labs: reviewed  BMP  Lab Results   Component Value Date     01/13/2020    K 4.9 01/13/2020     01/13/2020    CO2 28 01/13/2020    BUN 33 (H) 01/13/2020    " CREATININE 1.8 (H) 01/13/2020    CALCIUM 8.8 01/13/2020    ANIONGAP 8 01/13/2020    ESTGFRAFRICA 33.6 (A) 01/13/2020    EGFRNONAA 29.1 (A) 01/13/2020     Lab Results   Component Value Date    WBC 6.49 12/23/2019    HGB 7.2 (L) 12/23/2019    HCT 23.7 (L) 12/23/2019     (H) 12/23/2019     12/23/2019         IMPRESSION AND RECOMMENDATIONS: 65 y/o female with DARRIAN and fluctuations in s Cr and alcoholic cirrhosis:     1. Renal: s Cr was normal recently in May 2019.  s Cr at this point is stable and has not further worsned  The fluctuations in s Cr are due to hepatic disease and decompensation.  The rise in s Cr is c/w with DARRIAN, not with CKD  DARRIAN suspect due to hepatorenal syndrome  BP is low due to liver disease (splanchnic vasodilatation), hypotension causing further decrease in renal blood flow  K normal  Hyponatremia: dilutional, due to liver disease, improved and stable with lasix     2. Hypotension: BP normal to low as expected with liver disease and HRS  Low BP may limit the use of diuretics    3. Liver disease. Has required multiple large volume paracenteses (once per week, 7-8 L each)  Large 3rd spacing, LE edema and ascites  Pt on lasix 20 mg po qd, this does not work because diet is salty and pt does not restrict fluid intake  Will re-start spironolactone 25 mg po bid (watch for hyperkalemia)    Attempt was made to tell pt that she needs to restrict salt and fluid in diet  Not sure if she understand ort agrees  Pt appears to have low self control and low intuition, which may actually be complicated by hepatic encephalopathy.   High salt foods described  Also, K may go up with aldactone, pt was given a printed list of high K foods to avoid.    4. Anemia: noted normo to macrocytic  Unsure if due to lover disease or if dilutional  Doubt related to kidneys  Will defer to GI    Plans and recommendations:  As discussed above  Opportunity for questions provided  Add aldactone 25 mg po bid  Will need close  f/u after start of this med in 1 month with repeat labs  Total time spent 40 minutes including time needed to review the records, the   patient evaluation, documentation, face-to-face discussion with the patient,   more than 50% of the time was spent on coordination of care and counseling.    Level V visit.     Hector Sanchez MD

## 2020-01-17 ENCOUNTER — TELEPHONE (OUTPATIENT)
Dept: GASTROENTEROLOGY | Facility: CLINIC | Age: 65
End: 2020-01-17

## 2020-01-17 DIAGNOSIS — K70.31 ASCITES DUE TO ALCOHOLIC CIRRHOSIS: Primary | ICD-10-CM

## 2020-01-22 DIAGNOSIS — K70.31 ASCITES DUE TO ALCOHOLIC CIRRHOSIS: Primary | ICD-10-CM

## 2020-01-23 ENCOUNTER — HOSPITAL ENCOUNTER (OUTPATIENT)
Dept: RADIOLOGY | Facility: HOSPITAL | Age: 65
Discharge: HOME | End: 2020-01-23
Attending: NURSE PRACTITIONER
Payer: MEDICARE

## 2020-01-23 VITALS
WEIGHT: 150 LBS | HEART RATE: 78 BPM | HEIGHT: 66 IN | OXYGEN SATURATION: 100 % | BODY MASS INDEX: 24.11 KG/M2 | RESPIRATION RATE: 16 BRPM | DIASTOLIC BLOOD PRESSURE: 66 MMHG | SYSTOLIC BLOOD PRESSURE: 103 MMHG

## 2020-01-23 DIAGNOSIS — K70.31 ASCITES DUE TO ALCOHOLIC CIRRHOSIS: ICD-10-CM

## 2020-01-23 PROCEDURE — A7048 VACUUM DRAIN BOTTLE/TUBE KIT: HCPCS

## 2020-01-23 NOTE — DISCHARGE INSTRUCTIONS

## 2020-01-23 NOTE — PLAN OF CARE
Procedure and recovery complete.  VSS.  4 Liters of rajan fluid removed.  Patient has no complaints of pain or SOB.  Discussed DC instructions with patient and caregiver, both verbalized understanding.  Next 2 appt made - 1/30 and 2/6 - through Ani

## 2020-01-23 NOTE — H&P
[facility]  History & Physical    Subjective:      Chief Complaint/Reason for Admission: Abdominal Distension    Kylah Ortiz is a 64 y.o. female.Here for Paracentesis.    Past Medical History:   Diagnosis Date    Alcoholic cirrhosis     Alcoholic dementia     Anemia     Ascites     CKD (chronic kidney disease)     Esophageal reflux     Esophageal varices     Hepatic encephalopathy     Hiatal hernia      Past Surgical History:   Procedure Laterality Date    COLONOSCOPY      ESOPHAGOGASTRODUODENOSCOPY      ESOPHAGOGASTRODUODENOSCOPY N/A 1/6/2020    Procedure: EGD (ESOPHAGOGASTRODUODENOSCOPY);  Surgeon: Monique Wood MD;  Location: [facility];  Service: Endoscopy;  Laterality: N/A;    HYSTERECTOMY       Family History   Problem Relation Age of Onset    Cancer Mother     Arthritis Mother      Social History     Tobacco Use    Smoking status: Never Smoker    Smokeless tobacco: Never Used   Substance Use Topics    Alcohol use: Yes     Alcohol/week: 2.0 standard drinks     Types: 2 Shots of liquor per week     Comment: some days    Drug use: Not on file         (Not in a hospital admission)  Review of patient's allergies indicates:   Allergen Reactions    Hydrocodone-acetaminophen         Review of Systems   Constitutional: Negative.    Eyes: Negative.    Cardiovascular: Negative.    Gastrointestinal: Positive for abdominal pain.       Objective:      Vital Signs (Most Recent)  Pulse: 78 (01/23/20 1520)  Resp: 16 (01/23/20 1520)  BP: 103/66 (01/23/20 1520)  SpO2: 100 % (01/23/20 1520)    Vital Signs Range (Last 24H):  Pulse:  [77-79]   Resp:  [16-18]   BP: (103-118)/(66-87)   SpO2:  [100 %]     Physical Exam   HENT:   Head: Normocephalic.   Cardiovascular: Normal rate.   Pulmonary/Chest: Effort normal.   Abdominal: She exhibits distension.           Assessment:      Ascites    Plan:    Paracentesis

## 2020-01-23 NOTE — DISCHARGE SUMMARY
Pre Op Diagnosis: ascites     Post Op Diagnosis: same     Procedure:  para     Procedure performed by: Rosas RAINEY, Sherlyn GREGORY     Written Informed Consent Obtained: Yes     Specimen Removed:  yes     Estimated Blood Loss:  minimal     Findings: Local anesthesia.     The patient tolerated the procedure well and there were no complications.      Sterile technique was performed in the RLQ, lidocaine was used as a local anesthetic.   4 liters of rajan fluid removed for therapeutic purposes.  Pt tolerated the procedure well without immediate complications.  Please see radiologist report for details. F/u with PCP and/or ordering physician.

## 2020-02-11 ENCOUNTER — LAB VISIT (OUTPATIENT)
Dept: LAB | Facility: HOSPITAL | Age: 65
End: 2020-02-11
Attending: INTERNAL MEDICINE
Payer: MEDICARE

## 2020-02-11 DIAGNOSIS — K76.7 HEPATORENAL SYNDROME: ICD-10-CM

## 2020-02-11 LAB
ALBUMIN SERPL BCP-MCNC: 3.5 G/DL (ref 3.5–5.2)
ANION GAP SERPL CALC-SCNC: 10 MMOL/L (ref 8–16)
BUN SERPL-MCNC: 20 MG/DL (ref 8–23)
CALCIUM SERPL-MCNC: 8.8 MG/DL (ref 8.7–10.5)
CHLORIDE SERPL-SCNC: 99 MMOL/L (ref 95–110)
CO2 SERPL-SCNC: 23 MMOL/L (ref 23–29)
CREAT SERPL-MCNC: 1.5 MG/DL (ref 0.5–1.4)
EST. GFR  (AFRICAN AMERICAN): 41.8 ML/MIN/1.73 M^2
EST. GFR  (NON AFRICAN AMERICAN): 36.3 ML/MIN/1.73 M^2
GLUCOSE SERPL-MCNC: 87 MG/DL (ref 70–110)
PHOSPHATE SERPL-MCNC: 4.3 MG/DL (ref 2.7–4.5)
POTASSIUM SERPL-SCNC: 5.4 MMOL/L (ref 3.5–5.1)
SODIUM SERPL-SCNC: 132 MMOL/L (ref 136–145)

## 2020-02-11 PROCEDURE — 80069 RENAL FUNCTION PANEL: CPT

## 2020-02-11 PROCEDURE — 36415 COLL VENOUS BLD VENIPUNCTURE: CPT

## 2020-02-13 ENCOUNTER — OFFICE VISIT (OUTPATIENT)
Dept: NEPHROLOGY | Facility: CLINIC | Age: 65
End: 2020-02-13
Payer: MEDICARE

## 2020-02-13 VITALS
RESPIRATION RATE: 20 BRPM | HEIGHT: 66 IN | DIASTOLIC BLOOD PRESSURE: 80 MMHG | SYSTOLIC BLOOD PRESSURE: 130 MMHG | BODY MASS INDEX: 24.63 KG/M2 | HEART RATE: 84 BPM | WEIGHT: 153.25 LBS

## 2020-02-13 DIAGNOSIS — N17.9 ACUTE KIDNEY INJURY: ICD-10-CM

## 2020-02-13 DIAGNOSIS — I95.0 IDIOPATHIC HYPOTENSION: ICD-10-CM

## 2020-02-13 DIAGNOSIS — K76.7 HEPATORENAL SYNDROME: Primary | ICD-10-CM

## 2020-02-13 DIAGNOSIS — Z71.89 ENCOUNTER FOR MEDICATION REVIEW AND COUNSELING: ICD-10-CM

## 2020-02-13 DIAGNOSIS — E87.5 HYPERKALEMIA: ICD-10-CM

## 2020-02-13 PROCEDURE — 99215 OFFICE O/P EST HI 40 MIN: CPT | Mod: S$PBB,,, | Performed by: INTERNAL MEDICINE

## 2020-02-13 PROCEDURE — 99213 OFFICE O/P EST LOW 20 MIN: CPT | Mod: PBBFAC | Performed by: INTERNAL MEDICINE

## 2020-02-13 PROCEDURE — 99999 PR PBB SHADOW E&M-EST. PATIENT-LVL III: CPT | Mod: PBBFAC,,, | Performed by: INTERNAL MEDICINE

## 2020-02-13 PROCEDURE — 99999 PR PBB SHADOW E&M-EST. PATIENT-LVL III: ICD-10-PCS | Mod: PBBFAC,,, | Performed by: INTERNAL MEDICINE

## 2020-02-13 PROCEDURE — 99215 PR OFFICE/OUTPT VISIT, EST, LEVL V, 40-54 MIN: ICD-10-PCS | Mod: S$PBB,,, | Performed by: INTERNAL MEDICINE

## 2020-02-13 RX ORDER — SPIRONOLACTONE 25 MG/1
25 TABLET ORAL DAILY
Qty: 30 TABLET | Refills: 11 | Status: SHIPPED | OUTPATIENT
Start: 2020-02-13 | End: 2020-03-18 | Stop reason: SDUPTHER

## 2020-02-14 NOTE — H&P (VIEW-ONLY)
"Renal clinic consult note:  Date of pt visit: 2/13/20  Date of this note 2/14/20  Reason for f/u and chief c/o: DARRIAN on CKD. Suspected hepatorenal syndrome  Referring provider: Louise Álvarez NP     HPI: Pt is a 63 y/o female with h/o of advanced liver disease due to alcoholism who presents for f/u. Pt was last seen by us in Jan 2020. Pt has multiple fluctuations (DARRIAN) on CKD, suspected related to hemodynamic changes and hepatorenal syndrome. Pt has required large paracentesis approximately once per week with removal of about 7-8 L on average each time. What adds to her rapid increase in abdominal girth is also pt's own lack of grasp regarding the need to avoid salty foods and SX fluid intake. This has been exhaustively explained to her. On her last visit with us, aldactone 25 mg po bid was added to assist with the liver issues and her secondary hyperaldosteronism.     Pt presents with her regular care giver today for f/u. The care giver says that enlargement in the abdomen does not appear to be occurring as fast and as much. She even cancelled one paracentesis appt because "the abdomen did not appear big enough." Pt has no acute or new c/o's. No abd pain, no fever, no SOB. To also review: pt has had several episodes of hepatic decompensation, requiting hospital admissions. She has a h/o of hepatic encephalopathy, varices with GI bleed, and ascites requiring frequent paracentesis. Diet was again xtensively discussed. Pt appears to be doing slightly better with salt. Labs and meds reviewed.      PAST MEDICAL HISTORY:  DARRIAN (suspect hepatorenal syndrome, hypotension, Alcoholic cirrhosis, Alcoholic dementia, Anemia, Ascites, CKD (chronic kidney disease), Esophageal reflux, Esophageal varices, Hepatic encephalopathy, and Hiatal hernia.     PAST SURGICAL HISTORY:  She  has a past surgical history that includes Hysterectomy; Esophagogastroduodenoscopy; and Colonoscopy.     SOCIAL HISTORY:  She  reports that she has never " "smoked. She has never used smokeless tobacco. She reports that she drank alcohol.     FAMILY MEDICAL HISTORY:  Her family history includes Arthritis in her mother; Cancer in her mother.             Review of patient's allergies indicates:   Allergen Reactions    Hydrocodone-acetaminophen        Meds reviewed    Current Outpatient Medications:     FLUoxetine 20 MG capsule, Take 20 mg by mouth once daily., Disp: , Rfl:     furosemide (LASIX) 20 MG tablet, Take 1 tablet (20 mg total) by mouth once daily., Disp: 60 tablet, Rfl: 12    hydrOXYzine HCl (ATARAX) 25 MG tablet, Take 25 mg by mouth 3 (three) times daily as needed. , Disp: , Rfl:     lactulose (CHRONULAC) 10 gram/15 mL solution, Take 30 mLs (20 g total) by mouth 2 (two) times daily., Disp: 1800 mL, Rfl: 5    magnesium oxide (MAG-OX) 400 mg (241.3 mg magnesium) tablet, Take 400 mg by mouth once daily., Disp: , Rfl:     multivitamin (THERAGRAN) per tablet, Take 1 tablet by mouth once daily., Disp: , Rfl:     pantoprazole (PROTONIX) 20 MG tablet, Take 20 mg by mouth 2 (two) times daily before meals., Disp: , Rfl:     spironolactone (ALDACTONE) 25 MG tablet, Take 1 tablet (25 mg total) by mouth once daily., Disp: 30 tablet, Rfl: 11      REVIEW OF SYSTEMS:  Patient has no fever, fatigue, visual changes, chest pain, edema, cough, dyspnea, nausea, vomiting, constipation, diarrhea, arthralgias, pruritis, dizziness, weakness, depression, confusion.     PHYSICAL EXAM:  Blood pressure 130/80, pulse 80, resp. rate 20, height 5' 6" (1.676 m), weight 153 lbs, last visit 158 lbs  Gen:    WDWN female in no apparent distress  Psych: Normal mood and affect  Skin:    No rashes or ulcers  Eyes:   Normal conjunctiva and lids, PERRLA  ENT:    Normal hearing with no oropharyngeal lesions  Neck:   No JVD  Chest:  Clear with no rales, rhonchi, wheezing with normal effort  CV:      Regular with no murmurs, gallops or rubs  Abd:     Soft, nontender, + distension/ascites (not as " much as last visit), positive bowel sounds  Ext:      2+ edema     Labs: reviewed  BMP  Lab Results   Component Value Date     (L) 02/11/2020    K 5.4 (H) 02/11/2020    CL 99 02/11/2020    CO2 23 02/11/2020    BUN 20 02/11/2020    CREATININE 1.5 (H) 02/11/2020    CALCIUM 8.8 02/11/2020    ANIONGAP 10 02/11/2020    ESTGFRAFRICA 41.8 (A) 02/11/2020    EGFRNONAA 36.3 (A) 02/11/2020     Lab Results   Component Value Date    WBC 8.15 01/23/2020    HGB 10.9 (L) 01/23/2020    HCT 34.1 (L) 01/23/2020    MCV 93 01/23/2020     01/23/2020            IMPRESSION AND RECOMMENDATIONS: 63 y/o female with DARRIAN and fluctuations in s Cr and alcoholic cirrhosis:     1. Renal: s Cr stable, not worse. Has improved, lower. DARRIAN likely due to hepatic disease, hepatorenal syndrome  s Cr was normal recently in May 2019.  The fluctuations in s Cr are due to hepatic disease and decompensation.  The rise in s Cr is c/w with DARRIAN, not with CKD  BP is low due to liver disease (splanchnic vasodilatation), hypotension causing further decrease in renal blood flow. BP has improved and is stable    K: mild hyperkalemia. Due to aldactone  It appears that aldactone is helping the pt  Abdominal girth increase not occurring as fast  Will continue aldactone but will change to qd from bid    Hyponatremia: dilutional, due to liver disease, improved and stable with lasix  Advised again to lower fluid intake     2. Hypotension: BP normal to low as expected with liver disease and HRS  Low BP may limit the use of diuretics     3. Liver disease. Has required multiple large volume paracenteses (once per week, 7-8 L each)  Large 3rd spacing, LE edema and ascites  Pt on lasix 20 mg po qd, may need to increase dose  Continue spironolactone. See above     Again attempt was made to tell pt that she needs to restrict salt and fluid in diet  Not sure if she understand or agrees  Pt appears to have low self control and low intuition, which may actually be  complicated by hepatic encephalopathy.   Has a printed list of high K foods to avoid.     4. Anemia: noted normal to macrocytic  Unsure if due to lover disease or if dilutional  Doubt related to kidneys  Will defer to GI     Plans and recommendations:  As discussed above  Opportunity for questions provided with pt and care giver  Will need close f/u   RTC 1 month  Total time spent 40 minutes including time needed to review the records, the   patient evaluation, documentation, face-to-face discussion with the patient,   more than 50% of the time was spent on coordination of care and counseling.    Level V visit.     Hector Sanchez MD

## 2020-02-14 NOTE — PROGRESS NOTES
"Renal clinic consult note:  Date of pt visit: 2/13/20  Date of this note 2/14/20  Reason for f/u and chief c/o: DARRIAN on CKD. Suspected hepatorenal syndrome  Referring provider: Louise Álvarez NP     HPI: Pt is a 63 y/o female with h/o of advanced liver disease due to alcoholism who presents for f/u. Pt was last seen by us in Jan 2020. Pt has multiple fluctuations (DARRIAN) on CKD, suspected related to hemodynamic changes and hepatorenal syndrome. Pt has required large paracentesis approximately once per week with removal of about 7-8 L on average each time. What adds to her rapid increase in abdominal girth is also pt's own lack of grasp regarding the need to avoid salty foods and SX fluid intake. This has been exhaustively explained to her. On her last visit with us, aldactone 25 mg po bid was added to assist with the liver issues and her secondary hyperaldosteronism.     Pt presents with her regular care giver today for f/u. The care giver says that enlargement in the abdomen does not appear to be occurring as fast and as much. She even cancelled one paracentesis appt because "the abdomen did not appear big enough." Pt has no acute or new c/o's. No abd pain, no fever, no SOB. To also review: pt has had several episodes of hepatic decompensation, requiting hospital admissions. She has a h/o of hepatic encephalopathy, varices with GI bleed, and ascites requiring frequent paracentesis. Diet was again xtensively discussed. Pt appears to be doing slightly better with salt. Labs and meds reviewed.      PAST MEDICAL HISTORY:  DARRIAN (suspect hepatorenal syndrome, hypotension, Alcoholic cirrhosis, Alcoholic dementia, Anemia, Ascites, CKD (chronic kidney disease), Esophageal reflux, Esophageal varices, Hepatic encephalopathy, and Hiatal hernia.     PAST SURGICAL HISTORY:  She  has a past surgical history that includes Hysterectomy; Esophagogastroduodenoscopy; and Colonoscopy.     SOCIAL HISTORY:  She  reports that she has never " "smoked. She has never used smokeless tobacco. She reports that she drank alcohol.     FAMILY MEDICAL HISTORY:  Her family history includes Arthritis in her mother; Cancer in her mother.             Review of patient's allergies indicates:   Allergen Reactions    Hydrocodone-acetaminophen        Meds reviewed    Current Outpatient Medications:     FLUoxetine 20 MG capsule, Take 20 mg by mouth once daily., Disp: , Rfl:     furosemide (LASIX) 20 MG tablet, Take 1 tablet (20 mg total) by mouth once daily., Disp: 60 tablet, Rfl: 12    hydrOXYzine HCl (ATARAX) 25 MG tablet, Take 25 mg by mouth 3 (three) times daily as needed. , Disp: , Rfl:     lactulose (CHRONULAC) 10 gram/15 mL solution, Take 30 mLs (20 g total) by mouth 2 (two) times daily., Disp: 1800 mL, Rfl: 5    magnesium oxide (MAG-OX) 400 mg (241.3 mg magnesium) tablet, Take 400 mg by mouth once daily., Disp: , Rfl:     multivitamin (THERAGRAN) per tablet, Take 1 tablet by mouth once daily., Disp: , Rfl:     pantoprazole (PROTONIX) 20 MG tablet, Take 20 mg by mouth 2 (two) times daily before meals., Disp: , Rfl:     spironolactone (ALDACTONE) 25 MG tablet, Take 1 tablet (25 mg total) by mouth once daily., Disp: 30 tablet, Rfl: 11      REVIEW OF SYSTEMS:  Patient has no fever, fatigue, visual changes, chest pain, edema, cough, dyspnea, nausea, vomiting, constipation, diarrhea, arthralgias, pruritis, dizziness, weakness, depression, confusion.     PHYSICAL EXAM:  Blood pressure 130/80, pulse 80, resp. rate 20, height 5' 6" (1.676 m), weight 153 lbs, last visit 158 lbs  Gen:    WDWN female in no apparent distress  Psych: Normal mood and affect  Skin:    No rashes or ulcers  Eyes:   Normal conjunctiva and lids, PERRLA  ENT:    Normal hearing with no oropharyngeal lesions  Neck:   No JVD  Chest:  Clear with no rales, rhonchi, wheezing with normal effort  CV:      Regular with no murmurs, gallops or rubs  Abd:     Soft, nontender, + distension/ascites (not as " much as last visit), positive bowel sounds  Ext:      2+ edema     Labs: reviewed  BMP  Lab Results   Component Value Date     (L) 02/11/2020    K 5.4 (H) 02/11/2020    CL 99 02/11/2020    CO2 23 02/11/2020    BUN 20 02/11/2020    CREATININE 1.5 (H) 02/11/2020    CALCIUM 8.8 02/11/2020    ANIONGAP 10 02/11/2020    ESTGFRAFRICA 41.8 (A) 02/11/2020    EGFRNONAA 36.3 (A) 02/11/2020     Lab Results   Component Value Date    WBC 8.15 01/23/2020    HGB 10.9 (L) 01/23/2020    HCT 34.1 (L) 01/23/2020    MCV 93 01/23/2020     01/23/2020            IMPRESSION AND RECOMMENDATIONS: 65 y/o female with DARRIAN and fluctuations in s Cr and alcoholic cirrhosis:     1. Renal: s Cr stable, not worse. Has improved, lower. DARRIAN likely due to hepatic disease, hepatorenal syndrome  s Cr was normal recently in May 2019.  The fluctuations in s Cr are due to hepatic disease and decompensation.  The rise in s Cr is c/w with DARRIAN, not with CKD  BP is low due to liver disease (splanchnic vasodilatation), hypotension causing further decrease in renal blood flow. BP has improved and is stable    K: mild hyperkalemia. Due to aldactone  It appears that aldactone is helping the pt  Abdominal girth increase not occurring as fast  Will continue aldactone but will change to qd from bid    Hyponatremia: dilutional, due to liver disease, improved and stable with lasix  Advised again to lower fluid intake     2. Hypotension: BP normal to low as expected with liver disease and HRS  Low BP may limit the use of diuretics     3. Liver disease. Has required multiple large volume paracenteses (once per week, 7-8 L each)  Large 3rd spacing, LE edema and ascites  Pt on lasix 20 mg po qd, may need to increase dose  Continue spironolactone. See above     Again attempt was made to tell pt that she needs to restrict salt and fluid in diet  Not sure if she understand or agrees  Pt appears to have low self control and low intuition, which may actually be  complicated by hepatic encephalopathy.   Has a printed list of high K foods to avoid.     4. Anemia: noted normal to macrocytic  Unsure if due to lover disease or if dilutional  Doubt related to kidneys  Will defer to GI     Plans and recommendations:  As discussed above  Opportunity for questions provided with pt and care giver  Will need close f/u   RTC 1 month  Total time spent 40 minutes including time needed to review the records, the   patient evaluation, documentation, face-to-face discussion with the patient,   more than 50% of the time was spent on coordination of care and counseling.    Level V visit.     Hector Sanchez MD

## 2020-03-03 DIAGNOSIS — K70.31 ALCOHOLIC CIRRHOSIS OF LIVER WITH ASCITES: ICD-10-CM

## 2020-03-03 DIAGNOSIS — D64.9 ANEMIA, UNSPECIFIED TYPE: Primary | ICD-10-CM

## 2020-03-03 DIAGNOSIS — K70.31 ASCITES DUE TO ALCOHOLIC CIRRHOSIS: ICD-10-CM

## 2020-03-04 ENCOUNTER — HOSPITAL ENCOUNTER (OUTPATIENT)
Dept: RADIOLOGY | Facility: HOSPITAL | Age: 65
Discharge: HOME | End: 2020-03-04
Attending: NURSE PRACTITIONER
Payer: MEDICARE

## 2020-03-04 VITALS
SYSTOLIC BLOOD PRESSURE: 144 MMHG | RESPIRATION RATE: 16 BRPM | BODY MASS INDEX: 24.73 KG/M2 | HEIGHT: 66 IN | HEART RATE: 96 BPM | DIASTOLIC BLOOD PRESSURE: 83 MMHG | OXYGEN SATURATION: 98 %

## 2020-03-04 DIAGNOSIS — K70.31 ASCITES DUE TO ALCOHOLIC CIRRHOSIS: ICD-10-CM

## 2020-03-04 PROCEDURE — 49083 ABD PARACENTESIS W/IMAGING: CPT

## 2020-03-04 RX ORDER — LACTULOSE 10 G/10G
10 SOLUTION ORAL DAILY
Status: ON HOLD | COMMUNITY
End: 2020-10-31 | Stop reason: HOSPADM

## 2020-03-04 NOTE — PLAN OF CARE
Procedure and recovery complete.  VSS.  4 Liters of rajan fluid removed.  Puncture site with band aid - C/D/I.  Patient dc'd ambulatory to front of hospital with caretaker

## 2020-03-04 NOTE — DISCHARGE INSTRUCTIONS

## 2020-03-18 ENCOUNTER — PATIENT MESSAGE (OUTPATIENT)
Dept: GASTROENTEROLOGY | Facility: CLINIC | Age: 65
End: 2020-03-18

## 2020-03-18 RX ORDER — SPIRONOLACTONE 25 MG/1
25 TABLET ORAL DAILY
Qty: 30 TABLET | Refills: 11 | Status: SHIPPED | OUTPATIENT
Start: 2020-03-18 | End: 2020-07-14 | Stop reason: SDUPTHER

## 2020-04-07 ENCOUNTER — PATIENT MESSAGE (OUTPATIENT)
Dept: GASTROENTEROLOGY | Facility: CLINIC | Age: 65
End: 2020-04-07

## 2020-07-14 ENCOUNTER — LAB VISIT (OUTPATIENT)
Dept: LAB | Facility: HOSPITAL | Age: 65
End: 2020-07-14
Attending: NURSE PRACTITIONER
Payer: MEDICARE

## 2020-07-14 ENCOUNTER — OFFICE VISIT (OUTPATIENT)
Dept: GASTROENTEROLOGY | Facility: CLINIC | Age: 65
End: 2020-07-14
Payer: MEDICARE

## 2020-07-14 VITALS
WEIGHT: 138.88 LBS | SYSTOLIC BLOOD PRESSURE: 140 MMHG | DIASTOLIC BLOOD PRESSURE: 90 MMHG | HEIGHT: 66 IN | BODY MASS INDEX: 22.32 KG/M2 | HEART RATE: 99 BPM

## 2020-07-14 DIAGNOSIS — K70.31 ALCOHOLIC CIRRHOSIS OF LIVER WITH ASCITES: ICD-10-CM

## 2020-07-14 DIAGNOSIS — K70.31 ASCITES DUE TO ALCOHOLIC CIRRHOSIS: Primary | ICD-10-CM

## 2020-07-14 DIAGNOSIS — F10.27 DEMENTIA ASSOCIATED WITH ALCOHOLISM WITHOUT BEHAVIORAL DISTURBANCE: ICD-10-CM

## 2020-07-14 DIAGNOSIS — I85.11 SECONDARY ESOPHAGEAL VARICES WITH BLEEDING: ICD-10-CM

## 2020-07-14 DIAGNOSIS — K70.31 ASCITES DUE TO ALCOHOLIC CIRRHOSIS: ICD-10-CM

## 2020-07-14 LAB
AFP SERPL-MCNC: 3.2 NG/ML (ref 0–8.4)
ALBUMIN SERPL BCP-MCNC: 4.3 G/DL (ref 3.5–5.2)
ALP SERPL-CCNC: 152 U/L (ref 55–135)
ALT SERPL W/O P-5'-P-CCNC: 28 U/L (ref 10–44)
AMMONIA PLAS-SCNC: 37 UMOL/L (ref 10–50)
ANION GAP SERPL CALC-SCNC: 10 MMOL/L (ref 8–16)
AST SERPL-CCNC: 41 U/L (ref 10–40)
BASOPHILS # BLD AUTO: 0.04 K/UL (ref 0–0.2)
BASOPHILS NFR BLD: 0.8 % (ref 0–1.9)
BILIRUB SERPL-MCNC: 1.1 MG/DL (ref 0.1–1)
BUN SERPL-MCNC: 9 MG/DL (ref 8–23)
CALCIUM SERPL-MCNC: 9.7 MG/DL (ref 8.7–10.5)
CHLORIDE SERPL-SCNC: 92 MMOL/L (ref 95–110)
CO2 SERPL-SCNC: 25 MMOL/L (ref 23–29)
CREAT SERPL-MCNC: 1.3 MG/DL (ref 0.5–1.4)
DIFFERENTIAL METHOD: ABNORMAL
EOSINOPHIL # BLD AUTO: 0.1 K/UL (ref 0–0.5)
EOSINOPHIL NFR BLD: 1.9 % (ref 0–8)
ERYTHROCYTE [DISTWIDTH] IN BLOOD BY AUTOMATED COUNT: 12.8 % (ref 11.5–14.5)
EST. GFR  (AFRICAN AMERICAN): 49.7 ML/MIN/1.73 M^2
EST. GFR  (NON AFRICAN AMERICAN): 43.2 ML/MIN/1.73 M^2
FOLATE SERPL-MCNC: 16.6 NG/ML (ref 4–24)
GLUCOSE SERPL-MCNC: 113 MG/DL (ref 70–110)
HCT VFR BLD AUTO: 31.8 % (ref 37–48.5)
HGB BLD-MCNC: 10.4 G/DL (ref 12–16)
IMM GRANULOCYTES # BLD AUTO: 0.03 K/UL (ref 0–0.04)
IMM GRANULOCYTES NFR BLD AUTO: 0.6 % (ref 0–0.5)
INR PPP: 1.5 (ref 0.8–1.2)
LYMPHOCYTES # BLD AUTO: 0.8 K/UL (ref 1–4.8)
LYMPHOCYTES NFR BLD: 14.8 % (ref 18–48)
MCH RBC QN AUTO: 29.6 PG (ref 27–31)
MCHC RBC AUTO-ENTMCNC: 32.7 G/DL (ref 32–36)
MCV RBC AUTO: 91 FL (ref 82–98)
MONOCYTES # BLD AUTO: 0.8 K/UL (ref 0.3–1)
MONOCYTES NFR BLD: 15.8 % (ref 4–15)
NEUTROPHILS # BLD AUTO: 3.5 K/UL (ref 1.8–7.7)
NEUTROPHILS NFR BLD: 66.1 % (ref 38–73)
NRBC BLD-RTO: 0 /100 WBC
PLATELET # BLD AUTO: 208 K/UL (ref 150–350)
PMV BLD AUTO: 9.2 FL (ref 9.2–12.9)
POTASSIUM SERPL-SCNC: 4.6 MMOL/L (ref 3.5–5.1)
PROT SERPL-MCNC: 8.2 G/DL (ref 6–8.4)
PROTHROMBIN TIME: 16 SEC (ref 9–12.5)
RBC # BLD AUTO: 3.51 M/UL (ref 4–5.4)
SODIUM SERPL-SCNC: 127 MMOL/L (ref 136–145)
WBC # BLD AUTO: 5.32 K/UL (ref 3.9–12.7)

## 2020-07-14 PROCEDURE — 82140 ASSAY OF AMMONIA: CPT

## 2020-07-14 PROCEDURE — 99214 PR OFFICE/OUTPT VISIT, EST, LEVL IV, 30-39 MIN: ICD-10-PCS | Mod: S$GLB,,, | Performed by: NURSE PRACTITIONER

## 2020-07-14 PROCEDURE — 85025 COMPLETE CBC W/AUTO DIFF WBC: CPT

## 2020-07-14 PROCEDURE — 1100F PTFALLS ASSESS-DOCD GE2>/YR: CPT | Mod: CPTII,S$GLB,, | Performed by: NURSE PRACTITIONER

## 2020-07-14 PROCEDURE — 85610 PROTHROMBIN TIME: CPT

## 2020-07-14 PROCEDURE — 99999 PR PBB SHADOW E&M-EST. PATIENT-LVL IV: ICD-10-PCS | Mod: PBBFAC,,, | Performed by: NURSE PRACTITIONER

## 2020-07-14 PROCEDURE — 84425 ASSAY OF VITAMIN B-1: CPT

## 2020-07-14 PROCEDURE — 80053 COMPREHEN METABOLIC PANEL: CPT

## 2020-07-14 PROCEDURE — 99214 OFFICE O/P EST MOD 30 MIN: CPT | Mod: S$GLB,,, | Performed by: NURSE PRACTITIONER

## 2020-07-14 PROCEDURE — 3008F PR BODY MASS INDEX (BMI) DOCUMENTED: ICD-10-PCS | Mod: CPTII,S$GLB,, | Performed by: NURSE PRACTITIONER

## 2020-07-14 PROCEDURE — 82105 ALPHA-FETOPROTEIN SERUM: CPT

## 2020-07-14 PROCEDURE — 1100F PR PT FALLS ASSESS DOC 2+ FALLS/FALL W/INJURY/YR: ICD-10-PCS | Mod: CPTII,S$GLB,, | Performed by: NURSE PRACTITIONER

## 2020-07-14 PROCEDURE — 99999 PR PBB SHADOW E&M-EST. PATIENT-LVL IV: CPT | Mod: PBBFAC,,, | Performed by: NURSE PRACTITIONER

## 2020-07-14 PROCEDURE — 36415 COLL VENOUS BLD VENIPUNCTURE: CPT

## 2020-07-14 PROCEDURE — 3008F BODY MASS INDEX DOCD: CPT | Mod: CPTII,S$GLB,, | Performed by: NURSE PRACTITIONER

## 2020-07-14 PROCEDURE — 82746 ASSAY OF FOLIC ACID SERUM: CPT

## 2020-07-14 PROCEDURE — 3288F PR FALLS RISK ASSESSMENT DOCUMENTED: ICD-10-PCS | Mod: CPTII,S$GLB,, | Performed by: NURSE PRACTITIONER

## 2020-07-14 PROCEDURE — 3288F FALL RISK ASSESSMENT DOCD: CPT | Mod: CPTII,S$GLB,, | Performed by: NURSE PRACTITIONER

## 2020-07-14 RX ORDER — FUROSEMIDE 20 MG/1
20 TABLET ORAL DAILY
Qty: 60 TABLET | Refills: 12 | Status: SHIPPED | OUTPATIENT
Start: 2020-07-14

## 2020-07-14 RX ORDER — SPIRONOLACTONE 25 MG/1
25 TABLET ORAL DAILY
Qty: 30 TABLET | Refills: 11 | Status: SHIPPED | OUTPATIENT
Start: 2020-07-14 | End: 2021-07-22

## 2020-07-14 NOTE — PROGRESS NOTES
Clinic Follow Up:  [facility] Follow Up Note    Reason for Follow Up:  The primary encounter diagnosis was Ascites due to alcoholic cirrhosis. Diagnoses of Secondary esophageal varices with bleeding, Alcoholic cirrhosis of liver with ascites, and Dementia associated with alcoholism without behavioral disturbance were also pertinent to this visit.    PCP: Santos Beaver       HPI:  This is a 64 y.o. female here for follow up of the above  Pt is here with her caregiver  She states that she has been feeling overall stable.  She has continued to consume ETOH, reportedly less than previous.   No return of ascites on current diuretics.   Has continued with memory issues and confusion.  Taking lactulose with 3-4 stools per day.   Denies any abdominal pain.  No nausea or vomiting.  No change in bowel pattern.  No melena or hematochezia. No weight loss.  No upper GI bleeding.  No ascites or BLE.  No overt confusion.        Review of Systems   Constitutional: Negative for chills, fever, malaise/fatigue and weight loss.   Respiratory: Negative for cough.    Cardiovascular: Negative for chest pain.   Gastrointestinal:        Per HPI   Musculoskeletal: Negative for myalgias.   Skin: Negative for itching and rash.   Neurological: Negative for headaches.   Psychiatric/Behavioral: The patient is not nervous/anxious.        Medical History:  Past Medical History:   Diagnosis Date    Alcoholic cirrhosis     Alcoholic dementia     Anemia     Ascites     CKD (chronic kidney disease)     Esophageal reflux     Esophageal varices     Hepatic encephalopathy     Hiatal hernia        Surgical History:   Past Surgical History:   Procedure Laterality Date    COLONOSCOPY      ESOPHAGOGASTRODUODENOSCOPY      ESOPHAGOGASTRODUODENOSCOPY N/A 1/6/2020    Procedure: EGD (ESOPHAGOGASTRODUODENOSCOPY);  Surgeon: Monique Wood MD;  [facility];  Service: Endoscopy;  Laterality: N/A;    HYSTERECTOMY    "      Family History:   Family History   Problem Relation Age of Onset    Cancer Mother     Arthritis Mother        Social History:   Social History     Tobacco Use    Smoking status: Never Smoker    Smokeless tobacco: Never Used   Substance Use Topics    Alcohol use: Yes     Alcohol/week: 2.0 standard drinks     Types: 2 Shots of liquor per week     Comment: some days    Drug use: Not on file       Allergies: Reviewed    Home Medications:  Current Outpatient Medications on File Prior to Visit   Medication Sig Dispense Refill    FLUoxetine 20 MG capsule Take 20 mg by mouth once daily.      hydrOXYzine HCl (ATARAX) 25 MG tablet Take 25 mg by mouth 3 (three) times daily as needed.       lactulose (CEPHULAC) 10 gram packet Take 10 g by mouth once daily.      magnesium oxide (MAG-OX) 400 mg (241.3 mg magnesium) tablet Take 400 mg by mouth once daily.      multivitamin (THERAGRAN) per tablet Take 1 tablet by mouth once daily.      pantoprazole (PROTONIX) 20 MG tablet Take 20 mg by mouth 2 (two) times daily before meals.      [DISCONTINUED] spironolactone (ALDACTONE) 25 MG tablet Take 1 tablet (25 mg total) by mouth once daily. 30 tablet 11    [DISCONTINUED] furosemide (LASIX) 20 MG tablet Take 1 tablet (20 mg total) by mouth once daily. (Patient not taking: Reported on 7/14/2020) 60 tablet 12     No current facility-administered medications on file prior to visit.        Physical Exam:  Vital Signs:  BP (!) 140/90   Pulse 99   Ht 5' 6" (1.676 m)   Wt 63 kg (138 lb 14.2 oz)   BMI 22.42 kg/m²   Body mass index is 22.42 kg/m².  Physical Exam   Constitutional: She is oriented to person, place, and time. She appears well-developed and well-nourished.   HENT:   Head: Normocephalic.   Eyes: No scleral icterus.   Neck: Normal range of motion.   Cardiovascular: Normal rate.   Pulmonary/Chest: Effort normal.   Abdominal: She exhibits no distension.   Musculoskeletal:         General: No edema.   Neurological: She " is alert and oriented to person, place, and time.   Skin: Skin is dry.   Psychiatric: She has a normal mood and affect.   Vitals reviewed.      Labs: Pertinent labs reviewed.  MELD-Na score: 20 at 10/9/2019  2:50 PM  MELD score: 18 at 10/9/2019  2:50 PM  Calculated from:  Serum Creatinine: 2.0 mg/dL at 10/9/2019  2:50 PM  Serum Sodium: 134 mmol/L at 10/9/2019  2:50 PM  Total Bilirubin: 0.9 mg/dL (Rounded to 1 mg/dL) at 10/9/2019  2:50 PM  INR(ratio): 1.5 at 10/9/2019  2:50 PM  Age: 63 years 11 months  EV: EGD 1/2020 without EV  HCC: US 2019 without lesion or mass, per Care Everywhere, due for HCC surveillance    Assessment:  1. Ascites due to alcoholic cirrhosis    2. Secondary esophageal varices with bleeding    3. Alcoholic cirrhosis of liver with ascites    4. Dementia associated with alcoholism without behavioral disturbance        Recommendations:  Stable without any new complaints.   - continue current medications  - low sodium diet discussed  MELD remains elevated.  Not a transplant candidate due to continued ETOH intake daily  - WIll get updated MELD labs and HCC surveillance now and in 6 months  - given her continued ETOH will check thiamine and Folate and supplement as needed  - Strongly urged ETOH abstinence.  Pt is not interested in stopping at this time.       Return to Clinic:    6 months with pre-clinic labs and imaging.

## 2020-07-20 ENCOUNTER — TELEPHONE (OUTPATIENT)
Dept: RADIOLOGY | Facility: HOSPITAL | Age: 65
End: 2020-07-20

## 2020-07-21 ENCOUNTER — HOSPITAL ENCOUNTER (OUTPATIENT)
Dept: RADIOLOGY | Facility: HOSPITAL | Age: 65
Discharge: HOME | End: 2020-07-21
Attending: NURSE PRACTITIONER
Payer: MEDICARE

## 2020-07-21 DIAGNOSIS — K70.31 ASCITES DUE TO ALCOHOLIC CIRRHOSIS: ICD-10-CM

## 2020-07-21 DIAGNOSIS — F10.27 DEMENTIA ASSOCIATED WITH ALCOHOLISM WITHOUT BEHAVIORAL DISTURBANCE: ICD-10-CM

## 2020-07-21 DIAGNOSIS — I85.11 SECONDARY ESOPHAGEAL VARICES WITH BLEEDING: ICD-10-CM

## 2020-07-21 DIAGNOSIS — K70.31 ALCOHOLIC CIRRHOSIS OF LIVER WITH ASCITES: ICD-10-CM

## 2020-07-21 PROCEDURE — 76700 US ABDOMEN COMPLETE: ICD-10-PCS | Mod: 26,,, | Performed by: RADIOLOGY

## 2020-07-21 PROCEDURE — 76700 US EXAM ABDOM COMPLETE: CPT | Mod: TC

## 2020-07-21 PROCEDURE — 76700 US EXAM ABDOM COMPLETE: CPT | Mod: 26,,, | Performed by: RADIOLOGY

## 2020-10-14 ENCOUNTER — PATIENT MESSAGE (OUTPATIENT)
Dept: GASTROENTEROLOGY | Facility: CLINIC | Age: 65
End: 2020-10-14

## 2020-10-15 ENCOUNTER — TELEPHONE (OUTPATIENT)
Dept: RADIOLOGY | Facility: HOSPITAL | Age: 65
End: 2020-10-15

## 2020-10-15 DIAGNOSIS — I85.11 SECONDARY ESOPHAGEAL VARICES WITH BLEEDING: ICD-10-CM

## 2020-10-15 DIAGNOSIS — D68.9 COAGULATION DEFECT, UNSPECIFIED: Primary | ICD-10-CM

## 2020-10-15 DIAGNOSIS — F10.27 DEMENTIA ASSOCIATED WITH ALCOHOLISM WITHOUT BEHAVIORAL DISTURBANCE: ICD-10-CM

## 2020-10-15 DIAGNOSIS — K70.31 ASCITES DUE TO ALCOHOLIC CIRRHOSIS: ICD-10-CM

## 2020-10-15 DIAGNOSIS — D64.9 ANEMIA, UNSPECIFIED TYPE: ICD-10-CM

## 2020-10-15 NOTE — TELEPHONE ENCOUNTER
Called patient and left a voicemeail that she needs to be here at 1200 b/c she needs labwork first

## 2020-10-16 ENCOUNTER — HOSPITAL ENCOUNTER (OUTPATIENT)
Dept: RADIOLOGY | Facility: HOSPITAL | Age: 65
Discharge: HOME | End: 2020-10-16
Attending: NURSE PRACTITIONER
Payer: MEDICARE

## 2020-10-16 VITALS
HEART RATE: 92 BPM | SYSTOLIC BLOOD PRESSURE: 162 MMHG | BODY MASS INDEX: 22.82 KG/M2 | OXYGEN SATURATION: 98 % | WEIGHT: 142 LBS | HEIGHT: 66 IN | RESPIRATION RATE: 16 BRPM | DIASTOLIC BLOOD PRESSURE: 94 MMHG

## 2020-10-16 DIAGNOSIS — K70.31 ASCITES DUE TO ALCOHOLIC CIRRHOSIS: ICD-10-CM

## 2020-10-16 PROCEDURE — 49083 ABD PARACENTESIS W/IMAGING: CPT

## 2020-10-16 NOTE — DISCHARGE SUMMARY
Pre Op Diagnosis: ascites     Post Op Diagnosis: same     Procedure:  para     Procedure performed by: Rosas RAINEY, Sherlyn GREGORY     Written Informed Consent Obtained: Yes     Specimen Removed:  yes     Estimated Blood Loss:  minimal     Findings: Local anesthesia.     The patient tolerated the procedure well and there were no complications.      Sterile technique was performed in the lower abdomen, lidocaine was used as a local anesthetic.   3 liters of rajan fluid removed for therapeutic purposes.  Pt tolerated the procedure well without immediate complications.  Please see radiologist report for details. F/u with PCP and/or ordering physician.

## 2020-10-16 NOTE — PLAN OF CARE
3 liters of rajan fluid removed from R side abdomen puncture site, site WDL.  Pt d/c home in stable condition via wheelchair with ride.  Verbalized understanding of d/c instructions,handout given.  Pt voiced no complaints at this time.

## 2020-10-16 NOTE — H&P
[facility]  History & Physical    Subjective:      Chief Complaint/Reason for Admission: ascites    Kylah Ortiz is a 64 y.o. female.    Past Medical History:   Diagnosis Date    Alcoholic cirrhosis     Alcoholic dementia     Anemia     Ascites     CKD (chronic kidney disease)     Esophageal reflux     Esophageal varices     Hepatic encephalopathy     Hiatal hernia      Past Surgical History:   Procedure Laterality Date    COLONOSCOPY      ESOPHAGOGASTRODUODENOSCOPY      ESOPHAGOGASTRODUODENOSCOPY N/A 1/6/2020    Procedure: EGD (ESOPHAGOGASTRODUODENOSCOPY);  Surgeon: Monique Wood MD;  Location: [facility];  Service: Endoscopy;  Laterality: N/A;    HYSTERECTOMY       Family History   Problem Relation Age of Onset    Cancer Mother     Arthritis Mother      Social History     Tobacco Use    Smoking status: Never Smoker    Smokeless tobacco: Never Used   Substance Use Topics    Alcohol use: Yes     Alcohol/week: 2.0 standard drinks     Types: 2 Shots of liquor per week     Comment: some days    Drug use: Not on file       (Not in a hospital admission)    Review of patient's allergies indicates:   Allergen Reactions    Hydrocodone-acetaminophen         Review of Systems   Constitutional: Negative.    HENT: Negative.    Eyes: Negative.    Cardiovascular: Negative.    Gastrointestinal: Negative.    Skin: Negative.        Objective:      Vital Signs (Most Recent)  Pulse: 92 (10/16/20 1331)  Resp: 16 (10/16/20 1331)  BP: (!) 162/94 (10/16/20 1331)  SpO2: 98 % (10/16/20 1331)    Vital Signs Range (Last 24H):  Pulse:  [92]   Resp:  [16]   BP: (162)/(94)   SpO2:  [98 %]     Physical Exam  Constitutional:       Appearance: Normal appearance.   HENT:      Head: Atraumatic.      Nose: Nose normal.   Eyes:      Pupils: Pupils are equal, round, and reactive to light.   Cardiovascular:      Heart sounds: Normal heart sounds.   Pulmonary:      Effort: Pulmonary effort is normal.          Data Review:    CBC:   Lab Results   Component Value Date    WBC 6.41 10/16/2020    RBC 3.46 (L) 10/16/2020    HGB 10.7 (L) 10/16/2020    HCT 31.1 (L) 10/16/2020     10/16/2020      ECG: no prior ECG.     Assessment:      There are no hospital problems to display for this patient.      Plan:    paracentesis

## 2020-10-16 NOTE — DISCHARGE INSTRUCTIONS

## 2020-10-27 ENCOUNTER — ANESTHESIA EVENT (OUTPATIENT)
Dept: SURGERY | Facility: HOSPITAL | Age: 65
DRG: 353 | End: 2020-10-27
Payer: MEDICARE

## 2020-10-27 ENCOUNTER — HOSPITAL ENCOUNTER (INPATIENT)
Facility: HOSPITAL | Age: 65
LOS: 4 days | Discharge: HOME | DRG: 353 | End: 2020-10-31
Attending: EMERGENCY MEDICINE | Admitting: SURGERY
Payer: MEDICARE

## 2020-10-27 ENCOUNTER — OFFICE VISIT (OUTPATIENT)
Dept: GASTROENTEROLOGY | Facility: CLINIC | Age: 65
End: 2020-10-27
Payer: MEDICARE

## 2020-10-27 ENCOUNTER — ANESTHESIA (OUTPATIENT)
Dept: SURGERY | Facility: HOSPITAL | Age: 65
DRG: 353 | End: 2020-10-27
Payer: MEDICARE

## 2020-10-27 VITALS
BODY MASS INDEX: 22.57 KG/M2 | WEIGHT: 140.44 LBS | HEART RATE: 94 BPM | HEIGHT: 66 IN | DIASTOLIC BLOOD PRESSURE: 70 MMHG | SYSTOLIC BLOOD PRESSURE: 120 MMHG

## 2020-10-27 DIAGNOSIS — I85.11 SECONDARY ESOPHAGEAL VARICES WITH BLEEDING: ICD-10-CM

## 2020-10-27 DIAGNOSIS — F10.10 ETOH ABUSE: ICD-10-CM

## 2020-10-27 DIAGNOSIS — K42.9 UMBILICAL HERNIA WITHOUT OBSTRUCTION AND WITHOUT GANGRENE: ICD-10-CM

## 2020-10-27 DIAGNOSIS — K70.31 ALCOHOLIC CIRRHOSIS OF LIVER WITH ASCITES: ICD-10-CM

## 2020-10-27 DIAGNOSIS — K70.31 ASCITES DUE TO ALCOHOLIC CIRRHOSIS: Primary | ICD-10-CM

## 2020-10-27 DIAGNOSIS — K42.0 UMBILICAL HERNIA, INCARCERATED: ICD-10-CM

## 2020-10-27 DIAGNOSIS — K42.9 UMBILICAL HERNIA WITHOUT OBSTRUCTION AND WITHOUT GANGRENE: Primary | ICD-10-CM

## 2020-10-27 DIAGNOSIS — Z01.818 PRE-OP EXAM: ICD-10-CM

## 2020-10-27 PROBLEM — E87.1 HYPONATREMIA: Status: ACTIVE | Noted: 2020-10-27

## 2020-10-27 LAB
ALBUMIN SERPL BCP-MCNC: 3.7 G/DL (ref 3.5–5.2)
ALP SERPL-CCNC: 108 U/L (ref 55–135)
ALT SERPL W/O P-5'-P-CCNC: 38 U/L (ref 10–44)
AMMONIA PLAS-SCNC: 48 UMOL/L (ref 10–50)
ANION GAP SERPL CALC-SCNC: 8 MMOL/L (ref 8–16)
AST SERPL-CCNC: 71 U/L (ref 10–40)
BASOPHILS # BLD AUTO: 0.03 K/UL (ref 0–0.2)
BASOPHILS NFR BLD: 0.7 % (ref 0–1.9)
BILIRUB SERPL-MCNC: 0.9 MG/DL (ref 0.1–1)
BILIRUB UR QL STRIP: NEGATIVE
BUN SERPL-MCNC: 12 MG/DL (ref 8–23)
CALCIUM SERPL-MCNC: 8.4 MG/DL (ref 8.7–10.5)
CHLORIDE SERPL-SCNC: 94 MMOL/L (ref 95–110)
CLARITY UR: CLEAR
CO2 SERPL-SCNC: 26 MMOL/L (ref 23–29)
COLOR UR: YELLOW
CREAT SERPL-MCNC: 1 MG/DL (ref 0.5–1.4)
DIFFERENTIAL METHOD: ABNORMAL
EOSINOPHIL # BLD AUTO: 0.1 K/UL (ref 0–0.5)
EOSINOPHIL NFR BLD: 1.2 % (ref 0–8)
ERYTHROCYTE [DISTWIDTH] IN BLOOD BY AUTOMATED COUNT: 13.3 % (ref 11.5–14.5)
EST. GFR  (AFRICAN AMERICAN): >60 ML/MIN/1.73 M^2
EST. GFR  (NON AFRICAN AMERICAN): 59 ML/MIN/1.73 M^2
ETHANOL SERPL-MCNC: 96 MG/DL
GLUCOSE SERPL-MCNC: 91 MG/DL (ref 70–110)
GLUCOSE UR QL STRIP: NEGATIVE
HCT VFR BLD AUTO: 26.3 % (ref 37–48.5)
HGB BLD-MCNC: 9.1 G/DL (ref 12–16)
HGB UR QL STRIP: NEGATIVE
IMM GRANULOCYTES # BLD AUTO: 0.02 K/UL (ref 0–0.04)
IMM GRANULOCYTES NFR BLD AUTO: 0.5 % (ref 0–0.5)
INR PPP: 1.4 (ref 0.8–1.2)
KETONES UR QL STRIP: NEGATIVE
LEUKOCYTE ESTERASE UR QL STRIP: NEGATIVE
LIPASE SERPL-CCNC: 81 U/L (ref 4–60)
LYMPHOCYTES # BLD AUTO: 0.6 K/UL (ref 1–4.8)
LYMPHOCYTES NFR BLD: 13.2 % (ref 18–48)
MAGNESIUM SERPL-MCNC: 1.1 MG/DL (ref 1.6–2.6)
MCH RBC QN AUTO: 31.3 PG (ref 27–31)
MCHC RBC AUTO-ENTMCNC: 34.6 G/DL (ref 32–36)
MCV RBC AUTO: 90 FL (ref 82–98)
MONOCYTES # BLD AUTO: 0.6 K/UL (ref 0.3–1)
MONOCYTES NFR BLD: 14.9 % (ref 4–15)
NEUTROPHILS # BLD AUTO: 2.9 K/UL (ref 1.8–7.7)
NEUTROPHILS NFR BLD: 69.5 % (ref 38–73)
NITRITE UR QL STRIP: NEGATIVE
NRBC BLD-RTO: 0 /100 WBC
PH UR STRIP: 7 [PH] (ref 5–8)
PLATELET # BLD AUTO: 181 K/UL (ref 150–350)
PMV BLD AUTO: 8.6 FL (ref 9.2–12.9)
POTASSIUM SERPL-SCNC: 4.6 MMOL/L (ref 3.5–5.1)
PROT SERPL-MCNC: 6.8 G/DL (ref 6–8.4)
PROT UR QL STRIP: NEGATIVE
PROTHROMBIN TIME: 15.1 SEC (ref 9–12.5)
RBC # BLD AUTO: 2.91 M/UL (ref 4–5.4)
SARS-COV-2 RDRP RESP QL NAA+PROBE: NEGATIVE
SODIUM SERPL-SCNC: 128 MMOL/L (ref 136–145)
SP GR UR STRIP: <=1.005 (ref 1–1.03)
URN SPEC COLLECT METH UR: ABNORMAL
UROBILINOGEN UR STRIP-ACNC: NEGATIVE EU/DL
WBC # BLD AUTO: 4.23 K/UL (ref 3.9–12.7)

## 2020-10-27 PROCEDURE — 83735 ASSAY OF MAGNESIUM: CPT

## 2020-10-27 PROCEDURE — 3008F PR BODY MASS INDEX (BMI) DOCUMENTED: ICD-10-PCS | Mod: CPTII,S$GLB,, | Performed by: NURSE PRACTITIONER

## 2020-10-27 PROCEDURE — 36415 COLL VENOUS BLD VENIPUNCTURE: CPT

## 2020-10-27 PROCEDURE — U0002 COVID-19 LAB TEST NON-CDC: HCPCS

## 2020-10-27 PROCEDURE — 99233 SBSQ HOSP IP/OBS HIGH 50: CPT | Mod: 57,,, | Performed by: SURGERY

## 2020-10-27 PROCEDURE — 94799 UNLISTED PULMONARY SVC/PX: CPT

## 2020-10-27 PROCEDURE — 36000706: Performed by: SURGERY

## 2020-10-27 PROCEDURE — 63600175 PHARM REV CODE 636 W HCPCS: Performed by: NURSE PRACTITIONER

## 2020-10-27 PROCEDURE — 99999 PR PBB SHADOW E&M-EST. PATIENT-LVL III: CPT | Mod: PBBFAC,,, | Performed by: NURSE PRACTITIONER

## 2020-10-27 PROCEDURE — A9698 NON-RAD CONTRAST MATERIALNOC: HCPCS | Performed by: EMERGENCY MEDICINE

## 2020-10-27 PROCEDURE — 99285 EMERGENCY DEPT VISIT HI MDM: CPT | Mod: 25

## 2020-10-27 PROCEDURE — C9113 INJ PANTOPRAZOLE SODIUM, VIA: HCPCS | Performed by: NURSE PRACTITIONER

## 2020-10-27 PROCEDURE — 85025 COMPLETE CBC W/AUTO DIFF WBC: CPT

## 2020-10-27 PROCEDURE — 99999 PR PBB SHADOW E&M-EST. PATIENT-LVL III: ICD-10-PCS | Mod: PBBFAC,,, | Performed by: NURSE PRACTITIONER

## 2020-10-27 PROCEDURE — 93010 EKG 12-LEAD: ICD-10-PCS | Mod: ,,, | Performed by: INTERNAL MEDICINE

## 2020-10-27 PROCEDURE — 63600175 PHARM REV CODE 636 W HCPCS: Performed by: NURSE ANESTHETIST, CERTIFIED REGISTERED

## 2020-10-27 PROCEDURE — 1101F PR PT FALLS ASSESS DOC 0-1 FALLS W/OUT INJ PAST YR: ICD-10-PCS | Mod: CPTII,S$GLB,, | Performed by: NURSE PRACTITIONER

## 2020-10-27 PROCEDURE — 93005 ELECTROCARDIOGRAM TRACING: CPT

## 2020-10-27 PROCEDURE — 49587 PR REPAIR UMBILICAL HERN,5+Y/O,STRANG: ICD-10-PCS | Mod: ,,, | Performed by: SURGERY

## 2020-10-27 PROCEDURE — 25000003 PHARM REV CODE 250: Performed by: NURSE ANESTHETIST, CERTIFIED REGISTERED

## 2020-10-27 PROCEDURE — 25000003 PHARM REV CODE 250: Performed by: SURGERY

## 2020-10-27 PROCEDURE — 85610 PROTHROMBIN TIME: CPT

## 2020-10-27 PROCEDURE — 99233 PR SUBSEQUENT HOSPITAL CARE,LEVL III: ICD-10-PCS | Mod: 57,,, | Performed by: SURGERY

## 2020-10-27 PROCEDURE — 37000009 HC ANESTHESIA EA ADD 15 MINS: Performed by: SURGERY

## 2020-10-27 PROCEDURE — 99214 OFFICE O/P EST MOD 30 MIN: CPT | Mod: S$GLB,,, | Performed by: NURSE PRACTITIONER

## 2020-10-27 PROCEDURE — 3008F BODY MASS INDEX DOCD: CPT | Mod: CPTII,S$GLB,, | Performed by: NURSE PRACTITIONER

## 2020-10-27 PROCEDURE — 82140 ASSAY OF AMMONIA: CPT

## 2020-10-27 PROCEDURE — 99214 PR OFFICE/OUTPT VISIT, EST, LEVL IV, 30-39 MIN: ICD-10-PCS | Mod: S$GLB,,, | Performed by: NURSE PRACTITIONER

## 2020-10-27 PROCEDURE — 63600175 PHARM REV CODE 636 W HCPCS: Performed by: SURGERY

## 2020-10-27 PROCEDURE — 49587 PR REPAIR UMBILICAL HERN,5+Y/O,STRANG: CPT | Mod: ,,, | Performed by: SURGERY

## 2020-10-27 PROCEDURE — 25500020 PHARM REV CODE 255: Performed by: EMERGENCY MEDICINE

## 2020-10-27 PROCEDURE — 88302 PR  SURG PATH,LEVEL II: ICD-10-PCS | Mod: 26,,, | Performed by: PATHOLOGY

## 2020-10-27 PROCEDURE — 83690 ASSAY OF LIPASE: CPT

## 2020-10-27 PROCEDURE — 36000707: Performed by: SURGERY

## 2020-10-27 PROCEDURE — 93010 ELECTROCARDIOGRAM REPORT: CPT | Mod: ,,, | Performed by: INTERNAL MEDICINE

## 2020-10-27 PROCEDURE — 1101F PT FALLS ASSESS-DOCD LE1/YR: CPT | Mod: CPTII,S$GLB,, | Performed by: NURSE PRACTITIONER

## 2020-10-27 PROCEDURE — 21400001 HC TELEMETRY ROOM

## 2020-10-27 PROCEDURE — 71000039 HC RECOVERY, EACH ADD'L HOUR: Performed by: SURGERY

## 2020-10-27 PROCEDURE — 99900035 HC TECH TIME PER 15 MIN (STAT)

## 2020-10-27 PROCEDURE — 88302 TISSUE EXAM BY PATHOLOGIST: CPT | Mod: 26,,, | Performed by: PATHOLOGY

## 2020-10-27 PROCEDURE — 99223 1ST HOSP IP/OBS HIGH 75: CPT | Mod: ,,, | Performed by: INTERNAL MEDICINE

## 2020-10-27 PROCEDURE — 99223 PR INITIAL HOSPITAL CARE,LEVL III: ICD-10-PCS | Mod: ,,, | Performed by: INTERNAL MEDICINE

## 2020-10-27 PROCEDURE — 63600175 PHARM REV CODE 636 W HCPCS: Performed by: ANESTHESIOLOGY

## 2020-10-27 PROCEDURE — 71000033 HC RECOVERY, INTIAL HOUR: Performed by: SURGERY

## 2020-10-27 PROCEDURE — 80320 DRUG SCREEN QUANTALCOHOLS: CPT

## 2020-10-27 PROCEDURE — 88302 TISSUE EXAM BY PATHOLOGIST: CPT | Performed by: PATHOLOGY

## 2020-10-27 PROCEDURE — 81003 URINALYSIS AUTO W/O SCOPE: CPT

## 2020-10-27 PROCEDURE — 37000008 HC ANESTHESIA 1ST 15 MINUTES: Performed by: SURGERY

## 2020-10-27 PROCEDURE — 80053 COMPREHEN METABOLIC PANEL: CPT

## 2020-10-27 RX ORDER — HYDROMORPHONE HYDROCHLORIDE 2 MG/ML
0.2 INJECTION, SOLUTION INTRAMUSCULAR; INTRAVENOUS; SUBCUTANEOUS EVERY 5 MIN PRN
Status: DISCONTINUED | OUTPATIENT
Start: 2020-10-27 | End: 2020-10-27 | Stop reason: HOSPADM

## 2020-10-27 RX ORDER — MIDAZOLAM HYDROCHLORIDE 1 MG/ML
INJECTION, SOLUTION INTRAMUSCULAR; INTRAVENOUS
Status: DISCONTINUED | OUTPATIENT
Start: 2020-10-27 | End: 2020-10-27

## 2020-10-27 RX ORDER — MEPERIDINE HYDROCHLORIDE 25 MG/ML
12.5 INJECTION INTRAMUSCULAR; INTRAVENOUS; SUBCUTANEOUS ONCE AS NEEDED
Status: COMPLETED | OUTPATIENT
Start: 2020-10-27 | End: 2020-10-27

## 2020-10-27 RX ORDER — METOCLOPRAMIDE HYDROCHLORIDE 5 MG/ML
10 INJECTION INTRAMUSCULAR; INTRAVENOUS EVERY 10 MIN PRN
Status: DISCONTINUED | OUTPATIENT
Start: 2020-10-27 | End: 2020-10-27 | Stop reason: HOSPADM

## 2020-10-27 RX ORDER — CHLORHEXIDINE GLUCONATE ORAL RINSE 1.2 MG/ML
10 SOLUTION DENTAL 2 TIMES DAILY
Status: DISCONTINUED | OUTPATIENT
Start: 2020-10-27 | End: 2020-10-31 | Stop reason: HOSPADM

## 2020-10-27 RX ORDER — DIPHENHYDRAMINE HYDROCHLORIDE 50 MG/ML
25 INJECTION INTRAMUSCULAR; INTRAVENOUS EVERY 6 HOURS PRN
Status: DISCONTINUED | OUTPATIENT
Start: 2020-10-27 | End: 2020-10-27 | Stop reason: HOSPADM

## 2020-10-27 RX ORDER — BUPIVACAINE HYDROCHLORIDE 2.5 MG/ML
INJECTION, SOLUTION EPIDURAL; INFILTRATION; INTRACAUDAL
Status: DISCONTINUED | OUTPATIENT
Start: 2020-10-27 | End: 2020-10-27 | Stop reason: HOSPADM

## 2020-10-27 RX ORDER — SUCCINYLCHOLINE CHLORIDE 20 MG/ML
INJECTION INTRAMUSCULAR; INTRAVENOUS
Status: DISCONTINUED | OUTPATIENT
Start: 2020-10-27 | End: 2020-10-27

## 2020-10-27 RX ORDER — SODIUM CHLORIDE 0.9 % (FLUSH) 0.9 %
3 SYRINGE (ML) INJECTION EVERY 8 HOURS
Status: DISCONTINUED | OUTPATIENT
Start: 2020-10-27 | End: 2020-10-27 | Stop reason: HOSPADM

## 2020-10-27 RX ORDER — FENTANYL CITRATE 50 UG/ML
INJECTION, SOLUTION INTRAMUSCULAR; INTRAVENOUS
Status: DISCONTINUED | OUTPATIENT
Start: 2020-10-27 | End: 2020-10-27

## 2020-10-27 RX ORDER — SODIUM CHLORIDE, SODIUM LACTATE, POTASSIUM CHLORIDE, CALCIUM CHLORIDE 600; 310; 30; 20 MG/100ML; MG/100ML; MG/100ML; MG/100ML
INJECTION, SOLUTION INTRAVENOUS CONTINUOUS
Status: DISCONTINUED | OUTPATIENT
Start: 2020-10-27 | End: 2020-10-29

## 2020-10-27 RX ORDER — ONDANSETRON 8 MG/1
8 TABLET, ORALLY DISINTEGRATING ORAL EVERY 8 HOURS PRN
Status: DISCONTINUED | OUTPATIENT
Start: 2020-10-27 | End: 2020-10-31 | Stop reason: HOSPADM

## 2020-10-27 RX ORDER — CEFAZOLIN SODIUM 2 G/50ML
2 SOLUTION INTRAVENOUS ONCE
Status: COMPLETED | OUTPATIENT
Start: 2020-10-27 | End: 2020-10-27

## 2020-10-27 RX ORDER — NEOSTIGMINE METHYLSULFATE 1 MG/ML
INJECTION, SOLUTION INTRAVENOUS
Status: DISCONTINUED | OUTPATIENT
Start: 2020-10-27 | End: 2020-10-27

## 2020-10-27 RX ORDER — SODIUM CHLORIDE, SODIUM LACTATE, POTASSIUM CHLORIDE, CALCIUM CHLORIDE 600; 310; 30; 20 MG/100ML; MG/100ML; MG/100ML; MG/100ML
INJECTION, SOLUTION INTRAVENOUS CONTINUOUS PRN
Status: DISCONTINUED | OUTPATIENT
Start: 2020-10-27 | End: 2020-10-27

## 2020-10-27 RX ORDER — LIDOCAINE HYDROCHLORIDE 10 MG/ML
INJECTION, SOLUTION EPIDURAL; INFILTRATION; INTRACAUDAL; PERINEURAL
Status: DISCONTINUED | OUTPATIENT
Start: 2020-10-27 | End: 2020-10-27

## 2020-10-27 RX ORDER — HYDROMORPHONE HYDROCHLORIDE 1 MG/ML
0.5 INJECTION, SOLUTION INTRAMUSCULAR; INTRAVENOUS; SUBCUTANEOUS EVERY 4 HOURS PRN
Status: DISCONTINUED | OUTPATIENT
Start: 2020-10-27 | End: 2020-10-28

## 2020-10-27 RX ORDER — ONDANSETRON 2 MG/ML
INJECTION INTRAMUSCULAR; INTRAVENOUS
Status: DISCONTINUED | OUTPATIENT
Start: 2020-10-27 | End: 2020-10-27

## 2020-10-27 RX ORDER — PANTOPRAZOLE SODIUM 40 MG/10ML
40 INJECTION, POWDER, LYOPHILIZED, FOR SOLUTION INTRAVENOUS DAILY
Status: DISCONTINUED | OUTPATIENT
Start: 2020-10-27 | End: 2020-10-28 | Stop reason: SDUPTHER

## 2020-10-27 RX ORDER — ROCURONIUM BROMIDE 10 MG/ML
INJECTION, SOLUTION INTRAVENOUS
Status: DISCONTINUED | OUTPATIENT
Start: 2020-10-27 | End: 2020-10-27

## 2020-10-27 RX ORDER — PROPOFOL 10 MG/ML
VIAL (ML) INTRAVENOUS
Status: DISCONTINUED | OUTPATIENT
Start: 2020-10-27 | End: 2020-10-27

## 2020-10-27 RX ADMIN — FENTANYL CITRATE 100 MCG: 50 INJECTION, SOLUTION INTRAMUSCULAR; INTRAVENOUS at 04:10

## 2020-10-27 RX ADMIN — PANTOPRAZOLE SODIUM 40 MG: 40 INJECTION, POWDER, FOR SOLUTION INTRAVENOUS at 10:10

## 2020-10-27 RX ADMIN — CHLORHEXIDINE GLUCONATE 0.12% ORAL RINSE 10 ML: 1.2 LIQUID ORAL at 10:10

## 2020-10-27 RX ADMIN — SODIUM CHLORIDE, SODIUM LACTATE, POTASSIUM CHLORIDE, AND CALCIUM CHLORIDE: 600; 310; 30; 20 INJECTION, SOLUTION INTRAVENOUS at 04:10

## 2020-10-27 RX ADMIN — GLYCOPYRROLATE 0.8 MG: 0.2 INJECTION, SOLUTION INTRAMUSCULAR; INTRAVENOUS at 05:10

## 2020-10-27 RX ADMIN — IOHEXOL 1000 ML: 9 SOLUTION ORAL at 02:10

## 2020-10-27 RX ADMIN — SODIUM CHLORIDE, SODIUM LACTATE, POTASSIUM CHLORIDE, AND CALCIUM CHLORIDE: .6; .31; .03; .02 INJECTION, SOLUTION INTRAVENOUS at 07:10

## 2020-10-27 RX ADMIN — SODIUM CHLORIDE, SODIUM LACTATE, POTASSIUM CHLORIDE, AND CALCIUM CHLORIDE: 600; 310; 30; 20 INJECTION, SOLUTION INTRAVENOUS at 05:10

## 2020-10-27 RX ADMIN — IOHEXOL 75 ML: 350 INJECTION, SOLUTION INTRAVENOUS at 02:10

## 2020-10-27 RX ADMIN — NEOSTIGMINE METHYLSULFATE 5 MG: 1 INJECTION INTRAVENOUS at 05:10

## 2020-10-27 RX ADMIN — MEPERIDINE HYDROCHLORIDE 12.5 MG: 25 INJECTION INTRAMUSCULAR; INTRAVENOUS; SUBCUTANEOUS at 06:10

## 2020-10-27 RX ADMIN — PROPOFOL 150 MG: 10 INJECTION, EMULSION INTRAVENOUS at 04:10

## 2020-10-27 RX ADMIN — ONDANSETRON 4 MG: 2 INJECTION, SOLUTION INTRAMUSCULAR; INTRAVENOUS at 05:10

## 2020-10-27 RX ADMIN — LIDOCAINE HYDROCHLORIDE 50 MG: 10 INJECTION, SOLUTION EPIDURAL; INFILTRATION; INTRACAUDAL; PERINEURAL at 04:10

## 2020-10-27 RX ADMIN — CEFAZOLIN SODIUM 2 G: 2 SOLUTION INTRAVENOUS at 04:10

## 2020-10-27 RX ADMIN — PROPOFOL 50 MG: 10 INJECTION, EMULSION INTRAVENOUS at 04:10

## 2020-10-27 RX ADMIN — MIDAZOLAM 2 MG: 1 INJECTION INTRAMUSCULAR; INTRAVENOUS at 04:10

## 2020-10-27 RX ADMIN — ROCURONIUM BROMIDE 20 MG: 10 INJECTION, SOLUTION INTRAVENOUS at 04:10

## 2020-10-27 RX ADMIN — SUCCINYLCHOLINE CHLORIDE 80 MG: 20 INJECTION, SOLUTION INTRAMUSCULAR; INTRAVENOUS at 04:10

## 2020-10-27 NOTE — ED NOTES
Pt changed into gown, all other clothing and undergarments in patient's belonging bag at bedside.

## 2020-10-27 NOTE — ASSESSMENT & PLAN NOTE
-pt drinks daily- [employer] whiskey   -ETOH 96 upon admit   -pt counseled on cessation with understanding verbalized

## 2020-10-27 NOTE — HPI
Kylah Ortiz is a 64 y.o. female patient with a PMHx of alcoholis cirrhosis, alcoholic dementia, ascites, CKD, esophageal varices, hepatic encephalopathy, and hiatal hernia who presents to the Emergency Department for evaluation of umbilical hernia which onset gradually. Of note, caretaker noticed umbilical hernia post paracentesis last week after 4 L removed. Patient was referred to ED today after she was seen in clinic today by GIA Harper (GI) at the [facility].  No associated symptom reported.  Patient denies any fever, chills, diaphoresis, abdominal pain, abdominal distention, nausea, vomiting, diarrhea, constipation, blood in stool, CP, SOB, cough, leg swelling, and all other sxs at this time. Last BM was yesterday. No prior Tx. No further complaints or concerns at this time.  Pt is a full code and Tatyana Chavarria (caregiver) at [phone] is the surrogate decision maker.  ER work up showed: H/H 9.1/26.3, PT 15.1, INR 1.4, Na 128, Cl 94, Ca 8.4, AST 71, and Lipase 81.  COVID 19 negative.  CT abdomen/pelvis with contrast showed cirrhosis and ascites with portal hypertension and varicosities. Umbilical/ventral hernia containing a portion of small bowel without signs of high-grade obstruction.  There is small ascitic fluid within the hernia sac. Moderate to large hiatal hernia with multiple adjacent paraseptal varices.   Right inguinal hernia containing a small amount of ascitic fluid. ER discussed care with General Surgery and patient transported to OR from ED. Hospital Medicine contacted for admission with patient to be placed in Medical Surgical Unit post procedure.

## 2020-10-27 NOTE — ASSESSMENT & PLAN NOTE
-GI consulted- last seen by GWEN Álvarez NP today at the [facility] [place]   -Lasix and Spironolactone to be resumed post procedure when appropriate   -pt continues to drink daily with ETOH level of 96 noted  - ALT/AST 38/71  -Ammonia and Magnesium pending   -MELD-Na score: 10 at 10/27/2020 12:20 PM  MELD score: 10 at 10/27/2020 12:20 PM  Calculated from:  Serum Creatinine: 1.0 mg/dL at 10/27/2020 12:20 PM  Serum Sodium: 128 mmol/L at 10/27/2020 12:20 PM  Total Bilirubin: 0.9 mg/dL (Rounded to 1 mg/dL) at 10/27/2020 12:20 PM  INR(ratio): 1.4 at 10/27/2020 12:20 PM  Age: 64 years 11 months

## 2020-10-27 NOTE — OP NOTE
[facility] -   Surgery Department  Operative Note    SUMMARY     Date of Procedure: 10/27/2020     Procedure: Procedure(s) (LRB):  REPAIR, HERNIA, UMBILICAL, INCARCERATED, AGE 5 YEARS OR OLDER (N/A)     Surgeon(s) and Role:     * Shahbaz Oliva MD - Primary    Assisting Surgeon: None    Pre-Operative Diagnosis: Incarcerated umbilical hernia [K42.0]    Post-Operative Diagnosis: Post-Op Diagnosis Codes:     * Incarcerated umbilical hernia [K42.0]    Anesthesia: General    Technical Procedures Used:     Open multilayer repair of an umbilical hernia    Description of the Findings of the Procedure:     Findings a 4 cm umbilical hernia with a large hernia sac.    Patient was brought into the operative room placed on the operative table in the supine position.  General endotracheal anesthesia was induced.  IV antibiotics were administered.  A Edwards catheter was inserted.  Pneumatic compression devices placed on the lower extremities.  The abdomen was prepped and draped in a sterile fashion.    A time-out was performed.    Gentle pressure was placed on the hernia and the hernia was reduced.  An elliptical incision was then made around the umbilical skin.  Subcutaneous tissues were dissected using electrocautery.  The hernia sac was identified and opened.  The small bowel was noted to be reduced to the 4 cm hernia.    The hernia sac was then dissected free from the surrounding subcutaneous tissue.  The fascia was exposed for several cm around the hernia defect.    Approximately 250 mils of ascites was removed from the abdominal cavity.  It was straw-colored.    The hernia was repaired by in laying a far and near suture of 0 Nurolon.  The anterior and posterior rows of sutures were placed and tagged.  Sponge and instrument counts were confirmed.  The anterior row was then closed.  The posterior row was then closed.    The sutures were then imbricated by a suturing the superior fascia to the inferior fascia  over the sutures with a 0 PDS in a running fashion.    The wound was irrigated.  Marcaine was infiltrated.  Hemostasis was achieved.  The deep layers were closed with 2 0 Vicryl in interrupted fashion.  The skin was closed with 3 0 nylon in a running vertical mattress fashion.    Patient tolerated procedure well.  Transferred to the recovery room in stable condition.    Sponge and instrument counts were correct    Significant Surgical Tasks Conducted by the Assistant(s), if Applicable:  None    Complications: No    Estimated Blood Loss (EBL): 30 mL  IV fluids 1 L  Marcaine 0.25% 30 mL           Implants: * No implants in log *    Specimens:   Specimen (12h ago, onward)    None                  Condition: Stable    Disposition: PACU - hemodynamically stable.    Attestation: I performed the procedure.

## 2020-10-27 NOTE — ED NOTES
CT tech at bedside, pt provided oral contrast. Pt resting in bed comfortably, VSS, pt in NAD. Call light in reach, bed in lowest position, rails up X2. Pt denies any other needs at this time. Will continue to monitor.

## 2020-10-27 NOTE — ANESTHESIA PROCEDURE NOTES
Intubation  Performed by: Coby Clark CRNA  Authorized by: Jacinto Rodriguez MD     Intubation:     Induction:  Intravenous    Intubated:  Postinduction    Mask assessment prior to intubation: Not attempted.    Attempts:  1    Attempted By:  CRNA    Method of Intubation:  Direct    Blade:  Divine 3    Laryngeal View Grade: Grade IIA - cords partially seen      Difficult Airway Encountered?: No      Complications:  None    Airway Device:  Oral endotracheal tube (stylet)    Airway Device Size:  7.0    Style/Cuff Inflation:  Cuffed (inflated to minimal occlusive pressure)    Tube secured:  21    Secured at:  The lips    Placement Verified By:  Capnometry and Revisualization with laryngoscopy    Complicating Factors:  None    Findings Post-Intubation:  BS equal bilateral and atraumatic/condition of teeth unchanged

## 2020-10-27 NOTE — HOSPITAL COURSE
The patient was evaluated in the emergency room by the ER doctor.  The surgical service was consulted and a CT scan was recommended to evaluate    10/28/2020.  Patient seems to be at baseline.  Adequate urine output.  Mild abdominal discomfort.  Will start on clear liquid    10/29/20:  Incisional pain, good urine, d/c jay full liquids    10/30/2020.  Incisional pain is improved.  Good urine output.  Liver function tests are stable.  PT INR is improved.  No leakage from the incision.

## 2020-10-27 NOTE — HPI
64-year-old female with cirrhosis.  She underwent a paracentesis last week.  Her caregiver thought her umbilical hernia which is somewhat protuberant would go down.  The umbilical hernia was still protuberant, so her care giver brought her to the GI clinic for evaluation.  The patient was sent to the emergency room for evaluation due to a protuberant umbilical hernia.    The patient denies any abdominal pain.  She states the hernia does not gets hurt her.  She does not report any nausea and vomiting

## 2020-10-27 NOTE — ANESTHESIA PREPROCEDURE EVALUATION
10/27/2020  Kylah Ortiz is a 64 y.o., female.  Patient Active Problem List   Diagnosis    Acute renal failure superimposed on stage 4 chronic kidney disease    Anemia    Ascites due to alcoholic cirrhosis    Esophageal reflux disease    UTI (urinary tract infection)    Chronic kidney disease (CKD), stage IV (severe)    Alcoholic cirrhosis of liver with ascites    DARRIAN (acute kidney injury)    Secondary esophageal varices with bleeding    Dementia associated with alcoholism without behavioral disturbance    Umbilical hernia without obstruction and without gangrene     No current facility-administered medications on file prior to encounter.      Current Outpatient Medications on File Prior to Encounter   Medication Sig Dispense Refill    FLUoxetine 20 MG capsule Take 20 mg by mouth once daily.      furosemide (LASIX) 20 MG tablet Take 1 tablet (20 mg total) by mouth once daily. (Patient not taking: Reported on 10/27/2020) 60 tablet 12    hydrOXYzine HCl (ATARAX) 25 MG tablet Take 25 mg by mouth 3 (three) times daily as needed.       lactulose (CEPHULAC) 10 gram packet Take 10 g by mouth once daily.      lactulose (CHRONULAC) 10 gram/15 mL solution TAKE 30 ML(20 GRAMS TOTAL) BY MOUTH TWICE DAILY (Patient not taking: Reported on 10/27/2020) 1800 mL 5    magnesium oxide (MAG-OX) 400 mg (241.3 mg magnesium) tablet Take 400 mg by mouth once daily.      multivitamin (THERAGRAN) per tablet Take 1 tablet by mouth once daily.      pantoprazole (PROTONIX) 20 MG tablet Take 20 mg by mouth 2 (two) times daily before meals.      spironolactone (ALDACTONE) 25 MG tablet Take 1 tablet (25 mg total) by mouth once daily. 30 tablet 11     Past Surgical History:   Procedure Laterality Date    COLONOSCOPY      ESOPHAGOGASTRODUODENOSCOPY      ESOPHAGOGASTRODUODENOSCOPY N/A 1/6/2020    Procedure: EGD  (ESOPHAGOGASTRODUODENOSCOPY);  Surgeon: Monique Wood MD;  Location: [facility];  Service: Endoscopy;  Laterality: N/A;    HYSTERECTOMY         Pre-op Assessment    I have reviewed the Patient Summary Reports.     I have reviewed the Nursing Notes.    I have reviewed the Medications.     Review of Systems  Anesthesia Hx:  No problems with previous Anesthesia  Denies Family Hx of Anesthesia complications.   Denies Personal Hx of Anesthesia complications.   Social:  Non-Smoker    Hematology/Oncology:     Oncology Normal    -- Anemia:   EENT/Dental:EENT/Dental Normal   Cardiovascular:  Cardiovascular Normal     Pulmonary:  Pulmonary Normal    Renal/:   Chronic Renal Disease, CRI    Hepatic/GI:   Hiatal Hernia, GERD Liver Disease, ( Ascites due to alcoholic cirrhosis)    Musculoskeletal:  Musculoskeletal Normal    Neurological:  Neurology Normal Dementia associated with alcoholism without behavioral    Endocrine:  Endocrine Normal    Dermatological:  Skin Normal    Psych:   Psychiatric History          Physical Exam  General:  Well nourished    Airway/Jaw/Neck:  Airway Findings: Mouth Opening: Normal Tongue: Normal  General Airway Assessment: Adult, Average  Mallampati: II  TM Distance: Normal, at least 6 cm      Dental:  Dental Findings: In tact   Chest/Lungs:  Chest/Lungs Findings: Clear to auscultation, Normal Respiratory Rate     Heart/Vascular:  Heart Findings: Rate: Normal  Rhythm: Regular Rhythm        Mental Status:  Mental Status Findings:  Cooperative, Alert and Oriented         Chemistry        Component Value Date/Time     (L) 10/27/2020 1220    K 4.6 10/27/2020 1220    CL 94 (L) 10/27/2020 1220    CO2 26 10/27/2020 1220    BUN 12 10/27/2020 1220    CREATININE 1.0 10/27/2020 1220    GLU 91 10/27/2020 1220        Component Value Date/Time    CALCIUM 8.4 (L) 10/27/2020 1220    ALKPHOS 108 10/27/2020 1220    AST 71 (H) 10/27/2020 1220    ALT 38 10/27/2020 1220    BILITOT 0.9 10/27/2020 1220     ESTGFRAFRICA >60 10/27/2020 1220    EGFRNONAA 59 (A) 10/27/2020 1220        Lab Results   Component Value Date    WBC 4.23 10/27/2020    HGB 9.1 (L) 10/27/2020    HCT 26.3 (L) 10/27/2020    MCV 90 10/27/2020     10/27/2020           Anesthesia Plan  Type of Anesthesia, risks & benefits discussed:  Anesthesia Type:  general  Patient's Preference:   Intra-op Monitoring Plan: standard ASA monitors  Intra-op Monitoring Plan Comments:   Post Op Pain Control Plan:   Post Op Pain Control Plan Comments:   Induction:   IV  Beta Blocker:  Patient is not currently on a Beta-Blocker (No further documentation required).       Informed Consent: Patient understands risks and agrees with Anesthesia plan.  Questions answered. Anesthesia consent signed with patient.  ASA Score: 3  emergent   Day of Surgery Review of History & Physical: I have interviewed and examined the patient. I have reviewed the patient's H&P dated:  There are no significant changes.          Ready For Surgery From Anesthesia Perspective.

## 2020-10-27 NOTE — ED PROVIDER NOTES
SCRIBE #1 NOTE: I, Yolanda Wyman, am scribing for, and in the presence of, Erasmo Arrington Jr., MD. I have scribed the entire note.       History     Chief Complaint   Patient presents with    Abdominal Pain     Pt sent by the [facility] for eval of umbilical hernia. Denies N/V or abd pain.      Review of patient's allergies indicates:   Allergen Reactions    Hydrocodone-acetaminophen Hives and Itching         History of Present Illness     HPI    10/27/2020, 10:55 AM  History obtained from the patient and caretaker.      History of Present Illness: Kylah Ortiz is a 65 y.o. female patient with a PMHx of alcoholis cirrhosis, alcoholic dementia, ascites, CKD, esophageal varices, hepatic encephalopathy, and hiatal hernia who presents to the Emergency Department for evaluation of umbilical hernia which onset gradually. Caretaker brought patient in for paracentesis last week and after 4 L were drained off, caretaker noticed umbilical hernia. Patient was evaluated by GIA Harper (GI) today at the [facility] and was sent to ED for further evaluation. Patient has no complaints at this time. Patient denies any fever, chills, diaphoresis, abdominal pain, abdominal distention, n/v/d, constipation, blood in stool, CP, SOB, cough, leg swelling, and all other sxs at this time. Last BM was yesterday. No prior Tx. No further complaints or concerns at this time.       Arrival mode: Personal vehicle    PCP: Santos Beaver MD        Past Medical History:  Past Medical History:   Diagnosis Date    Alcoholic cirrhosis     Alcoholic dementia     Anemia     Ascites     CKD (chronic kidney disease)     Esophageal reflux     Esophageal varices     Hepatic encephalopathy     Hiatal hernia        Past Surgical History:  Past Surgical History:   Procedure Laterality Date    COLONOSCOPY      ESOPHAGOGASTRODUODENOSCOPY      ESOPHAGOGASTRODUODENOSCOPY N/A 1/6/2020    Procedure: EGD (ESOPHAGOGASTRODUODENOSCOPY);  Surgeon:  Monique Wood MD;  Location: [facility] ENDO;  Service: Endoscopy;  Laterality: N/A;    HYSTERECTOMY      REPAIR OF INCARCERATED UMBILICAL HERNIA N/A 10/27/2020    Procedure: REPAIR, HERNIA, UMBILICAL, INCARCERATED, AGE 5 YEARS OR OLDER;  Surgeon: Shahbaz Oliva MD;  Location: [facility] OR;  Service: General;  Laterality: N/A;         Family History:  Family History   Problem Relation Age of Onset    Cancer Mother     Arthritis Mother        Social History:  Social History     Tobacco Use    Smoking status: Never Smoker    Smokeless tobacco: Never Used   Substance and Sexual Activity    Alcohol use: Yes     Alcohol/week: 2.0 standard drinks     Types: 2 Shots of liquor per week     Comment: some days    Drug use: Not on file    Sexual activity: Not on file        Review of Systems     Review of Systems   Constitutional: Negative for activity change, appetite change, chills, diaphoresis, fatigue and fever.   HENT: Negative for congestion, ear pain, nosebleeds, rhinorrhea, sinus pain, sore throat and trouble swallowing.    Eyes: Negative for pain and discharge.   Respiratory: Negative for cough, chest tightness, shortness of breath, wheezing and stridor.    Cardiovascular: Negative for chest pain, palpitations and leg swelling.   Gastrointestinal: Negative for abdominal distention, abdominal pain, blood in stool, constipation, diarrhea, nausea and vomiting.        (+) umbilical hernia   Genitourinary: Negative for difficulty urinating, dysuria, flank pain, frequency, hematuria and urgency.   Musculoskeletal: Negative for arthralgias, back pain, myalgias and neck pain.   Skin: Negative for pallor, rash and wound.   Neurological: Negative for dizziness, syncope, weakness, light-headedness, numbness and headaches.   Hematological: Does not bruise/bleed easily.   Psychiatric/Behavioral: Negative for confusion and self-injury.   All other systems reviewed and are negative.     Physical Exam     Initial Vitals  [10/27/20 1039]   BP Pulse Resp Temp SpO2   133/77 89 20 98.5 °F (36.9 °C) 99 %      MAP       --          Physical Exam  Nursing Notes and Vital Signs Reviewed.  Constitutional: Patient is in no acute distress. Well-developed and well-nourished.  Head: Atraumatic. Normocephalic.  Eyes: PERRL. EOM intact. Conjunctivae are not pale. No scleral icterus.  ENT: Mucous membranes are moist. Oropharynx is clear and symmetric.    Neck: Supple. Full ROM. No lymphadenopathy.  Cardiovascular: Regular rate. Regular rhythm. No murmurs, rubs, or gallops. Distal pulses are 2+ and symmetric.  Pulmonary/Chest: No respiratory distress. Clear to auscultation bilaterally. No wheezing or rales.  Abdominal: Soft and non-distended. There is no tenderness.  No rebound, guarding, or rigidity. Good bowel sounds. Ventral hernia noted that is easily reducible.   Genitourinary: No CVA tenderness  Musculoskeletal: Moves all extremities. No obvious deformities. No edema. No calf tenderness.  Skin: Warm and dry.  Neurological:  Alert, awake, and appropriate.  Normal speech.  No acute focal neurological deficits are appreciated.  Psychiatric: Normal affect. Good eye contact. Appropriate in content.     ED Course   Procedures  ED Vital Signs:  Vitals:    10/30/20 1500 10/30/20 1540 10/30/20 1642 10/30/20 1700   BP:   (!) 151/85    Pulse: 81  84 78   Resp:  18 18    Temp:       TempSrc:       SpO2:   96%    Weight:       Height:        10/30/20 1918 10/30/20 2105 10/30/20 2328 10/31/20 0000   BP: 134/85   (!) 151/90   Pulse: 100 74 75 81   Resp: 20   18   Temp: 98.8 °F (37.1 °C)   97.9 °F (36.6 °C)   TempSrc: Oral   Oral   SpO2: 97%   97%   Weight:       Height:        10/31/20 0154 10/31/20 0324 10/31/20 0400 10/31/20 0550   BP:  (!) 163/96     Pulse: 78 82  81   Resp:  18     Temp:  98.7 °F (37.1 °C)     TempSrc:       SpO2:  95%     Weight:   64.8 kg (142 lb 13.7 oz)    Height:        10/31/20 0836 10/31/20 0900 10/31/20 1100   BP: (!) 165/92      Pulse: 97 93 82   Resp: 16     Temp: 97.4 °F (36.3 °C)     TempSrc: Oral     SpO2: 99%     Weight:      Height:          Abnormal Lab Results:  Labs Reviewed   CBC W/ AUTO DIFFERENTIAL - Abnormal; Notable for the following components:       Result Value    RBC 2.91 (*)     Hemoglobin 9.1 (*)     Hematocrit 26.3 (*)     MCH 31.3 (*)     MPV 8.6 (*)     Lymph # 0.6 (*)     Lymph % 13.2 (*)     All other components within normal limits   COMPREHENSIVE METABOLIC PANEL - Abnormal; Notable for the following components:    Sodium 128 (*)     Chloride 94 (*)     Calcium 8.4 (*)     AST 71 (*)     eGFR if non  59 (*)     All other components within normal limits   LIPASE - Abnormal; Notable for the following components:    Lipase 81 (*)     All other components within normal limits   URINALYSIS, REFLEX TO URINE CULTURE - Abnormal; Notable for the following components:    Specific Gravity, UA <=1.005 (*)     All other components within normal limits    Narrative:     Specimen Source->Urine   PROTIME-INR - Abnormal; Notable for the following components:    Prothrombin Time 15.1 (*)     INR 1.4 (*)     All other components within normal limits   ALCOHOL,MEDICAL (ETHANOL) - Abnormal; Notable for the following components:    Alcohol, Serum 96 (*)     All other components within normal limits   SARS-COV-2 RNA AMPLIFICATION, QUAL   ALCOHOL,MEDICAL (ETHANOL)        All Lab Results:  Results for orders placed or performed during the hospital encounter of 10/27/20   CBC W/ AUTO DIFFERENTIAL   Result Value Ref Range    WBC 4.23 3.90 - 12.70 K/uL    RBC 2.91 (L) 4.00 - 5.40 M/uL    Hemoglobin 9.1 (L) 12.0 - 16.0 g/dL    Hematocrit 26.3 (L) 37.0 - 48.5 %    MCV 90 82 - 98 fL    MCH 31.3 (H) 27.0 - 31.0 pg    MCHC 34.6 32.0 - 36.0 g/dL    RDW 13.3 11.5 - 14.5 %    Platelets 181 150 - 350 K/uL    MPV 8.6 (L) 9.2 - 12.9 fL    Immature Granulocytes 0.5 0.0 - 0.5 %    Gran # (ANC) 2.9 1.8 - 7.7 K/uL    Immature Grans (Abs)  0.02 0.00 - 0.04 K/uL    Lymph # 0.6 (L) 1.0 - 4.8 K/uL    Mono # 0.6 0.3 - 1.0 K/uL    Eos # 0.1 0.0 - 0.5 K/uL    Baso # 0.03 0.00 - 0.20 K/uL    nRBC 0 0 /100 WBC    Gran % 69.5 38.0 - 73.0 %    Lymph % 13.2 (L) 18.0 - 48.0 %    Mono % 14.9 4.0 - 15.0 %    Eosinophil % 1.2 0.0 - 8.0 %    Basophil % 0.7 0.0 - 1.9 %    Differential Method Automated    Comp. Metabolic Panel   Result Value Ref Range    Sodium 128 (L) 136 - 145 mmol/L    Potassium 4.6 3.5 - 5.1 mmol/L    Chloride 94 (L) 95 - 110 mmol/L    CO2 26 23 - 29 mmol/L    Glucose 91 70 - 110 mg/dL    BUN 12 8 - 23 mg/dL    Creatinine 1.0 0.5 - 1.4 mg/dL    Calcium 8.4 (L) 8.7 - 10.5 mg/dL    Total Protein 6.8 6.0 - 8.4 g/dL    Albumin 3.7 3.5 - 5.2 g/dL    Total Bilirubin 0.9 0.1 - 1.0 mg/dL    Alkaline Phosphatase 108 55 - 135 U/L    AST 71 (H) 10 - 40 U/L    ALT 38 10 - 44 U/L    Anion Gap 8 8 - 16 mmol/L    eGFR if African American >60 >60 mL/min/1.73 m^2    eGFR if non African American 59 (A) >60 mL/min/1.73 m^2   Lipase   Result Value Ref Range    Lipase 81 (H) 4 - 60 U/L   Urinalysis, Reflex to Urine Culture Urine, Clean Catch    Specimen: Urine   Result Value Ref Range    Specimen UA Urine, Clean Catch     Color, UA Yellow Yellow, Straw, Keysha    Appearance, UA Clear Clear    pH, UA 7.0 5.0 - 8.0    Specific Gravity, UA <=1.005 (A) 1.005 - 1.030    Protein, UA Negative Negative    Glucose, UA Negative Negative    Ketones, UA Negative Negative    Bilirubin (UA) Negative Negative    Occult Blood UA Negative Negative    Nitrite, UA Negative Negative    Urobilinogen, UA Negative <2.0 EU/dL    Leukocytes, UA Negative Negative   Protime-INR   Result Value Ref Range    Prothrombin Time 15.1 (H) 9.0 - 12.5 sec    INR 1.4 (H) 0.8 - 1.2   COVID-19 Rapid Screening   Result Value Ref Range    SARS-CoV-2 RNA, Amplification, Qual Negative Negative   Ethanol   Result Value Ref Range    Alcohol, Serum 96 (H) <10 mg/dL   Magnesium   Result Value Ref Range    Magnesium  1.1 (L) 1.6 - 2.6 mg/dL   Ammonia   Result Value Ref Range    Ammonia 48 10 - 50 umol/L   CBC auto differential   Result Value Ref Range    WBC 7.30 3.90 - 12.70 K/uL    RBC 2.86 (L) 4.00 - 5.40 M/uL    Hemoglobin 8.9 (L) 12.0 - 16.0 g/dL    Hematocrit 26.2 (L) 37.0 - 48.5 %    MCV 92 82 - 98 fL    MCH 31.1 (H) 27.0 - 31.0 pg    MCHC 34.0 32.0 - 36.0 g/dL    RDW 13.3 11.5 - 14.5 %    Platelets 182 150 - 350 K/uL    MPV 9.2 9.2 - 12.9 fL    Immature Granulocytes 0.5 0.0 - 0.5 %    Gran # (ANC) 5.6 1.8 - 7.7 K/uL    Immature Grans (Abs) 0.04 0.00 - 0.04 K/uL    Lymph # 0.5 (L) 1.0 - 4.8 K/uL    Mono # 1.2 (H) 0.3 - 1.0 K/uL    Eos # 0.0 0.0 - 0.5 K/uL    Baso # 0.02 0.00 - 0.20 K/uL    nRBC 0 0 /100 WBC    Gran % 76.3 (H) 38.0 - 73.0 %    Lymph % 6.6 (L) 18.0 - 48.0 %    Mono % 15.8 (H) 4.0 - 15.0 %    Eosinophil % 0.5 0.0 - 8.0 %    Basophil % 0.3 0.0 - 1.9 %    Differential Method Automated    Comprehensive Metabolic Panel   Result Value Ref Range    Sodium 128 (L) 136 - 145 mmol/L    Potassium 4.4 3.5 - 5.1 mmol/L    Chloride 93 (L) 95 - 110 mmol/L    CO2 28 23 - 29 mmol/L    Glucose 97 70 - 110 mg/dL    BUN 11 8 - 23 mg/dL    Creatinine 0.9 0.5 - 1.4 mg/dL    Calcium 8.4 (L) 8.7 - 10.5 mg/dL    Total Protein 6.2 6.0 - 8.4 g/dL    Albumin 3.3 (L) 3.5 - 5.2 g/dL    Total Bilirubin 1.3 (H) 0.1 - 1.0 mg/dL    Alkaline Phosphatase 99 55 - 135 U/L    AST 54 (H) 10 - 40 U/L    ALT 28 10 - 44 U/L    Anion Gap 7 (L) 8 - 16 mmol/L    eGFR if African American >60 >60 mL/min/1.73 m^2    eGFR if non African American >60 >60 mL/min/1.73 m^2   CBC auto differential   Result Value Ref Range    WBC 7.30 3.90 - 12.70 K/uL    RBC 2.86 (L) 4.00 - 5.40 M/uL    Hemoglobin 8.9 (L) 12.0 - 16.0 g/dL    Hematocrit 26.2 (L) 37.0 - 48.5 %    MCV 92 82 - 98 fL    MCH 31.1 (H) 27.0 - 31.0 pg    MCHC 34.0 32.0 - 36.0 g/dL    RDW 13.3 11.5 - 14.5 %    Platelets 182 150 - 350 K/uL    MPV 9.2 9.2 - 12.9 fL    Immature Granulocytes 0.5 0.0 - 0.5 %     Gran # (ANC) 5.6 1.8 - 7.7 K/uL    Immature Grans (Abs) 0.04 0.00 - 0.04 K/uL    Lymph # 0.5 (L) 1.0 - 4.8 K/uL    Mono # 1.2 (H) 0.3 - 1.0 K/uL    Eos # 0.0 0.0 - 0.5 K/uL    Baso # 0.02 0.00 - 0.20 K/uL    nRBC 0 0 /100 WBC    Gran % 76.3 (H) 38.0 - 73.0 %    Lymph % 6.6 (L) 18.0 - 48.0 %    Mono % 15.8 (H) 4.0 - 15.0 %    Eosinophil % 0.5 0.0 - 8.0 %    Basophil % 0.3 0.0 - 1.9 %    Differential Method Automated    CBC auto differential   Result Value Ref Range    WBC 6.35 3.90 - 12.70 K/uL    RBC 2.80 (L) 4.00 - 5.40 M/uL    Hemoglobin 8.5 (L) 12.0 - 16.0 g/dL    Hematocrit 25.9 (L) 37.0 - 48.5 %    MCV 93 82 - 98 fL    MCH 30.4 27.0 - 31.0 pg    MCHC 32.8 32.0 - 36.0 g/dL    RDW 13.1 11.5 - 14.5 %    Platelets 162 150 - 350 K/uL    MPV 9.1 (L) 9.2 - 12.9 fL    Immature Granulocytes 0.9 (H) 0.0 - 0.5 %    Gran # (ANC) 4.3 1.8 - 7.7 K/uL    Immature Grans (Abs) 0.06 (H) 0.00 - 0.04 K/uL    Lymph # 0.7 (L) 1.0 - 4.8 K/uL    Mono # 1.2 (H) 0.3 - 1.0 K/uL    Eos # 0.1 0.0 - 0.5 K/uL    Baso # 0.03 0.00 - 0.20 K/uL    nRBC 0 0 /100 WBC    Gran % 68.2 38.0 - 73.0 %    Lymph % 10.4 (L) 18.0 - 48.0 %    Mono % 18.3 (H) 4.0 - 15.0 %    Eosinophil % 1.7 0.0 - 8.0 %    Basophil % 0.5 0.0 - 1.9 %    Differential Method Automated    Comprehensive Metabolic Panel   Result Value Ref Range    Sodium 128 (L) 136 - 145 mmol/L    Potassium 4.0 3.5 - 5.1 mmol/L    Chloride 93 (L) 95 - 110 mmol/L    CO2 27 23 - 29 mmol/L    Glucose 96 70 - 110 mg/dL    BUN 9 8 - 23 mg/dL    Creatinine 0.9 0.5 - 1.4 mg/dL    Calcium 8.3 (L) 8.7 - 10.5 mg/dL    Total Protein 5.8 (L) 6.0 - 8.4 g/dL    Albumin 3.1 (L) 3.5 - 5.2 g/dL    Total Bilirubin 1.6 (H) 0.1 - 1.0 mg/dL    Alkaline Phosphatase 96 55 - 135 U/L    AST 38 10 - 40 U/L    ALT 21 10 - 44 U/L    Anion Gap 8 8 - 16 mmol/L    eGFR if African American >60 >60 mL/min/1.73 m^2    eGFR if non African American >60 >60 mL/min/1.73 m^2   Protime-INR   Result Value Ref Range    Prothrombin Time  "18.2 (H) 9.0 - 12.5 sec    INR 1.8 (H) 0.8 - 1.2   Comprehensive Metabolic Panel   Result Value Ref Range    Sodium 131 (L) 136 - 145 mmol/L    Potassium 4.2 3.5 - 5.1 mmol/L    Chloride 97 95 - 110 mmol/L    CO2 27 23 - 29 mmol/L    Glucose 106 70 - 110 mg/dL    BUN 7 (L) 8 - 23 mg/dL    Creatinine 1.0 0.5 - 1.4 mg/dL    Calcium 8.3 (L) 8.7 - 10.5 mg/dL    Total Protein 5.8 (L) 6.0 - 8.4 g/dL    Albumin 3.1 (L) 3.5 - 5.2 g/dL    Total Bilirubin 1.5 (H) 0.1 - 1.0 mg/dL    Alkaline Phosphatase 95 55 - 135 U/L    AST 32 10 - 40 U/L    ALT 17 10 - 44 U/L    Anion Gap 7 (L) 8 - 16 mmol/L    eGFR if African American >60 >60 mL/min/1.73 m^2    eGFR if non African American 59 (A) >60 mL/min/1.73 m^2   Protime-INR   Result Value Ref Range    Prothrombin Time 17.0 (H) 9.0 - 12.5 sec    INR 1.6 (H) 0.8 - 1.2   Protime-INR   Result Value Ref Range    Prothrombin Time 16.1 (H) 9.0 - 12.5 sec    INR 1.5 (H) 0.8 - 1.2   Specimen to Pathology, Surgery General Surgery   Result Value Ref Range    Final Pathologic Diagnosis       1.  Skin and hernia sac:      -  Benign skin and mesothelial lined fibromembranous tissue with focal  chronic inflammation, consistent         with hernia sac      -  Negative for dysplasia or malignancy      Gross       Surgery ID:  [id number];  Pathology ID:  [id number]  1.  Received in formalin labeled "skin and hernia sac" is a 5.3 x 4.3 cm  ellipse of skin with underlying fibromembranous soft tissue forming a  sac-like structure.  Representative sections are embedded in cassettes 5102,  1A-1B.  Grossed by: Latrell Swan      Disclaimer       Unless the case is a 'gross only' or additional testing only, the final  diagnosis for each specimen is based on a microscopic examination of  appropriate tissue sections.           Imaging Results:  Imaging Results          CT Abdomen Pelvis With Contrast (Final result)  Result time 10/27/20 14:44:59    Final result by Abe Monique Jr., MD (10/27/20 14:44:59)           "       Impression:      1. Cirrhosis and ascites with portal hypertension and varicosities.  2. Umbilical/ventral hernia containing a portion of small bowel without signs of high-grade obstruction.  There is small ascitic fluid within the hernia sac.  3. Moderate to large hiatal hernia with multiple adjacent paraseptal varices.  4. Right inguinal hernia containing a small amount of ascitic fluid.  5. Diverticulosis of the sigmoid colon.  6. Cholelithiasis.  7. Compression deformities of multiple vertebral bodies with a possibly subacute fracture of L3 given its fragmented appearance.  No definite spinal canal hematoma.  Clinical correlation for back pain is advised.      Electronically signed by: Abe Monique Jr., MD  Date:    10/27/2020  Time:    14:44             Narrative:    EXAMINATION:  CT ABDOMEN PELVIS WITH CONTRAST    CLINICAL HISTORY:  Hernia, complicated;Bowel obstruction high-grade suspected;    TECHNIQUE:  Low dose axial images, sagittal and coronal reformations were obtained from the lung bases to the pubic symphysis following the IV administration of 75 mL of Omnipaque 350 and the oral administration of 1000  mL of Omnipaque 9. All CT scans at this facility use dose modulation, iterative reconstruction and/or weight based dosing when appropriate to reduce radiation dose to as low as reasonably achievable.    COMPARISON:  Prior ultrasound from October 16, 2020.    FINDINGS:  Abdomen:    - Lower thorax: Normal heart size.    - Lung bases: No infiltrates and no nodules.    - Liver: Cirrhotic morphology of the liver.  There are 2 cysts within the liver, 1 within the lateral segment of the left lobe and 1 within the medial right lobe.  These measure 14 mm and 12 mm respectively.  There is small perihepatic ascites.  The portal vein and hepatic veins are patent.  No definite enhancing masses within the liver.  No rapid washout of contrast material.    - Gallbladder: Contains dependently layering calcified  stones.    - Bile Ducts: No abnormal bile duct dilation.    - Spleen: Unremarkable.    - Kidneys: The kidneys enhance homogeneously.    - Adrenals: Unremarkable.    - Pancreas: Unremarkable.    - Bowel/Mesentery: Moderate to large hiatal hernia with adjacent paraesophageal varices.  There also varicosities about the lesser and greater curvatures of the stomach.  There is a ventral hernia/umbilical hernia containing a loop of small bowel, likely jejunum.  The hernia neck measures 31 mm craniocaudal by 37 mm transverse.  No sign of bowel obstruction relation to the hernia.  There is small ascites within the hernia sac.  Diverticulosis of the sigmoid colon.    - Peritoneal cavity: Small ascites within the dependent pelvis.  No free air.    - Retroperitoneum:  No lymphadenopathy or hemorrhage.    - Vascular: No abdominal aortic aneurysm.    - Reproductive organs: Prior hysterectomy.    - Inguinal region: Small right inguinal hernia containing a small amount of ascitic fluid.  The hernia neck measures 19 mm.    Bones:  Severe compression fractures of multiple vertebral bodies including T9, T11, T12, L1, L3, L4, and L5.  The fracture of L3 may be subacute in nature given the fragmentation.  No definite paraspinal hematoma.  Old anterolateral left-sided and right-sided rib fractures.                                 The EKG was ordered, reviewed, and independently interpreted by the ED provider.  Interpretation time: 15:13  Rate: 89 BPM  Rhythm: Sinus rhythm with occasional premature ventricular complexes  Interpretation: No acute ST changes. No STEMI.           The Emergency Provider reviewed the vital signs and test results, which are outlined above.     ED Discussion     11:28 AM: Discussed pt's case with GIA Garcia (GI) who spoke with Dr. Paige (general surgery) in clinic. Dr. Paige advised pt to come to ED for stat imaging and didn't think clinic visit was best option. GIA Garcia also recommends stat CT to be  sure pt isn't obstructed.    11:55 AM: Discussed pt's case with Dr. Oliva (general surgery) who recommends normal work up and consult GI. Given the fact that she says hernia gets worse with ascites would call GI unless there is incarcerated bowel within it. If there is incarcerated obstructed bowel, she still needs GI consult as people with ascites are a high risk for surgical intervention.    2:54 PM: Discussed pt's case with Dr. Oliva (general surgery) who evaluated pt in ED. Recommends admission to hospital medicine for medical management.    3:23 PM: Discussed case with Olivia Bliss NP ([facility]). Dr. Hebert agrees with current care and management of pt and accepts admission.   Admitting Service: hospital medicine  Admitting Physician: Dr. Hebert  Admit to: observation    3:23 PM: Re-evaluated pt. I have discussed test results, shared treatment plan, and the need for admission with patient and caretaker at bedside. Pt and caretaker express understanding at this time and agree with all information. All questions answered. Pt and caretaker have no further questions or concerns at this time. Pt is ready for admit.       Medical Decision Making:   Clinical Tests:   Lab Tests: Ordered and Reviewed  Radiological Study: Ordered and Reviewed  Medical Tests: Ordered and Reviewed           ED Medication(s):  Medications   meperidine (PF) injection 25 mg (has no administration in time range)   iohexoL (OMNIPAQUE 9) oral solution 1,000 mL (1,000 mLs Oral Given 10/27/20 1420)   iohexoL (OMNIPAQUE 350) injection 75 mL (75 mLs Intravenous Given 10/27/20 1420)   cefazolin (ANCEF) 2 gram in dextrose 5% 50 mL IVPB (premix) (2 g Intravenous Given 10/27/20 1647)   meperidine (PF) injection 12.5 mg (12.5 mg Intravenous Given 10/27/20 1803)         Scribe Attestation:   Scribe #1: I performed the above scribed service and the documentation accurately describes the services I performed. I attest to the  accuracy of the note.     Attending:   Physician Attestation Statement for Scribe #1: I, Erasmo Arrington Jr., MD, personally performed the services described in this documentation, as scribed by Yolanda Wyman, in my presence, and it is both accurate and complete.           Clinical Impression       ICD-10-CM ICD-9-CM   1. Umbilical hernia without obstruction and without gangrene  K42.9 553.1   2. Pre-op exam  Z01.818 V72.84   3. Alcoholic cirrhosis of liver with ascites  K70.31 571.2   4. ETOH abuse  F10.10 305.00   5. Umbilical hernia, incarcerated  K42.0 552.1       Disposition:   Disposition: Placed in Observation  Condition: Fair         Erasmo Arrington Jr., MD  11/07/20 0550       Erasmo Arrington Jr., MD  11/07/20 0605

## 2020-10-27 NOTE — ASSESSMENT & PLAN NOTE
CT scan showed a large hernia sac with a small fascial defect containing multiple loops of small bowel.    Given the situation I would recommend a open umbilical hernia repair with a multitude layered sutured approach.  Mesh will not be used.  The need for surgery, risks benefits and complications were discussed with the patient.  I feel that she is currently in a fairly good physiologic state for surgery given the amount of ascites seen on CT scan.  The umbilical skin would need to be excised due to the redundancy and thinness of the tissue.    The risk of surgery include infection, bleeding, bowel injury, development of ascites, leaking ascites and liver failure.  I explained that if the patient developed liver failure she would have a significant risk of mortality however I feel that this is a low an acceptable risk at this point as her meld score is only 10.    Consent was obtained.

## 2020-10-27 NOTE — SUBJECTIVE & OBJECTIVE
No current facility-administered medications on file prior to encounter.      Current Outpatient Medications on File Prior to Encounter   Medication Sig    FLUoxetine 20 MG capsule Take 20 mg by mouth once daily.    furosemide (LASIX) 20 MG tablet Take 1 tablet (20 mg total) by mouth once daily. (Patient not taking: Reported on 10/27/2020)    hydrOXYzine HCl (ATARAX) 25 MG tablet Take 25 mg by mouth 3 (three) times daily as needed.     lactulose (CEPHULAC) 10 gram packet Take 10 g by mouth once daily.    lactulose (CHRONULAC) 10 gram/15 mL solution TAKE 30 ML(20 GRAMS TOTAL) BY MOUTH TWICE DAILY (Patient not taking: Reported on 10/27/2020)    magnesium oxide (MAG-OX) 400 mg (241.3 mg magnesium) tablet Take 400 mg by mouth once daily.    multivitamin (THERAGRAN) per tablet Take 1 tablet by mouth once daily.    pantoprazole (PROTONIX) 20 MG tablet Take 20 mg by mouth 2 (two) times daily before meals.    spironolactone (ALDACTONE) 25 MG tablet Take 1 tablet (25 mg total) by mouth once daily.       Review of patient's allergies indicates:   Allergen Reactions    Hydrocodone-acetaminophen        Past Medical History:   Diagnosis Date    Alcoholic cirrhosis     Alcoholic dementia     Anemia     Ascites     CKD (chronic kidney disease)     Esophageal reflux     Esophageal varices     Hepatic encephalopathy     Hiatal hernia      Past Surgical History:   Procedure Laterality Date    COLONOSCOPY      ESOPHAGOGASTRODUODENOSCOPY      ESOPHAGOGASTRODUODENOSCOPY N/A 1/6/2020    Procedure: EGD (ESOPHAGOGASTRODUODENOSCOPY);  Surgeon: Monique Wood MD;  Location: [facility];  Service: Endoscopy;  Laterality: N/A;    HYSTERECTOMY       Family History     Problem Relation (Age of Onset)    Arthritis Mother    Cancer Mother        Tobacco Use    Smoking status: Never Smoker    Smokeless tobacco: Never Used   Substance and Sexual Activity    Alcohol use: Yes     Alcohol/week: 2.0 standard drinks      Types: 2 Shots of liquor per week     Comment: some days    Drug use: Not on file    Sexual activity: Not on file     Review of Systems   Constitutional: Negative for activity change.   Gastrointestinal: Negative for abdominal pain, nausea and vomiting.        Bulging umbilical hernia   Genitourinary: Positive for difficulty urinating.   Musculoskeletal: Negative.      Objective:     Vital Signs (Most Recent):  Temp: 98.5 °F (36.9 °C) (10/27/20 1039)  Pulse: 82 (10/27/20 1301)  Resp: 18 (10/27/20 1301)  BP: (!) 154/86 (10/27/20 1301)  SpO2: 100 % (10/27/20 1301) Vital Signs (24h Range):  Temp:  [98.5 °F (36.9 °C)] 98.5 °F (36.9 °C)  Pulse:  [81-94] 82  Resp:  [18-20] 18  SpO2:  [98 %-100 %] 100 %  BP: (120-154)/(68-86) 154/86        Body mass index is 22.67 kg/m².    Physical Exam  Vitals signs and nursing note reviewed.   Constitutional:       Comments: Somewhat chronically ill   HENT:      Head: Normocephalic and atraumatic.      Nose: Nose normal.   Eyes:      General: No scleral icterus.  Cardiovascular:      Rate and Rhythm: Normal rate.   Pulmonary:      Effort: Pulmonary effort is normal.      Breath sounds: Normal breath sounds.   Abdominal:      General: Bowel sounds are normal. There is distension.      Palpations: Abdomen is soft.      Hernia: Hernia: Protuberant/reducible.      Comments: When the hernia is gently palpated the fluid within it can be reduced through the opening which is approximately 1.5 cm in size    Some changes of a caput medusa   Musculoskeletal:         General: Injury: .meld.   Skin:     Capillary Refill: Capillary refill takes less than 2 seconds.   Neurological:      General: No focal deficit present.   Psychiatric:         Mood and Affect: Mood normal.         Significant Labs:  CBC:   Recent Labs   Lab 10/27/20  1220   WBC 4.23   RBC 2.91*   HGB 9.1*   HCT 26.3*      MCV 90   MCH 31.3*   MCHC 34.6     CMP:   Recent Labs   Lab 10/27/20  1220   GLU 91   CALCIUM 8.4*    ALBUMIN 3.7   PROT 6.8   *   K 4.6   CO2 26   CL 94*   BUN 12   CREATININE 1.0   ALKPHOS 108   ALT 38   AST 71*   BILITOT 0.9     Coagulation:   Recent Labs   Lab 10/27/20  1220   LABPROT 15.1*   INR 1.4*     MELD-Na score: 10 at 10/27/2020 12:20 PM  MELD score: 10 at 10/27/2020 12:20 PM  Calculated from:  Serum Creatinine: 1.0 mg/dL at 10/27/2020 12:20 PM  Serum Sodium: 128 mmol/L at 10/27/2020 12:20 PM  Total Bilirubin: 0.9 mg/dL (Rounded to 1 mg/dL) at 10/27/2020 12:20 PM  INR(ratio): 1.4 at 10/27/2020 12:20 PM  Age: 64 years 11 months  Significant Diagnostics:  CT scan

## 2020-10-27 NOTE — ASSESSMENT & PLAN NOTE
-pt admitted to Medical Surgical Unit   -General Surgery consulted for hernia repair   -analgesia as needed   -IV hydration   -antiemetics as needed   -NPO

## 2020-10-27 NOTE — TRANSFER OF CARE
"Anesthesia Transfer of Care Note    Patient: Kylah Ortiz    Procedure(s) Performed: Procedure(s) (LRB):  REPAIR, HERNIA, UMBILICAL, INCARCERATED, AGE 4 YEARS OR OLDER (N/A)    Patient location: PACU    Anesthesia Type: general    Transport from OR: Transported from OR on room air with adequate spontaneous ventilation    Post pain: adequate analgesia    Post assessment: no apparent anesthetic complications    Post vital signs: stable    Level of consciousness: awake, alert and oriented    Nausea/Vomiting: no nausea/vomiting    Complications: none    Transfer of care protocol was followed      Last vitals:   Visit Vitals  BP (!) 151/86 (BP Location: Right arm, Patient Position: Sitting)   Pulse 83   Temp 36.9 °C (98.5 °F) (Oral)   Resp 18   Ht 5' 6" (1.676 m)   SpO2 99%   BMI 22.67 kg/m²     "

## 2020-10-27 NOTE — SUBJECTIVE & OBJECTIVE
Past Medical History:   Diagnosis Date    Alcoholic cirrhosis     Alcoholic dementia     Anemia     Ascites     CKD (chronic kidney disease)     Esophageal reflux     Esophageal varices     Hepatic encephalopathy     Hiatal hernia        Past Surgical History:   Procedure Laterality Date    COLONOSCOPY      ESOPHAGOGASTRODUODENOSCOPY      ESOPHAGOGASTRODUODENOSCOPY N/A 1/6/2020    Procedure: EGD (ESOPHAGOGASTRODUODENOSCOPY);  Surgeon: Monique Wood MD;  Location: [facility];  Service: Endoscopy;  Laterality: N/A;    HYSTERECTOMY         Review of patient's allergies indicates:   Allergen Reactions    Hydrocodone-acetaminophen        No current facility-administered medications on file prior to encounter.      Current Outpatient Medications on File Prior to Encounter   Medication Sig    FLUoxetine 20 MG capsule Take 20 mg by mouth once daily.    furosemide (LASIX) 20 MG tablet Take 1 tablet (20 mg total) by mouth once daily. (Patient not taking: Reported on 10/27/2020)    hydrOXYzine HCl (ATARAX) 25 MG tablet Take 25 mg by mouth 3 (three) times daily as needed.     lactulose (CEPHULAC) 10 gram packet Take 10 g by mouth once daily.    lactulose (CHRONULAC) 10 gram/15 mL solution TAKE 30 ML(20 GRAMS TOTAL) BY MOUTH TWICE DAILY (Patient not taking: Reported on 10/27/2020)    magnesium oxide (MAG-OX) 400 mg (241.3 mg magnesium) tablet Take 400 mg by mouth once daily.    multivitamin (THERAGRAN) per tablet Take 1 tablet by mouth once daily.    pantoprazole (PROTONIX) 20 MG tablet Take 20 mg by mouth 2 (two) times daily before meals.    spironolactone (ALDACTONE) 25 MG tablet Take 1 tablet (25 mg total) by mouth once daily.     Family History     Problem Relation (Age of Onset)    Arthritis Mother    Cancer Mother        Tobacco Use    Smoking status: Never Smoker    Smokeless tobacco: Never Used   Substance and Sexual Activity    Alcohol use: Yes     Alcohol/week: 2.0 standard drinks      Types: 2 Shots of liquor per week     Comment: some days    Drug use: Not on file    Sexual activity: Not on file     Review of Systems   Unable to perform ROS: Dementia   Constitutional: Negative for activity change and appetite change.   Eyes: Negative.    Respiratory: Negative for cough, shortness of breath and wheezing.    Cardiovascular: Negative for chest pain, palpitations and leg swelling.   Gastrointestinal: Negative for abdominal distention, abdominal pain, nausea and vomiting.   Endocrine: Negative for cold intolerance and heat intolerance.   Genitourinary: Negative for difficulty urinating, dysuria, flank pain, frequency and urgency.   Musculoskeletal: Negative for arthralgias, back pain, joint swelling and myalgias.   Skin: Negative for color change and wound.   Allergic/Immunologic: Negative for environmental allergies and food allergies.   Neurological: Negative for dizziness and headaches.   Hematological: Does not bruise/bleed easily.   Psychiatric/Behavioral: Negative for agitation, confusion and sleep disturbance. The patient is not nervous/anxious.      Objective:     Vital Signs (Most Recent):  Temp: 98.5 °F (36.9 °C) (10/27/20 1039)  Pulse: 83 (10/27/20 1601)  Resp: 18 (10/27/20 1601)  BP: (!) 151/86 (10/27/20 1601)  SpO2: 99 % (10/27/20 1601) Vital Signs (24h Range):  Temp:  [98.5 °F (36.9 °C)] 98.5 °F (36.9 °C)  Pulse:  [80-94] 83  Resp:  [18-20] 18  SpO2:  [98 %-100 %] 99 %  BP: (120-154)/(68-88) 151/86        Body mass index is 22.67 kg/m².    Physical Exam  Constitutional:       Appearance: Normal appearance.   HENT:      Head: Normocephalic.      Nose: Nose normal.      Mouth/Throat:      Pharynx: Oropharynx is clear.   Eyes:      Conjunctiva/sclera: Conjunctivae normal.   Neck:      Musculoskeletal: Normal range of motion and neck supple.   Cardiovascular:      Rate and Rhythm: Normal rate and regular rhythm.      Pulses: Normal pulses.      Heart sounds: Normal heart sounds.    Pulmonary:      Effort: Pulmonary effort is normal.      Breath sounds: Normal breath sounds.   Abdominal:      General: Bowel sounds are normal. There is distension.      Palpations: Abdomen is soft.      Tenderness: There is no abdominal tenderness.      Hernia: A hernia is present.   Genitourinary:     Comments: Deferred   Musculoskeletal: Normal range of motion.   Skin:     General: Skin is warm and dry.      Coloration: Skin is pale.   Neurological:      General: No focal deficit present.      Mental Status: She is alert.      Comments: Oriented to person, place, and situation.               Significant Labs:   CBC:   Recent Labs   Lab 10/27/20  1220   WBC 4.23   HGB 9.1*   HCT 26.3*        CMP:   Recent Labs   Lab 10/27/20  1220   *   K 4.6   CL 94*   CO2 26   GLU 91   BUN 12   CREATININE 1.0   CALCIUM 8.4*   PROT 6.8   ALBUMIN 3.7   BILITOT 0.9   ALKPHOS 108   AST 71*   ALT 38   ANIONGAP 8   EGFRNONAA 59*     Significant Imaging:   Imaging Results          CT Abdomen Pelvis With Contrast (Final result)  Result time 10/27/20 14:44:59    Final result by Abe Monique Jr., MD (10/27/20 14:44:59)                 Impression:      1. Cirrhosis and ascites with portal hypertension and varicosities.  2. Umbilical/ventral hernia containing a portion of small bowel without signs of high-grade obstruction.  There is small ascitic fluid within the hernia sac.  3. Moderate to large hiatal hernia with multiple adjacent paraseptal varices.  4. Right inguinal hernia containing a small amount of ascitic fluid.  5. Diverticulosis of the sigmoid colon.  6. Cholelithiasis.  7. Compression deformities of multiple vertebral bodies with a possibly subacute fracture of L3 given its fragmented appearance.  No definite spinal canal hematoma.  Clinical correlation for back pain is advised.      Electronically signed by: Abe Monique Jr., MD  Date:    10/27/2020  Time:    14:44             Narrative:    EXAMINATION:  CT  ABDOMEN PELVIS WITH CONTRAST    CLINICAL HISTORY:  Hernia, complicated;Bowel obstruction high-grade suspected;    TECHNIQUE:  Low dose axial images, sagittal and coronal reformations were obtained from the lung bases to the pubic symphysis following the IV administration of 75 mL of Omnipaque 350 and the oral administration of 1000  mL of Omnipaque 9. All CT scans at this facility use dose modulation, iterative reconstruction and/or weight based dosing when appropriate to reduce radiation dose to as low as reasonably achievable.    COMPARISON:  Prior ultrasound from October 16, 2020.    FINDINGS:  Abdomen:    - Lower thorax: Normal heart size.    - Lung bases: No infiltrates and no nodules.    - Liver: Cirrhotic morphology of the liver.  There are 2 cysts within the liver, 1 within the lateral segment of the left lobe and 1 within the medial right lobe.  These measure 14 mm and 12 mm respectively.  There is small perihepatic ascites.  The portal vein and hepatic veins are patent.  No definite enhancing masses within the liver.  No rapid washout of contrast material.    - Gallbladder: Contains dependently layering calcified stones.    - Bile Ducts: No abnormal bile duct dilation.    - Spleen: Unremarkable.    - Kidneys: The kidneys enhance homogeneously.    - Adrenals: Unremarkable.    - Pancreas: Unremarkable.    - Bowel/Mesentery: Moderate to large hiatal hernia with adjacent paraesophageal varices.  There also varicosities about the lesser and greater curvatures of the stomach.  There is a ventral hernia/umbilical hernia containing a loop of small bowel, likely jejunum.  The hernia neck measures 31 mm craniocaudal by 37 mm transverse.  No sign of bowel obstruction relation to the hernia.  There is small ascites within the hernia sac.  Diverticulosis of the sigmoid colon.    - Peritoneal cavity: Small ascites within the dependent pelvis.  No free air.    - Retroperitoneum:  No lymphadenopathy or hemorrhage.    -  Vascular: No abdominal aortic aneurysm.    - Reproductive organs: Prior hysterectomy.    - Inguinal region: Small right inguinal hernia containing a small amount of ascitic fluid.  The hernia neck measures 19 mm.    Bones:  Severe compression fractures of multiple vertebral bodies including T9, T11, T12, L1, L3, L4, and L5.  The fracture of L3 may be subacute in nature given the fragmentation.  No definite paraspinal hematoma.  Old anterolateral left-sided and right-sided rib fractures.

## 2020-10-27 NOTE — H&P
[facility] Hospital Medicine  History & Physical    Patient Name: Kylah Ortiz  MRN: [id number]  Admission Date: 10/27/2020  Attending Physician: Sumeet Stone, *   Primary Care Provider: Santos Beaver MD         Patient information was obtained from patient, caregiver / friend, past medical records and ER records.     Subjective:     Principal Problem:Umbilical hernia without obstruction and without gangrene    Chief Complaint:   Chief Complaint   Patient presents with    Abdominal Pain     Pt sent by the [facility] for eval of umbilical hernia. Denies N/V or abd pain.         HPI: Kylah Ortiz is a 64 y.o. female patient with a PMHx of alcoholis cirrhosis, alcoholic dementia, ascites, CKD, esophageal varices, hepatic encephalopathy, and hiatal hernia who presents to the Emergency Department for evaluation of umbilical hernia which onset gradually. Of note, caretaker noticed umbilical hernia post paracentesis last week after 4 L removed. Patient was referred to ED today after she was seen in clinic today by GIA Harper (GI) at the [facility].  No associated symptom reported.  Patient denies any fever, chills, diaphoresis, abdominal pain, abdominal distention, nausea, vomiting, diarrhea, constipation, blood in stool, CP, SOB, cough, leg swelling, and all other sxs at this time. Last BM was yesterday. No prior Tx. No further complaints or concerns at this time.  Pt is a full code and Tatyana Chavarria (caregiver) at [phone] is the surrogate decision maker.  ER work up showed: H/H 9.1/26.3, PT 15.1, INR 1.4, Na 128, Cl 94, Ca 8.4, AST 71, and Lipase 81.  COVID 19 negative.  CT abdomen/pelvis with contrast showed cirrhosis and ascites with portal hypertension and varicosities. Umbilical/ventral hernia containing a portion of small bowel without signs of high-grade obstruction.  There is small ascitic fluid within the hernia sac. Moderate to large hiatal hernia with multiple  adjacent paraseptal varices.   Right inguinal hernia containing a small amount of ascitic fluid. ER discussed care with General Surgery and patient transported to OR from ED. Hospital Medicine contacted for admission with patient to be placed in Medical Surgical Unit post procedure.         Past Medical History:   Diagnosis Date    Alcoholic cirrhosis     Alcoholic dementia     Anemia     Ascites     CKD (chronic kidney disease)     Esophageal reflux     Esophageal varices     Hepatic encephalopathy     Hiatal hernia        Past Surgical History:   Procedure Laterality Date    COLONOSCOPY      ESOPHAGOGASTRODUODENOSCOPY      ESOPHAGOGASTRODUODENOSCOPY N/A 1/6/2020    Procedure: EGD (ESOPHAGOGASTRODUODENOSCOPY);  Surgeon: Monique Wood MD;  Location: [facility];  Service: Endoscopy;  Laterality: N/A;    HYSTERECTOMY         Review of patient's allergies indicates:   Allergen Reactions    Hydrocodone-acetaminophen        No current facility-administered medications on file prior to encounter.      Current Outpatient Medications on File Prior to Encounter   Medication Sig    FLUoxetine 20 MG capsule Take 20 mg by mouth once daily.    furosemide (LASIX) 20 MG tablet Take 1 tablet (20 mg total) by mouth once daily. (Patient not taking: Reported on 10/27/2020)    hydrOXYzine HCl (ATARAX) 25 MG tablet Take 25 mg by mouth 3 (three) times daily as needed.     lactulose (CEPHULAC) 10 gram packet Take 10 g by mouth once daily.    lactulose (CHRONULAC) 10 gram/15 mL solution TAKE 30 ML(20 GRAMS TOTAL) BY MOUTH TWICE DAILY (Patient not taking: Reported on 10/27/2020)    magnesium oxide (MAG-OX) 400 mg (241.3 mg magnesium) tablet Take 400 mg by mouth once daily.    multivitamin (THERAGRAN) per tablet Take 1 tablet by mouth once daily.    pantoprazole (PROTONIX) 20 MG tablet Take 20 mg by mouth 2 (two) times daily before meals.    spironolactone (ALDACTONE) 25 MG tablet Take 1 tablet (25 mg total) by  mouth once daily.     Family History     Problem Relation (Age of Onset)    Arthritis Mother    Cancer Mother        Tobacco Use    Smoking status: Never Smoker    Smokeless tobacco: Never Used   Substance and Sexual Activity    Alcohol use: Yes     Alcohol/week: 2.0 standard drinks     Types: 2 Shots of liquor per week     Comment: some days    Drug use: Not on file    Sexual activity: Not on file     Review of Systems   Unable to perform ROS: Dementia   Constitutional: Negative for activity change and appetite change.   Eyes: Negative.    Respiratory: Negative for cough, shortness of breath and wheezing.    Cardiovascular: Negative for chest pain, palpitations and leg swelling.   Gastrointestinal: Negative for abdominal distention, abdominal pain, nausea and vomiting.   Endocrine: Negative for cold intolerance and heat intolerance.   Genitourinary: Negative for difficulty urinating, dysuria, flank pain, frequency and urgency.   Musculoskeletal: Negative for arthralgias, back pain, joint swelling and myalgias.   Skin: Negative for color change and wound.   Allergic/Immunologic: Negative for environmental allergies and food allergies.   Neurological: Negative for dizziness and headaches.   Hematological: Does not bruise/bleed easily.   Psychiatric/Behavioral: Negative for agitation, confusion and sleep disturbance. The patient is not nervous/anxious.      Objective:     Vital Signs (Most Recent):  Temp: 98.5 °F (36.9 °C) (10/27/20 1039)  Pulse: 83 (10/27/20 1601)  Resp: 18 (10/27/20 1601)  BP: (!) 151/86 (10/27/20 1601)  SpO2: 99 % (10/27/20 1601) Vital Signs (24h Range):  Temp:  [98.5 °F (36.9 °C)] 98.5 °F (36.9 °C)  Pulse:  [80-94] 83  Resp:  [18-20] 18  SpO2:  [98 %-100 %] 99 %  BP: (120-154)/(68-88) 151/86        Body mass index is 22.67 kg/m².    Physical Exam  Constitutional:       Appearance: Normal appearance.   HENT:      Head: Normocephalic.      Nose: Nose normal.      Mouth/Throat:      Pharynx:  Oropharynx is clear.   Eyes:      Conjunctiva/sclera: Conjunctivae normal.   Neck:      Musculoskeletal: Normal range of motion and neck supple.   Cardiovascular:      Rate and Rhythm: Normal rate and regular rhythm.      Pulses: Normal pulses.      Heart sounds: Normal heart sounds.   Pulmonary:      Effort: Pulmonary effort is normal.      Breath sounds: Normal breath sounds.   Abdominal:      General: Bowel sounds are normal. There is distension.      Palpations: Abdomen is soft.      Tenderness: There is no abdominal tenderness.      Hernia: A hernia is present.   Genitourinary:     Comments: Deferred   Musculoskeletal: Normal range of motion.   Skin:     General: Skin is warm and dry.      Coloration: Skin is pale.   Neurological:      General: No focal deficit present.      Mental Status: She is alert.      Comments: Oriented to person, place, and situation.               Significant Labs:   CBC:   Recent Labs   Lab 10/27/20  1220   WBC 4.23   HGB 9.1*   HCT 26.3*        CMP:   Recent Labs   Lab 10/27/20  1220   *   K 4.6   CL 94*   CO2 26   GLU 91   BUN 12   CREATININE 1.0   CALCIUM 8.4*   PROT 6.8   ALBUMIN 3.7   BILITOT 0.9   ALKPHOS 108   AST 71*   ALT 38   ANIONGAP 8   EGFRNONAA 59*     Significant Imaging:   Imaging Results          CT Abdomen Pelvis With Contrast (Final result)  Result time 10/27/20 14:44:59    Final result by Abe Monique Jr., MD (10/27/20 14:44:59)                 Impression:      1. Cirrhosis and ascites with portal hypertension and varicosities.  2. Umbilical/ventral hernia containing a portion of small bowel without signs of high-grade obstruction.  There is small ascitic fluid within the hernia sac.  3. Moderate to large hiatal hernia with multiple adjacent paraseptal varices.  4. Right inguinal hernia containing a small amount of ascitic fluid.  5. Diverticulosis of the sigmoid colon.  6. Cholelithiasis.  7. Compression deformities of multiple vertebral bodies  with a possibly subacute fracture of L3 given its fragmented appearance.  No definite spinal canal hematoma.  Clinical correlation for back pain is advised.      Electronically signed by: bAe Monique Jr., MD  Date:    10/27/2020  Time:    14:44             Narrative:    EXAMINATION:  CT ABDOMEN PELVIS WITH CONTRAST    CLINICAL HISTORY:  Hernia, complicated;Bowel obstruction high-grade suspected;    TECHNIQUE:  Low dose axial images, sagittal and coronal reformations were obtained from the lung bases to the pubic symphysis following the IV administration of 75 mL of Omnipaque 350 and the oral administration of 1000  mL of Omnipaque 9. All CT scans at this facility use dose modulation, iterative reconstruction and/or weight based dosing when appropriate to reduce radiation dose to as low as reasonably achievable.    COMPARISON:  Prior ultrasound from October 16, 2020.    FINDINGS:  Abdomen:    - Lower thorax: Normal heart size.    - Lung bases: No infiltrates and no nodules.    - Liver: Cirrhotic morphology of the liver.  There are 2 cysts within the liver, 1 within the lateral segment of the left lobe and 1 within the medial right lobe.  These measure 14 mm and 12 mm respectively.  There is small perihepatic ascites.  The portal vein and hepatic veins are patent.  No definite enhancing masses within the liver.  No rapid washout of contrast material.    - Gallbladder: Contains dependently layering calcified stones.    - Bile Ducts: No abnormal bile duct dilation.    - Spleen: Unremarkable.    - Kidneys: The kidneys enhance homogeneously.    - Adrenals: Unremarkable.    - Pancreas: Unremarkable.    - Bowel/Mesentery: Moderate to large hiatal hernia with adjacent paraesophageal varices.  There also varicosities about the lesser and greater curvatures of the stomach.  There is a ventral hernia/umbilical hernia containing a loop of small bowel, likely jejunum.  The hernia neck measures 31 mm craniocaudal by 37 mm  transverse.  No sign of bowel obstruction relation to the hernia.  There is small ascites within the hernia sac.  Diverticulosis of the sigmoid colon.    - Peritoneal cavity: Small ascites within the dependent pelvis.  No free air.    - Retroperitoneum:  No lymphadenopathy or hemorrhage.    - Vascular: No abdominal aortic aneurysm.    - Reproductive organs: Prior hysterectomy.    - Inguinal region: Small right inguinal hernia containing a small amount of ascitic fluid.  The hernia neck measures 19 mm.    Bones:  Severe compression fractures of multiple vertebral bodies including T9, T11, T12, L1, L3, L4, and L5.  The fracture of L3 may be subacute in nature given the fragmentation.  No definite paraspinal hematoma.  Old anterolateral left-sided and right-sided rib fractures.                              Assessment/Plan:     * Umbilical hernia without obstruction and without gangrene  -pt admitted to Medical Surgical Unit   -General Surgery consulted for hernia repair   -analgesia as needed   -IV hydration   -antiemetics as needed   -NPO       Hyponatremia  -gentle hydration   -repeat CMP in am       ETOH abuse  -pt drinks daily- [place] [employer] whiskey   -ETOH 96 upon admit   -pt counseled on cessation with understanding verbalized       Dementia associated with alcoholism without behavioral disturbance  -will monitor       Alcoholic cirrhosis of liver with ascites  -GI consulted- last seen by GWEN Álvarez NP today at the [place] [facility]   -Lasix and Spironolactone to be resumed post procedure when appropriate   -pt continues to drink daily with ETOH level of 96 noted  - ALT/AST 38/71  -Ammonia and Magnesium pending   -MELD-Na score: 10 at 10/27/2020 12:20 PM  MELD score: 10 at 10/27/2020 12:20 PM  Calculated from:  Serum Creatinine: 1.0 mg/dL at 10/27/2020 12:20 PM  Serum Sodium: 128 mmol/L at 10/27/2020 12:20 PM  Total Bilirubin: 0.9 mg/dL (Rounded to 1 mg/dL) at 10/27/2020 12:20 PM  INR(ratio): 1.4 at 10/27/2020 12:20 PM  Age:  64 years 11 months      Esophageal reflux disease  -PPI     Anemia  -H/H 9.1/26.3  -stable   -repeat CBC in am        VTE Risk Mitigation (From admission, onward)    None             Jane Redmond NP  Department of Hospital Medicine   [facility]

## 2020-10-27 NOTE — ED NOTES
Spoke with Elsa, friend, about pt's plan for surgery for hernia repair.  at bedside discussing procedure

## 2020-10-27 NOTE — CONSULTS
[facility]   General Surgery  Consult Note    Patient Name: Kylah Ortiz  MRN: [id number]  Code Status: Prior  Admission Date: 10/27/2020  Hospital Length of Stay: 0 days  Attending Physician: Sumeet Stone, *  Primary Care Provider: Santos Beaver MD    Patient information was obtained from patient, past medical records and ER records.     Inpatient consult to Gastroenterology  Consult performed by: Shahbaz Oliva MD  Consult ordered by: Shahbaz Oliva MD  Reason for consult: Umbilical hernia  Assessment/Recommendations: Alcoholic cirrhosis  Umbilical hernia with incarcerated loops of small bowel.    The bath recommend urgent umbilical hernia repair    Inpatient consult to General surgery  Consult performed by: Shahbaz Oliva MD  Consult ordered by: Erasmo Arrington Jr., MD  Reason for consult: Incarcerated umbilical hernia  Assessment/Recommendations: Patient has an incarcerated umbilical hernia with loops of small bowel within it.  There is no obstruction or strangulation.  Given her medical history and the risk of potential obstruction/strangulation and the fact that she is fairly well medically managed at this time for her cirrhosis I would recommend our emergent repair of the hernia.  This was discussed with her.  Consent was obtained.          Subjective:     Principal Problem: <principal problem not specified>    History of Present Illness: 64-year-old female with cirrhosis.  She underwent a paracentesis last week.  Her caregiver thought her umbilical hernia which is somewhat protuberant would go down.  The umbilical hernia was still protuberant, so her care giver brought her to the GI clinic for evaluation.  The patient was sent to the emergency room for evaluation due to a protuberant umbilical hernia.    The patient denies any abdominal pain.  She states the hernia does not gets hurt her.  She does not report any nausea and vomiting    No current facility-administered  medications on file prior to encounter.      Current Outpatient Medications on File Prior to Encounter   Medication Sig    FLUoxetine 20 MG capsule Take 20 mg by mouth once daily.    furosemide (LASIX) 20 MG tablet Take 1 tablet (20 mg total) by mouth once daily. (Patient not taking: Reported on 10/27/2020)    hydrOXYzine HCl (ATARAX) 25 MG tablet Take 25 mg by mouth 3 (three) times daily as needed.     lactulose (CEPHULAC) 10 gram packet Take 10 g by mouth once daily.    lactulose (CHRONULAC) 10 gram/15 mL solution TAKE 30 ML(20 GRAMS TOTAL) BY MOUTH TWICE DAILY (Patient not taking: Reported on 10/27/2020)    magnesium oxide (MAG-OX) 400 mg (241.3 mg magnesium) tablet Take 400 mg by mouth once daily.    multivitamin (THERAGRAN) per tablet Take 1 tablet by mouth once daily.    pantoprazole (PROTONIX) 20 MG tablet Take 20 mg by mouth 2 (two) times daily before meals.    spironolactone (ALDACTONE) 25 MG tablet Take 1 tablet (25 mg total) by mouth once daily.       Review of patient's allergies indicates:   Allergen Reactions    Hydrocodone-acetaminophen        Past Medical History:   Diagnosis Date    Alcoholic cirrhosis     Alcoholic dementia     Anemia     Ascites     CKD (chronic kidney disease)     Esophageal reflux     Esophageal varices     Hepatic encephalopathy     Hiatal hernia      Past Surgical History:   Procedure Laterality Date    COLONOSCOPY      ESOPHAGOGASTRODUODENOSCOPY      ESOPHAGOGASTRODUODENOSCOPY N/A 1/6/2020    Procedure: EGD (ESOPHAGOGASTRODUODENOSCOPY);  Surgeon: Monique Wood MD;  [facility];  Service: Endoscopy;  Laterality: N/A;    HYSTERECTOMY       Family History     Problem Relation (Age of Onset)    Arthritis Mother    Cancer Mother        Tobacco Use    Smoking status: Never Smoker    Smokeless tobacco: Never Used   Substance and Sexual Activity    Alcohol use: Yes     Alcohol/week: 2.0 standard drinks     Types: 2 Shots of liquor per week      Comment: some days    Drug use: Not on file    Sexual activity: Not on file     Review of Systems   Constitutional: Negative for activity change.   Gastrointestinal: Negative for abdominal pain, nausea and vomiting.        Bulging umbilical hernia   Genitourinary: Positive for difficulty urinating.   Musculoskeletal: Negative.      Objective:     Vital Signs (Most Recent):  Temp: 98.5 °F (36.9 °C) (10/27/20 1039)  Pulse: 82 (10/27/20 1301)  Resp: 18 (10/27/20 1301)  BP: (!) 154/86 (10/27/20 1301)  SpO2: 100 % (10/27/20 1301) Vital Signs (24h Range):  Temp:  [98.5 °F (36.9 °C)] 98.5 °F (36.9 °C)  Pulse:  [81-94] 82  Resp:  [18-20] 18  SpO2:  [98 %-100 %] 100 %  BP: (120-154)/(68-86) 154/86        Body mass index is 22.67 kg/m².    Physical Exam  Vitals signs and nursing note reviewed.   Constitutional:       Comments: Somewhat chronically ill   HENT:      Head: Normocephalic and atraumatic.      Nose: Nose normal.   Eyes:      General: No scleral icterus.  Cardiovascular:      Rate and Rhythm: Normal rate.   Pulmonary:      Effort: Pulmonary effort is normal.      Breath sounds: Normal breath sounds.   Abdominal:      General: Bowel sounds are normal. There is distension.      Palpations: Abdomen is soft.      Hernia: Hernia: Protuberant/reducible.      Comments: When the hernia is gently palpated the fluid within it can be reduced through the opening which is approximately 1.5 cm in size    Some changes of a caput medusa   Musculoskeletal:         General: Injury: .meld.   Skin:     Capillary Refill: Capillary refill takes less than 2 seconds.   Neurological:      General: No focal deficit present.   Psychiatric:         Mood and Affect: Mood normal.         Significant Labs:  CBC:   Recent Labs   Lab 10/27/20  1220   WBC 4.23   RBC 2.91*   HGB 9.1*   HCT 26.3*      MCV 90   MCH 31.3*   MCHC 34.6     CMP:   Recent Labs   Lab 10/27/20  1220   GLU 91   CALCIUM 8.4*   ALBUMIN 3.7   PROT 6.8   *   K 4.6    CO2 26   CL 94*   BUN 12   CREATININE 1.0   ALKPHOS 108   ALT 38   AST 71*   BILITOT 0.9     Coagulation:   Recent Labs   Lab 10/27/20  1220   LABPROT 15.1*   INR 1.4*     MELD-Na score: 10 at 10/27/2020 12:20 PM  MELD score: 10 at 10/27/2020 12:20 PM  Calculated from:  Serum Creatinine: 1.0 mg/dL at 10/27/2020 12:20 PM  Serum Sodium: 128 mmol/L at 10/27/2020 12:20 PM  Total Bilirubin: 0.9 mg/dL (Rounded to 1 mg/dL) at 10/27/2020 12:20 PM  INR(ratio): 1.4 at 10/27/2020 12:20 PM  Age: 64 years 11 months  Significant Diagnostics:  CT scan         Assessment/Plan:     Umbilical hernia without obstruction and without gangrene  CT scan showed a large hernia sac with a small fascial defect containing multiple loops of small bowel.    Given the situation I would recommend a open umbilical hernia repair with a multitude layered sutured approach.  Mesh will not be used.  The need for surgery, risks benefits and complications were discussed with the patient.  I feel that she is currently in a fairly good physiologic state for surgery given the amount of ascites seen on CT scan.  The umbilical skin would need to be excised due to the redundancy and thinness of the tissue.    The risk of surgery include infection, bleeding, bowel injury, development of ascites, leaking ascites and liver failure.  I explained that if the patient developed liver failure she would have a significant risk of mortality however I feel that this is a low an acceptable risk at this point as her meld score is only 10.    Consent was obtained.    Alcoholic cirrhosis of liver with ascites  The CT scan did not show significant ascites.  It did however show a incarcerated umbilical hernia with multiple loops of small bowel.  No evidence of strangulation or obstruction    Acute renal failure superimposed on stage 4 chronic kidney disease  Likely secondary to cirrhosis    VTE Risk Mitigation (From admission, onward)    None          Thank you for your  consult. I will follow-up with patient. Please contact us if you have any additional questions.  Patient requires urgent surgical intervention.  Is recommended that she be admitted to the medical service with GI in surgery being consulted Services.    Discussed with the emergency room doctor      Shahbaz Oliva MD  General Surgery  [facility]

## 2020-10-27 NOTE — ASSESSMENT & PLAN NOTE
The CT scan did not show significant ascites.  It did however show a incarcerated umbilical hernia with multiple loops of small bowel.  No evidence of strangulation or obstruction

## 2020-10-27 NOTE — ED NOTES
Report called to Doni in surgery. Surgery staff here to get pt for surgery. Friend at bedside. Pt's jewelry (4 rings) put inside stat bag and placed in belongings bag. Hospitalist at bedside. Consents signed and placed in patient's chart.

## 2020-10-27 NOTE — ED NOTES
Pt resting in bed comfortably, VSS, pt in NAD. Call light in reach, bed in lowest position, rails up X2. Pt denies any other needs at this time. Will continue to monitor.

## 2020-10-27 NOTE — PROGRESS NOTES
Clinic Follow Up:  [facility] Follow Up Note    Reason for Follow Up:  The primary encounter diagnosis was Ascites due to alcoholic cirrhosis. Diagnoses of Secondary esophageal varices with bleeding, Alcoholic cirrhosis of liver with ascites, and Umbilical hernia without obstruction and without gangrene were also pertinent to this visit.    PCP: Santos Beaver       HPI:  This is a 64 y.o. female here for follow up of the above  Pt is here with a caregiver that provides all HPI, pt mental status is questionable today  Caregiver reports that over the last few days, pt has had progressive worsening of protrusion of umbilical hernia.  Previously able to reduce the size after having a paracentesis, however, since Friday, unable to reduce.   She denies any pain however, on palpation there is guarding and tenderness  Pt continues to consume ETOH and appears intoxicated today.   Last paracentesis on Friday with 3.5 liters removed      Review of Systems   Unable to perform ROS: Mental acuity       Medical History:  Past Medical History:   Diagnosis Date    Alcoholic cirrhosis     Alcoholic dementia     Anemia     Ascites     CKD (chronic kidney disease)     Esophageal reflux     Esophageal varices     Hepatic encephalopathy     Hiatal hernia        Surgical History:   Past Surgical History:   Procedure Laterality Date    COLONOSCOPY      ESOPHAGOGASTRODUODENOSCOPY      ESOPHAGOGASTRODUODENOSCOPY N/A 1/6/2020    Procedure: EGD (ESOPHAGOGASTRODUODENOSCOPY);  Surgeon: Monique Wood MD;  Location: [facility];  Service: Endoscopy;  Laterality: N/A;    HYSTERECTOMY         Family History:   Family History   Problem Relation Age of Onset    Cancer Mother     Arthritis Mother        Social History:   Social History     Tobacco Use    Smoking status: Never Smoker    Smokeless tobacco: Never Used   Substance Use Topics    Alcohol use: Yes     Alcohol/week: 2.0 standard drinks     Types: 2  "Shots of liquor per week     Comment: some days    Drug use: Not on file       Allergies: Reviewed    Home Medications:  Current Outpatient Medications on File Prior to Visit   Medication Sig Dispense Refill    FLUoxetine 20 MG capsule Take 20 mg by mouth once daily.      hydrOXYzine HCl (ATARAX) 25 MG tablet Take 25 mg by mouth 3 (three) times daily as needed.       lactulose (CEPHULAC) 10 gram packet Take 10 g by mouth once daily.      magnesium oxide (MAG-OX) 400 mg (241.3 mg magnesium) tablet Take 400 mg by mouth once daily.      multivitamin (THERAGRAN) per tablet Take 1 tablet by mouth once daily.      pantoprazole (PROTONIX) 20 MG tablet Take 20 mg by mouth 2 (two) times daily before meals.      spironolactone (ALDACTONE) 25 MG tablet Take 1 tablet (25 mg total) by mouth once daily. 30 tablet 11    furosemide (LASIX) 20 MG tablet Take 1 tablet (20 mg total) by mouth once daily. (Patient not taking: Reported on 10/27/2020) 60 tablet 12    lactulose (CHRONULAC) 10 gram/15 mL solution TAKE 30 ML(20 GRAMS TOTAL) BY MOUTH TWICE DAILY (Patient not taking: Reported on 10/27/2020) 1800 mL 5     No current facility-administered medications on file prior to visit.        Physical Exam:  Vital Signs:  /70   Pulse 94   Ht 5' 6" (1.676 m)   Wt 63.7 kg (140 lb 6.9 oz)   BMI 22.67 kg/m²   Body mass index is 22.67 kg/m².  Physical Exam   Constitutional: She appears ill.   HENT:   Head: Normocephalic and atraumatic.   Eyes: No scleral icterus.   Neck: Normal range of motion.   Cardiovascular: Normal rate.   Pulmonary/Chest: Effort normal.   Abdominal: Soft. Bowel sounds are normal. She exhibits no distension and no ascites. There is abdominal tenderness in the periumbilical area. There is guarding. A hernia (protruding, non-reducible, purple colored) is present.   Musculoskeletal:         General: No edema.   Neurological: She is alert.   Skin: Skin is dry.   Psychiatric: Cognition and memory are impaired. "   Vitals reviewed.      Labs: Pertinent labs reviewed.  MELD-Na score: 23 at 7/14/2020  9:21 AM  MELD score: 14 at 7/14/2020  9:21 AM  Calculated from:  Serum Creatinine: 1.3 mg/dL at 7/14/2020  9:21 AM  Serum Sodium: 127 mmol/L at 7/14/2020  9:21 AM  Total Bilirubin: 1.1 mg/dL at 7/14/2020  9:21 AM  INR(ratio): 1.5 at 7/14/2020  9:21 AM  Age: 64 years 8 months      Assessment:  1. Ascites due to alcoholic cirrhosis    2. Secondary esophageal varices with bleeding    3. Alcoholic cirrhosis of liver with ascites    4. Umbilical hernia without obstruction and without gangrene        Recommendations:  Case discussed with General surgery.  Given that there is pain on palpation and the hernia is not easily reduced, pt needs urgent evaluation in the ER to R/O strangulated hernia.   - discussed with care giver the need for ER and she states understanding.   - discussed the High risk of mortality as it relates to possible surgical need in a pt with ESLD  Mcconnell post surgical risk score  7 days 4%, 30 days 15%, 90 days 23 % based on her labs in July  Pt to go directly to ER by way of car and care giver.       Return to Clinic:    As previously planned.

## 2020-10-28 LAB
ALBUMIN SERPL BCP-MCNC: 3.3 G/DL (ref 3.5–5.2)
ALP SERPL-CCNC: 99 U/L (ref 55–135)
ALT SERPL W/O P-5'-P-CCNC: 28 U/L (ref 10–44)
ANION GAP SERPL CALC-SCNC: 7 MMOL/L (ref 8–16)
AST SERPL-CCNC: 54 U/L (ref 10–40)
BASOPHILS # BLD AUTO: 0.02 K/UL (ref 0–0.2)
BASOPHILS # BLD AUTO: 0.02 K/UL (ref 0–0.2)
BASOPHILS NFR BLD: 0.3 % (ref 0–1.9)
BASOPHILS NFR BLD: 0.3 % (ref 0–1.9)
BILIRUB SERPL-MCNC: 1.3 MG/DL (ref 0.1–1)
BUN SERPL-MCNC: 11 MG/DL (ref 8–23)
CALCIUM SERPL-MCNC: 8.4 MG/DL (ref 8.7–10.5)
CHLORIDE SERPL-SCNC: 93 MMOL/L (ref 95–110)
CO2 SERPL-SCNC: 28 MMOL/L (ref 23–29)
CREAT SERPL-MCNC: 0.9 MG/DL (ref 0.5–1.4)
DIFFERENTIAL METHOD: ABNORMAL
DIFFERENTIAL METHOD: ABNORMAL
EOSINOPHIL # BLD AUTO: 0 K/UL (ref 0–0.5)
EOSINOPHIL # BLD AUTO: 0 K/UL (ref 0–0.5)
EOSINOPHIL NFR BLD: 0.5 % (ref 0–8)
EOSINOPHIL NFR BLD: 0.5 % (ref 0–8)
ERYTHROCYTE [DISTWIDTH] IN BLOOD BY AUTOMATED COUNT: 13.3 % (ref 11.5–14.5)
ERYTHROCYTE [DISTWIDTH] IN BLOOD BY AUTOMATED COUNT: 13.3 % (ref 11.5–14.5)
EST. GFR  (AFRICAN AMERICAN): >60 ML/MIN/1.73 M^2
EST. GFR  (NON AFRICAN AMERICAN): >60 ML/MIN/1.73 M^2
GLUCOSE SERPL-MCNC: 97 MG/DL (ref 70–110)
HCT VFR BLD AUTO: 26.2 % (ref 37–48.5)
HCT VFR BLD AUTO: 26.2 % (ref 37–48.5)
HGB BLD-MCNC: 8.9 G/DL (ref 12–16)
HGB BLD-MCNC: 8.9 G/DL (ref 12–16)
IMM GRANULOCYTES # BLD AUTO: 0.04 K/UL (ref 0–0.04)
IMM GRANULOCYTES # BLD AUTO: 0.04 K/UL (ref 0–0.04)
IMM GRANULOCYTES NFR BLD AUTO: 0.5 % (ref 0–0.5)
IMM GRANULOCYTES NFR BLD AUTO: 0.5 % (ref 0–0.5)
LYMPHOCYTES # BLD AUTO: 0.5 K/UL (ref 1–4.8)
LYMPHOCYTES # BLD AUTO: 0.5 K/UL (ref 1–4.8)
LYMPHOCYTES NFR BLD: 6.6 % (ref 18–48)
LYMPHOCYTES NFR BLD: 6.6 % (ref 18–48)
MCH RBC QN AUTO: 31.1 PG (ref 27–31)
MCH RBC QN AUTO: 31.1 PG (ref 27–31)
MCHC RBC AUTO-ENTMCNC: 34 G/DL (ref 32–36)
MCHC RBC AUTO-ENTMCNC: 34 G/DL (ref 32–36)
MCV RBC AUTO: 92 FL (ref 82–98)
MCV RBC AUTO: 92 FL (ref 82–98)
MONOCYTES # BLD AUTO: 1.2 K/UL (ref 0.3–1)
MONOCYTES # BLD AUTO: 1.2 K/UL (ref 0.3–1)
MONOCYTES NFR BLD: 15.8 % (ref 4–15)
MONOCYTES NFR BLD: 15.8 % (ref 4–15)
NEUTROPHILS # BLD AUTO: 5.6 K/UL (ref 1.8–7.7)
NEUTROPHILS # BLD AUTO: 5.6 K/UL (ref 1.8–7.7)
NEUTROPHILS NFR BLD: 76.3 % (ref 38–73)
NEUTROPHILS NFR BLD: 76.3 % (ref 38–73)
NRBC BLD-RTO: 0 /100 WBC
NRBC BLD-RTO: 0 /100 WBC
PLATELET # BLD AUTO: 182 K/UL (ref 150–350)
PLATELET # BLD AUTO: 182 K/UL (ref 150–350)
PMV BLD AUTO: 9.2 FL (ref 9.2–12.9)
PMV BLD AUTO: 9.2 FL (ref 9.2–12.9)
POTASSIUM SERPL-SCNC: 4.4 MMOL/L (ref 3.5–5.1)
PROT SERPL-MCNC: 6.2 G/DL (ref 6–8.4)
RBC # BLD AUTO: 2.86 M/UL (ref 4–5.4)
RBC # BLD AUTO: 2.86 M/UL (ref 4–5.4)
SODIUM SERPL-SCNC: 128 MMOL/L (ref 136–145)
WBC # BLD AUTO: 7.3 K/UL (ref 3.9–12.7)
WBC # BLD AUTO: 7.3 K/UL (ref 3.9–12.7)

## 2020-10-28 PROCEDURE — 99232 SBSQ HOSP IP/OBS MODERATE 35: CPT | Mod: ,,, | Performed by: PHYSICIAN ASSISTANT

## 2020-10-28 PROCEDURE — 94799 UNLISTED PULMONARY SVC/PX: CPT

## 2020-10-28 PROCEDURE — 99232 PR SUBSEQUENT HOSPITAL CARE,LEVL II: ICD-10-PCS | Mod: ,,, | Performed by: PHYSICIAN ASSISTANT

## 2020-10-28 PROCEDURE — 63600175 PHARM REV CODE 636 W HCPCS: Performed by: SURGERY

## 2020-10-28 PROCEDURE — 63600175 PHARM REV CODE 636 W HCPCS: Performed by: INTERNAL MEDICINE

## 2020-10-28 PROCEDURE — 85025 COMPLETE CBC W/AUTO DIFF WBC: CPT

## 2020-10-28 PROCEDURE — 21400001 HC TELEMETRY ROOM

## 2020-10-28 PROCEDURE — 36415 COLL VENOUS BLD VENIPUNCTURE: CPT

## 2020-10-28 PROCEDURE — 25000003 PHARM REV CODE 250: Performed by: SURGERY

## 2020-10-28 PROCEDURE — 80053 COMPREHEN METABOLIC PANEL: CPT

## 2020-10-28 RX ORDER — FLUOXETINE HYDROCHLORIDE 20 MG/1
20 CAPSULE ORAL DAILY
Status: DISCONTINUED | OUTPATIENT
Start: 2020-10-28 | End: 2020-10-31 | Stop reason: HOSPADM

## 2020-10-28 RX ORDER — MEPERIDINE HYDROCHLORIDE 25 MG/ML
25 INJECTION INTRAMUSCULAR; INTRAVENOUS; SUBCUTANEOUS
Status: ACTIVE | OUTPATIENT
Start: 2020-10-28 | End: 2020-10-29

## 2020-10-28 RX ORDER — LANOLIN ALCOHOL/MO/W.PET/CERES
400 CREAM (GRAM) TOPICAL DAILY
Status: DISCONTINUED | OUTPATIENT
Start: 2020-10-28 | End: 2020-10-31 | Stop reason: HOSPADM

## 2020-10-28 RX ORDER — SPIRONOLACTONE 25 MG/1
25 TABLET ORAL DAILY
Status: DISCONTINUED | OUTPATIENT
Start: 2020-10-28 | End: 2020-10-31 | Stop reason: HOSPADM

## 2020-10-28 RX ORDER — PANTOPRAZOLE SODIUM 40 MG/1
40 TABLET, DELAYED RELEASE ORAL
Status: DISCONTINUED | OUTPATIENT
Start: 2020-10-28 | End: 2020-10-31

## 2020-10-28 RX ORDER — TRAMADOL HYDROCHLORIDE 50 MG/1
50 TABLET ORAL EVERY 6 HOURS PRN
Status: DISCONTINUED | OUTPATIENT
Start: 2020-10-28 | End: 2020-10-31 | Stop reason: HOSPADM

## 2020-10-28 RX ORDER — OXYCODONE HYDROCHLORIDE 5 MG/1
5 TABLET ORAL EVERY 6 HOURS PRN
Status: DISCONTINUED | OUTPATIENT
Start: 2020-10-28 | End: 2020-10-28

## 2020-10-28 RX ORDER — HYDROXYZINE HYDROCHLORIDE 25 MG/1
25 TABLET, FILM COATED ORAL EVERY 4 HOURS PRN
Status: DISCONTINUED | OUTPATIENT
Start: 2020-10-28 | End: 2020-10-31 | Stop reason: HOSPADM

## 2020-10-28 RX ADMIN — Medication 400 MG: at 09:10

## 2020-10-28 RX ADMIN — PANTOPRAZOLE SODIUM 40 MG: 40 TABLET, DELAYED RELEASE ORAL at 03:10

## 2020-10-28 RX ADMIN — CHLORHEXIDINE GLUCONATE 0.12% ORAL RINSE 10 ML: 1.2 LIQUID ORAL at 09:10

## 2020-10-28 RX ADMIN — LORAZEPAM 1 MG: 2 INJECTION INTRAMUSCULAR; INTRAVENOUS at 10:10

## 2020-10-28 RX ADMIN — THERA TABS 1 TABLET: TAB at 09:10

## 2020-10-28 RX ADMIN — SPIRONOLACTONE 25 MG: 25 TABLET ORAL at 09:10

## 2020-10-28 RX ADMIN — FLUOXETINE 20 MG: 20 CAPSULE ORAL at 09:10

## 2020-10-28 RX ADMIN — SODIUM CHLORIDE, SODIUM LACTATE, POTASSIUM CHLORIDE, AND CALCIUM CHLORIDE: .6; .31; .03; .02 INJECTION, SOLUTION INTRAVENOUS at 03:10

## 2020-10-28 RX ADMIN — PANTOPRAZOLE SODIUM 40 MG: 40 TABLET, DELAYED RELEASE ORAL at 07:10

## 2020-10-28 NOTE — PLAN OF CARE
Pt to Pacu per stretcher. Pt drowsy responds to verbal stimuli. Oriented x1 person and pt thought she was at [facility]. Reoriented at this time. Large bruise noted to left lateral thigh area. Edwards cath in place. Umbilical drsg clean and dry.No distress noted.

## 2020-10-28 NOTE — ANESTHESIA POSTPROCEDURE EVALUATION
Anesthesia Post Evaluation    Patient: Kylah Ortiz    Procedure(s) Performed: Procedure(s) (LRB):  REPAIR, HERNIA, UMBILICAL, INCARCERATED, AGE 5 YEARS OR OLDER (N/A)    Final Anesthesia Type: general    Patient location during evaluation: PACU  Patient participation: Yes- Able to Participate  Level of consciousness: awake and alert, oriented and awake  Post-procedure vital signs: reviewed and stable  Pain management: adequate  Airway patency: patent    PONV status at discharge: No PONV  Anesthetic complications: no      Cardiovascular status: blood pressure returned to baseline  Respiratory status: unassisted and spontaneous ventilation  Hydration status: euvolemic  Follow-up not needed.          Vitals Value Taken Time   /84 10/28/20 0726   Temp 36.9 °C (98.4 °F) 10/28/20 0726   Pulse 79 10/28/20 0726   Resp 18 10/28/20 0726   SpO2 97 % 10/28/20 0726         Event Time   Out of Recovery 19:21:15         Pain/Polo Score: Pain Rating Prior to Med Admin: 6 (10/27/2020  6:03 PM)  Pain Rating Post Med Admin: 0 (10/27/2020  7:00 PM)  Polo Score: 10 (10/27/2020  7:15 PM)

## 2020-10-28 NOTE — CONSULTS
[facility] -   Gastroenterology  Consult Note    Patient Name: Kylah Ortiz  MRN: [id number]  Admission Date: 10/27/2020  Hospital Length of Stay: 0 days  Code Status: Prior   Attending Provider: Shahbaz Oliva MD   Consulting Provider: Monique Wood MD  Primary Care Physician: Santos Beaver MD  Principal Problem:Alcoholic cirrhosis of liver with ascites    Inpatient consult to Gastroenterology  Consult performed by: Monique Wood MD  Consult ordered by: Monique Wood MD  Reason for consult: Alcoholic Cirrhosis        Subjective:     HPI:  Patient is a 65 y/o WF with PMH of alcoholic liver cirrhosis followed in hepatology clinic who was sent to ED for a non-reducible umbilical hernia. Patient has history of decompensated cirrhosis with ascites, HE and varices. She has had a recent paracentesis with removal of 3.5 L ascitic fluid. Her encephalopathy is controlled on home medications. She is still actively drinking alcohol and thus is not a liver transplant candidate. Her last EGD was in 01/2020 with no varices noted at that time. In the ED, patient is without any complaints aside from pain around her hernia. General surgery consulted and plans for surgery to prevent strangulation of umbilical hernia. GI requested to follow in consultation while patient is admitted.     Past Medical History:   Diagnosis Date    Alcoholic cirrhosis     Alcoholic dementia     Anemia     Ascites     CKD (chronic kidney disease)     Esophageal reflux     Esophageal varices     Hepatic encephalopathy     Hiatal hernia        Past Surgical History:   Procedure Laterality Date    COLONOSCOPY      ESOPHAGOGASTRODUODENOSCOPY      ESOPHAGOGASTRODUODENOSCOPY N/A 1/6/2020    Procedure: EGD (ESOPHAGOGASTRODUODENOSCOPY);  Surgeon: Monique Wood MD;  Location: [facility];  Service: Endoscopy;  Laterality: N/A;    HYSTERECTOMY         Review of patient's allergies indicates:   Allergen  Reactions    Hydrocodone-acetaminophen      Family History     Problem Relation (Age of Onset)    Arthritis Mother    Cancer Mother        Tobacco Use    Smoking status: Never Smoker    Smokeless tobacco: Never Used   Substance and Sexual Activity    Alcohol use: Yes     Alcohol/week: 2.0 standard drinks     Types: 2 Shots of liquor per week     Comment: some days    Drug use: Not on file    Sexual activity: Not on file     Review of Systems   Constitutional: Negative for appetite change, fever and unexpected weight change.   HENT: Negative for postnasal drip, rhinorrhea, sneezing, sore throat and trouble swallowing.    Eyes: Negative for visual disturbance.   Respiratory: Negative for cough, shortness of breath and wheezing.    Cardiovascular: Negative for chest pain, palpitations and leg swelling.   Gastrointestinal: Positive for abdominal pain. Negative for blood in stool, constipation, diarrhea, nausea and vomiting.        + hernia   Genitourinary: Negative for dysuria.   Musculoskeletal: Negative for arthralgias, joint swelling and myalgias.   Skin: Negative for color change, pallor and rash.   Neurological: Negative for weakness, light-headedness, numbness and headaches.   Hematological: Negative for adenopathy. Does not bruise/bleed easily.   Psychiatric/Behavioral: Negative for agitation.     Objective:     Vital Signs (Most Recent):  Temp: 98.4 °F (36.9 °C) (10/27/20 1932)  Pulse: 91 (10/27/20 1932)  Resp: 18 (10/27/20 1932)  BP: (!) 151/83 (10/27/20 1932)  SpO2: 96 % (10/27/20 2033) Vital Signs (24h Range):  Temp:  [98.2 °F (36.8 °C)-98.5 °F (36.9 °C)] 98.4 °F (36.9 °C)  Pulse:  [] 91  Resp:  [12-24] 18  SpO2:  [92 %-100 %] 96 %  BP: (120-162)/() 151/83     Weight: 65.4 kg (144 lb 2.9 oz) (10/27/20 1934)  Body mass index is 23.27 kg/m².      Intake/Output Summary (Last 24 hours) at 10/27/2020 2040  Last data filed at 10/27/2020 1859  Gross per 24 hour   Intake 1100 ml   Output 480 ml    Net 620 ml       Lines/Drains/Airways     Drain                 Urethral Catheter 10/27/20 1645 Latex 16 Fr. less than 1 day          Peripheral Intravenous Line                 Peripheral IV - Single Lumen 10/27/20 1220 20 G Left Antecubital less than 1 day                Physical Exam  Vitals signs reviewed.   Constitutional:       General: She is not in acute distress.     Appearance: She is not diaphoretic.   HENT:      Head: Normocephalic and atraumatic.      Mouth/Throat:      Pharynx: No oropharyngeal exudate.   Eyes:      General: No scleral icterus.        Right eye: No discharge.         Left eye: No discharge.      Conjunctiva/sclera: Conjunctivae normal.      Pupils: Pupils are equal, round, and reactive to light.   Neck:      Musculoskeletal: Normal range of motion.   Cardiovascular:      Rate and Rhythm: Normal rate and regular rhythm.      Heart sounds: Normal heart sounds. No murmur. No friction rub. No gallop.    Pulmonary:      Effort: Pulmonary effort is normal. No respiratory distress.      Breath sounds: Normal breath sounds. No stridor. No wheezing or rales.   Abdominal:      General: Bowel sounds are normal. There is no distension.      Palpations: Abdomen is soft. There is no mass.      Tenderness: There is no abdominal tenderness. There is no guarding.      Hernia: A hernia (umbilical hernia, nonreducible) is present.   Musculoskeletal: Normal range of motion.   Skin:     General: Skin is warm and dry.      Coloration: Skin is not pale.      Findings: No erythema or rash.   Neurological:      Mental Status: She is alert and oriented to person, place, and time.         Significant Labs:  All pertinent lab results from the last 24 hours have been reviewed.    Significant Imaging:  Imaging results within the past 24 hours have been reviewed.    Assessment/Plan:     * Alcoholic cirrhosis of liver with ascites  - MELD-Na is 10 on labs today  - Recommend to continue with home dose lactulose for  history of HE  - Can hold diuretics for today  - Monitor daily labs  - Patient is not a transplant candidate due to active ETOH use. Recommend ETOH cessation    Umbilical hernia without obstruction and without gangrene  - Patient went to OR for hernia repair. Continued management per surgery service        Thank you for your consult. I will follow-up with patient. Please contact us if you have any additional questions.    Monique Wood MD  Gastroenterology  [facility]

## 2020-10-28 NOTE — SUBJECTIVE & OBJECTIVE
Interval History:  Patient seems to be at her baseline.  She does not report any nausea or vomiting.  There is no drainage from the incision.  She has adequate urine output    Medications:  Continuous Infusions:   lactated ringers 100 mL/hr at 10/27/20 1951     Scheduled Meds:   chlorhexidine  10 mL Mouth/Throat BID    FLUoxetine  20 mg Oral Daily    magnesium oxide  400 mg Oral Daily    multivitamin  1 tablet Oral Daily    nozaseptin   Each Nostril BID    pantoprazole  40 mg Oral BID AC    spironolactone  25 mg Oral Daily     PRN Meds:hydrOXYzine HCL, influenza, meperidine, ondansetron, pneumoc 13-pao conj-dip cr(PF), traMADoL     Review of patient's allergies indicates:   Allergen Reactions    Hydrocodone-acetaminophen Hives and Itching     Objective:     Vital Signs (Most Recent):  Temp: 98.4 °F (36.9 °C) (10/28/20 0726)  Pulse: 79 (10/28/20 0726)  Resp: 18 (10/28/20 0726)  BP: (!) 158/84 (10/28/20 0726)  SpO2: 97 % (10/28/20 0726) Vital Signs (24h Range):  Temp:  [98.2 °F (36.8 °C)-99.2 °F (37.3 °C)] 98.4 °F (36.9 °C)  Pulse:  [] 79  Resp:  [12-24] 18  SpO2:  [92 %-100 %] 97 %  BP: (134-162)/() 158/84     Weight: 65.4 kg (144 lb 2.9 oz)  Body mass index is 23.27 kg/m².    Intake/Output - Last 3 Shifts       10/26 0700 - 10/27 0659 10/27 0700 - 10/28 0659 10/28 0700 - 10/29 0659    P.O.  0     I.V. (mL/kg)  2015 (30.8)     Total Intake(mL/kg)  2015 (30.8)     Urine (mL/kg/hr)  1350     Blood  30     Total Output  1380     Net  +635                  Physical Exam  Vitals signs and nursing note reviewed.   Constitutional:       Comments: Slightly chronically ill   Eyes:      General: No scleral icterus.  Cardiovascular:      Rate and Rhythm: Normal rate and regular rhythm.   Pulmonary:      Effort: Pulmonary effort is normal.      Breath sounds: Normal breath sounds.   Abdominal:      General: Abdomen is flat. Distention: Mild.      Palpations: Abdomen is soft.      Comments: Incision is  dressed with no drainage   Musculoskeletal:      Right lower leg: No edema.      Left lower leg: No edema.   Skin:     General: Skin is warm and dry.   Neurological:      General: No focal deficit present.      Mental Status: She is alert and oriented to person, place, and time.   Psychiatric:         Mood and Affect: Mood normal.         Behavior: Behavior normal.         Thought Content: Thought content normal.         Judgment: Judgment normal.         Significant Labs:  CBC:   Recent Labs   Lab 10/28/20  0655   WBC 7.30  7.30   RBC 2.86*  2.86*   HGB 8.9*  8.9*   HCT 26.2*  26.2*     182   MCV 92  92   MCH 31.1*  31.1*   MCHC 34.0  34.0     CMP:   Recent Labs   Lab 10/28/20  0655   GLU 97   CALCIUM 8.4*   ALBUMIN 3.3*   PROT 6.2   *   K 4.4   CO2 28   CL 93*   BUN 11   CREATININE 0.9   ALKPHOS 99   ALT 28   AST 54*   BILITOT 1.3*       Significant Diagnostics:  No new

## 2020-10-28 NOTE — SUBJECTIVE & OBJECTIVE
Subjective:     Interval History:   The patient is s/p hernia repair. No acute liver issues overnight. MELD stable. Patient is feeling ok without nausea, vomiting or abdominal pain.     Review of Systems   Constitutional:        See Interval History for daily ROS.      Objective:     Vital Signs (Most Recent):  Temp: 98.4 °F (36.9 °C) (10/28/20 0726)  Pulse: 79 (10/28/20 0726)  Resp: 18 (10/28/20 0726)  BP: (!) 158/84 (10/28/20 0726)  SpO2: 97 % (10/28/20 0726) Vital Signs (24h Range):  Temp:  [98.2 °F (36.8 °C)-99.2 °F (37.3 °C)] 98.4 °F (36.9 °C)  Pulse:  [] 79  Resp:  [12-24] 18  SpO2:  [92 %-100 %] 97 %  BP: (133-162)/() 158/84     Weight: 65.4 kg (144 lb 2.9 oz) (10/27/20 1934)  Body mass index is 23.27 kg/m².      Intake/Output Summary (Last 24 hours) at 10/28/2020 1019  Last data filed at 10/28/2020 0500  Gross per 24 hour   Intake 2015 ml   Output 1380 ml   Net 635 ml       Lines/Drains/Airways     Drain                 Urethral Catheter 10/27/20 1645 Latex 16 Fr. less than 1 day          Peripheral Intravenous Line                 Peripheral IV - Single Lumen 10/27/20 1220 20 G Left Antecubital less than 1 day                Physical Exam  Constitutional:       General: She is not in acute distress.     Appearance: She is well-developed. She is not diaphoretic.   HENT:      Head: Normocephalic and atraumatic.   Eyes:      Extraocular Movements: Extraocular movements intact.   Cardiovascular:      Rate and Rhythm: Normal rate and regular rhythm.   Pulmonary:      Effort: Pulmonary effort is normal. No respiratory distress.      Breath sounds: Normal breath sounds. No wheezing.   Abdominal:      General: Bowel sounds are normal. There is no distension.      Palpations: Abdomen is soft.      Tenderness: There is abdominal tenderness (mild).      Comments: Dressing clean, dry and intact.   Neurological:      Mental Status: She is alert and oriented to person, place, and time.      Cranial Nerves:  No cranial nerve deficit.   Psychiatric:         Behavior: Behavior normal.         Significant Labs:  CBC:   Recent Labs   Lab 10/27/20  1220 10/28/20  0655   WBC 4.23 7.30  7.30   HGB 9.1* 8.9*  8.9*   HCT 26.3* 26.2*  26.2*    182  182     CMP:   Recent Labs   Lab 10/28/20  0655   GLU 97   CALCIUM 8.4*   ALBUMIN 3.3*   PROT 6.2   *   K 4.4   CO2 28   CL 93*   BUN 11   CREATININE 0.9   ALKPHOS 99   ALT 28   AST 54*   BILITOT 1.3*     Coagulation:   Recent Labs   Lab 10/27/20  1220   INR 1.4*         Significant Imaging:  Imaging results within the past 24 hours have been reviewed.

## 2020-10-28 NOTE — SUBJECTIVE & OBJECTIVE
Review of Systems   Constitutional: Positive for fatigue. Negative for chills and fever.   HENT: Negative for congestion, rhinorrhea and sinus pressure.    Respiratory: Negative for apnea, cough, choking, chest tightness, shortness of breath, wheezing and stridor.    Cardiovascular: Negative for chest pain, palpitations and leg swelling.   Gastrointestinal: Positive for abdominal pain. Negative for abdominal distention, diarrhea, nausea and vomiting.   Endocrine: Negative for cold intolerance and heat intolerance.   Genitourinary: Negative for difficulty urinating and hematuria.   Musculoskeletal: Negative for arthralgias and joint swelling.   Skin: Negative for color change, pallor and rash.   Neurological: Positive for weakness. Negative for dizziness, seizures, numbness and headaches.   Psychiatric/Behavioral: Negative for agitation. The patient is not nervous/anxious.      Objective:     Vital Signs (Most Recent):  Temp: 98.8 °F (37.1 °C) (10/28/20 1142)  Pulse: 79 (10/28/20 1142)  Resp: 18 (10/28/20 1142)  BP: (!) 150/82 (10/28/20 1142)  SpO2: 97 % (10/28/20 1142) Vital Signs (24h Range):  Temp:  [98.2 °F (36.8 °C)-99.2 °F (37.3 °C)] 98.8 °F (37.1 °C)  Pulse:  [] 79  Resp:  [12-24] 18  SpO2:  [92 %-100 %] 97 %  BP: (136-162)/() 150/82     Weight: 65.4 kg (144 lb 2.9 oz)  Body mass index is 23.27 kg/m².    Intake/Output Summary (Last 24 hours) at 10/28/2020 1411  Last data filed at 10/28/2020 0500  Gross per 24 hour   Intake 2015 ml   Output 1380 ml   Net 635 ml      Physical Exam  Vitals signs and nursing note reviewed.   Constitutional:       General: She is not in acute distress.     Appearance: She is not ill-appearing, toxic-appearing or diaphoretic.   HENT:      Head: Normocephalic and atraumatic.   Cardiovascular:      Heart sounds: No murmur. No friction rub. No gallop.    Pulmonary:      Effort: No respiratory distress.      Breath sounds: No stridor. No wheezing, rhonchi or rales.    Chest:      Chest wall: No tenderness.   Abdominal:      General: There is no distension.      Palpations: There is no mass.      Tenderness: There is abdominal tenderness. There is no right CVA tenderness, left CVA tenderness, guarding or rebound.      Hernia: No hernia is present.      Comments: Mid abdominal area dressing intact    Musculoskeletal: Normal range of motion.   Skin:     General: Skin is warm and dry.   Neurological:      General: No focal deficit present.   Psychiatric:         Mood and Affect: Mood normal.         Significant Labs:   CBC:   Recent Labs   Lab 10/27/20  1220 10/28/20  0655   WBC 4.23 7.30  7.30   HGB 9.1* 8.9*  8.9*   HCT 26.3* 26.2*  26.2*    182  182     CMP:   Recent Labs   Lab 10/27/20  1220 10/28/20  0655   * 128*   K 4.6 4.4   CL 94* 93*   CO2 26 28   GLU 91 97   BUN 12 11   CREATININE 1.0 0.9   CALCIUM 8.4* 8.4*   PROT 6.8 6.2   ALBUMIN 3.7 3.3*   BILITOT 0.9 1.3*   ALKPHOS 108 99   AST 71* 54*   ALT 38 28   ANIONGAP 8 7*   EGFRNONAA 59* >60       Significant Imaging:     Imaging Results          CT Abdomen Pelvis With Contrast (Final result)  Result time 10/27/20 14:44:59    Final result by Abe Monique Jr., MD (10/27/20 14:44:59)                 Impression:      1. Cirrhosis and ascites with portal hypertension and varicosities.  2. Umbilical/ventral hernia containing a portion of small bowel without signs of high-grade obstruction.  There is small ascitic fluid within the hernia sac.  3. Moderate to large hiatal hernia with multiple adjacent paraseptal varices.  4. Right inguinal hernia containing a small amount of ascitic fluid.  5. Diverticulosis of the sigmoid colon.  6. Cholelithiasis.  7. Compression deformities of multiple vertebral bodies with a possibly subacute fracture of L3 given its fragmented appearance.  No definite spinal canal hematoma.  Clinical correlation for back pain is advised.      Electronically signed by: Abe Monique Jr.  MD  Date:    10/27/2020  Time:    14:44             Narrative:    EXAMINATION:  CT ABDOMEN PELVIS WITH CONTRAST    CLINICAL HISTORY:  Hernia, complicated;Bowel obstruction high-grade suspected;    TECHNIQUE:  Low dose axial images, sagittal and coronal reformations were obtained from the lung bases to the pubic symphysis following the IV administration of 75 mL of Omnipaque 350 and the oral administration of 1000  mL of Omnipaque 9. All CT scans at this facility use dose modulation, iterative reconstruction and/or weight based dosing when appropriate to reduce radiation dose to as low as reasonably achievable.    COMPARISON:  Prior ultrasound from October 16, 2020.    FINDINGS:  Abdomen:    - Lower thorax: Normal heart size.    - Lung bases: No infiltrates and no nodules.    - Liver: Cirrhotic morphology of the liver.  There are 2 cysts within the liver, 1 within the lateral segment of the left lobe and 1 within the medial right lobe.  These measure 14 mm and 12 mm respectively.  There is small perihepatic ascites.  The portal vein and hepatic veins are patent.  No definite enhancing masses within the liver.  No rapid washout of contrast material.    - Gallbladder: Contains dependently layering calcified stones.    - Bile Ducts: No abnormal bile duct dilation.    - Spleen: Unremarkable.    - Kidneys: The kidneys enhance homogeneously.    - Adrenals: Unremarkable.    - Pancreas: Unremarkable.    - Bowel/Mesentery: Moderate to large hiatal hernia with adjacent paraesophageal varices.  There also varicosities about the lesser and greater curvatures of the stomach.  There is a ventral hernia/umbilical hernia containing a loop of small bowel, likely jejunum.  The hernia neck measures 31 mm craniocaudal by 37 mm transverse.  No sign of bowel obstruction relation to the hernia.  There is small ascites within the hernia sac.  Diverticulosis of the sigmoid colon.    - Peritoneal cavity: Small ascites within the dependent  pelvis.  No free air.    - Retroperitoneum:  No lymphadenopathy or hemorrhage.    - Vascular: No abdominal aortic aneurysm.    - Reproductive organs: Prior hysterectomy.    - Inguinal region: Small right inguinal hernia containing a small amount of ascitic fluid.  The hernia neck measures 19 mm.    Bones:  Severe compression fractures of multiple vertebral bodies including T9, T11, T12, L1, L3, L4, and L5.  The fracture of L3 may be subacute in nature given the fragmentation.  No definite paraspinal hematoma.  Old anterolateral left-sided and right-sided rib fractures.

## 2020-10-28 NOTE — ASSESSMENT & PLAN NOTE
Postop day 1 from a open umbilical hernia repair with removal of the umbilical skin in layers closure.  Patient is doing well.    Clear liquid diet.  Continue IV fluids.  Monitor urine output.    Advanced to full liquids if clear liquids or tolerated.    The patient will need to be monitored for several days to ensure that she does not go into hepatic failure due to her underlying liver disease

## 2020-10-28 NOTE — SUBJECTIVE & OBJECTIVE
Past Medical History:   Diagnosis Date    Alcoholic cirrhosis     Alcoholic dementia     Anemia     Ascites     CKD (chronic kidney disease)     Esophageal reflux     Esophageal varices     Hepatic encephalopathy     Hiatal hernia        Past Surgical History:   Procedure Laterality Date    COLONOSCOPY      ESOPHAGOGASTRODUODENOSCOPY      ESOPHAGOGASTRODUODENOSCOPY N/A 1/6/2020    Procedure: EGD (ESOPHAGOGASTRODUODENOSCOPY);  Surgeon: Monique Wood MD;  Location: [facility];  Service: Endoscopy;  Laterality: N/A;    HYSTERECTOMY         Review of patient's allergies indicates:   Allergen Reactions    Hydrocodone-acetaminophen      Family History     Problem Relation (Age of Onset)    Arthritis Mother    Cancer Mother        Tobacco Use    Smoking status: Never Smoker    Smokeless tobacco: Never Used   Substance and Sexual Activity    Alcohol use: Yes     Alcohol/week: 2.0 standard drinks     Types: 2 Shots of liquor per week     Comment: some days    Drug use: Not on file    Sexual activity: Not on file     Review of Systems   Constitutional: Negative for appetite change, fever and unexpected weight change.   HENT: Negative for postnasal drip, rhinorrhea, sneezing, sore throat and trouble swallowing.    Eyes: Negative for visual disturbance.   Respiratory: Negative for cough, shortness of breath and wheezing.    Cardiovascular: Negative for chest pain, palpitations and leg swelling.   Gastrointestinal: Positive for abdominal pain. Negative for blood in stool, constipation, diarrhea, nausea and vomiting.        + hernia   Genitourinary: Negative for dysuria.   Musculoskeletal: Negative for arthralgias, joint swelling and myalgias.   Skin: Negative for color change, pallor and rash.   Neurological: Negative for weakness, light-headedness, numbness and headaches.   Hematological: Negative for adenopathy. Does not bruise/bleed easily.   Psychiatric/Behavioral: Negative for agitation.      Objective:     Vital Signs (Most Recent):  Temp: 98.4 °F (36.9 °C) (10/27/20 1932)  Pulse: 91 (10/27/20 1932)  Resp: 18 (10/27/20 1932)  BP: (!) 151/83 (10/27/20 1932)  SpO2: 96 % (10/27/20 2033) Vital Signs (24h Range):  Temp:  [98.2 °F (36.8 °C)-98.5 °F (36.9 °C)] 98.4 °F (36.9 °C)  Pulse:  [] 91  Resp:  [12-24] 18  SpO2:  [92 %-100 %] 96 %  BP: (120-162)/() 151/83     Weight: 65.4 kg (144 lb 2.9 oz) (10/27/20 1934)  Body mass index is 23.27 kg/m².      Intake/Output Summary (Last 24 hours) at 10/27/2020 2040  Last data filed at 10/27/2020 1859  Gross per 24 hour   Intake 1100 ml   Output 480 ml   Net 620 ml       Lines/Drains/Airways     Drain                 Urethral Catheter 10/27/20 1645 Latex 16 Fr. less than 1 day          Peripheral Intravenous Line                 Peripheral IV - Single Lumen 10/27/20 1220 20 G Left Antecubital less than 1 day                Physical Exam  Vitals signs reviewed.   Constitutional:       General: She is not in acute distress.     Appearance: She is not diaphoretic.   HENT:      Head: Normocephalic and atraumatic.      Mouth/Throat:      Pharynx: No oropharyngeal exudate.   Eyes:      General: No scleral icterus.        Right eye: No discharge.         Left eye: No discharge.      Conjunctiva/sclera: Conjunctivae normal.      Pupils: Pupils are equal, round, and reactive to light.   Neck:      Musculoskeletal: Normal range of motion.   Cardiovascular:      Rate and Rhythm: Normal rate and regular rhythm.      Heart sounds: Normal heart sounds. No murmur. No friction rub. No gallop.    Pulmonary:      Effort: Pulmonary effort is normal. No respiratory distress.      Breath sounds: Normal breath sounds. No stridor. No wheezing or rales.   Abdominal:      General: Bowel sounds are normal. There is no distension.      Palpations: Abdomen is soft. There is no mass.      Tenderness: There is no abdominal tenderness. There is no guarding.      Hernia: A hernia  (umbilical hernia, nonreducible) is present.   Musculoskeletal: Normal range of motion.   Skin:     General: Skin is warm and dry.      Coloration: Skin is not pale.      Findings: No erythema or rash.   Neurological:      Mental Status: She is alert and oriented to person, place, and time.         Significant Labs:  All pertinent lab results from the last 24 hours have been reviewed.    Significant Imaging:  Imaging results within the past 24 hours have been reviewed.

## 2020-10-28 NOTE — PROGRESS NOTES
[facility]  Gastroenterology  Progress Note    Patient Name: Kylah Ortiz  MRN: [id number]  Admission Date: 10/27/2020  Hospital Length of Stay: 1 days  Code Status: Prior   Attending Provider: Shahbaz Oliva MD  Consulting Provider: Milton Sepulveda PA-C  Primary Care Physician: Santos Beaver MD  Principal Problem: Umbilical hernia without obstruction and without gangrene      Subjective:     Interval History:   The patient is s/p hernia repair. No acute liver issues overnight. MELD stable. Patient is feeling ok without nausea, vomiting or abdominal pain.     Review of Systems   Constitutional:        See Interval History for daily ROS.      Objective:     Vital Signs (Most Recent):  Temp: 98.4 °F (36.9 °C) (10/28/20 0726)  Pulse: 79 (10/28/20 0726)  Resp: 18 (10/28/20 0726)  BP: (!) 158/84 (10/28/20 0726)  SpO2: 97 % (10/28/20 0726) Vital Signs (24h Range):  Temp:  [98.2 °F (36.8 °C)-99.2 °F (37.3 °C)] 98.4 °F (36.9 °C)  Pulse:  [] 79  Resp:  [12-24] 18  SpO2:  [92 %-100 %] 97 %  BP: (133-162)/() 158/84     Weight: 65.4 kg (144 lb 2.9 oz) (10/27/20 1934)  Body mass index is 23.27 kg/m².      Intake/Output Summary (Last 24 hours) at 10/28/2020 1019  Last data filed at 10/28/2020 0500  Gross per 24 hour   Intake 2015 ml   Output 1380 ml   Net 635 ml       Lines/Drains/Airways     Drain                 Urethral Catheter 10/27/20 1645 Latex 16 Fr. less than 1 day          Peripheral Intravenous Line                 Peripheral IV - Single Lumen 10/27/20 1220 20 G Left Antecubital less than 1 day                Physical Exam  Constitutional:       General: She is not in acute distress.     Appearance: She is well-developed. She is not diaphoretic.   HENT:      Head: Normocephalic and atraumatic.   Eyes:      Extraocular Movements: Extraocular movements intact.   Cardiovascular:      Rate and Rhythm: Normal rate and regular rhythm.   Pulmonary:      Effort: Pulmonary effort is  normal. No respiratory distress.      Breath sounds: Normal breath sounds. No wheezing.   Abdominal:      General: Bowel sounds are normal. There is no distension.      Palpations: Abdomen is soft.      Tenderness: There is abdominal tenderness (mild).      Comments: Dressing clean, dry and intact.   Neurological:      Mental Status: She is alert and oriented to person, place, and time.      Cranial Nerves: No cranial nerve deficit.   Psychiatric:         Behavior: Behavior normal.         Significant Labs:  CBC:   Recent Labs   Lab 10/27/20  1220 10/28/20  0655   WBC 4.23 7.30  7.30   HGB 9.1* 8.9*  8.9*   HCT 26.3* 26.2*  26.2*    182  182     CMP:   Recent Labs   Lab 10/28/20  0655   GLU 97   CALCIUM 8.4*   ALBUMIN 3.3*   PROT 6.2   *   K 4.4   CO2 28   CL 93*   BUN 11   CREATININE 0.9   ALKPHOS 99   ALT 28   AST 54*   BILITOT 1.3*     Coagulation:   Recent Labs   Lab 10/27/20  1220   INR 1.4*         Significant Imaging:  Imaging results within the past 24 hours have been reviewed.    Assessment/Plan:     * Umbilical hernia without obstruction and without gangrene  S/p surgical repair. Continued management per surgery service    Alcoholic cirrhosis of liver with ascites  - MELD stable. Check INR and CMP daily.   - When able to take po meds, continue home dose lactulose for history of HE  - Patient is not a transplant candidate due to active ETOH use. Recommend ETOH cessation    MELD-Na score: 11 at 10/28/2020  6:55 AM  MELD score: 11 at 10/28/2020  6:55 AM  Calculated from:  Serum Creatinine: 0.9 mg/dL (Rounded to 1 mg/dL) at 10/28/2020  6:55 AM  Serum Sodium: 128 mmol/L at 10/28/2020  6:55 AM  Total Bilirubin: 1.3 mg/dL at 10/28/2020  6:55 AM  INR(ratio): 1.4 at 10/27/2020 12:20 PM  Age: 64 years 11 months      ETOH abuse  Recommend alcohol cessation.   What for symptoms of withdrawal. Management per Hospital Medicine service.        Thank you for your consult. I will follow-up with patient.  Please contact us if you have any additional questions.    Milton Sepulveda PA-C  Gastroenterology  [facility]

## 2020-10-28 NOTE — ASSESSMENT & PLAN NOTE
- MELD-Na is 10 on labs today  - Recommend to continue with home dose lactulose for history of HE  - Can hold diuretics for today  - Monitor daily labs  - Patient is not a transplant candidate due to active ETOH use. Recommend ETOH cessation

## 2020-10-28 NOTE — HOSPITAL COURSE
Ms Ortiz is a 64 year old who presented to [facility] for evaluation of umbilical hernia. CT of abdomin/pelvis with contrast showed umbilical/ventral hernia containing a portion of small bowel without signs of high-grade obstruction. She was taken surgery on yesterday Dr Oliva preformed an open umbilical hernia repair with removal of the umbilical skin in layers closure. She is currently resting in bed, General Surgery and GI following. As of 10/29, patient feeling well, ambulated in baig, now sitting up in chair. Vital signs and labs stable. S/P open incarcerated umbilical hernia repair POD # 2. Will follow. As of 10/30 POD #3 pt doing well in good spirits. Pain controlled. Tolerating diet. VSS. As of 10/31 pt awake and alert. Oriented to place and situation. INR 1.5. Pt ready for discharge from general surgery standpoint.  Case also discussed with Dr. Kang, recommendations made and she will arrange appointment with the liver clinic this upcoming week. Spoke with patient and caretaker Annete at bedside, discussed the importance of medication compliance and the importance of abstinence from alcohol. Both verbalized understanding. Patient to follow-up with PCP in 3 days for hospital follow-up. Patient to also follow-up with Dr. Oliva in 3 weeks for post-op follow-up and suture removal. Patient seen and examined on the date of discharge and found suitable for discharge.

## 2020-10-28 NOTE — ASSESSMENT & PLAN NOTE
- MELD stable. Check INR and CMP daily.   - When able to take po meds, continue home dose lactulose for history of HE  - Patient is not a transplant candidate due to active ETOH use. Recommend ETOH cessation    MELD-Na score: 11 at 10/28/2020  6:55 AM  MELD score: 11 at 10/28/2020  6:55 AM  Calculated from:  Serum Creatinine: 0.9 mg/dL (Rounded to 1 mg/dL) at 10/28/2020  6:55 AM  Serum Sodium: 128 mmol/L at 10/28/2020  6:55 AM  Total Bilirubin: 1.3 mg/dL at 10/28/2020  6:55 AM  INR(ratio): 1.4 at 10/27/2020 12:20 PM  Age: 64 years 11 months

## 2020-10-28 NOTE — PLAN OF CARE
Yazmin met with patient at bedside to complete discharge assessment.  Patient lives at home alone. Patient's help at home and transportation for D/C will be her friend, Tatyana.   No D/C needs identified at this time.   Patient is independent with ADLs.  Patient have access to [facility] chart  Gianfrancor provided a transitional care folder, information on advanced directives, information on pharmacy bedside delivery, and discharge planning begins on admission with contact information for any needs/questions.  Patient's white board updated with CM name and number.  Patient's PCP: Santos Beaver MD  Patient's Preferred Pharmacy:   [id number] - [place], [place] - [address] [place] [facility] [place] [address]  [address]  [place] [address]  Phone: [phone] Fax: [phone]     10/28/20 1126   Discharge Assessment   Assessment Type Discharge Planning Assessment   Confirmed/corrected address and phone number on facesheet? Yes   Assessment information obtained from? Patient;Caregiver   Communicated expected length of stay with patient/caregiver yes   Prior to hospitilization cognitive status: Alert/Oriented   Prior to hospitalization functional status: Independent   Current cognitive status: Alert/Oriented   Current Functional Status: Independent   Lives With alone   Able to Return to Prior Arrangements yes   Is patient able to care for self after discharge? Yes   Who are your caregiver(s) and their phone number(s)? Elsa Chavarria [phone]   Patient's perception of discharge disposition home or selfcare   Readmission Within the Last 30 Days no previous admission in last 30 days   Patient currently being followed by outpatient case management? No   Patient currently receives any other outside agency services? No   Equipment Currently Used at Home walker, rolling;bedside commode   Do you have any problems affording any of your prescribed medications? No   Is the patient taking medications as  prescribed? yes   Does the patient have transportation home? Yes   Transportation Anticipated family or friend will provide   Does the patient receive services at the Coumadin Clinic? No   Discharge Plan A Home   DME Needed Upon Discharge  none   Patient/Family in Agreement with Plan yes   Esther Hurtado LMSW 10/28/2020 11:26 AM

## 2020-10-28 NOTE — PROGRESS NOTES
[facility] Hospital Medicine  Progress Note    Patient Name: Kylah Ortiz  MRN: [id number]  Patient Class: IP- Inpatient   Admission Date: 10/27/2020  Length of Stay: 1 days  Attending Physician: Shahbaz Oliva MD  Primary Care Provider: Santos Beaver MD        Subjective:     Principal Problem:Umbilical hernia without obstruction and without gangrene        HPI:  Kylah Ortiz is a 64 y.o. female patient with a PMHx of alcoholis cirrhosis, alcoholic dementia, ascites, CKD, esophageal varices, hepatic encephalopathy, and hiatal hernia who presents to the Emergency Department for evaluation of umbilical hernia which onset gradually. Of note, caretaker noticed umbilical hernia post paracentesis last week after 4 L removed. Patient was referred to ED today after she was seen in clinic today by GIA Harper (GI) at the [facility].  No associated symptom reported.  Patient denies any fever, chills, diaphoresis, abdominal pain, abdominal distention, nausea, vomiting, diarrhea, constipation, blood in stool, CP, SOB, cough, leg swelling, and all other sxs at this time. Last BM was yesterday. No prior Tx. No further complaints or concerns at this time.  Pt is a full code and Tatyana Chavarria (caregiver) at [phone] is the surrogate decision maker.  ER work up showed: H/H 9.1/26.3, PT 15.1, INR 1.4, Na 128, Cl 94, Ca 8.4, AST 71, and Lipase 81.  COVID 19 negative.  CT abdomen/pelvis with contrast showed cirrhosis and ascites with portal hypertension and varicosities. Umbilical/ventral hernia containing a portion of small bowel without signs of high-grade obstruction.  There is small ascitic fluid within the hernia sac. Moderate to large hiatal hernia with multiple adjacent paraseptal varices.   Right inguinal hernia containing a small amount of ascitic fluid. ER discussed care with General Surgery and patient transported to OR from ED. Hospital Medicine contacted for admission with patient to  be placed in Medical Surgical Unit post procedure.         Overview/Hospital Course:  Ms Ortiz is a 64 year old who presented to [facility] Emergency Room for evaluation of umbilical hernia. CT of abdomin/pelvis with contrast showed umbilical/ventral hernia containing a portion of small bowel without signs of high-grade obstruction. She was taken surgery on yesterday Dr Oliva preformed an open umbilical hernia repair with removal of the umbilical skin in layers closure. She is currently resting in bed, General Surgery and GI following.         Review of Systems   Constitutional: Positive for fatigue. Negative for chills and fever.   HENT: Negative for congestion, rhinorrhea and sinus pressure.    Respiratory: Negative for apnea, cough, choking, chest tightness, shortness of breath, wheezing and stridor.    Cardiovascular: Negative for chest pain, palpitations and leg swelling.   Gastrointestinal: Positive for abdominal pain. Negative for abdominal distention, diarrhea, nausea and vomiting.   Endocrine: Negative for cold intolerance and heat intolerance.   Genitourinary: Negative for difficulty urinating and hematuria.   Musculoskeletal: Negative for arthralgias and joint swelling.   Skin: Negative for color change, pallor and rash.   Neurological: Positive for weakness. Negative for dizziness, seizures, numbness and headaches.   Psychiatric/Behavioral: Negative for agitation. The patient is not nervous/anxious.      Objective:     Vital Signs (Most Recent):  Temp: 98.8 °F (37.1 °C) (10/28/20 1142)  Pulse: 79 (10/28/20 1142)  Resp: 18 (10/28/20 1142)  BP: (!) 150/82 (10/28/20 1142)  SpO2: 97 % (10/28/20 1142) Vital Signs (24h Range):  Temp:  [98.2 °F (36.8 °C)-99.2 °F (37.3 °C)] 98.8 °F (37.1 °C)  Pulse:  [] 79  Resp:  [12-24] 18  SpO2:  [92 %-100 %] 97 %  BP: (136-162)/() 150/82     Weight: 65.4 kg (144 lb 2.9 oz)  Body mass index is 23.27 kg/m².    Intake/Output Summary (Last 24 hours) at 10/28/2020  1411  Last data filed at 10/28/2020 0500  Gross per 24 hour   Intake 2015 ml   Output 1380 ml   Net 635 ml      Physical Exam  Vitals signs and nursing note reviewed.   Constitutional:       General: She is not in acute distress.     Appearance: She is not ill-appearing, toxic-appearing or diaphoretic.   HENT:      Head: Normocephalic and atraumatic.   Cardiovascular:      Heart sounds: No murmur. No friction rub. No gallop.    Pulmonary:      Effort: No respiratory distress.      Breath sounds: No stridor. No wheezing, rhonchi or rales.   Chest:      Chest wall: No tenderness.   Abdominal:      General: There is no distension.      Palpations: There is no mass.      Tenderness: There is abdominal tenderness. There is no right CVA tenderness, left CVA tenderness, guarding or rebound.      Hernia: No hernia is present.      Comments: Mid abdominal area dressing intact    Musculoskeletal: Normal range of motion.   Skin:     General: Skin is warm and dry.   Neurological:      General: No focal deficit present.   Psychiatric:         Mood and Affect: Mood normal.         Significant Labs:   CBC:   Recent Labs   Lab 10/27/20  1220 10/28/20  0655   WBC 4.23 7.30  7.30   HGB 9.1* 8.9*  8.9*   HCT 26.3* 26.2*  26.2*    182  182     CMP:   Recent Labs   Lab 10/27/20  1220 10/28/20  0655   * 128*   K 4.6 4.4   CL 94* 93*   CO2 26 28   GLU 91 97   BUN 12 11   CREATININE 1.0 0.9   CALCIUM 8.4* 8.4*   PROT 6.8 6.2   ALBUMIN 3.7 3.3*   BILITOT 0.9 1.3*   ALKPHOS 108 99   AST 71* 54*   ALT 38 28   ANIONGAP 8 7*   EGFRNONAA 59* >60       Significant Imaging:     Imaging Results          CT Abdomen Pelvis With Contrast (Final result)  Result time 10/27/20 14:44:59    Final result by Abe Monique Jr., MD (10/27/20 14:44:59)                 Impression:      1. Cirrhosis and ascites with portal hypertension and varicosities.  2. Umbilical/ventral hernia containing a portion of small bowel without signs of high-grade  obstruction.  There is small ascitic fluid within the hernia sac.  3. Moderate to large hiatal hernia with multiple adjacent paraseptal varices.  4. Right inguinal hernia containing a small amount of ascitic fluid.  5. Diverticulosis of the sigmoid colon.  6. Cholelithiasis.  7. Compression deformities of multiple vertebral bodies with a possibly subacute fracture of L3 given its fragmented appearance.  No definite spinal canal hematoma.  Clinical correlation for back pain is advised.      Electronically signed by: Abe Monique Jr., MD  Date:    10/27/2020  Time:    14:44             Narrative:    EXAMINATION:  CT ABDOMEN PELVIS WITH CONTRAST    CLINICAL HISTORY:  Hernia, complicated;Bowel obstruction high-grade suspected;    TECHNIQUE:  Low dose axial images, sagittal and coronal reformations were obtained from the lung bases to the pubic symphysis following the IV administration of 75 mL of Omnipaque 350 and the oral administration of 1000  mL of Omnipaque 9. All CT scans at this facility use dose modulation, iterative reconstruction and/or weight based dosing when appropriate to reduce radiation dose to as low as reasonably achievable.    COMPARISON:  Prior ultrasound from October 16, 2020.    FINDINGS:  Abdomen:    - Lower thorax: Normal heart size.    - Lung bases: No infiltrates and no nodules.    - Liver: Cirrhotic morphology of the liver.  There are 2 cysts within the liver, 1 within the lateral segment of the left lobe and 1 within the medial right lobe.  These measure 14 mm and 12 mm respectively.  There is small perihepatic ascites.  The portal vein and hepatic veins are patent.  No definite enhancing masses within the liver.  No rapid washout of contrast material.    - Gallbladder: Contains dependently layering calcified stones.    - Bile Ducts: No abnormal bile duct dilation.    - Spleen: Unremarkable.    - Kidneys: The kidneys enhance homogeneously.    - Adrenals: Unremarkable.    - Pancreas:  Unremarkable.    - Bowel/Mesentery: Moderate to large hiatal hernia with adjacent paraesophageal varices.  There also varicosities about the lesser and greater curvatures of the stomach.  There is a ventral hernia/umbilical hernia containing a loop of small bowel, likely jejunum.  The hernia neck measures 31 mm craniocaudal by 37 mm transverse.  No sign of bowel obstruction relation to the hernia.  There is small ascites within the hernia sac.  Diverticulosis of the sigmoid colon.    - Peritoneal cavity: Small ascites within the dependent pelvis.  No free air.    - Retroperitoneum:  No lymphadenopathy or hemorrhage.    - Vascular: No abdominal aortic aneurysm.    - Reproductive organs: Prior hysterectomy.    - Inguinal region: Small right inguinal hernia containing a small amount of ascitic fluid.  The hernia neck measures 19 mm.    Bones:  Severe compression fractures of multiple vertebral bodies including T9, T11, T12, L1, L3, L4, and L5.  The fracture of L3 may be subacute in nature given the fragmentation.  No definite paraspinal hematoma.  Old anterolateral left-sided and right-sided rib fractures.                                Assessment/Plan:      * Umbilical hernia without obstruction and without gangrene  -pt admitted to Medical Surgical Unit   -General Surgery consulted for hernia repair   -analgesia as needed   -IV hydration   -antiemetics as needed   -NPO     10/28  General surgery managing   Clear liquid diet   IV fluids   Pain control         Hyponatremia  -gentle hydration   -probable related to liver disease   -monitor labs daily       ETOH abuse  -pt drinks daily- [employer] [employer] whiskey   -ETOH 96 upon admit   -pt counseled on cessation with understanding verbalized       Dementia associated with alcoholism without behavioral disturbance  -will monitor       Alcoholic cirrhosis of liver with ascites  -GI consulted- last seen by GWEN Álvarez NP today at the [facility]   -Lasix and Spironolactone to be  resumed post procedure when appropriate   -pt continues to drink daily with ETOH level of 96 noted  - ALT/AST 38/71  -Ammonia and Magnesium pending   -MELD-Na score: 11 at 10/28/2020  6:55 AM  MELD score: 11 at 10/28/2020  6:55 AM  Calculated from:  Serum Creatinine: 0.9 mg/dL (Rounded to 1 mg/dL) at 10/28/2020  6:55 AM  Serum Sodium: 128 mmol/L at 10/28/2020  6:55 AM  Total Bilirubin: 1.3 mg/dL at 10/28/2020  6:55 AM  INR(ratio): 1.4 at 10/27/2020 12:20 PM  Age: 64 years 11 months      10/28  GI following   Restart lactulose when tolerating PO     Esophageal reflux disease  -PPI     Anemia  -H/H 9.1/26.3  -stable   -repeat CBC in am    10/28  HGB 8.9  Monitor daily labs         VTE Risk Mitigation (From admission, onward)         Ordered     IP VTE HIGH RISK PATIENT  Once      10/28/20 0617     Place sequential compression device  Until discontinued      10/28/20 0617                Discharge Planning   JANETTE:      Code Status: Prior   Is the patient medically ready for discharge?:     Reason for patient still in hospital (select all that apply): Patient trending condition and Treatment  Discharge Plan A: Home                  Eben Jimenez NP  Department of Hospital Medicine   [facility]

## 2020-10-28 NOTE — ASSESSMENT & PLAN NOTE
-pt admitted to Medical Surgical Unit   -General Surgery consulted for hernia repair   -analgesia as needed   -IV hydration   -antiemetics as needed   -NPO     10/28  General surgery managing   Clear liquid diet   IV fluids   Pain control

## 2020-10-28 NOTE — ASSESSMENT & PLAN NOTE
-pt drinks daily- [employer] whiskey   -ETOH 96 upon admit   -pt counseled on cessation with understanding verbalized

## 2020-10-28 NOTE — PROGRESS NOTES
[facility]   General Surgery  Progress Note    Subjective:     History of Present Illness:  64-year-old female with cirrhosis.  She underwent a paracentesis last week.  Her caregiver thought her umbilical hernia which is somewhat protuberant would go down.  The umbilical hernia was still protuberant, so her care giver brought her to the GI clinic for evaluation.  The patient was sent to the emergency room for evaluation due to a protuberant umbilical hernia.    The patient denies any abdominal pain.  She states the hernia does not gets hurt her.  She does not report any nausea and vomiting    Post-Op Info:  Procedure(s) (LRB):  REPAIR, HERNIA, UMBILICAL, INCARCERATED, AGE 5 YEARS OR OLDER (N/A)   1 Day Post-Op     Interval History:  Patient seems to be at her baseline.  She does not report any nausea or vomiting.  There is no drainage from the incision.  She has adequate urine output    Medications:  Continuous Infusions:   lactated ringers 100 mL/hr at 10/27/20 1951     Scheduled Meds:   chlorhexidine  10 mL Mouth/Throat BID    FLUoxetine  20 mg Oral Daily    magnesium oxide  400 mg Oral Daily    multivitamin  1 tablet Oral Daily    nozaseptin   Each Nostril BID    pantoprazole  40 mg Oral BID AC    spironolactone  25 mg Oral Daily     PRN Meds:hydrOXYzine HCL, influenza, meperidine, ondansetron, pneumoc 13-pao conj-dip cr(PF), traMADoL     Review of patient's allergies indicates:   Allergen Reactions    Hydrocodone-acetaminophen Hives and Itching     Objective:     Vital Signs (Most Recent):  Temp: 98.4 °F (36.9 °C) (10/28/20 0726)  Pulse: 79 (10/28/20 0726)  Resp: 18 (10/28/20 0726)  BP: (!) 158/84 (10/28/20 0726)  SpO2: 97 % (10/28/20 0726) Vital Signs (24h Range):  Temp:  [98.2 °F (36.8 °C)-99.2 °F (37.3 °C)] 98.4 °F (36.9 °C)  Pulse:  [] 79  Resp:  [12-24] 18  SpO2:  [92 %-100 %] 97 %  BP: (134-162)/() 158/84     Weight: 65.4 kg (144 lb 2.9 oz)  Body mass index is 23.27  kg/m².    Intake/Output - Last 3 Shifts       10/26 0700 - 10/27 0659 10/27 0700 - 10/28 0659 10/28 0700 - 10/29 0659    P.O.  0     I.V. (mL/kg)  2015 (30.8)     Total Intake(mL/kg)  2015 (30.8)     Urine (mL/kg/hr)  1350     Blood  30     Total Output  1380     Net  +635                  Physical Exam  Vitals signs and nursing note reviewed.   Constitutional:       Comments: Slightly chronically ill   Eyes:      General: No scleral icterus.  Cardiovascular:      Rate and Rhythm: Normal rate and regular rhythm.   Pulmonary:      Effort: Pulmonary effort is normal.      Breath sounds: Normal breath sounds.   Abdominal:      General: Abdomen is flat. Distention: Mild.      Palpations: Abdomen is soft.      Comments: Incision is dressed with no drainage   Musculoskeletal:      Right lower leg: No edema.      Left lower leg: No edema.   Skin:     General: Skin is warm and dry.   Neurological:      General: No focal deficit present.      Mental Status: She is alert and oriented to person, place, and time.   Psychiatric:         Mood and Affect: Mood normal.         Behavior: Behavior normal.         Thought Content: Thought content normal.         Judgment: Judgment normal.         Significant Labs:  CBC:   Recent Labs   Lab 10/28/20  0655   WBC 7.30  7.30   RBC 2.86*  2.86*   HGB 8.9*  8.9*   HCT 26.2*  26.2*     182   MCV 92  92   MCH 31.1*  31.1*   MCHC 34.0  34.0     CMP:   Recent Labs   Lab 10/28/20  0655   GLU 97   CALCIUM 8.4*   ALBUMIN 3.3*   PROT 6.2   *   K 4.4   CO2 28   CL 93*   BUN 11   CREATININE 0.9   ALKPHOS 99   ALT 28   AST 54*   BILITOT 1.3*       Significant Diagnostics:  No new    Assessment/Plan:     * Umbilical hernia without obstruction and without gangrene  Postop day 1 from a open umbilical hernia repair with removal of the umbilical skin in layers closure.  Patient is doing well.    Clear liquid diet.  Continue IV fluids.  Monitor urine output.    Advanced to full liquids  if clear liquids or tolerated.    The patient will need to be monitored for several days to ensure that she does not go into hepatic failure due to her underlying liver disease    Dementia associated with alcoholism without behavioral disturbance  Monitor    Alcoholic cirrhosis of liver with ascites  Monitor  GI consult  Lactulose per GI    Esophageal reflux disease  Home PPI    Anemia  Monitor    Acute renal failure superimposed on stage 4 chronic kidney disease  Likely secondary to cirrhosis        Shahbaz Oliva MD  General Surgery  [facility]

## 2020-10-28 NOTE — ASSESSMENT & PLAN NOTE
Recommend alcohol cessation.   What for symptoms of withdrawal. Management per Hospital Medicine service.

## 2020-10-28 NOTE — HPI
Patient is a 63 y/o WF with PMH of alcoholic liver cirrhosis followed in hepatology clinic who was sent to ED for a non-reducible umbilical hernia. Patient has history of decompensated cirrhosis with ascites, HE and varices. She has had a recent paracentesis with removal of 3.5 L ascitic fluid. Her encephalopathy is controlled on home medications. She is still actively drinking alcohol and thus is not a liver transplant candidate. Her last EGD was in 01/2020 with no varices noted at that time. In the ED, patient is without any complaints aside from pain around her hernia. General surgery consulted and plans for surgery to prevent strangulation of umbilical hernia. GI requested to follow in consultation while patient is admitted.

## 2020-10-28 NOTE — ASSESSMENT & PLAN NOTE
-GI consulted- last seen by GWEN Álvarez NP today at the [place]   -Lasix and Spironolactone to be resumed post procedure when appropriate   -pt continues to drink daily with ETOH level of 96 noted  - ALT/AST 38/71  -Ammonia and Magnesium pending   -MELD-Na score: 11 at 10/28/2020  6:55 AM  MELD score: 11 at 10/28/2020  6:55 AM  Calculated from:  Serum Creatinine: 0.9 mg/dL (Rounded to 1 mg/dL) at 10/28/2020  6:55 AM  Serum Sodium: 128 mmol/L at 10/28/2020  6:55 AM  Total Bilirubin: 1.3 mg/dL at 10/28/2020  6:55 AM  INR(ratio): 1.4 at 10/27/2020 12:20 PM  Age: 64 years 11 months      10/28  GI following   Restart lactulose when tolerating PO

## 2020-10-29 LAB
ALBUMIN SERPL BCP-MCNC: 3.1 G/DL (ref 3.5–5.2)
ALP SERPL-CCNC: 96 U/L (ref 55–135)
ALT SERPL W/O P-5'-P-CCNC: 21 U/L (ref 10–44)
ANION GAP SERPL CALC-SCNC: 8 MMOL/L (ref 8–16)
AST SERPL-CCNC: 38 U/L (ref 10–40)
BASOPHILS # BLD AUTO: 0.03 K/UL (ref 0–0.2)
BASOPHILS NFR BLD: 0.5 % (ref 0–1.9)
BILIRUB SERPL-MCNC: 1.6 MG/DL (ref 0.1–1)
BUN SERPL-MCNC: 9 MG/DL (ref 8–23)
CALCIUM SERPL-MCNC: 8.3 MG/DL (ref 8.7–10.5)
CHLORIDE SERPL-SCNC: 93 MMOL/L (ref 95–110)
CO2 SERPL-SCNC: 27 MMOL/L (ref 23–29)
CREAT SERPL-MCNC: 0.9 MG/DL (ref 0.5–1.4)
DIFFERENTIAL METHOD: ABNORMAL
EOSINOPHIL # BLD AUTO: 0.1 K/UL (ref 0–0.5)
EOSINOPHIL NFR BLD: 1.7 % (ref 0–8)
ERYTHROCYTE [DISTWIDTH] IN BLOOD BY AUTOMATED COUNT: 13.1 % (ref 11.5–14.5)
EST. GFR  (AFRICAN AMERICAN): >60 ML/MIN/1.73 M^2
EST. GFR  (NON AFRICAN AMERICAN): >60 ML/MIN/1.73 M^2
GLUCOSE SERPL-MCNC: 96 MG/DL (ref 70–110)
HCT VFR BLD AUTO: 25.9 % (ref 37–48.5)
HGB BLD-MCNC: 8.5 G/DL (ref 12–16)
IMM GRANULOCYTES # BLD AUTO: 0.06 K/UL (ref 0–0.04)
IMM GRANULOCYTES NFR BLD AUTO: 0.9 % (ref 0–0.5)
INR PPP: 1.8 (ref 0.8–1.2)
LYMPHOCYTES # BLD AUTO: 0.7 K/UL (ref 1–4.8)
LYMPHOCYTES NFR BLD: 10.4 % (ref 18–48)
MCH RBC QN AUTO: 30.4 PG (ref 27–31)
MCHC RBC AUTO-ENTMCNC: 32.8 G/DL (ref 32–36)
MCV RBC AUTO: 93 FL (ref 82–98)
MONOCYTES # BLD AUTO: 1.2 K/UL (ref 0.3–1)
MONOCYTES NFR BLD: 18.3 % (ref 4–15)
NEUTROPHILS # BLD AUTO: 4.3 K/UL (ref 1.8–7.7)
NEUTROPHILS NFR BLD: 68.2 % (ref 38–73)
NRBC BLD-RTO: 0 /100 WBC
PLATELET # BLD AUTO: 162 K/UL (ref 150–350)
PMV BLD AUTO: 9.1 FL (ref 9.2–12.9)
POTASSIUM SERPL-SCNC: 4 MMOL/L (ref 3.5–5.1)
PROT SERPL-MCNC: 5.8 G/DL (ref 6–8.4)
PROTHROMBIN TIME: 18.2 SEC (ref 9–12.5)
RBC # BLD AUTO: 2.8 M/UL (ref 4–5.4)
SODIUM SERPL-SCNC: 128 MMOL/L (ref 136–145)
WBC # BLD AUTO: 6.35 K/UL (ref 3.9–12.7)

## 2020-10-29 PROCEDURE — 36415 COLL VENOUS BLD VENIPUNCTURE: CPT

## 2020-10-29 PROCEDURE — 21400001 HC TELEMETRY ROOM

## 2020-10-29 PROCEDURE — 85025 COMPLETE CBC W/AUTO DIFF WBC: CPT

## 2020-10-29 PROCEDURE — 94799 UNLISTED PULMONARY SVC/PX: CPT

## 2020-10-29 PROCEDURE — 85610 PROTHROMBIN TIME: CPT

## 2020-10-29 PROCEDURE — 80053 COMPREHEN METABOLIC PANEL: CPT

## 2020-10-29 PROCEDURE — 25000003 PHARM REV CODE 250: Performed by: SURGERY

## 2020-10-29 RX ADMIN — CHLORHEXIDINE GLUCONATE 0.12% ORAL RINSE 10 ML: 1.2 LIQUID ORAL at 09:10

## 2020-10-29 RX ADMIN — PANTOPRAZOLE SODIUM 40 MG: 40 TABLET, DELAYED RELEASE ORAL at 05:10

## 2020-10-29 RX ADMIN — Medication 400 MG: at 09:10

## 2020-10-29 RX ADMIN — PANTOPRAZOLE SODIUM 40 MG: 40 TABLET, DELAYED RELEASE ORAL at 04:10

## 2020-10-29 RX ADMIN — SPIRONOLACTONE 25 MG: 25 TABLET ORAL at 09:10

## 2020-10-29 RX ADMIN — FLUOXETINE 20 MG: 20 CAPSULE ORAL at 09:10

## 2020-10-29 RX ADMIN — THERA TABS 1 TABLET: TAB at 09:10

## 2020-10-29 NOTE — PROGRESS NOTES
[facility] Hospital Medicine  Progress Note    Patient Name: Kylah Ortiz  MRN: [id number]  Patient Class: IP- Inpatient   Admission Date: 10/27/2020  Length of Stay: 2 days  Attending Physician: Shahbaz Oliva MD  Primary Care Provider: Santos Beaver MD        Subjective:     Principal Problem:Umbilical hernia without obstruction and without gangrene        HPI:  Kylah Ortiz is a 64 y.o. female patient with a PMHx of alcoholis cirrhosis, alcoholic dementia, ascites, CKD, esophageal varices, hepatic encephalopathy, and hiatal hernia who presents to the Emergency Department for evaluation of umbilical hernia which onset gradually. Of note, caretaker noticed umbilical hernia post paracentesis last week after 4 L removed. Patient was referred to ED today after she was seen in clinic today by GIA Harper (GI) at the [facility].  No associated symptom reported.  Patient denies any fever, chills, diaphoresis, abdominal pain, abdominal distention, nausea, vomiting, diarrhea, constipation, blood in stool, CP, SOB, cough, leg swelling, and all other sxs at this time. Last BM was yesterday. No prior Tx. No further complaints or concerns at this time.  Pt is a full code and Tatyana Chavarria (caregiver) at [phone] is the surrogate decision maker.  ER work up showed: H/H 9.1/26.3, PT 15.1, INR 1.4, Na 128, Cl 94, Ca 8.4, AST 71, and Lipase 81.  COVID 19 negative.  CT abdomen/pelvis with contrast showed cirrhosis and ascites with portal hypertension and varicosities. Umbilical/ventral hernia containing a portion of small bowel without signs of high-grade obstruction.  There is small ascitic fluid within the hernia sac. Moderate to large hiatal hernia with multiple adjacent paraseptal varices.   Right inguinal hernia containing a small amount of ascitic fluid. ER discussed care with General Surgery and patient transported to OR from ED. Hospital Medicine contacted for admission with patient to  be placed in Medical Surgical Unit post procedure.         Overview/Hospital Course:  Ms Ortiz is a 64 year old who presented to [facility] Emergency Room for evaluation of umbilical hernia. CT of abdomin/pelvis with contrast showed umbilical/ventral hernia containing a portion of small bowel without signs of high-grade obstruction. She was taken surgery on yesterday Dr Oliva preformed an open umbilical hernia repair with removal of the umbilical skin in layers closure. She is currently resting in bed, General Surgery and GI following. As of 10/29, patient feeling well, ambulated in baig, now sitting up in chair. Vital signs and labs stable. S/P open incarcerated umbilical hernia repair POD # 2. Will follow.     Interval History: Pt seen and examined, reports feeling better. Will follow.     Review of Systems   Constitutional: Positive for fatigue. Negative for chills and fever.   HENT: Negative for congestion, rhinorrhea and sinus pressure.    Respiratory: Negative for apnea, cough, choking, chest tightness, shortness of breath, wheezing and stridor.    Cardiovascular: Negative for chest pain, palpitations and leg swelling.   Gastrointestinal: Positive for abdominal pain. Negative for abdominal distention, diarrhea, nausea and vomiting.   Endocrine: Negative for cold intolerance and heat intolerance.   Genitourinary: Negative for difficulty urinating and hematuria.   Musculoskeletal: Negative for arthralgias and joint swelling.   Skin: Negative for color change, pallor and rash.   Neurological: Positive for weakness. Negative for dizziness, seizures, numbness and headaches.   Psychiatric/Behavioral: Negative for agitation. The patient is not nervous/anxious.      Objective:     Vital Signs (Most Recent):  Temp: 99.2 °F (37.3 °C) (10/29/20 0710)  Pulse: 101 (10/29/20 1215)  Resp: 18 (10/29/20 1215)  BP: 129/78 (10/29/20 1215)  SpO2: 95 % (10/29/20 1215) Vital Signs (24h Range):  Temp:  [97.5 °F (36.4 °C)-99.2 °F  (37.3 °C)] 99.2 °F (37.3 °C)  Pulse:  [] 101  Resp:  [18-20] 18  SpO2:  [94 %-97 %] 95 %  BP: (129-164)/(78-95) 129/78     Weight: 65.1 kg (143 lb 8.3 oz)  Body mass index is 23.16 kg/m².    Intake/Output Summary (Last 24 hours) at 10/29/2020 1404  Last data filed at 10/29/2020 0926  Gross per 24 hour   Intake 2518 ml   Output 2600 ml   Net -82 ml      Physical Exam  Vitals signs and nursing note reviewed.   Constitutional:       General: She is not in acute distress.     Appearance: She is not ill-appearing, toxic-appearing or diaphoretic.   HENT:      Head: Normocephalic and atraumatic.   Cardiovascular:      Heart sounds: No murmur. No friction rub. No gallop.    Pulmonary:      Effort: No respiratory distress.      Breath sounds: No stridor. No wheezing, rhonchi or rales.   Chest:      Chest wall: No tenderness.   Abdominal:      General: There is no distension.      Palpations: There is no mass.      Tenderness: There is abdominal tenderness. There is no right CVA tenderness, left CVA tenderness, guarding or rebound.      Hernia: No hernia is present.      Comments: Mid abdominal area dressing intact    Musculoskeletal: Normal range of motion.   Skin:     General: Skin is warm and dry.   Neurological:      General: No focal deficit present.   Psychiatric:         Mood and Affect: Mood normal.         Significant Labs:   CBC:   Recent Labs   Lab 10/28/20  0655 10/29/20  0627   WBC 7.30  7.30 6.35   HGB 8.9*  8.9* 8.5*   HCT 26.2*  26.2* 25.9*     182 162     CMP:   Recent Labs   Lab 10/28/20  0655 10/29/20  0627   * 128*   K 4.4 4.0   CL 93* 93*   CO2 28 27   GLU 97 96   BUN 11 9   CREATININE 0.9 0.9   CALCIUM 8.4* 8.3*   PROT 6.2 5.8*   ALBUMIN 3.3* 3.1*   BILITOT 1.3* 1.6*   ALKPHOS 99 96   AST 54* 38   ALT 28 21   ANIONGAP 7* 8   EGFRNONAA >60 >60       Significant Imaging:     Imaging Results          CT Abdomen Pelvis With Contrast (Final result)  Result time 10/27/20 14:44:59     Final result by Abe Monique Jr., MD (10/27/20 14:44:59)                 Impression:      1. Cirrhosis and ascites with portal hypertension and varicosities.  2. Umbilical/ventral hernia containing a portion of small bowel without signs of high-grade obstruction.  There is small ascitic fluid within the hernia sac.  3. Moderate to large hiatal hernia with multiple adjacent paraseptal varices.  4. Right inguinal hernia containing a small amount of ascitic fluid.  5. Diverticulosis of the sigmoid colon.  6. Cholelithiasis.  7. Compression deformities of multiple vertebral bodies with a possibly subacute fracture of L3 given its fragmented appearance.  No definite spinal canal hematoma.  Clinical correlation for back pain is advised.      Electronically signed by: Abe Monique Jr., MD  Date:    10/27/2020  Time:    14:44             Narrative:    EXAMINATION:  CT ABDOMEN PELVIS WITH CONTRAST    CLINICAL HISTORY:  Hernia, complicated;Bowel obstruction high-grade suspected;    TECHNIQUE:  Low dose axial images, sagittal and coronal reformations were obtained from the lung bases to the pubic symphysis following the IV administration of 75 mL of Omnipaque 350 and the oral administration of 1000  mL of Omnipaque 9. All CT scans at this facility use dose modulation, iterative reconstruction and/or weight based dosing when appropriate to reduce radiation dose to as low as reasonably achievable.    COMPARISON:  Prior ultrasound from October 16, 2020.    FINDINGS:  Abdomen:    - Lower thorax: Normal heart size.    - Lung bases: No infiltrates and no nodules.    - Liver: Cirrhotic morphology of the liver.  There are 2 cysts within the liver, 1 within the lateral segment of the left lobe and 1 within the medial right lobe.  These measure 14 mm and 12 mm respectively.  There is small perihepatic ascites.  The portal vein and hepatic veins are patent.  No definite enhancing masses within the liver.  No rapid washout of contrast  material.    - Gallbladder: Contains dependently layering calcified stones.    - Bile Ducts: No abnormal bile duct dilation.    - Spleen: Unremarkable.    - Kidneys: The kidneys enhance homogeneously.    - Adrenals: Unremarkable.    - Pancreas: Unremarkable.    - Bowel/Mesentery: Moderate to large hiatal hernia with adjacent paraesophageal varices.  There also varicosities about the lesser and greater curvatures of the stomach.  There is a ventral hernia/umbilical hernia containing a loop of small bowel, likely jejunum.  The hernia neck measures 31 mm craniocaudal by 37 mm transverse.  No sign of bowel obstruction relation to the hernia.  There is small ascites within the hernia sac.  Diverticulosis of the sigmoid colon.    - Peritoneal cavity: Small ascites within the dependent pelvis.  No free air.    - Retroperitoneum:  No lymphadenopathy or hemorrhage.    - Vascular: No abdominal aortic aneurysm.    - Reproductive organs: Prior hysterectomy.    - Inguinal region: Small right inguinal hernia containing a small amount of ascitic fluid.  The hernia neck measures 19 mm.    Bones:  Severe compression fractures of multiple vertebral bodies including T9, T11, T12, L1, L3, L4, and L5.  The fracture of L3 may be subacute in nature given the fragmentation.  No definite paraspinal hematoma.  Old anterolateral left-sided and right-sided rib fractures.                                Assessment/Plan:      * Umbilical hernia without obstruction and without gangrene  -pt admitted to Medical Surgical Unit   -General Surgery consulted for hernia repair   -analgesia as needed   -IV hydration   -antiemetics as needed   -NPO     10/28  General surgery managing   Clear liquid diet   IV fluids   Pain control     10/29  S/P open incarcerated umbilical hernia repair POD # 2  General surgery managing   Full liquid diet   IV fluids   Pain control         Hyponatremia  -gentle hydration   -repeat CMP in am     10/29  Na level 128   Stable    monitor     ETOH abuse  -pt drinks daily- [place] [employer] whiskey   -ETOH 96 upon admit   -pt counseled on cessation with understanding verbalized       Dementia associated with alcoholism without behavioral disturbance  -will monitor       Alcoholic cirrhosis of liver with ascites  -GI consulted- last seen by GWEN Álvarez NP today at the [facility]   -Lasix and Spironolactone to be resumed post procedure when appropriate   -pt continues to drink daily with ETOH level of 96 noted  - ALT/AST 38/71  -Ammonia and Magnesium pending   -MELD-Na score: 22 at 10/29/2020 12:23 PM  MELD score: 15 at 10/29/2020 12:23 PM  Calculated from:  Serum Creatinine: 0.9 mg/dL (Rounded to 1 mg/dL) at 10/29/2020  6:27 AM  Serum Sodium: 128 mmol/L at 10/29/2020  6:27 AM  Total Bilirubin: 1.6 mg/dL at 10/29/2020  6:27 AM  INR(ratio): 1.8 at 10/29/2020 12:23 PM  Age: 64 years 11 months      10/28  GI following   Restart lactulose when tolerating PO     Esophageal reflux disease  -PPI     Anemia  -H/H 9.1/26.3  -stable   -repeat CBC in am    10/28  HGB 8.9  Monitor daily labs     10/29  HGb 8.5   Stable monitor labs daily       VTE Risk Mitigation (From admission, onward)         Ordered     IP VTE HIGH RISK PATIENT  Once      10/28/20 0617     Place sequential compression device  Until discontinued      10/28/20 0617                Discharge Planning   JANETTE:      Code Status: Prior   Is the patient medically ready for discharge?:     Reason for patient still in hospital (select all that apply): Patient trending condition and Treatment  Discharge Plan A: Home                  Eben Jimenez NP  Department of Hospital Medicine   [facility]

## 2020-10-29 NOTE — PLAN OF CARE
POC reviewed, needs reinforcement. Pt disoriented to place and situation.  Pt remained free from falls, fall precautions in place. Pt is SR on monitor. VSS. Given PRN ativan for agitation/anxiety due to probable alcohol withdrawal. No other c/o at this time. PIV intact, LR infusing @ 100ml/hr. Call bell and personal belongings within reach. Avasys at bedside to promote safety. Pt refused SCDs. Hourly rounding complete. Reminded to call for assistance. Will continue to monitor.

## 2020-10-29 NOTE — SUBJECTIVE & OBJECTIVE
Interval History: tolerated clears, d/c jay, full liquids    Medications:  Continuous Infusions:   lactated ringers 100 mL/hr at 10/28/20 1845     Scheduled Meds:   chlorhexidine  10 mL Mouth/Throat BID    FLUoxetine  20 mg Oral Daily    magnesium oxide  400 mg Oral Daily    multivitamin  1 tablet Oral Daily    nozaseptin   Each Nostril BID    pantoprazole  40 mg Oral BID AC    spironolactone  25 mg Oral Daily     PRN Meds:hydrOXYzine HCL, influenza, lorazepam, meperidine, ondansetron, pneumoc 13-pao conj-dip cr(PF), traMADoL     Review of patient's allergies indicates:   Allergen Reactions    Hydrocodone-acetaminophen Hives and Itching     Objective:     Vital Signs (Most Recent):  Temp: 97.5 °F (36.4 °C) (10/29/20 0333)  Pulse: 78 (10/29/20 0500)  Resp: 20 (10/29/20 0333)  BP: 138/83 (10/29/20 0333)  SpO2: 95 % (10/29/20 0333) Vital Signs (24h Range):  Temp:  [97.5 °F (36.4 °C)-99 °F (37.2 °C)] 97.5 °F (36.4 °C)  Pulse:  [77-97] 78  Resp:  [18-20] 20  SpO2:  [94 %-97 %] 95 %  BP: (138-164)/(80-95) 138/83     Weight: 65.1 kg (143 lb 8.3 oz)  Body mass index is 23.16 kg/m².    Intake/Output - Last 3 Shifts       10/27 0700 - 10/28 0659 10/28 0700 - 10/29 0659 10/29 0700 - 10/30 0659    P.O. 0 118     I.V. (mL/kg) 2015 (30.8) 2400 (36.9)     Total Intake(mL/kg) 2015 (30.8) 2518 (38.7)     Urine (mL/kg/hr) 1350 2000 (1.3)     Blood 30      Total Output 1380 2000     Net +635 +518                  Physical Exam  Vitals signs and nursing note reviewed.   Constitutional:       Comments: Chronically ill, slight   HENT:      Head: Normocephalic and atraumatic.   Eyes:      General: No scleral icterus.  Neck:      Musculoskeletal: Normal range of motion and neck supple.   Cardiovascular:      Rate and Rhythm: Normal rate and regular rhythm.   Pulmonary:      Breath sounds: Normal breath sounds.   Abdominal:      General: Bowel sounds are normal. There is distension.      Palpations: Abdomen is soft.       Tenderness: There is abdominal tenderness (incisional).      Comments: Incision is clean, no drainage   Musculoskeletal:      Right lower leg: No edema.      Left lower leg: No edema.   Neurological:      General: No focal deficit present.      Mental Status: She is oriented to person, place, and time.   Psychiatric:         Mood and Affect: Mood normal.         Behavior: Behavior normal.         Thought Content: Thought content normal.         Significant Labs:  CBC:   Recent Labs   Lab 10/29/20  0627   WBC 6.35   RBC 2.80*   HGB 8.5*   HCT 25.9*      MCV 93   MCH 30.4   MCHC 32.8     CMP:   Recent Labs   Lab 10/29/20  0627   GLU 96   CALCIUM 8.3*   ALBUMIN 3.1*   PROT 5.8*   *   K 4.0   CO2 27   CL 93*   BUN 9   CREATININE 0.9   ALKPHOS 96   ALT 21   AST 38   BILITOT 1.6*     LFTs:   Recent Labs   Lab 10/29/20  0627   ALT 21   AST 38   ALKPHOS 96   BILITOT 1.6*   PROT 5.8*   ALBUMIN 3.1*     Coagulation:   Recent Labs   Lab 10/27/20  1220   LABPROT 15.1*   INR 1.4*       Significant Diagnostics:  no new

## 2020-10-29 NOTE — ASSESSMENT & PLAN NOTE
-GI consulted- last seen by GWEN Álvarez NP today at the [facility]   -Lasix and Spironolactone to be resumed post procedure when appropriate   -pt continues to drink daily with ETOH level of 96 noted  - ALT/AST 38/71  -Ammonia and Magnesium pending   -MELD-Na score: 22 at 10/29/2020 12:23 PM  MELD score: 15 at 10/29/2020 12:23 PM  Calculated from:  Serum Creatinine: 0.9 mg/dL (Rounded to 1 mg/dL) at 10/29/2020  6:27 AM  Serum Sodium: 128 mmol/L at 10/29/2020  6:27 AM  Total Bilirubin: 1.6 mg/dL at 10/29/2020  6:27 AM  INR(ratio): 1.8 at 10/29/2020 12:23 PM  Age: 64 years 11 months      10/28  GI following   Restart lactulose when tolerating PO

## 2020-10-29 NOTE — ASSESSMENT & PLAN NOTE
-pt drinks daily- [place] [employer] whiskey   -ETOH 96 upon admit   -pt counseled on cessation with understanding verbalized

## 2020-10-29 NOTE — NURSING
Pt extremely agitated and anxious. Threatening to pull out IV, surgical dressing and heart monitor. Tried reorienting and redirecting, notified NP, ativan 1mg ordered and given.

## 2020-10-29 NOTE — PLAN OF CARE
Problem: Fall Injury Risk  Goal: Absence of Fall and Fall-Related Injury  Outcome: Ongoing, Progressing     Problem: Electrolyte Imbalance (Acute Kidney Injury/Impairment)  Goal: Serum Electrolyte Balance  Outcome: Ongoing, Progressing     Problem: Fluid Imbalance (Acute Kidney Injury/Impairment)  Goal: Optimal Fluid Balance  Outcome: Ongoing, Progressing     Problem: Hematologic Alteration (Acute Kidney Injury/Impairment)  Goal: Hemoglobin, Hematocrit and Platelets Within Normal Range  Outcome: Ongoing, Progressing     Problem: Oral Intake Inadequate (Acute Kidney Injury/Impairment)  Goal: Optimal Nutrition Intake  Outcome: Ongoing, Progressing     Problem: Renal Function Impairment (Acute Kidney Injury/Impairment)  Goal: Effective Renal Function  Outcome: Ongoing, Progressing     Problem: Adult Inpatient Plan of Care  Goal: Plan of Care Review  Outcome: Ongoing, Progressing  Goal: Patient-Specific Goal (Individualization)  Outcome: Ongoing, Progressing  Goal: Absence of Hospital-Acquired Illness or Injury  Outcome: Ongoing, Progressing  Goal: Optimal Comfort and Wellbeing  Outcome: Ongoing, Progressing  Goal: Readiness for Transition of Care  Outcome: Ongoing, Progressing  Goal: Rounds/Family Conference  Outcome: Ongoing, Progressing     Problem: Infection  Goal: Infection Symptom Resolution  Outcome: Ongoing, Progressing

## 2020-10-29 NOTE — ASSESSMENT & PLAN NOTE
-H/H 9.1/26.3  -stable   -repeat CBC in am    10/28  HGB 8.9  Monitor daily labs     10/29  HGb 8.5   Stable monitor labs daily

## 2020-10-29 NOTE — PROGRESS NOTES
[facility] -   General Surgery  Progress Note    Subjective:     History of Present Illness:  64-year-old female with cirrhosis.  She underwent a paracentesis last week.  Her caregiver thought her umbilical hernia which is somewhat protuberant would go down.  The umbilical hernia was still protuberant, so her care giver brought her to the GI clinic for evaluation.  The patient was sent to the emergency room for evaluation due to a protuberant umbilical hernia.    The patient denies any abdominal pain.  She states the hernia does not gets hurt her.  She does not report any nausea and vomiting    Post-Op Info:  Procedure(s) (LRB):  REPAIR, HERNIA, UMBILICAL, INCARCERATED, AGE 5 YEARS OR OLDER (N/A)   2 Days Post-Op     Interval History: tolerated clears, d/c jay, full liquids    Medications:  Continuous Infusions:   lactated ringers 100 mL/hr at 10/28/20 1845     Scheduled Meds:   chlorhexidine  10 mL Mouth/Throat BID    FLUoxetine  20 mg Oral Daily    magnesium oxide  400 mg Oral Daily    multivitamin  1 tablet Oral Daily    nozaseptin   Each Nostril BID    pantoprazole  40 mg Oral BID AC    spironolactone  25 mg Oral Daily     PRN Meds:hydrOXYzine HCL, influenza, lorazepam, meperidine, ondansetron, pneumoc 13-pao conj-dip cr(PF), traMADoL     Review of patient's allergies indicates:   Allergen Reactions    Hydrocodone-acetaminophen Hives and Itching     Objective:     Vital Signs (Most Recent):  Temp: 97.5 °F (36.4 °C) (10/29/20 0333)  Pulse: 78 (10/29/20 0500)  Resp: 20 (10/29/20 0333)  BP: 138/83 (10/29/20 0333)  SpO2: 95 % (10/29/20 0333) Vital Signs (24h Range):  Temp:  [97.5 °F (36.4 °C)-99 °F (37.2 °C)] 97.5 °F (36.4 °C)  Pulse:  [77-97] 78  Resp:  [18-20] 20  SpO2:  [94 %-97 %] 95 %  BP: (138-164)/(80-95) 138/83     Weight: 65.1 kg (143 lb 8.3 oz)  Body mass index is 23.16 kg/m².    Intake/Output - Last 3 Shifts       10/27 0700 - 10/28 0659 10/28 0700 - 10/29 0659 10/29 0700 - 10/30  0659    P.O. 0 118     I.V. (mL/kg) 2015 (30.8) 2400 (36.9)     Total Intake(mL/kg) 2015 (30.8) 2518 (38.7)     Urine (mL/kg/hr) 1350 2000 (1.3)     Blood 30      Total Output 1380 2000     Net +635 +518                  Physical Exam  Vitals signs and nursing note reviewed.   Constitutional:       Comments: Chronically ill, slight   HENT:      Head: Normocephalic and atraumatic.   Eyes:      General: No scleral icterus.  Neck:      Musculoskeletal: Normal range of motion and neck supple.   Cardiovascular:      Rate and Rhythm: Normal rate and regular rhythm.   Pulmonary:      Breath sounds: Normal breath sounds.   Abdominal:      General: Bowel sounds are normal. There is distension.      Palpations: Abdomen is soft.      Tenderness: There is abdominal tenderness (incisional).      Comments: Incision is clean, no drainage   Musculoskeletal:      Right lower leg: No edema.      Left lower leg: No edema.   Neurological:      General: No focal deficit present.      Mental Status: She is oriented to person, place, and time.   Psychiatric:         Mood and Affect: Mood normal.         Behavior: Behavior normal.         Thought Content: Thought content normal.         Significant Labs:  CBC:   Recent Labs   Lab 10/29/20  0627   WBC 6.35   RBC 2.80*   HGB 8.5*   HCT 25.9*      MCV 93   MCH 30.4   MCHC 32.8     CMP:   Recent Labs   Lab 10/29/20  0627   GLU 96   CALCIUM 8.3*   ALBUMIN 3.1*   PROT 5.8*   *   K 4.0   CO2 27   CL 93*   BUN 9   CREATININE 0.9   ALKPHOS 96   ALT 21   AST 38   BILITOT 1.6*     LFTs:   Recent Labs   Lab 10/29/20  0627   ALT 21   AST 38   ALKPHOS 96   BILITOT 1.6*   PROT 5.8*   ALBUMIN 3.1*     Coagulation:   Recent Labs   Lab 10/27/20  1220   LABPROT 15.1*   INR 1.4*       Significant Diagnostics:  no new    Assessment/Plan:     * Umbilical hernia without obstruction and without gangrene  Postop day 2 from a open umbilical hernia repair with removal of the umbilical skin in layers  closure.  Patient is doing well.  Full liquids  D/c ivf per GI  D/c jay.        The patient will need to be monitored for several days to ensure that she does not go into hepatic failure due to her underlying liver disease    Dementia associated with alcoholism without behavioral disturbance  Monitor    Alcoholic cirrhosis of liver with ascites  D/c ivf per GI  Lactulose per GI    Esophageal reflux disease  Home PPI    Anemia  Monitor    Acute renal failure superimposed on stage 4 chronic kidney disease  Likely secondary to cirrhosis        Shahbaz Oliva MD  General Surgery  [facility]

## 2020-10-29 NOTE — ASSESSMENT & PLAN NOTE
Postop day 2 from a open umbilical hernia repair with removal of the umbilical skin in layers closure.  Patient is doing well.  Full liquids  D/c ivf per GI  D/c misty.        The patient will need to be monitored for several days to ensure that she does not go into hepatic failure due to her underlying liver disease

## 2020-10-29 NOTE — SUBJECTIVE & OBJECTIVE
Interval History: Pt seen and examined, reports feeling better. Will follow.     Review of Systems   Constitutional: Positive for fatigue. Negative for chills and fever.   HENT: Negative for congestion, rhinorrhea and sinus pressure.    Respiratory: Negative for apnea, cough, choking, chest tightness, shortness of breath, wheezing and stridor.    Cardiovascular: Negative for chest pain, palpitations and leg swelling.   Gastrointestinal: Positive for abdominal pain. Negative for abdominal distention, diarrhea, nausea and vomiting.   Endocrine: Negative for cold intolerance and heat intolerance.   Genitourinary: Negative for difficulty urinating and hematuria.   Musculoskeletal: Negative for arthralgias and joint swelling.   Skin: Negative for color change, pallor and rash.   Neurological: Positive for weakness. Negative for dizziness, seizures, numbness and headaches.   Psychiatric/Behavioral: Negative for agitation. The patient is not nervous/anxious.      Objective:     Vital Signs (Most Recent):  Temp: 99.2 °F (37.3 °C) (10/29/20 0710)  Pulse: 101 (10/29/20 1215)  Resp: 18 (10/29/20 1215)  BP: 129/78 (10/29/20 1215)  SpO2: 95 % (10/29/20 1215) Vital Signs (24h Range):  Temp:  [97.5 °F (36.4 °C)-99.2 °F (37.3 °C)] 99.2 °F (37.3 °C)  Pulse:  [] 101  Resp:  [18-20] 18  SpO2:  [94 %-97 %] 95 %  BP: (129-164)/(78-95) 129/78     Weight: 65.1 kg (143 lb 8.3 oz)  Body mass index is 23.16 kg/m².    Intake/Output Summary (Last 24 hours) at 10/29/2020 1404  Last data filed at 10/29/2020 0926  Gross per 24 hour   Intake 2518 ml   Output 2600 ml   Net -82 ml      Physical Exam  Vitals signs and nursing note reviewed.   Constitutional:       General: She is not in acute distress.     Appearance: She is not ill-appearing, toxic-appearing or diaphoretic.   HENT:      Head: Normocephalic and atraumatic.   Cardiovascular:      Heart sounds: No murmur. No friction rub. No gallop.    Pulmonary:      Effort: No respiratory  distress.      Breath sounds: No stridor. No wheezing, rhonchi or rales.   Chest:      Chest wall: No tenderness.   Abdominal:      General: There is no distension.      Palpations: There is no mass.      Tenderness: There is abdominal tenderness. There is no right CVA tenderness, left CVA tenderness, guarding or rebound.      Hernia: No hernia is present.      Comments: Mid abdominal area dressing intact    Musculoskeletal: Normal range of motion.   Skin:     General: Skin is warm and dry.   Neurological:      General: No focal deficit present.   Psychiatric:         Mood and Affect: Mood normal.         Significant Labs:   CBC:   Recent Labs   Lab 10/28/20  0655 10/29/20  0627   WBC 7.30  7.30 6.35   HGB 8.9*  8.9* 8.5*   HCT 26.2*  26.2* 25.9*     182 162     CMP:   Recent Labs   Lab 10/28/20  0655 10/29/20  0627   * 128*   K 4.4 4.0   CL 93* 93*   CO2 28 27   GLU 97 96   BUN 11 9   CREATININE 0.9 0.9   CALCIUM 8.4* 8.3*   PROT 6.2 5.8*   ALBUMIN 3.3* 3.1*   BILITOT 1.3* 1.6*   ALKPHOS 99 96   AST 54* 38   ALT 28 21   ANIONGAP 7* 8   EGFRNONAA >60 >60       Significant Imaging:     Imaging Results          CT Abdomen Pelvis With Contrast (Final result)  Result time 10/27/20 14:44:59    Final result by Abe Monique Jr., MD (10/27/20 14:44:59)                 Impression:      1. Cirrhosis and ascites with portal hypertension and varicosities.  2. Umbilical/ventral hernia containing a portion of small bowel without signs of high-grade obstruction.  There is small ascitic fluid within the hernia sac.  3. Moderate to large hiatal hernia with multiple adjacent paraseptal varices.  4. Right inguinal hernia containing a small amount of ascitic fluid.  5. Diverticulosis of the sigmoid colon.  6. Cholelithiasis.  7. Compression deformities of multiple vertebral bodies with a possibly subacute fracture of L3 given its fragmented appearance.  No definite spinal canal hematoma.  Clinical correlation for  back pain is advised.      Electronically signed by: Abe Monique Jr., MD  Date:    10/27/2020  Time:    14:44             Narrative:    EXAMINATION:  CT ABDOMEN PELVIS WITH CONTRAST    CLINICAL HISTORY:  Hernia, complicated;Bowel obstruction high-grade suspected;    TECHNIQUE:  Low dose axial images, sagittal and coronal reformations were obtained from the lung bases to the pubic symphysis following the IV administration of 75 mL of Omnipaque 350 and the oral administration of 1000  mL of Omnipaque 9. All CT scans at this facility use dose modulation, iterative reconstruction and/or weight based dosing when appropriate to reduce radiation dose to as low as reasonably achievable.    COMPARISON:  Prior ultrasound from October 16, 2020.    FINDINGS:  Abdomen:    - Lower thorax: Normal heart size.    - Lung bases: No infiltrates and no nodules.    - Liver: Cirrhotic morphology of the liver.  There are 2 cysts within the liver, 1 within the lateral segment of the left lobe and 1 within the medial right lobe.  These measure 14 mm and 12 mm respectively.  There is small perihepatic ascites.  The portal vein and hepatic veins are patent.  No definite enhancing masses within the liver.  No rapid washout of contrast material.    - Gallbladder: Contains dependently layering calcified stones.    - Bile Ducts: No abnormal bile duct dilation.    - Spleen: Unremarkable.    - Kidneys: The kidneys enhance homogeneously.    - Adrenals: Unremarkable.    - Pancreas: Unremarkable.    - Bowel/Mesentery: Moderate to large hiatal hernia with adjacent paraesophageal varices.  There also varicosities about the lesser and greater curvatures of the stomach.  There is a ventral hernia/umbilical hernia containing a loop of small bowel, likely jejunum.  The hernia neck measures 31 mm craniocaudal by 37 mm transverse.  No sign of bowel obstruction relation to the hernia.  There is small ascites within the hernia sac.  Diverticulosis of the  sigmoid colon.    - Peritoneal cavity: Small ascites within the dependent pelvis.  No free air.    - Retroperitoneum:  No lymphadenopathy or hemorrhage.    - Vascular: No abdominal aortic aneurysm.    - Reproductive organs: Prior hysterectomy.    - Inguinal region: Small right inguinal hernia containing a small amount of ascitic fluid.  The hernia neck measures 19 mm.    Bones:  Severe compression fractures of multiple vertebral bodies including T9, T11, T12, L1, L3, L4, and L5.  The fracture of L3 may be subacute in nature given the fragmentation.  No definite paraspinal hematoma.  Old anterolateral left-sided and right-sided rib fractures.

## 2020-10-29 NOTE — PROGRESS NOTES
Chart review done. Looks like patient feeling ok and diet started. INR has increased causing her MELD to increase. Will need to continue to monitor INR and CMP daily. We will follow.     MELD-Na score: 22 at 10/29/2020 12:23 PM  MELD score: 15 at 10/29/2020 12:23 PM  Calculated from:  Serum Creatinine: 0.9 mg/dL (Rounded to 1 mg/dL) at 10/29/2020  6:27 AM  Serum Sodium: 128 mmol/L at 10/29/2020  6:27 AM  Total Bilirubin: 1.6 mg/dL at 10/29/2020  6:27 AM  INR(ratio): 1.8 at 10/29/2020 12:23 PM  Age: 64 years 11 months

## 2020-10-29 NOTE — PLAN OF CARE
POC reviewed with PT; understanding verbalized  She will need reinforcement as she is forgetful  Pt remained free of injury today  Pablo at bedside  Ambulated in the baig to nurses station once, sat in chair twice and ambulated to BR all using walker with assist  SR/ST on tele  BP creeping up; docs discontinued iv fluids  Bowel sounds remain very hypoactive  Tolerating full liquids  Pain under control

## 2020-10-29 NOTE — ASSESSMENT & PLAN NOTE
-pt admitted to Medical Surgical Unit   -General Surgery consulted for hernia repair   -analgesia as needed   -IV hydration   -antiemetics as needed   -NPO     10/28  General surgery managing   Clear liquid diet   IV fluids   Pain control     10/29  S/P open incarcerated umbilical hernia repair POD # 2  General surgery managing   Full liquid diet   IV fluids   Pain control

## 2020-10-30 LAB
ALBUMIN SERPL BCP-MCNC: 3.1 G/DL (ref 3.5–5.2)
ALP SERPL-CCNC: 95 U/L (ref 55–135)
ALT SERPL W/O P-5'-P-CCNC: 17 U/L (ref 10–44)
ANION GAP SERPL CALC-SCNC: 7 MMOL/L (ref 8–16)
AST SERPL-CCNC: 32 U/L (ref 10–40)
BILIRUB SERPL-MCNC: 1.5 MG/DL (ref 0.1–1)
BUN SERPL-MCNC: 7 MG/DL (ref 8–23)
CALCIUM SERPL-MCNC: 8.3 MG/DL (ref 8.7–10.5)
CHLORIDE SERPL-SCNC: 97 MMOL/L (ref 95–110)
CO2 SERPL-SCNC: 27 MMOL/L (ref 23–29)
CREAT SERPL-MCNC: 1 MG/DL (ref 0.5–1.4)
EST. GFR  (AFRICAN AMERICAN): >60 ML/MIN/1.73 M^2
EST. GFR  (NON AFRICAN AMERICAN): 59 ML/MIN/1.73 M^2
GLUCOSE SERPL-MCNC: 106 MG/DL (ref 70–110)
INR PPP: 1.6 (ref 0.8–1.2)
POTASSIUM SERPL-SCNC: 4.2 MMOL/L (ref 3.5–5.1)
PROT SERPL-MCNC: 5.8 G/DL (ref 6–8.4)
PROTHROMBIN TIME: 17 SEC (ref 9–12.5)
SODIUM SERPL-SCNC: 131 MMOL/L (ref 136–145)

## 2020-10-30 PROCEDURE — 99232 PR SUBSEQUENT HOSPITAL CARE,LEVL II: ICD-10-PCS | Mod: ,,, | Performed by: INTERNAL MEDICINE

## 2020-10-30 PROCEDURE — 85610 PROTHROMBIN TIME: CPT

## 2020-10-30 PROCEDURE — 36415 COLL VENOUS BLD VENIPUNCTURE: CPT

## 2020-10-30 PROCEDURE — 25000003 PHARM REV CODE 250: Performed by: PHYSICIAN ASSISTANT

## 2020-10-30 PROCEDURE — 94799 UNLISTED PULMONARY SVC/PX: CPT

## 2020-10-30 PROCEDURE — 80053 COMPREHEN METABOLIC PANEL: CPT

## 2020-10-30 PROCEDURE — 21400001 HC TELEMETRY ROOM

## 2020-10-30 PROCEDURE — 99232 SBSQ HOSP IP/OBS MODERATE 35: CPT | Mod: ,,, | Performed by: INTERNAL MEDICINE

## 2020-10-30 PROCEDURE — 25000003 PHARM REV CODE 250: Performed by: INTERNAL MEDICINE

## 2020-10-30 PROCEDURE — 25000003 PHARM REV CODE 250: Performed by: SURGERY

## 2020-10-30 RX ORDER — LACTULOSE 10 G/15ML
20 SOLUTION ORAL DAILY
Status: DISCONTINUED | OUTPATIENT
Start: 2020-10-30 | End: 2020-10-31 | Stop reason: HOSPADM

## 2020-10-30 RX ADMIN — SPIRONOLACTONE 25 MG: 25 TABLET ORAL at 08:10

## 2020-10-30 RX ADMIN — CHLORHEXIDINE GLUCONATE 0.12% ORAL RINSE 10 ML: 1.2 LIQUID ORAL at 08:10

## 2020-10-30 RX ADMIN — PANTOPRAZOLE SODIUM 40 MG: 40 TABLET, DELAYED RELEASE ORAL at 03:10

## 2020-10-30 RX ADMIN — TRAMADOL HYDROCHLORIDE 50 MG: 50 TABLET, FILM COATED ORAL at 03:10

## 2020-10-30 RX ADMIN — CHLORHEXIDINE GLUCONATE 0.12% ORAL RINSE 10 ML: 1.2 LIQUID ORAL at 10:10

## 2020-10-30 RX ADMIN — RIFAXIMIN 550 MG: 550 TABLET ORAL at 10:10

## 2020-10-30 RX ADMIN — FLUOXETINE 20 MG: 20 CAPSULE ORAL at 08:10

## 2020-10-30 RX ADMIN — Medication 400 MG: at 08:10

## 2020-10-30 RX ADMIN — PANTOPRAZOLE SODIUM 40 MG: 40 TABLET, DELAYED RELEASE ORAL at 05:10

## 2020-10-30 RX ADMIN — THERA TABS 1 TABLET: TAB at 08:10

## 2020-10-30 RX ADMIN — LACTULOSE 20 G: 20 SOLUTION ORAL at 10:10

## 2020-10-30 NOTE — PROGRESS NOTES
[facility] Hospital Medicine  Progress Note    Patient Name: Kylah Ortiz  MRN: [id number]  Patient Class: IP- Inpatient   Admission Date: 10/27/2020  Length of Stay: 3 days  Attending Physician: Shahbaz Oliva MD  Primary Care Provider: Santos Beaver MD        Subjective:     Principal Problem:Umbilical hernia without obstruction and without gangrene        HPI:  Kylah Ortiz is a 64 y.o. female patient with a PMHx of alcoholis cirrhosis, alcoholic dementia, ascites, CKD, esophageal varices, hepatic encephalopathy, and hiatal hernia who presents to the Emergency Department for evaluation of umbilical hernia which onset gradually. Of note, caretaker noticed umbilical hernia post paracentesis last week after 4 L removed. Patient was referred to ED today after she was seen in clinic today by GIA Harper (GI) at the [facility].  No associated symptom reported.  Patient denies any fever, chills, diaphoresis, abdominal pain, abdominal distention, nausea, vomiting, diarrhea, constipation, blood in stool, CP, SOB, cough, leg swelling, and all other sxs at this time. Last BM was yesterday. No prior Tx. No further complaints or concerns at this time.  Pt is a full code and Tatyana Chavarria (caregiver) at [phone] is the surrogate decision maker.  ER work up showed: H/H 9.1/26.3, PT 15.1, INR 1.4, Na 128, Cl 94, Ca 8.4, AST 71, and Lipase 81.  COVID 19 negative.  CT abdomen/pelvis with contrast showed cirrhosis and ascites with portal hypertension and varicosities. Umbilical/ventral hernia containing a portion of small bowel without signs of high-grade obstruction.  There is small ascitic fluid within the hernia sac. Moderate to large hiatal hernia with multiple adjacent paraseptal varices.   Right inguinal hernia containing a small amount of ascitic fluid. ER discussed care with General Surgery and patient transported to OR from ED. Hospital Medicine contacted for admission with patient to  be placed in Medical Surgical Unit post procedure.         Overview/Hospital Course:  Ms Ortiz is a 64 year old who presented to [facility] Emergency Room for evaluation of umbilical hernia. CT of abdomin/pelvis with contrast showed umbilical/ventral hernia containing a portion of small bowel without signs of high-grade obstruction. She was taken surgery on yesterday Dr Oliva preformed an open umbilical hernia repair with removal of the umbilical skin in layers closure. She is currently resting in bed, General Surgery and GI following. As of 10/29, patient feeling well, ambulated in baig, now sitting up in chair. Vital signs and labs stable. S/P open incarcerated umbilical hernia repair POD # 2. Will follow. As of 10/30 POD #3 pt doing well in good spirits. Pain controlled. Tolerating diet. VSS.     Interval History: POD #3 pt doing well in good spirits. Pain controlled. Tolerating diet. VSS.       Review of Systems   Constitutional: Positive for fatigue. Negative for chills and fever.   HENT: Negative for congestion, rhinorrhea and sinus pressure.    Respiratory: Negative for apnea, cough, choking, chest tightness, shortness of breath, wheezing and stridor.    Cardiovascular: Negative for chest pain, palpitations and leg swelling.   Gastrointestinal: Positive for abdominal pain. Negative for abdominal distention, diarrhea, nausea and vomiting.   Endocrine: Negative for cold intolerance and heat intolerance.   Genitourinary: Negative for difficulty urinating and hematuria.   Musculoskeletal: Negative for arthralgias and joint swelling.   Skin: Negative for color change, pallor and rash.   Neurological: Positive for weakness. Negative for dizziness, seizures, numbness and headaches.   Psychiatric/Behavioral: Negative for agitation. The patient is not nervous/anxious.      Objective:     Vital Signs (Most Recent):  Temp: 98.6 °F (37 °C) (10/30/20 0809)  Pulse: 82 (10/30/20 0809)  Resp: 18 (10/30/20 0809)  BP: (!)  167/95 (10/30/20 0809)  SpO2: (!) 94 % (10/30/20 0809) Vital Signs (24h Range):  Temp:  [97.3 °F (36.3 °C)-98.6 °F (37 °C)] 98.6 °F (37 °C)  Pulse:  [] 82  Resp:  [18-19] 18  SpO2:  [94 %-98 %] 94 %  BP: (129-174)/(77-95) 167/95     Weight: 65.9 kg (145 lb 4.5 oz)  Body mass index is 23.45 kg/m².    Intake/Output Summary (Last 24 hours) at 10/30/2020 0928  Last data filed at 10/30/2020 0600  Gross per 24 hour   Intake 2130 ml   Output 1000 ml   Net 1130 ml      Physical Exam  Vitals signs and nursing note reviewed.   Constitutional:       General: She is not in acute distress.     Appearance: She is not ill-appearing, toxic-appearing or diaphoretic.   HENT:      Head: Normocephalic and atraumatic.   Cardiovascular:      Heart sounds: No murmur. No friction rub. No gallop.    Pulmonary:      Effort: No respiratory distress.      Breath sounds: No stridor. No wheezing, rhonchi or rales.   Chest:      Chest wall: No tenderness.   Abdominal:      General: There is no distension.      Palpations: There is no mass.      Tenderness: There is abdominal tenderness (incisional ). There is no right CVA tenderness, left CVA tenderness, guarding or rebound.      Hernia: No hernia is present.      Comments: Mid abdominal area dressing intact    Musculoskeletal: Normal range of motion.   Skin:     General: Skin is warm and dry.   Neurological:      General: No focal deficit present.   Psychiatric:         Mood and Affect: Mood normal.         Significant Labs:   BMP:   Recent Labs   Lab 10/30/20  0639      *   K 4.2   CL 97   CO2 27   BUN 7*   CREATININE 1.0   CALCIUM 8.3*     CBC:   Recent Labs   Lab 10/29/20  0627   WBC 6.35   HGB 8.5*   HCT 25.9*        CMP:   Recent Labs   Lab 10/29/20  0627 10/30/20  0639   * 131*   K 4.0 4.2   CL 93* 97   CO2 27 27   GLU 96 106   BUN 9 7*   CREATININE 0.9 1.0   CALCIUM 8.3* 8.3*   PROT 5.8* 5.8*   ALBUMIN 3.1* 3.1*   BILITOT 1.6* 1.5*   ALKPHOS 96 95   AST 38  32   ALT 21 17   ANIONGAP 8 7*   EGFRNONAA >60 59*     All pertinent labs within the past 24 hours have been reviewed.    Significant Imaging:   Imaging Results          CT Abdomen Pelvis With Contrast (Final result)  Result time 10/27/20 14:44:59    Final result by Abe Monique Jr., MD (10/27/20 14:44:59)                 Impression:      1. Cirrhosis and ascites with portal hypertension and varicosities.  2. Umbilical/ventral hernia containing a portion of small bowel without signs of high-grade obstruction.  There is small ascitic fluid within the hernia sac.  3. Moderate to large hiatal hernia with multiple adjacent paraseptal varices.  4. Right inguinal hernia containing a small amount of ascitic fluid.  5. Diverticulosis of the sigmoid colon.  6. Cholelithiasis.  7. Compression deformities of multiple vertebral bodies with a possibly subacute fracture of L3 given its fragmented appearance.  No definite spinal canal hematoma.  Clinical correlation for back pain is advised.      Electronically signed by: Abe Monique Jr., MD  Date:    10/27/2020  Time:    14:44             Narrative:    EXAMINATION:  CT ABDOMEN PELVIS WITH CONTRAST    CLINICAL HISTORY:  Hernia, complicated;Bowel obstruction high-grade suspected;    TECHNIQUE:  Low dose axial images, sagittal and coronal reformations were obtained from the lung bases to the pubic symphysis following the IV administration of 75 mL of Omnipaque 350 and the oral administration of 1000  mL of Omnipaque 9. All CT scans at this facility use dose modulation, iterative reconstruction and/or weight based dosing when appropriate to reduce radiation dose to as low as reasonably achievable.    COMPARISON:  Prior ultrasound from October 16, 2020.    FINDINGS:  Abdomen:    - Lower thorax: Normal heart size.    - Lung bases: No infiltrates and no nodules.    - Liver: Cirrhotic morphology of the liver.  There are 2 cysts within the liver, 1 within the lateral segment of the  left lobe and 1 within the medial right lobe.  These measure 14 mm and 12 mm respectively.  There is small perihepatic ascites.  The portal vein and hepatic veins are patent.  No definite enhancing masses within the liver.  No rapid washout of contrast material.    - Gallbladder: Contains dependently layering calcified stones.    - Bile Ducts: No abnormal bile duct dilation.    - Spleen: Unremarkable.    - Kidneys: The kidneys enhance homogeneously.    - Adrenals: Unremarkable.    - Pancreas: Unremarkable.    - Bowel/Mesentery: Moderate to large hiatal hernia with adjacent paraesophageal varices.  There also varicosities about the lesser and greater curvatures of the stomach.  There is a ventral hernia/umbilical hernia containing a loop of small bowel, likely jejunum.  The hernia neck measures 31 mm craniocaudal by 37 mm transverse.  No sign of bowel obstruction relation to the hernia.  There is small ascites within the hernia sac.  Diverticulosis of the sigmoid colon.    - Peritoneal cavity: Small ascites within the dependent pelvis.  No free air.    - Retroperitoneum:  No lymphadenopathy or hemorrhage.    - Vascular: No abdominal aortic aneurysm.    - Reproductive organs: Prior hysterectomy.    - Inguinal region: Small right inguinal hernia containing a small amount of ascitic fluid.  The hernia neck measures 19 mm.    Bones:  Severe compression fractures of multiple vertebral bodies including T9, T11, T12, L1, L3, L4, and L5.  The fracture of L3 may be subacute in nature given the fragmentation.  No definite paraspinal hematoma.  Old anterolateral left-sided and right-sided rib fractures.                                Assessment/Plan:      * Umbilical hernia without obstruction and without gangrene  -pt admitted to Medical Surgical Unit   -General Surgery consulted for hernia repair   -analgesia as needed   -IV hydration   -antiemetics as needed   -NPO     10/28  General surgery managing   Clear liquid diet   IV  fluids   Pain control     10/29  S/P open incarcerated umbilical hernia repair POD # 2  General surgery managing   Full liquid diet   IV fluids   Pain control     10/30/20  -POD #3. Pain controlled.   -Tolerating diet.         Hyponatremia  -gentle hydration   -repeat CMP in am     10/30  Na level 131   Stable   monitor     ETOH abuse  -pt drinks daily- [place] whiskey   -ETOH 96 upon admit   -pt counseled on cessation with understanding verbalized   -Ativan prn   -Seizure precautions       Dementia associated with alcoholism without behavioral disturbance  -will monitor       Alcoholic cirrhosis of liver with ascites  -GI consulted- last seen by GWEN Álvarez NP today at the [place]   -Lasix and Spironolactone to be resumed post procedure when appropriate   -pt continues to drink daily with ETOH level of 96 noted  - ALT/AST 38/71  -Ammonia and Magnesium pending   -MELD-Na score: 18 at 10/30/2020  6:39 AM  MELD score: 13 at 10/30/2020  6:39 AM  Calculated from:  Serum Creatinine: 1.0 mg/dL at 10/30/2020  6:39 AM  Serum Sodium: 131 mmol/L at 10/30/2020  6:39 AM  Total Bilirubin: 1.5 mg/dL at 10/30/2020  6:39 AM  INR(ratio): 1.6 at 10/30/2020  6:39 AM  Age: 64 years 11 months      10/30  GI following   MELD-Na score: 18 at 10/30/2020  6:39 AM  MELD score: 13 at 10/30/2020  6:39 AM  Calculated from:  Serum Creatinine: 1.0 mg/dL at 10/30/2020  6:39 AM  Serum Sodium: 131 mmol/L at 10/30/2020  6:39 AM  Total Bilirubin: 1.5 mg/dL at 10/30/2020  6:39 AM  INR(ratio): 1.6 at 10/30/2020  6:39 AM  Age: 64 years 11 months    Esophageal reflux disease  -PPI     Anemia  -H/H 9.1/26.3  -stable   -repeat CBC in am    10/28  HGB 8.9  Monitor daily labs     10/30  Stable       VTE Risk Mitigation (From admission, onward)         Ordered     IP VTE HIGH RISK PATIENT  Once      10/28/20 0617     Place sequential compression device  Until discontinued      10/28/20 0617                Discharge Planning   JANETTE:      Code Status: Prior    Is the patient medically ready for discharge?:     Reason for patient still in hospital (select all that apply): Other (specify) managed per primary team   Discharge Plan A: Home          Thank you for consulting Hospital Medicine, I will sign off. Please re-consult if needed.         Becky Saleem NP  Department of Hospital Medicine   [facility]

## 2020-10-30 NOTE — PLAN OF CARE
POC reviewed, verbalized understanding. Oriented to everything, but situation, she has to be reminded that she had a hernia repair. Room air. Bandage over surgical site intact, bowel sounds hypoactive in all 4 quads, passing flatus. Pt up tp bedside commode w/x1 assist. Pt remained free from falls, fall precautions in place. Pt is SR-SB on monitor. VSS. No other c/o at this time. PIV intact. Avasys at bedside. Call bell and personal belongings within reach. Hourly rounding complete. Reminded to call for assistance. Will continue to monitor.

## 2020-10-30 NOTE — ASSESSMENT & PLAN NOTE
-pt admitted to Medical Surgical Unit   -General Surgery consulted for hernia repair   -analgesia as needed   -IV hydration   -antiemetics as needed   -NPO     10/28  General surgery managing   Clear liquid diet   IV fluids   Pain control     10/29  S/P open incarcerated umbilical hernia repair POD # 2  General surgery managing   Full liquid diet   IV fluids   Pain control     10/30/20  -POD #3. Pain controlled.   -Tolerating diet.

## 2020-10-30 NOTE — ASSESSMENT & PLAN NOTE
Further management per GI.    If GI feels the patient is stable from her alcoholic cirrhosis point of view NG tolerates a regular diet she can be discharged from the surgery point of view

## 2020-10-30 NOTE — PHYSICIAN QUERY
PT Name: Kylah Ortiz  MR #: [id number]     Documentation Clarification      CDS: Shelia MARTINEZ RN  Contact information: [email or web address]    This form is a permanent document in the medical record.     Query Date: October 30, 2020    By submitting this query, we are merely seeking further clarification of documentation. Please utilize your independent clinical judgment when addressing the question(s) below.    The Medical Record reflects the following:    Supporting Clinical Findings Location in Medical Record   HPI: Kylah Ortiz is a 64 y.o. female patient with a PMHx of alcoholis cirrhosis, alcoholic dementia, ascites, CKD, esophageal varices, hepatic encephalopathy, and hiatal hernia who presents to the Emergency Department for evaluation of umbilical hernia which onset gradually. Of note, caretaker noticed umbilical hernia post paracentesis last week after 4 L removed. Patient was referred to ED today after she was seen in clinic today by GIA Harper (GI) at the [facility].  No associated symptom reported.  Patient denies any fever, chills, diaphoresis, abdominal pain, abdominal distention, nausea, vomiting, diarrhea, constipation, blood in stool, CP, SOB, cough, leg swelling, and all other sxs at this time. Last BM was yesterday.     * Umbilical hernia without obstruction and without gangrene  -pt admitted to Medical Surgical Unit   -General Surgery consulted for hernia repair      Umbilical/ventral hernia containing a portion of small bowel without signs of high-grade obstruction.  There is small ascitic fluid within the hernia sac.    Procedure: Procedure(s) (LRB):  REPAIR, HERNIA, UMBILICAL, INCARCERATED, AGE 5 YEARS OR OLDER (N/A)     Post-Operative Diagnosis: Post-Op Diagnosis Codes:     * Incarcerated umbilical hernia [K42.0]    Gentle pressure was placed on the hernia and the hernia was reduced.  An elliptical incision was then made around the umbilical skin.  Subcutaneous tissues were  dissected using electrocautery.  The hernia sac was identified and opened.  The small bowel was noted to be reduced to the 4 cm hernia.  The hernia sac was then dissected free from the surrounding subcutaneous tissue.  The fascia was exposed for several cm around the hernia defect.    * Umbilical hernia without obstruction and without gangrene  Postop day 1 from a open umbilical hernia repair with removal of the umbilical skin in layers closure.  Patient is doing well. [facility] 10/27//2020                    [facility] 10/27//2020      CT Abd Pelvis 10/27/2020    Op Note 10/27/2020      Op Note 10/27/2020      Op Note 10/27/2020              PN Gen Surg 10/28/2020                                                                           Provider, please provide diagnosis or diagnoses associated with above clinical findings.    Doctor, due to conflicting documentation, please clarify the documentation of hernia    [x   ] Incarcerated umbilical hernia Ruled in   [  x ] Umbilical hernia without obstruction and without gangrene Ruled in   [   ] Other clarification  (please specify): ____________   [  ] Clinically undetermined                                                                                                           Present on admission (POA) status:   [  x ] Yes (Y)                          [  ] Clinically Undetermined (W)  [   ] No (N)                            [   ] Documentation insufficient to determine if condition is POA (U)

## 2020-10-30 NOTE — SUBJECTIVE & OBJECTIVE
Interval History: POD #3 pt doing well in good spirits. Pain controlled. Tolerating diet. VSS.       Review of Systems   Constitutional: Positive for fatigue. Negative for chills and fever.   HENT: Negative for congestion, rhinorrhea and sinus pressure.    Respiratory: Negative for apnea, cough, choking, chest tightness, shortness of breath, wheezing and stridor.    Cardiovascular: Negative for chest pain, palpitations and leg swelling.   Gastrointestinal: Positive for abdominal pain. Negative for abdominal distention, diarrhea, nausea and vomiting.   Endocrine: Negative for cold intolerance and heat intolerance.   Genitourinary: Negative for difficulty urinating and hematuria.   Musculoskeletal: Negative for arthralgias and joint swelling.   Skin: Negative for color change, pallor and rash.   Neurological: Positive for weakness. Negative for dizziness, seizures, numbness and headaches.   Psychiatric/Behavioral: Negative for agitation. The patient is not nervous/anxious.      Objective:     Vital Signs (Most Recent):  Temp: 98.6 °F (37 °C) (10/30/20 0809)  Pulse: 82 (10/30/20 0809)  Resp: 18 (10/30/20 0809)  BP: (!) 167/95 (10/30/20 0809)  SpO2: (!) 94 % (10/30/20 0809) Vital Signs (24h Range):  Temp:  [97.3 °F (36.3 °C)-98.6 °F (37 °C)] 98.6 °F (37 °C)  Pulse:  [] 82  Resp:  [18-19] 18  SpO2:  [94 %-98 %] 94 %  BP: (129-174)/(77-95) 167/95     Weight: 65.9 kg (145 lb 4.5 oz)  Body mass index is 23.45 kg/m².    Intake/Output Summary (Last 24 hours) at 10/30/2020 0928  Last data filed at 10/30/2020 0600  Gross per 24 hour   Intake 2130 ml   Output 1000 ml   Net 1130 ml      Physical Exam  Vitals signs and nursing note reviewed.   Constitutional:       General: She is not in acute distress.     Appearance: She is not ill-appearing, toxic-appearing or diaphoretic.   HENT:      Head: Normocephalic and atraumatic.   Cardiovascular:      Heart sounds: No murmur. No friction rub. No gallop.    Pulmonary:      Effort:  No respiratory distress.      Breath sounds: No stridor. No wheezing, rhonchi or rales.   Chest:      Chest wall: No tenderness.   Abdominal:      General: There is no distension.      Palpations: There is no mass.      Tenderness: There is abdominal tenderness (incisional ). There is no right CVA tenderness, left CVA tenderness, guarding or rebound.      Hernia: No hernia is present.      Comments: Mid abdominal area dressing intact    Musculoskeletal: Normal range of motion.   Skin:     General: Skin is warm and dry.   Neurological:      General: No focal deficit present.   Psychiatric:         Mood and Affect: Mood normal.         Significant Labs:   BMP:   Recent Labs   Lab 10/30/20  0639      *   K 4.2   CL 97   CO2 27   BUN 7*   CREATININE 1.0   CALCIUM 8.3*     CBC:   Recent Labs   Lab 10/29/20  0627   WBC 6.35   HGB 8.5*   HCT 25.9*        CMP:   Recent Labs   Lab 10/29/20  0627 10/30/20  0639   * 131*   K 4.0 4.2   CL 93* 97   CO2 27 27   GLU 96 106   BUN 9 7*   CREATININE 0.9 1.0   CALCIUM 8.3* 8.3*   PROT 5.8* 5.8*   ALBUMIN 3.1* 3.1*   BILITOT 1.6* 1.5*   ALKPHOS 96 95   AST 38 32   ALT 21 17   ANIONGAP 8 7*   EGFRNONAA >60 59*     All pertinent labs within the past 24 hours have been reviewed.    Significant Imaging:   Imaging Results          CT Abdomen Pelvis With Contrast (Final result)  Result time 10/27/20 14:44:59    Final result by Abe Monique Jr., MD (10/27/20 14:44:59)                 Impression:      1. Cirrhosis and ascites with portal hypertension and varicosities.  2. Umbilical/ventral hernia containing a portion of small bowel without signs of high-grade obstruction.  There is small ascitic fluid within the hernia sac.  3. Moderate to large hiatal hernia with multiple adjacent paraseptal varices.  4. Right inguinal hernia containing a small amount of ascitic fluid.  5. Diverticulosis of the sigmoid colon.  6. Cholelithiasis.  7. Compression deformities of  multiple vertebral bodies with a possibly subacute fracture of L3 given its fragmented appearance.  No definite spinal canal hematoma.  Clinical correlation for back pain is advised.      Electronically signed by: Abe Monique Jr., MD  Date:    10/27/2020  Time:    14:44             Narrative:    EXAMINATION:  CT ABDOMEN PELVIS WITH CONTRAST    CLINICAL HISTORY:  Hernia, complicated;Bowel obstruction high-grade suspected;    TECHNIQUE:  Low dose axial images, sagittal and coronal reformations were obtained from the lung bases to the pubic symphysis following the IV administration of 75 mL of Omnipaque 350 and the oral administration of 1000  mL of Omnipaque 9. All CT scans at this facility use dose modulation, iterative reconstruction and/or weight based dosing when appropriate to reduce radiation dose to as low as reasonably achievable.    COMPARISON:  Prior ultrasound from October 16, 2020.    FINDINGS:  Abdomen:    - Lower thorax: Normal heart size.    - Lung bases: No infiltrates and no nodules.    - Liver: Cirrhotic morphology of the liver.  There are 2 cysts within the liver, 1 within the lateral segment of the left lobe and 1 within the medial right lobe.  These measure 14 mm and 12 mm respectively.  There is small perihepatic ascites.  The portal vein and hepatic veins are patent.  No definite enhancing masses within the liver.  No rapid washout of contrast material.    - Gallbladder: Contains dependently layering calcified stones.    - Bile Ducts: No abnormal bile duct dilation.    - Spleen: Unremarkable.    - Kidneys: The kidneys enhance homogeneously.    - Adrenals: Unremarkable.    - Pancreas: Unremarkable.    - Bowel/Mesentery: Moderate to large hiatal hernia with adjacent paraesophageal varices.  There also varicosities about the lesser and greater curvatures of the stomach.  There is a ventral hernia/umbilical hernia containing a loop of small bowel, likely jejunum.  The hernia neck measures 31 mm  craniocaudal by 37 mm transverse.  No sign of bowel obstruction relation to the hernia.  There is small ascites within the hernia sac.  Diverticulosis of the sigmoid colon.    - Peritoneal cavity: Small ascites within the dependent pelvis.  No free air.    - Retroperitoneum:  No lymphadenopathy or hemorrhage.    - Vascular: No abdominal aortic aneurysm.    - Reproductive organs: Prior hysterectomy.    - Inguinal region: Small right inguinal hernia containing a small amount of ascitic fluid.  The hernia neck measures 19 mm.    Bones:  Severe compression fractures of multiple vertebral bodies including T9, T11, T12, L1, L3, L4, and L5.  The fracture of L3 may be subacute in nature given the fragmentation.  No definite paraspinal hematoma.  Old anterolateral left-sided and right-sided rib fractures.

## 2020-10-30 NOTE — ASSESSMENT & PLAN NOTE
-H/H 9.1/26.3  -stable   -repeat CBC in am    10/28  HGB 8.9  Monitor daily labs     10/30  Stable

## 2020-10-30 NOTE — SUBJECTIVE & OBJECTIVE
Subjective:     Interval History:   No acute events overnight. She has no complaints today. Tolerated her diet. Passing gas. No n/v. INR trended down a little overnight. Other labs stable.     Review of Systems   Constitutional:        See Interval History for daily ROS.      Objective:     Vital Signs (Most Recent):  Temp: 98.6 °F (37 °C) (10/30/20 0809)  Pulse: 77 (10/30/20 1123)  Resp: 18 (10/30/20 1123)  BP: 129/80 (10/30/20 1123)  SpO2: 97 % (10/30/20 1123) Vital Signs (24h Range):  Temp:  [97.3 °F (36.3 °C)-98.6 °F (37 °C)] 98.6 °F (37 °C)  Pulse:  [] 77  Resp:  [18-19] 18  SpO2:  [94 %-98 %] 97 %  BP: (129-174)/(77-95) 129/80     Weight: 65.9 kg (145 lb 4.5 oz) (10/29/20 2356)  Body mass index is 23.45 kg/m².      Intake/Output Summary (Last 24 hours) at 10/30/2020 1455  Last data filed at 10/30/2020 0600  Gross per 24 hour   Intake 2130 ml   Output 1000 ml   Net 1130 ml       Lines/Drains/Airways     Peripheral Intravenous Line                 Peripheral IV - Single Lumen 10/27/20 1220 20 G Left Antecubital 3 days         Peripheral IV - Single Lumen 10/29/20 1600 22 G Posterior;Right Hand less than 1 day                Physical Exam  Constitutional:       General: She is not in acute distress.     Appearance: She is well-developed. She is not diaphoretic.   HENT:      Head: Normocephalic and atraumatic.   Eyes:      Extraocular Movements: Extraocular movements intact.   Cardiovascular:      Rate and Rhythm: Normal rate and regular rhythm.   Pulmonary:      Effort: Pulmonary effort is normal. No respiratory distress.      Breath sounds: Normal breath sounds. No wheezing.   Abdominal:      General: Bowel sounds are normal. There is distension (mild).      Palpations: Abdomen is soft.      Tenderness: There is abdominal tenderness (mild near surgical site).      Comments: Dressing clear, dry and intact   Neurological:      Mental Status: She is alert and oriented to person, place, and time.       Cranial Nerves: No cranial nerve deficit.   Psychiatric:         Behavior: Behavior normal.         Significant Labs:  CBC:   Recent Labs   Lab 10/29/20  0627   WBC 6.35   HGB 8.5*   HCT 25.9*        CMP:   Recent Labs   Lab 10/30/20  0639      CALCIUM 8.3*   ALBUMIN 3.1*   PROT 5.8*   *   K 4.2   CO2 27   CL 97   BUN 7*   CREATININE 1.0   ALKPHOS 95   ALT 17   AST 32   BILITOT 1.5*     Coagulation:   Recent Labs   Lab 10/30/20  0639   INR 1.6*         Significant Imaging:  Imaging results within the past 24 hours have been reviewed.

## 2020-10-30 NOTE — PHYSICIAN QUERY
PT Name: Kylah Ortiz  MR #: [id number]     ACUITY OF CONDITION CLARIFICATION      CDS: Shelia MARTINEZ RN  Contact information: [email or web address]    This form is a permanent document in the medical record.     Query Date: October 30, 2020    By submitting this query, we are merely seeking further clarification of documentation to reflect the severity of illness of your patient. Please utilize your independent clinical judgment when addressing the question(s) below.    The Medical record reflects the following:     Indicators   Supporting Clinical Findings Location in Medical Record   X Documentation of condition HPI: Kylah Ortiz is a 64 y.o. female patient with a PMHx of alcoholis cirrhosis, alcoholic dementia, ascites, CKD, esophageal varices, hepatic encephalopathy, and hiatal hernia who presents to the Emergency Department for evaluation of umbilical hernia which onset gradually.     Patient is a 65 y/o WF with PMH of alcoholic liver cirrhosis followed in hepatology clinic who was sent to ED for a non-reducible umbilical hernia. Patient has history of decompensated cirrhosis with ascites, HE and varices. She has had a recent paracentesis with removal of 3.5 L ascitic fluid. Her encephalopathy is controlled on home medications. She is still actively drinking alcohol and thus is not a liver transplant candidate.    * Alcoholic cirrhosis of liver with ascites  - MELD-Na is 10 on labs today  - Recommend to continue with home dose lactulose for history of HE  - Can hold diuretics for today  - Monitor daily labs  - Patient is not a transplant candidate due to active ETOH use. Recommend ETOH cessation [facility] 10/27//2020          Consults Gastro 10/27/2020              Consults Gastro 10/27/2020   X Lab Value(s) Ammonia= 48    INR= 1.4 -- > 1.8 -- > 1.6      -pt continues to drink daily with ETOH level of 96 noted  - ALT/AST 38/71 Labs 10/27/2020    Labs 10/27/2020- 10/30/2020    [facility]  10/29/2020    Radiology Findings     X Treatment/Medication Restart lactulose when tolerating PO     lorazepam (ATIVAN) injection 1 mg    [facility] 10/29/2020    MAR 10/28/2020 2238   X Other The patient will need to be monitored for several days to ensure that she does not go into hepatic failure due to her underlying liver disease    ETOH abuse  -pt drinks daily- [place] [employer] whiskey   -ETOH 96 upon admit   -pt counseled on cessation with understanding verbalized    POC reviewed, needs reinforcement. Pt disoriented to place and situation.  Pt remained free from falls, fall precautions in place. Pt is SR on monitor. VSS. Given PRN ativan for agitation/anxiety due to probable alcohol withdrawal. PN Gen Surg 10/28/2020      [facility] 10/29/2020        Nursing Tyra Sanchez 10/29/2020 0207      Provider, please specify the acuity/chronicity of __hepatic encephalopathy______:    [   ] Acute   [   ] Chronic   [   ] Subacute   [   ] Acute on chronic   [   ]  Clinically Undetermined         Present on admission (POA) status:   [   ] Yes (Y)                           [   ] Clinically Undetermined (W)  [   ] No (N)                            [   ] Documentation insufficient to determine if condition is POA (U)         Please send this to the GI service  Please document in your progress notes daily for the duration of treatment until resolved, and include in your discharge summary.

## 2020-10-30 NOTE — SUBJECTIVE & OBJECTIVE
Interval History:  Has tolerated a diet.  Good urine output.  No leakage from the incision.  Will advance to regular diet.  If a diet is tolerated she be discharged if there is no contraindications per Gastroenterology    Medications:  Continuous Infusions:  Scheduled Meds:   chlorhexidine  10 mL Mouth/Throat BID    FLUoxetine  20 mg Oral Daily    magnesium oxide  400 mg Oral Daily    multivitamin  1 tablet Oral Daily    nozaseptin   Each Nostril BID    pantoprazole  40 mg Oral BID AC    spironolactone  25 mg Oral Daily     PRN Meds:hydrOXYzine HCL, influenza, lorazepam, ondansetron, pneumoc 13-pao conj-dip cr(PF), traMADoL     Review of patient's allergies indicates:   Allergen Reactions    Hydrocodone-acetaminophen Hives and Itching     Objective:     Vital Signs (Most Recent):  Temp: 98.6 °F (37 °C) (10/30/20 0809)  Pulse: 82 (10/30/20 0809)  Resp: 18 (10/30/20 0809)  BP: (!) 167/95 (10/30/20 0809)  SpO2: (!) 94 % (10/30/20 0809) Vital Signs (24h Range):  Temp:  [97.3 °F (36.3 °C)-98.6 °F (37 °C)] 98.6 °F (37 °C)  Pulse:  [] 82  Resp:  [18-19] 18  SpO2:  [94 %-98 %] 94 %  BP: (129-174)/(77-95) 167/95     Weight: 65.9 kg (145 lb 4.5 oz)  Body mass index is 23.45 kg/m².    Intake/Output - Last 3 Shifts       10/28 0700 - 10/29 0659 10/29 0700 - 10/30 0659 10/30 0700 - 10/31 0659    P.O. 118 1180     I.V. (mL/kg) 2400 (36.9) 950 (14.4)     Total Intake(mL/kg) 2518 (38.7) 2130 (32.3)     Urine (mL/kg/hr) 2000 (1.3) 1600 (1)     Blood       Total Output 2000 1600     Net +518 +530                  Physical Exam  Vitals signs and nursing note reviewed.   Constitutional:       Appearance: Normal appearance.   HENT:      Mouth/Throat:      Mouth: Mucous membranes are moist.   Neck:      Musculoskeletal: Normal range of motion and neck supple.   Cardiovascular:      Rate and Rhythm: Normal rate and regular rhythm.      Pulses: Normal pulses.      Heart sounds: Normal heart sounds.   Pulmonary:      Effort:  Pulmonary effort is normal.      Breath sounds: Normal breath sounds.   Abdominal:      General: Abdomen is flat. There is distension (Mildnimal incisional).      Palpations: Abdomen is soft.      Tenderness: There is abdominal tenderness.      Comments: Incision is clean no leakage   Musculoskeletal:      Right lower leg: No edema.      Left lower leg: No edema.   Skin:     General: Skin is warm and dry.   Neurological:      General: No focal deficit present.      Mental Status: She is alert. Mental status is at baseline.   Psychiatric:         Mood and Affect: Mood normal.         Behavior: Behavior normal.         Thought Content: Thought content normal.         Significant Labs:  CBC:   Recent Labs   Lab 10/29/20  0627   WBC 6.35   RBC 2.80*   HGB 8.5*   HCT 25.9*      MCV 93   MCH 30.4   MCHC 32.8     CMP:   Recent Labs   Lab 10/30/20  0639      CALCIUM 8.3*   ALBUMIN 3.1*   PROT 5.8*   *   K 4.2   CO2 27   CL 97   BUN 7*   CREATININE 1.0   ALKPHOS 95   ALT 17   AST 32   BILITOT 1.5*     LFTs:   Recent Labs   Lab 10/30/20  0639   ALT 17   AST 32   ALKPHOS 95   BILITOT 1.5*   PROT 5.8*   ALBUMIN 3.1*     Coagulation:   Recent Labs   Lab 10/30/20  0639   LABPROT 17.0*   INR 1.6*       Significant Diagnostics:  No new     MELD-Na score: 18 at 10/30/2020  6:39 AM  MELD score: 13 at 10/30/2020  6:39 AM  Calculated from:  Serum Creatinine: 1.0 mg/dL at 10/30/2020  6:39 AM  Serum Sodium: 131 mmol/L at 10/30/2020  6:39 AM  Total Bilirubin: 1.5 mg/dL at 10/30/2020  6:39 AM  INR(ratio): 1.6 at 10/30/2020  6:39 AM  Age: 64 years 11 months

## 2020-10-30 NOTE — PLAN OF CARE
POC reviewed, verbalized understanding. Pt remained free from falls, fall precautions in place. Pt is NSR on monitor, 8558. VSS. No other c/o at this time. PIV intact. RA. Call bell and personal belongings within reach. Hourly rounding complete. Reminded to call for assistance. Will continue to monitor.

## 2020-10-30 NOTE — ASSESSMENT & PLAN NOTE
Postop day 3 from rom a open umbilical hernia repair with removal of the umbilical skin in layers closure.  Patient is doing well.  Regular diet        If the patient tolerates a regular diet she can be discharged home if there is no contraindications from the GI service regarding her alcoholic cirrhosis

## 2020-10-30 NOTE — PHYSICIAN QUERY
PT Name: Kylah Ortiz  MR #: [id number]     ACUITY OF CONDITION CLARIFICATION      CDS: Shelia MARTINEZ RN  Contact information: [email or web address]    This form is a permanent document in the medical record.     Query Date: October 30, 2020    By submitting this query, we are merely seeking further clarification of documentation to reflect the severity of illness of your patient. Please utilize your independent clinical judgment when addressing the question(s) below.    The Medical record reflects the following:     Indicators   Supporting Clinical Findings Location in Medical Record   X Documentation of condition HPI: Kylah Ortiz is a 64 y.o. female patient with a PMHx of alcoholis cirrhosis, alcoholic dementia, ascites, CKD, esophageal varices, hepatic encephalopathy, and hiatal hernia who presents to the Emergency Department for evaluation of umbilical hernia which onset gradually.     Patient is a 63 y/o WF with PMH of alcoholic liver cirrhosis followed in hepatology clinic who was sent to ED for a non-reducible umbilical hernia. Patient has history of decompensated cirrhosis with ascites, HE and varices. She has had a recent paracentesis with removal of 3.5 L ascitic fluid. Her encephalopathy is controlled on home medications. She is still actively drinking alcohol and thus is not a liver transplant candidate.    * Alcoholic cirrhosis of liver with ascites  - MELD-Na is 10 on labs today  - Recommend to continue with home dose lactulose for history of HE  - Can hold diuretics for today  - Monitor daily labs  - Patient is not a transplant candidate due to active ETOH use. Recommend ETOH cessation [facility] 10/27//2020          Consults Gastro 10/27/2020              Consults Gastro 10/27/2020   X Lab Value(s) Ammonia= 48    INR= 1.4 -- > 1.8 -- > 1.6      -pt continues to drink daily with ETOH level of 96 noted  - ALT/AST 38/71 Labs 10/27/2020    Labs 10/27/2020- 10/30/2020    [facility]  10/29/2020    Radiology Findings     X Treatment/Medication Restart lactulose when tolerating PO     lorazepam (ATIVAN) injection 1 mg    [facility] 10/29/2020    MAR 10/28/2020 2238   X Other The patient will need to be monitored for several days to ensure that she does not go into hepatic failure due to her underlying liver disease    ETOH abuse  -pt drinks daily- [employer] whiskey   -ETOH 96 upon admit   -pt counseled on cessation with understanding verbalized    POC reviewed, needs reinforcement. Pt disoriented to place and situation.  Pt remained free from falls, fall precautions in place. Pt is SR on monitor. VSS. Given PRN ativan for agitation/anxiety due to probable alcohol withdrawal. PN Gen Surg 10/28/2020      [facility] 10/29/2020        Nursing Tyra Sanchez 10/29/2020 0207      Provider, please specify the acuity/chronicity of __hepatic encephalopathy______:    [   ] Acute   [   ] Chronic   [   ] Subacute   [   ] Acute on chronic   [x   ]  Clinically Undetermined - By my assessment two days ago, she did not have hepatic encephalopathy. I haven't not seen her again since.          Present on admission (POA) status:   [   ] Yes (Y)                           [   ] Clinically Undetermined (W)  [ x ] No (N)                            [   ] Documentation insufficient to determine if condition is POA (U)         Please send this to the GI service  Please document in your progress notes daily for the duration of treatment until resolved, and include in your discharge summary.

## 2020-10-30 NOTE — PROGRESS NOTES
[facility] -   General Surgery  Progress Note    Subjective:     History of Present Illness:  64-year-old female with cirrhosis.  She underwent a paracentesis last week.  Her caregiver thought her umbilical hernia which is somewhat protuberant would go down.  The umbilical hernia was still protuberant, so her care giver brought her to the GI clinic for evaluation.  The patient was sent to the emergency room for evaluation due to a protuberant umbilical hernia.    The patient denies any abdominal pain.  She states the hernia does not gets hurt her.  She does not report any nausea and vomiting    Post-Op Info:  Procedure(s) (LRB):  REPAIR, HERNIA, UMBILICAL, INCARCERATED, AGE 5 YEARS OR OLDER (N/A)   3 Days Post-Op     Interval History:  Has tolerated a diet.  Good urine output.  No leakage from the incision.  Will advance to regular diet.  If a diet is tolerated she be discharged if there is no contraindications per Gastroenterology    Medications:  Continuous Infusions:  Scheduled Meds:   chlorhexidine  10 mL Mouth/Throat BID    FLUoxetine  20 mg Oral Daily    magnesium oxide  400 mg Oral Daily    multivitamin  1 tablet Oral Daily    nozaseptin   Each Nostril BID    pantoprazole  40 mg Oral BID AC    spironolactone  25 mg Oral Daily     PRN Meds:hydrOXYzine HCL, influenza, lorazepam, ondansetron, pneumoc 13-pao conj-dip cr(PF), traMADoL     Review of patient's allergies indicates:   Allergen Reactions    Hydrocodone-acetaminophen Hives and Itching     Objective:     Vital Signs (Most Recent):  Temp: 98.6 °F (37 °C) (10/30/20 0809)  Pulse: 82 (10/30/20 0809)  Resp: 18 (10/30/20 0809)  BP: (!) 167/95 (10/30/20 0809)  SpO2: (!) 94 % (10/30/20 0809) Vital Signs (24h Range):  Temp:  [97.3 °F (36.3 °C)-98.6 °F (37 °C)] 98.6 °F (37 °C)  Pulse:  [] 82  Resp:  [18-19] 18  SpO2:  [94 %-98 %] 94 %  BP: (129-174)/(77-95) 167/95     Weight: 65.9 kg (145 lb 4.5 oz)  Body mass index is 23.45  kg/m².    Intake/Output - Last 3 Shifts       10/28 0700 - 10/29 0659 10/29 0700 - 10/30 0659 10/30 0700 - 10/31 0659    P.O. 118 1180     I.V. (mL/kg) 2400 (36.9) 950 (14.4)     Total Intake(mL/kg) 2518 (38.7) 2130 (32.3)     Urine (mL/kg/hr) 2000 (1.3) 1600 (1)     Blood       Total Output 2000 1600     Net +518 +530                  Physical Exam  Vitals signs and nursing note reviewed.   Constitutional:       Appearance: Normal appearance.   HENT:      Mouth/Throat:      Mouth: Mucous membranes are moist.   Neck:      Musculoskeletal: Normal range of motion and neck supple.   Cardiovascular:      Rate and Rhythm: Normal rate and regular rhythm.      Pulses: Normal pulses.      Heart sounds: Normal heart sounds.   Pulmonary:      Effort: Pulmonary effort is normal.      Breath sounds: Normal breath sounds.   Abdominal:      General: Abdomen is flat. There is distension (Mildnimal incisional).      Palpations: Abdomen is soft.      Tenderness: There is abdominal tenderness.      Comments: Incision is clean no leakage   Musculoskeletal:      Right lower leg: No edema.      Left lower leg: No edema.   Skin:     General: Skin is warm and dry.   Neurological:      General: No focal deficit present.      Mental Status: She is alert. Mental status is at baseline.   Psychiatric:         Mood and Affect: Mood normal.         Behavior: Behavior normal.         Thought Content: Thought content normal.         Significant Labs:  CBC:   Recent Labs   Lab 10/29/20  0627   WBC 6.35   RBC 2.80*   HGB 8.5*   HCT 25.9*      MCV 93   MCH 30.4   MCHC 32.8     CMP:   Recent Labs   Lab 10/30/20  0639      CALCIUM 8.3*   ALBUMIN 3.1*   PROT 5.8*   *   K 4.2   CO2 27   CL 97   BUN 7*   CREATININE 1.0   ALKPHOS 95   ALT 17   AST 32   BILITOT 1.5*     LFTs:   Recent Labs   Lab 10/30/20  0639   ALT 17   AST 32   ALKPHOS 95   BILITOT 1.5*   PROT 5.8*   ALBUMIN 3.1*     Coagulation:   Recent Labs   Lab 10/30/20  0639    LABPROT 17.0*   INR 1.6*       Significant Diagnostics:  No new     MELD-Na score: 18 at 10/30/2020  6:39 AM  MELD score: 13 at 10/30/2020  6:39 AM  Calculated from:  Serum Creatinine: 1.0 mg/dL at 10/30/2020  6:39 AM  Serum Sodium: 131 mmol/L at 10/30/2020  6:39 AM  Total Bilirubin: 1.5 mg/dL at 10/30/2020  6:39 AM  INR(ratio): 1.6 at 10/30/2020  6:39 AM  Age: 64 years 11 months    Assessment/Plan:     * Umbilical hernia without obstruction and without gangrene  Postop day 3 from rom a open umbilical hernia repair with removal of the umbilical skin in layers closure.  Patient is doing well.  Regular diet        If the patient tolerates a regular diet she can be discharged home if there is no contraindications from the GI service regarding her alcoholic cirrhosis    Hyponatremia  Monitor per Medicine and GI    Dementia associated with alcoholism without behavioral disturbance  Monitor    Alcoholic cirrhosis of liver with ascites  Further management per GI.    If GI feels the patient is stable from her alcoholic cirrhosis point of view NG tolerates a regular diet she can be discharged from the surgery point of view    Esophageal reflux disease  Home PPI    Anemia  Monitor    Acute renal failure superimposed on stage 4 chronic kidney disease  Likely secondary to cirrhosis        Shahbaz Oliva MD  General Surgery  [facility] -

## 2020-10-30 NOTE — ASSESSMENT & PLAN NOTE
-GI consulted- last seen by GWEN Álvarez NP today at the [facility]   -Lasix and Spironolactone to be resumed post procedure when appropriate   -pt continues to drink daily with ETOH level of 96 noted  - ALT/AST 38/71  -Ammonia and Magnesium pending   -MELD-Na score: 18 at 10/30/2020  6:39 AM  MELD score: 13 at 10/30/2020  6:39 AM  Calculated from:  Serum Creatinine: 1.0 mg/dL at 10/30/2020  6:39 AM  Serum Sodium: 131 mmol/L at 10/30/2020  6:39 AM  Total Bilirubin: 1.5 mg/dL at 10/30/2020  6:39 AM  INR(ratio): 1.6 at 10/30/2020  6:39 AM  Age: 64 years 11 months      10/30  GI following   MELD-Na score: 18 at 10/30/2020  6:39 AM  MELD score: 13 at 10/30/2020  6:39 AM  Calculated from:  Serum Creatinine: 1.0 mg/dL at 10/30/2020  6:39 AM  Serum Sodium: 131 mmol/L at 10/30/2020  6:39 AM  Total Bilirubin: 1.5 mg/dL at 10/30/2020  6:39 AM  INR(ratio): 1.6 at 10/30/2020  6:39 AM  Age: 64 years 11 months

## 2020-10-30 NOTE — ASSESSMENT & PLAN NOTE
- MELD up from admission but down a little compared to yesterday. This is being driven by INR. Check INR and CMP in the morning. We want to give one more day to ensure the trend is down.  - When able to take po meds, continue home dose lactulose for history of HE  - Patient is not a transplant candidate due to active ETOH use. Recommend ETOH cessation    MELD-Na score: 18 at 10/30/2020  6:39 AM  MELD score: 13 at 10/30/2020  6:39 AM  Calculated from:  Serum Creatinine: 1.0 mg/dL at 10/30/2020  6:39 AM  Serum Sodium: 131 mmol/L at 10/30/2020  6:39 AM  Total Bilirubin: 1.5 mg/dL at 10/30/2020  6:39 AM  INR(ratio): 1.6 at 10/30/2020  6:39 AM  Age: 64 years 11 months

## 2020-10-30 NOTE — ASSESSMENT & PLAN NOTE
-pt drinks daily- [place] whiskey   -ETOH 96 upon admit   -pt counseled on cessation with understanding verbalized   -Ativan prn   -Seizure precautions

## 2020-10-30 NOTE — ASSESSMENT & PLAN NOTE
Recommend alcohol cessation.   Watch for symptoms of withdrawal. Management per Hospital Medicine service.

## 2020-10-31 VITALS
WEIGHT: 142.88 LBS | RESPIRATION RATE: 16 BRPM | SYSTOLIC BLOOD PRESSURE: 165 MMHG | DIASTOLIC BLOOD PRESSURE: 92 MMHG | OXYGEN SATURATION: 99 % | TEMPERATURE: 97 F | HEART RATE: 82 BPM | HEIGHT: 66 IN | BODY MASS INDEX: 22.96 KG/M2

## 2020-10-31 LAB
INR PPP: 1.5 (ref 0.8–1.2)
PROTHROMBIN TIME: 16.1 SEC (ref 9–12.5)

## 2020-10-31 PROCEDURE — 36415 COLL VENOUS BLD VENIPUNCTURE: CPT

## 2020-10-31 PROCEDURE — 25000003 PHARM REV CODE 250: Performed by: PHYSICIAN ASSISTANT

## 2020-10-31 PROCEDURE — 99232 SBSQ HOSP IP/OBS MODERATE 35: CPT | Mod: ,,, | Performed by: INTERNAL MEDICINE

## 2020-10-31 PROCEDURE — 25000003 PHARM REV CODE 250: Performed by: SURGERY

## 2020-10-31 PROCEDURE — 25000003 PHARM REV CODE 250: Performed by: INTERNAL MEDICINE

## 2020-10-31 PROCEDURE — 99232 PR SUBSEQUENT HOSPITAL CARE,LEVL II: ICD-10-PCS | Mod: ,,, | Performed by: INTERNAL MEDICINE

## 2020-10-31 PROCEDURE — 85610 PROTHROMBIN TIME: CPT

## 2020-10-31 RX ORDER — FOLIC ACID 1 MG/1
1 TABLET ORAL DAILY
Qty: 30 TABLET | Refills: 0 | Status: SHIPPED | OUTPATIENT
Start: 2020-10-31 | End: 2023-04-25

## 2020-10-31 RX ORDER — PANTOPRAZOLE SODIUM 40 MG/1
40 TABLET, DELAYED RELEASE ORAL DAILY
Status: DISCONTINUED | OUTPATIENT
Start: 2020-11-01 | End: 2020-10-31 | Stop reason: HOSPADM

## 2020-10-31 RX ORDER — THIAMINE HCL 50 MG
50 TABLET ORAL DAILY
Qty: 30 TABLET | Refills: 0 | Status: SHIPPED | OUTPATIENT
Start: 2020-10-31

## 2020-10-31 RX ADMIN — RIFAXIMIN 550 MG: 550 TABLET ORAL at 08:10

## 2020-10-31 RX ADMIN — LACTULOSE 20 G: 20 SOLUTION ORAL at 08:10

## 2020-10-31 RX ADMIN — CHLORHEXIDINE GLUCONATE 0.12% ORAL RINSE 10 ML: 1.2 LIQUID ORAL at 08:10

## 2020-10-31 RX ADMIN — FLUOXETINE 20 MG: 20 CAPSULE ORAL at 08:10

## 2020-10-31 RX ADMIN — THERA TABS 1 TABLET: TAB at 08:10

## 2020-10-31 RX ADMIN — SPIRONOLACTONE 25 MG: 25 TABLET ORAL at 08:10

## 2020-10-31 RX ADMIN — PANTOPRAZOLE SODIUM 40 MG: 40 TABLET, DELAYED RELEASE ORAL at 05:10

## 2020-10-31 RX ADMIN — Medication 400 MG: at 08:10

## 2020-10-31 NOTE — ASSESSMENT & PLAN NOTE
-GI consulted- last seen by GWEN Álvarez NP today at the [facility]   -Lasix and Spironolactone to be resumed post procedure when appropriate   -pt continues to drink daily with ETOH level of 96 noted  - ALT/AST 38/71  -Ammonia and Magnesium pending   -MELD-Na score: 18 at 10/31/2020  6:04 AM  MELD score: 13 at 10/31/2020  6:04 AM  Calculated from:  Serum Creatinine: 1.0 mg/dL at 10/30/2020  6:39 AM  Serum Sodium: 131 mmol/L at 10/30/2020  6:39 AM  Total Bilirubin: 1.5 mg/dL at 10/30/2020  6:39 AM  INR(ratio): 1.5 at 10/31/2020  6:04 AM  Age: 64 years 11 months      10/30  GI following   MELD-Na score: 18 at 10/31/2020  6:04 AM  MELD score: 13 at 10/31/2020  6:04 AM  Calculated from:  Serum Creatinine: 1.0 mg/dL at 10/30/2020  6:39 AM  Serum Sodium: 131 mmol/L at 10/30/2020  6:39 AM  Total Bilirubin: 1.5 mg/dL at 10/30/2020  6:39 AM  INR(ratio): 1.5 at 10/31/2020  6:04 AM  Age: 64 years 11 months     MELD-Na score: 18 at 10/31/2020  6:04 AM  MELD score: 13 at 10/31/2020  6:04 AM  Calculated from:  Serum Creatinine: 1.0 mg/dL at 10/30/2020  6:39 AM  Serum Sodium: 131 mmol/L at 10/30/2020  6:39 AM  Total Bilirubin: 1.5 mg/dL at 10/30/2020  6:39 AM  INR(ratio): 1.5 at 10/31/2020  6:04 AM  Age: 64 years 11 months

## 2020-10-31 NOTE — NURSING
"Called to pt's room for c/o pain. Pt verbalized pain, however, when medication was brought to pt's room, she stated "Am I supposed to be hurting?". Informed pt of her previous request. She then stated "I'm not a junkie. I don't want any drugs." Medication was then returned to [facility] by myself & ANGELIA Almanza-OC as witness.   "

## 2020-10-31 NOTE — PLAN OF CARE
AOx3. POC reviewed; interventions implemented as appropriate.   Able to verbalize needs. Calm & cooperative at this time.  VSS; NSR.  Denies pain.   Progressive mobility level - edge of bed. Ambulates w/ stand-by assist.  Up to BSC.  Able to reposition self in bed. Frequent position changes encouraged. No s/sx of  DTI noted.  NADN. Resting quietly in bed.   Hourly rounding complete.   All safety measures remain in place. Bed alarm on & audible. Avasys at bedside. Free of falls. SR up x2; bed low & locked. Call light w/in reach.   Contact, droplet & airborne precautions maintained.  Will continue to monitor throughout shift.

## 2020-10-31 NOTE — PROGRESS NOTES
[facility]  Gastroenterology  Progress Note    Patient Name: Kylah Ortiz  MRN: [id number]  Admission Date: 10/27/2020  Hospital Length of Stay: 4 days  Code Status: Prior   Attending Provider: Sumeet Stone, *  Consulting Provider: Porsche Kang MD  Primary Care Physician: Santos Beaver MD  Principal Problem: Umbilical hernia without obstruction and without gangrene    Subjective:     Interval History: patient seen this morning at bedside. She can recall I am a physician but believes she is at [facility]. Alert to time. Denies abdominal pain. Tolerating PO. Denies diarrhea.    Review of Systems  Objective:     Vital Signs (Most Recent):  Temp: 97.4 °F (36.3 °C) (10/31/20 0836)  Pulse: 82 (10/31/20 1100)  Resp: 16 (10/31/20 0836)  BP: (!) 165/92 (10/31/20 0836)  SpO2: 99 % (10/31/20 0836) Vital Signs (24h Range):  Temp:  [97.4 °F (36.3 °C)-98.8 °F (37.1 °C)] 97.4 °F (36.3 °C)  Pulse:  [] 82  Resp:  [16-20] 16  SpO2:  [95 %-99 %] 99 %  BP: (134-165)/(85-96) 165/92     Weight: 64.8 kg (142 lb 13.7 oz) (10/31/20 0400)  Body mass index is 23.06 kg/m².      Intake/Output Summary (Last 24 hours) at 10/31/2020 1145  Last data filed at 10/31/2020 0400  Gross per 24 hour   Intake 865 ml   Output 550 ml   Net 315 ml       Lines/Drains/Airways     Peripheral Intravenous Line                 Peripheral IV - Single Lumen 10/27/20 1220 20 G Left Antecubital 3 days         Peripheral IV - Single Lumen 10/29/20 1600 22 G Posterior;Right Hand 1 day                Physical Exam  Constitutional:       Appearance: Normal appearance.   Abdominal:      General: Abdomen is protuberant. Bowel sounds are normal.      Palpations: Abdomen is soft.      Tenderness: There is no abdominal tenderness.          Comments: Dressing c/d/i   Neurological:      Mental Status: She is alert.         Significant Labs:  CBC: No results for input(s): WBC, HGB, HCT, PLT in the last 48 hours.  CMP:    Recent Labs   Lab 10/30/20  0639      CALCIUM 8.3*   ALBUMIN 3.1*   PROT 5.8*   *   K 4.2   CO2 27   CL 97   BUN 7*   CREATININE 1.0   ALKPHOS 95   ALT 17   AST 32   BILITOT 1.5*     Coagulation:   Recent Labs   Lab 10/31/20  0604   INR 1.5*         Significant Imaging:  Imaging results within the past 24 hours have been reviewed.    Assessment/Plan:     Active Diagnoses:    Diagnosis Date Noted POA    PRINCIPAL PROBLEM:  Umbilical hernia without obstruction and without gangrene [K42.9] 10/27/2020 Yes    ETOH abuse [F10.10] 10/27/2020 Yes    Hyponatremia [E87.1] 10/27/2020 Unknown    Dementia associated with alcoholism without behavioral disturbance [F10.27] 12/19/2019 Yes    Alcoholic cirrhosis of liver with ascites [K70.31] 09/23/2019 Yes    Anemia [D64.9] 09/20/2019 Yes    Esophageal reflux disease [K21.9] 09/20/2019 Yes      Problems Resolved During this Admission:       A: 1. Decompensated alcohol cirrhosis  - active etoh use prior to admission  - not a OLTx candidate     MELD-Na score: 18 at 10/31/2020  6:04 AM  MELD score: 13 at 10/31/2020  6:04 AM  Calculated from:  Serum Creatinine: 1.0 mg/dL at 10/30/2020  6:39 AM  Serum Sodium: 131 mmol/L at 10/30/2020  6:39 AM  Total Bilirubin: 1.5 mg/dL at 10/30/2020  6:39 AM  INR(ratio): 1.5 at 10/31/2020  6:04 AM  Age: 64 years 11 months      2. Coagulopathy  - worse with decompensation  - INR 1.5     3. Hepatic encephalopathy  - rifaximin  - lactulose     4. Hyponatremia  - 2/2 volume overload  - Na 131  - no hx seizures     5. Normocytic anemia  - no overt bleeding  - H&H stable  - anemia of chronic disease        Recommendations:  1. Thiamine and folate  2. Continue lactulose and rifaximin  3. Continue Spironolactone  4. Will arrange liver clinic follow up this upcoming week.  5. Avoid narcotics, sleep aids and anxiolytics  6. Continue low NaCl diet      Patient's recovery continues to remain stable. Her MELD remains at 18. Understands she can  worsen and would not be a OLTx candidate due to active etoh use. Expresses understanding of abstinence. Her support system is worrisome and she is at high risk for rehospitalization. Patient reportedly lives alone and have friend help her meds. Recommend contacting patient's friend Tatyana who can confirm close medication compliance and clinic follow up. I will sign off. Please contact us if you have any additional questions. Discussed with Becky Saleem, [facility] medicine.      Porsche Kang MD  Gastroenterology  [facility]

## 2020-10-31 NOTE — PROGRESS NOTES
[facility]  Gastroenterology  Progress Note    Patient Name: Kylah Ortiz  MRN: [id number]  Admission Date: 10/27/2020  Hospital Length of Stay: 3 days  Code Status: Prior   Attending Provider: Sumeet Stone, *  Consulting Provider: Porsche Kang MD  Primary Care Physician: Santos Beaver MD  Principal Problem: Umbilical hernia without obstruction and without gangrene    Subjective:     Interval History: seen this evening. She is awake but not alert and slow to answer. She believes it is 2019, believes she is hospitalized at the [facility] and believes [facility] is president. Review of meds show she had ativan ordered prn. She is able to tell me she had a formed brown stool. No blood. Tolerating PO.      Review of Systems  Objective:     Vital Signs (Most Recent):  Temp: 98.8 °F (37.1 °C) (10/30/20 1918)  Pulse: 100 (10/30/20 1918)  Resp: 20 (10/30/20 1918)  BP: 134/85 (10/30/20 1918)  SpO2: 97 % (10/30/20 1918) Vital Signs (24h Range):  Temp:  [98.4 °F (36.9 °C)-98.8 °F (37.1 °C)] 98.8 °F (37.1 °C)  Pulse:  [] 100  Resp:  [18-20] 20  SpO2:  [94 %-97 %] 97 %  BP: (129-167)/(77-95) 134/85     Weight: 65.9 kg (145 lb 4.5 oz) (10/29/20 2356)  Body mass index is 23.45 kg/m².      Intake/Output Summary (Last 24 hours) at 10/30/2020 2255  Last data filed at 10/30/2020 1700  Gross per 24 hour   Intake 1005 ml   Output 900 ml   Net 105 ml       Lines/Drains/Airways     Peripheral Intravenous Line                 Peripheral IV - Single Lumen 10/27/20 1220 20 G Left Antecubital 3 days         Peripheral IV - Single Lumen 10/29/20 1600 22 G Posterior;Right Hand 1 day                Physical Exam    Significant Labs:  CBC:   Recent Labs   Lab 10/29/20  0627   WBC 6.35   HGB 8.5*   HCT 25.9*        CMP:   Recent Labs   Lab 10/30/20  0639      CALCIUM 8.3*   ALBUMIN 3.1*   PROT 5.8*   *   K 4.2   CO2 27   CL 97   BUN 7*   CREATININE 1.0   ALKPHOS 95   ALT 17   AST 32   BILITOT  1.5*     Coagulation:   Recent Labs   Lab 10/30/20  0639   INR 1.6*         Significant Imaging:  Imaging results within the past 24 hours have been reviewed.    Assessment/Plan:     Active Diagnoses:    Diagnosis Date Noted POA    PRINCIPAL PROBLEM:  Umbilical hernia without obstruction and without gangrene [K42.9] 10/27/2020 Yes    ETOH abuse [F10.10] 10/27/2020 Yes    Hyponatremia [E87.1] 10/27/2020 Unknown    Dementia associated with alcoholism without behavioral disturbance [F10.27] 12/19/2019 Yes    Alcoholic cirrhosis of liver with ascites [K70.31] 09/23/2019 Yes    Anemia [D64.9] 09/20/2019 Yes    Esophageal reflux disease [K21.9] 09/20/2019 Yes      Problems Resolved During this Admission:     A. 64 y.o female with decompensated alcohol cirrhosis s/p umbilical hernia repair    1. Decompensated alcohol cirrhosis  - active etoh use prior to admission  - not a OLTx candidate    MELD-Na score: 18 at 10/30/2020  6:39 AM  MELD score: 13 at 10/30/2020  6:39 AM  Calculated from:  Serum Creatinine: 1.0 mg/dL at 10/30/2020  6:39 AM  Serum Sodium: 131 mmol/L at 10/30/2020  6:39 AM  Total Bilirubin: 1.5 mg/dL at 10/30/2020  6:39 AM  INR(ratio): 1.6 at 10/30/2020  6:39 AM  Age: 64 years 11 months    2. Coagulopathy  - worse with decompensation  - INR 1.6    3. Hepatic encephalopathy  - rifaximin    4. Hyponatremia  - 2/2 volume overload  - Na 128  - no hx seizures    5. Normocytic anemia  - no overt bleeding  - H&H stable  - anemia of chronic disease      Recommendations:  1. Switch to low NaCl diet  2. Stop all anxiolytics, narcotics and sleep aids - worsen hepatic encephalopathy  3. Add lactulose daily  4. Vitamin K 10mg IV daily x 3 days  5. Fluid restrict 1liter/day     I will follow-up with patient. Please contact us if you have any additional questions. Patient is slowly improving. Anticipate discharge in the morning or next day.       Porsche Kang MD  Gastroenterology  [facility] -

## 2020-10-31 NOTE — NURSING
"Monitor tech reported 2 episodes of tachycardia (140's)  sustained for about 5-10 seconds then would return to normal limits.  Secure chat sent to On-call provider. CISCO Clancy responded "ok". No new orders given.   "

## 2020-10-31 NOTE — PLAN OF CARE
10/31/20 1031   Final Note   Assessment Type Final Discharge Note   Anticipated Discharge Disposition Home

## 2020-10-31 NOTE — DISCHARGE SUMMARY
[facility] Hospital Medicine  Discharge Summary      Patient Name: Kylah Ortiz  MRN: [id number]  Admission Date: 10/27/2020  Hospital Length of Stay: 4 days  Discharge Date and Time:  10/31/2020 1:38 PM  Attending Physician: No att. providers found   Discharging Provider: Becky Saleem NP  Primary Care Provider: Santos Beaver MD      HPI:   Kylah Ortiz is a 64 y.o. female patient with a PMHx of alcoholis cirrhosis, alcoholic dementia, ascites, CKD, esophageal varices, hepatic encephalopathy, and hiatal hernia who presents to the Emergency Department for evaluation of umbilical hernia which onset gradually. Of note, caretaker noticed umbilical hernia post paracentesis last week after 4 L removed. Patient was referred to ED today after she was seen in clinic today by GIA Harper (GI) at the [facility].  No associated symptom reported.  Patient denies any fever, chills, diaphoresis, abdominal pain, abdominal distention, nausea, vomiting, diarrhea, constipation, blood in stool, CP, SOB, cough, leg swelling, and all other sxs at this time. Last BM was yesterday. No prior Tx. No further complaints or concerns at this time.  Pt is a full code and Tatyana Chavarria (caregiver) at [phone] is the surrogate decision maker.  ER work up showed: H/H 9.1/26.3, PT 15.1, INR 1.4, Na 128, Cl 94, Ca 8.4, AST 71, and Lipase 81.  COVID 19 negative.  CT abdomen/pelvis with contrast showed cirrhosis and ascites with portal hypertension and varicosities. Umbilical/ventral hernia containing a portion of small bowel without signs of high-grade obstruction.  There is small ascitic fluid within the hernia sac. Moderate to large hiatal hernia with multiple adjacent paraseptal varices.   Right inguinal hernia containing a small amount of ascitic fluid. ER discussed care with General Surgery and patient transported to OR from ED. Hospital Medicine contacted for admission with patient to be placed in Medical  Surgical Unit post procedure.         Procedure(s) (LRB):  REPAIR, HERNIA, UMBILICAL, INCARCERATED, AGE 5 YEARS OR OLDER (N/A)      Hospital Course:   Ms Ortiz is a 64 year old who presented to [facility] Emergency Room for evaluation of umbilical hernia. CT of abdomin/pelvis with contrast showed umbilical/ventral hernia containing a portion of small bowel without signs of high-grade obstruction. She was taken surgery on yesterday Dr Oliva preformed an open umbilical hernia repair with removal of the umbilical skin in layers closure. She is currently resting in bed, General Surgery and GI following. As of 10/29, patient feeling well, ambulated in baig, now sitting up in chair. Vital signs and labs stable. S/P open incarcerated umbilical hernia repair POD # 2. Will follow. As of 10/30 POD #3 pt doing well in good spirits. Pain controlled. Tolerating diet. VSS. As of 10/31 pt awake and alert. Oriented to place and situation. INR 1.5. Pt ready for discharge from general surgery standpoint.  Case also discussed with Dr. Kang, recommendations made and she will arrange appointment with the liver clinic this upcoming week. Spoke with patient and caretaker Annete at bedside, discussed the importance of medication compliance and the importance of abstinence from alcohol. Both verbalized understanding. Patient to follow-up with PCP in 3 days for hospital follow-up. Patient to also follow-up with Dr. Oliva in 3 weeks for post-op follow-up and suture removal. Patient seen and examined on the date of discharge and found suitable for discharge.        Consults:   Consults (From admission, onward)        Status Ordering Provider     Inpatient consult to Gastroenterology  Once     Provider:  GIA Garcia    Completed JENIFER OLIVA     Inpatient consult to Gastroenterology  Once     Provider:  Monique Wood MD    Completed JENIFER OLIVA     Inpatient consult to Gastroenterology  Once     Provider:  Monique  NGOC Atwood MD    Completed LIZZY ATWOOD     Inpatient consult to General surgery  Once     Provider:  Shahbaz Oliva MD    Completed HERNANDEZ VASQUEZ JR          Alcoholic cirrhosis of liver with ascites  -GI consulted- last seen by GWEN Álvarez NP today at the [facility]   -Lasix and Spironolactone to be resumed post procedure when appropriate   -pt continues to drink daily with ETOH level of 96 noted  - ALT/AST 38/71  -Ammonia and Magnesium pending   -MELD-Na score: 18 at 10/31/2020  6:04 AM  MELD score: 13 at 10/31/2020  6:04 AM  Calculated from:  Serum Creatinine: 1.0 mg/dL at 10/30/2020  6:39 AM  Serum Sodium: 131 mmol/L at 10/30/2020  6:39 AM  Total Bilirubin: 1.5 mg/dL at 10/30/2020  6:39 AM  INR(ratio): 1.5 at 10/31/2020  6:04 AM  Age: 64 years 11 months      10/30  GI following   MELD-Na score: 18 at 10/31/2020  6:04 AM  MELD score: 13 at 10/31/2020  6:04 AM  Calculated from:  Serum Creatinine: 1.0 mg/dL at 10/30/2020  6:39 AM  Serum Sodium: 131 mmol/L at 10/30/2020  6:39 AM  Total Bilirubin: 1.5 mg/dL at 10/30/2020  6:39 AM  INR(ratio): 1.5 at 10/31/2020  6:04 AM  Age: 64 years 11 months     MELD-Na score: 18 at 10/31/2020  6:04 AM  MELD score: 13 at 10/31/2020  6:04 AM  Calculated from:  Serum Creatinine: 1.0 mg/dL at 10/30/2020  6:39 AM  Serum Sodium: 131 mmol/L at 10/30/2020  6:39 AM  Total Bilirubin: 1.5 mg/dL at 10/30/2020  6:39 AM  INR(ratio): 1.5 at 10/31/2020  6:04 AM  Age: 64 years 11 months      Final Active Diagnoses:    Diagnosis Date Noted POA    PRINCIPAL PROBLEM:  Umbilical hernia without obstruction and without gangrene [K42.9] 10/27/2020 Yes    ETOH abuse [F10.10] 10/27/2020 Yes    Hyponatremia [E87.1] 10/27/2020 Yes    Dementia associated with alcoholism without behavioral disturbance [F10.27] 12/19/2019 Yes    Alcoholic cirrhosis of liver with ascites [K70.31] 09/23/2019 Yes    Anemia [D64.9] 09/20/2019 Yes    Esophageal reflux disease [K21.9] 09/20/2019 Yes      Problems  Resolved During this Admission:       Discharged Condition: stable    Disposition: Home or Self Care    Follow Up:  Follow-up Information     Shahbaz Oliva MD In 3 weeks.    Specialty: General Surgery  Why: post op appt, suture removal  Contact information:  [address] [place] [address]  [phone]             Santos Beaver MD In 3 days.    Specialty: Nuclear Medicine  Why: for hospital follow-up   Contact information:  [address] [place] [address]  [phone]             GIA Martell In 1 week.    Specialty: Gastroenterology  Why: for close hospital follow-up   Contact information:  [address] [place] [address]  [phone]                 Patient Instructions:      Notify your health care provider if you experience any of the following:  difficulty breathing or increased cough     Notify your health care provider if you experience any of the following:  severe persistent headache     Notify your health care provider if you experience any of the following:  persistent dizziness, light-headedness, or visual disturbances     Notify your health care provider if you experience any of the following:  increased confusion or weakness     Notify your health care provider if you experience any of the following:  temperature >100.4     Notify your health care provider if you experience any of the following:  persistent nausea and vomiting or diarrhea     Notify your health care provider if you experience any of the following:  severe uncontrolled pain     Notify your health care provider if you experience any of the following:  redness, tenderness, or signs of infection (pain, swelling, redness, odor or green/yellow discharge around incision site)       Significant Diagnostic Studies: Labs:   BMP:   Recent Labs   Lab 10/30/20  0639      *   K 4.2   CL 97   CO2 27   BUN 7*   CREATININE 1.0   CALCIUM 8.3*   , CMP   Recent Labs   Lab 10/30/20  0639   *   K  4.2   CL 97   CO2 27      BUN 7*   CREATININE 1.0   CALCIUM 8.3*   PROT 5.8*   ALBUMIN 3.1*   BILITOT 1.5*   ALKPHOS 95   AST 32   ALT 17   ANIONGAP 7*   ESTGFRAFRICA >60   EGFRNONAA 59*   , CBC No results for input(s): WBC, HGB, HCT, PLT in the last 48 hours. and All labs within the past 24 hours have been reviewed  Microbiology: Blood Culture No results found for: LABBLOO    Pending Diagnostic Studies:     Procedure Component Value Units Date/Time    Specimen to Pathology, Surgery General Surgery [[id number]] Collected: 10/27/20 9253    Order Status: Sent Lab Status: In process Updated: 10/28/20 124         Medications:  Reconciled Home Medications:      Medication List      START taking these medications    folic acid 1 MG tablet  Commonly known as: FOLVITE  Take 1 tablet (1 mg total) by mouth once daily.     nozaseptin  nasal   Commonly known as: NOZIN  1 each by Each Nostril route 2 (two) times daily.     rifAXIMin 550 mg Tab  Commonly known as: XIFAXAN  Take 1 tablet (550 mg total) by mouth 2 (two) times daily.     thiamine 50 MG tablet  Commonly known as: VITAMIN B-1  Take 1 tablet (50 mg total) by mouth once daily.        CHANGE how you take these medications    lactulose 10 gram/15 mL solution  Commonly known as: CHRONULAC  TAKE 30 ML(20 GRAMS TOTAL) BY MOUTH TWICE DAILY  What changed: Another medication with the same name was removed. Continue taking this medication, and follow the directions you see here.        CONTINUE taking these medications    FLUoxetine 20 MG capsule  Take 20 mg by mouth once daily.     furosemide 20 MG tablet  Commonly known as: LASIX  Take 1 tablet (20 mg total) by mouth once daily.     magnesium oxide 400 mg (241.3 mg magnesium) tablet  Commonly known as: MAG-OX  Take 400 mg by mouth once daily.     multivitamin per tablet  Commonly known as: THERAGRAN  Take 1 tablet by mouth once daily.     pantoprazole 20 MG tablet  Commonly known as: PROTONIX  Take 20 mg by  mouth 2 (two) times daily before meals.     spironolactone 25 MG tablet  Commonly known as: ALDACTONE  Take 1 tablet (25 mg total) by mouth once daily.        STOP taking these medications    hydrOXYzine HCL 25 MG tablet  Commonly known as: ATARAX            Indwelling Lines/Drains at time of discharge:   Lines/Drains/Airways     None                 Time spent on the discharge of patient: 69 minutes  Patient was seen and examined on the date of discharge and determined to be suitable for discharge.         Becky Saleem NP  Department of Hospital Medicine  [facility]

## 2020-11-02 ENCOUNTER — PATIENT OUTREACH (OUTPATIENT)
Dept: ADMINISTRATIVE | Facility: CLINIC | Age: 65
End: 2020-11-02

## 2020-11-02 LAB
FINAL PATHOLOGIC DIAGNOSIS: NORMAL
GROSS: NORMAL
Lab: NORMAL

## 2020-11-02 NOTE — PROGRESS NOTES
C3 nurse attempted to contact patient. No answer. The following message was left for the patient to return the call:  Good afternoon, I am a nurse calling on behalf of [employer] from the [facility].  This is a Transitional Care Call for Kylah Ortiz. When you have a moment please contact us at [phone] or [phone] Monday through Friday, between the hours of 8 am to 4 pm. We look forward to speaking with you. On behalf of [facility] have a nice day.    The patient does not have a scheduled HOSFU appointment within 7-14 days post hospital discharge date 10/31.

## 2020-11-02 NOTE — PHYSICIAN QUERY
PT Name: Kylah Ortiz  MR #: [id number]     ACUITY OF CONDITION CLARIFICATION      CDS: Shelia MARTINEZ RN  Contact information: [email or web address]    This form is a permanent document in the medical record.     Query Date: November 2, 2020    By submitting this query, we are merely seeking further clarification of documentation to reflect the severity of illness of your patient. Please utilize your independent clinical judgment when addressing the question(s) below.    The Medical record reflects the following:     Indicators   Supporting Clinical Findings Location in Medical Record   X Documentation of condition HPI: Kylah Ortiz is a 64 y.o. female patient with a PMHx of alcoholis cirrhosis, alcoholic dementia, ascites, CKD, esophageal varices, hepatic encephalopathy, and hiatal hernia who presents to the Emergency Department for evaluation of umbilical hernia which onset gradually.     The patient will need to be monitored for several days to ensure that she does not go into hepatic failure due to her underlying liver disease    3. Hepatic encephalopathy  - rifaximin  - lactulose H&P Hosp Med 10/27//2020          PN Gen Surg 10/28/2020        PN Gastro 10/31/2020     X Lab Value(s) Ammonia= 48    INR= 1.4 -- > 1.8 -- > 1.6 -- > 1.5      -pt continues to drink daily with ETOH level of 96 noted  - ALT/AST 38/71 Labs 10/27/2020    Labs 10/27/2020- 10/31/2020    PN Hosp Med 10/29/2020      Radiology Findings     X Treatment/Medication POC reviewed, needs reinforcement. Pt disoriented to place and situation. Pt remained free from falls, fall precautions in place. Pt is SR on monitor. VSS. Given PRN ativan for agitation/anxiety due to probable alcohol withdrawal.    Recommendations:  1. Switch to low NaCl diet  2. Stop all anxiolytics, narcotics and sleep aids - worsen hepatic encephalopathy  3. Add lactulose daily  4. Vitamin K 10mg IV daily x 3 days  5. Fluid restrict 1liter/day Nursing Tyra Sanchez  10/29/2020 0207        PN Gastro 10/30/2020             X Other Interval History: seen this evening. She is awake but not alert and slow to answer. She believes it is 2019, believes she is hospitalized at the [facility] and believes [facility] is president. Review of meds show she had ativan ordered prn. She is able to tell me she had a formed brown stool. No blood. Tolerating PO.    A: 1. Decompensated alcohol cirrhosis  - active etoh use prior to admission  - not a OLTx candidate  MELD-Na score: 18 at 10/31/2020  6:04 AM  MELD score: 13 at 10/31/2020  6:04 AM PN Gastro 10/30/2020            PN Gastro 10/31/2020              Provider, please specify the acuity/chronicity of ___Hepatic encephalopathy____:    [ X ] Acute   [   ] Chronic   [   ] Subacute   [   ] Acute on chronic   [   ] Past history only, not a current diagnosis   [   ]  Clinically Undetermined         Present on admission (POA) status:   [  ] Yes (Y)                           [   ] Clinically Undetermined (W)  [   ] No (N)                            [ X ] Documentation insufficient to determine if condition is POA (U)       Please document in your progress notes daily for the duration of treatment until resolved, and include in your discharge summary.

## 2020-11-04 NOTE — TELEPHONE ENCOUNTER
Called [facility] and spoke with Ag to ask about  and Rifaximin. She reports that Rifaximin needs PA and didn't receive Rx for . Called Tatyana back. No answer. Left message for her to call back.

## 2020-11-04 NOTE — PATIENT INSTRUCTIONS
Wound Check After Surgery, No Complication  Surgery involves cutting through layers of skin, fatty tissue, muscle, and sometimes bone and cartilage. Sutures (stitches) or staples are used to close all layers of the wound. The sutures on the inside will dissolve in about 2 to 3 weeks. Any sutures or staples used on the outside need to be removed in about 7 to 14 days, depending on the location.  It is normal to have some clear or bloody discharge on the wound covering or bandage (dressing) for the first few days after surgery. If your wound was sutured (sewn) closed, you should not have to change the dressing more than twice a day in the first few days. Bleeding or discharge requiring more frequent dressing changes can be a sign of a problem.  It is normal to feel pain at the incision site. The pain decreases as the wound heals. Most of the pain and soreness from the skin incision should go away by the time the sutures or staples are removed. Soreness and pain from deeper tissues may last another week or two.  Pain that continues more than a few weeks after surgery or pain that worsens anytime after surgery can be a sign of a problem, such as:  · Infection  · Separation of wound edges  · Collection of blood or other below the skin  Home care  Different types of surgery require different types of care and dressing changes. It is important to follow all instructions and advice from your surgeon, as well as other members of your healthcare team.  Wound care  · Keep the wound clean, as directed by your healthcare provider.  · Change the dressing as directed. Change the dressing sooner if it becomes wet or stained with blood or fluid from the wound.  · Bathe with a sponge (no shower or tub baths) for the first few days after surgery, or until there is no more drainage from the wound. Unless you received different instructions from your surgeon, you can then shower. Do not soak the area in water (no baths or swimming)  until the tape, sutures, or staples are removed and any wound opening has dried out and healed.  Changing the dressing  · Wash your hands before changing the dressings.  · Carefully remove the dressing and tape; dont just yank it off. If it sticks to the wound, you may need to wet it a little to remove it, unless your healthcare provider told you not to wet it.  · Wash your hands again before putting on a new, clean dressing.  · Gently clean the wound with clean water (or saline) using gauze or a clean washcloth. Do not rub it or pick at it.  · Do not use soap, alcohol, hydrogen peroxide, or any other cleanser.  · If you were told to dry the wound before putting on a new dressing, gently pat it dry. Do not rub.  · Throw out the old dressing. Do not reuse it!  · Wash your hands again when you are done.  Types of dressings  Your healthcare team will tell you what type of dressing to put on your wound. Follow your healthcare teams instructions carefully, and contact them if you have any questions. Two common types of dressings are described below. You may have one of these or another type.  · Dry dressing. Use dry gauze. If the wound is still draining, use a nonadherent dressing, which shouldnt stick to the wound.  · Wet-to-dry dressing. Wet the gauze, and squeeze out the excess water (or saline), before putting it on. Then, cover this with a dry pad.  Medicines  · If you were given antibiotics, take them until they are used up or your healthcare provider tells you to stop. It is important to finish the antibiotics even though you feel better, to make sure the infection has cleared.  · You can take acetaminophen or ibuprofen for pain, unless you were given a different pain medicine to use. (Note: If you have chronic liver or kidney disease, or have ever had a stomach ulcer or gastrointestinal bleeding, or are taking blood thinner medicines, talk with your healthcare provider before using these  medicines.)  · Aspirin should never be used in anyone under 18 years of age who is ill with a fever. It may cause severe liver damage.  Follow-up care  Follow up with your healthcare provider, or as advised, for your next wound check or removal of your sutures, staples, or tape.  · If a culture was done, you will be notified if the results will affect your treatment. You can call as directed for the results.  · If imaging tests, such as X-rays, an ultrasound, or CT scan were done, they will be reviewed by a specialist. You will be notified of the results, especially if they affect treatment.  Call 911  Call emergency services right away if any of these occur:  · Trouble breathing or swallowing, wheezing  · Hoarse voice or trouble speaking  · Extreme confusion  · Extreme drowsiness or trouble awakening  · Fainting or loss of consciousness  · Rapid heart rate or very slow heart rate  · Vomiting blood, or large amounts of blood in stool  · Discomfort in the center of the chest that feels like pressure, squeezing, a sense of fullness, or pain.  · Discomfort or pain in other upper body areas, such as the back, one or both arms, neck, jaw, or stomach  · Stroke symptoms (spot a stroke FAST)  ¨ F: Face drooping. One side of the face is numb or droops.  ¨ A: Arm weakness. One arm feels weak or numb.  ¨ S: Speech difficulty: Speech is slurred, or the person is unable to speak.  ¨ T: Time to call 911. Even if symptoms go away, call 911.  When to seek medical advice  Call your healthcare provider right away if any of the following occur:  · Increasing pain at the site of surgery  · Fever over 100.4º F (38º C)  · Redness around the wound  · Fluid, pus, or blood draining from the wound  · Vomiting, constipation, or diarrhea  Date Last Reviewed: 9/27/2015  © 8159-4140 [employer]. [address] [place] [address], [place] [address]. All rights reserved. This information is not intended as a substitute for professional  medical care. Always follow your healthcare professional's instructions.

## 2020-11-05 ENCOUNTER — PATIENT MESSAGE (OUTPATIENT)
Dept: GASTROENTEROLOGY | Facility: CLINIC | Age: 65
End: 2020-11-05

## 2020-11-07 PROBLEM — K42.0 UMBILICAL HERNIA, INCARCERATED: Status: ACTIVE | Noted: 2020-10-27

## 2020-11-09 ENCOUNTER — OFFICE VISIT (OUTPATIENT)
Dept: GASTROENTEROLOGY | Facility: CLINIC | Age: 65
End: 2020-11-09
Payer: MEDICARE

## 2020-11-09 ENCOUNTER — LAB VISIT (OUTPATIENT)
Dept: LAB | Facility: HOSPITAL | Age: 65
End: 2020-11-09
Attending: NURSE PRACTITIONER
Payer: MEDICARE

## 2020-11-09 ENCOUNTER — PATIENT MESSAGE (OUTPATIENT)
Dept: GASTROENTEROLOGY | Facility: CLINIC | Age: 65
End: 2020-11-09

## 2020-11-09 VITALS
HEIGHT: 65 IN | SYSTOLIC BLOOD PRESSURE: 118 MMHG | HEART RATE: 82 BPM | DIASTOLIC BLOOD PRESSURE: 74 MMHG | BODY MASS INDEX: 22.99 KG/M2 | WEIGHT: 138 LBS

## 2020-11-09 DIAGNOSIS — F10.27 DEMENTIA ASSOCIATED WITH ALCOHOLISM WITHOUT BEHAVIORAL DISTURBANCE: ICD-10-CM

## 2020-11-09 DIAGNOSIS — K70.31 ALCOHOLIC CIRRHOSIS OF LIVER WITH ASCITES: ICD-10-CM

## 2020-11-09 DIAGNOSIS — K42.9 UMBILICAL HERNIA WITHOUT OBSTRUCTION AND WITHOUT GANGRENE: ICD-10-CM

## 2020-11-09 DIAGNOSIS — K70.31 ASCITES DUE TO ALCOHOLIC CIRRHOSIS: ICD-10-CM

## 2020-11-09 DIAGNOSIS — K70.31 ASCITES DUE TO ALCOHOLIC CIRRHOSIS: Primary | ICD-10-CM

## 2020-11-09 DIAGNOSIS — I85.11 SECONDARY ESOPHAGEAL VARICES WITH BLEEDING: ICD-10-CM

## 2020-11-09 LAB
INR PPP: 1.5 (ref 0.8–1.2)
PROTHROMBIN TIME: 16.1 SEC (ref 9–12.5)

## 2020-11-09 PROCEDURE — 3008F BODY MASS INDEX DOCD: CPT | Mod: CPTII,S$GLB,, | Performed by: NURSE PRACTITIONER

## 2020-11-09 PROCEDURE — 1101F PR PT FALLS ASSESS DOC 0-1 FALLS W/OUT INJ PAST YR: ICD-10-PCS | Mod: CPTII,S$GLB,, | Performed by: NURSE PRACTITIONER

## 2020-11-09 PROCEDURE — 85610 PROTHROMBIN TIME: CPT

## 2020-11-09 PROCEDURE — 99214 PR OFFICE/OUTPT VISIT, EST, LEVL IV, 30-39 MIN: ICD-10-PCS | Mod: S$GLB,,, | Performed by: NURSE PRACTITIONER

## 2020-11-09 PROCEDURE — 3008F PR BODY MASS INDEX (BMI) DOCUMENTED: ICD-10-PCS | Mod: CPTII,S$GLB,, | Performed by: NURSE PRACTITIONER

## 2020-11-09 PROCEDURE — 1126F AMNT PAIN NOTED NONE PRSNT: CPT | Mod: S$GLB,,, | Performed by: NURSE PRACTITIONER

## 2020-11-09 PROCEDURE — 85025 COMPLETE CBC W/AUTO DIFF WBC: CPT

## 2020-11-09 PROCEDURE — 36415 COLL VENOUS BLD VENIPUNCTURE: CPT

## 2020-11-09 PROCEDURE — 1159F MED LIST DOCD IN RCRD: CPT | Mod: S$GLB,,, | Performed by: NURSE PRACTITIONER

## 2020-11-09 PROCEDURE — 1159F PR MEDICATION LIST DOCUMENTED IN MEDICAL RECORD: ICD-10-PCS | Mod: S$GLB,,, | Performed by: NURSE PRACTITIONER

## 2020-11-09 PROCEDURE — 1101F PT FALLS ASSESS-DOCD LE1/YR: CPT | Mod: CPTII,S$GLB,, | Performed by: NURSE PRACTITIONER

## 2020-11-09 PROCEDURE — 80053 COMPREHEN METABOLIC PANEL: CPT

## 2020-11-09 PROCEDURE — 99214 OFFICE O/P EST MOD 30 MIN: CPT | Mod: S$GLB,,, | Performed by: NURSE PRACTITIONER

## 2020-11-09 PROCEDURE — 99999 PR PBB SHADOW E&M-EST. PATIENT-LVL III: ICD-10-PCS | Mod: PBBFAC,,, | Performed by: NURSE PRACTITIONER

## 2020-11-09 PROCEDURE — 99999 PR PBB SHADOW E&M-EST. PATIENT-LVL III: CPT | Mod: PBBFAC,,, | Performed by: NURSE PRACTITIONER

## 2020-11-09 PROCEDURE — 1126F PR PAIN SEVERITY QUANTIFIED, NO PAIN PRESENT: ICD-10-PCS | Mod: S$GLB,,, | Performed by: NURSE PRACTITIONER

## 2020-11-10 LAB
ALBUMIN SERPL BCP-MCNC: 3.9 G/DL (ref 3.5–5.2)
ALP SERPL-CCNC: 95 U/L (ref 55–135)
ALT SERPL W/O P-5'-P-CCNC: 25 U/L (ref 10–44)
ANION GAP SERPL CALC-SCNC: 10 MMOL/L (ref 8–16)
AST SERPL-CCNC: 34 U/L (ref 10–40)
BASOPHILS # BLD AUTO: 0.05 K/UL (ref 0–0.2)
BASOPHILS NFR BLD: 0.5 % (ref 0–1.9)
BILIRUB SERPL-MCNC: 0.9 MG/DL (ref 0.1–1)
BUN SERPL-MCNC: 12 MG/DL (ref 8–23)
CALCIUM SERPL-MCNC: 9.4 MG/DL (ref 8.7–10.5)
CHLORIDE SERPL-SCNC: 97 MMOL/L (ref 95–110)
CO2 SERPL-SCNC: 26 MMOL/L (ref 23–29)
CREAT SERPL-MCNC: 1.1 MG/DL (ref 0.5–1.4)
DIFFERENTIAL METHOD: ABNORMAL
EOSINOPHIL # BLD AUTO: 0.1 K/UL (ref 0–0.5)
EOSINOPHIL NFR BLD: 1.3 % (ref 0–8)
ERYTHROCYTE [DISTWIDTH] IN BLOOD BY AUTOMATED COUNT: 13.7 % (ref 11.5–14.5)
EST. GFR  (AFRICAN AMERICAN): >60 ML/MIN/1.73 M^2
EST. GFR  (NON AFRICAN AMERICAN): 52.4 ML/MIN/1.73 M^2
GLUCOSE SERPL-MCNC: 98 MG/DL (ref 70–110)
HCT VFR BLD AUTO: 32.5 % (ref 37–48.5)
HGB BLD-MCNC: 10.2 G/DL (ref 12–16)
IMM GRANULOCYTES # BLD AUTO: 0.05 K/UL (ref 0–0.04)
IMM GRANULOCYTES NFR BLD AUTO: 0.5 % (ref 0–0.5)
LYMPHOCYTES # BLD AUTO: 1.1 K/UL (ref 1–4.8)
LYMPHOCYTES NFR BLD: 11.3 % (ref 18–48)
MCH RBC QN AUTO: 30.7 PG (ref 27–31)
MCHC RBC AUTO-ENTMCNC: 31.4 G/DL (ref 32–36)
MCV RBC AUTO: 98 FL (ref 82–98)
MONOCYTES # BLD AUTO: 1 K/UL (ref 0.3–1)
MONOCYTES NFR BLD: 10.4 % (ref 4–15)
NEUTROPHILS # BLD AUTO: 7.2 K/UL (ref 1.8–7.7)
NEUTROPHILS NFR BLD: 76 % (ref 38–73)
NRBC BLD-RTO: 0 /100 WBC
PLATELET # BLD AUTO: 242 K/UL (ref 150–350)
PMV BLD AUTO: 9.7 FL (ref 9.2–12.9)
POTASSIUM SERPL-SCNC: 4.5 MMOL/L (ref 3.5–5.1)
PROT SERPL-MCNC: 7.3 G/DL (ref 6–8.4)
RBC # BLD AUTO: 3.32 M/UL (ref 4–5.4)
SODIUM SERPL-SCNC: 133 MMOL/L (ref 136–145)
WBC # BLD AUTO: 9.5 K/UL (ref 3.9–12.7)

## 2020-11-10 NOTE — PROGRESS NOTES
Clinic Follow Up:  [facility] Follow Up Note    Reason for Follow Up:  The primary encounter diagnosis was Ascites due to alcoholic cirrhosis. Diagnoses of Secondary esophageal varices with bleeding, Alcoholic cirrhosis of liver with ascites, Umbilical hernia without obstruction and without gangrene, and Dementia associated with alcoholism without behavioral disturbance were also pertinent to this visit.    PCP: Santos Beaver       HPI:  This is a 65 y.o. female here for follow up of the above  Pt is here with her caregiver who provide most HPI due to dementia.   After her last visit, pt was evaluated in the ER by General Surgery.  CT abdomen/pelvis with contrast showed cirrhosis and ascites with portal hypertension and varicosities. Umbilical/ventral hernia containing a portion of small bowel without signs of high-grade obstruction.  There is small ascitic fluid within the hernia sac. Moderate to large hiatal hernia with multiple adjacent paraseptal varices.   Right inguinal hernia containing a small amount of ascitic fluid. ER discussed care with General Surgery and patient transported to OR from ED.  Her hospitalization was without significant complication.  There was no evidence of significant decompensation.   MELD remained stable.   Today, pt states that she has been feeling overall well.  She has no complaints of abdominal pain.  Per caregiver, surgical site is healing without signs of infection.   Pt denies any ascites or BLE.  No Gi bleeding.  Confusion is stable.  She was started on Rifaximin while hospitalized, however, has not been able to obtain since discharge due to cost.  Working on PAP   Admits to continued ETOH intake most days of the week.      Review of Systems   Unable to perform ROS: Dementia       Medical History:  Past Medical History:   Diagnosis Date    Alcoholic cirrhosis     Alcoholic dementia     Anemia     Ascites     CKD (chronic kidney disease)      Esophageal reflux     Esophageal varices     Hepatic encephalopathy     Hiatal hernia        Surgical History:   Past Surgical History:   Procedure Laterality Date    COLONOSCOPY      ESOPHAGOGASTRODUODENOSCOPY      ESOPHAGOGASTRODUODENOSCOPY N/A 1/6/2020    Procedure: EGD (ESOPHAGOGASTRODUODENOSCOPY);  Surgeon: Monique Wood MD;  Location: [facility];  Service: Endoscopy;  Laterality: N/A;    HYSTERECTOMY      REPAIR OF INCARCERATED UMBILICAL HERNIA N/A 10/27/2020    Procedure: REPAIR, HERNIA, UMBILICAL, INCARCERATED, AGE 5 YEARS OR OLDER;  Surgeon: Shahbaz Oliva MD;  Location: [facility] OR;  Service: General;  Laterality: N/A;       Family History:   Family History   Problem Relation Age of Onset    Cancer Mother     Arthritis Mother        Social History:   Social History     Tobacco Use    Smoking status: Never Smoker    Smokeless tobacco: Never Used   Substance Use Topics    Alcohol use: Yes     Alcohol/week: 2.0 standard drinks     Types: 2 Shots of liquor per week     Comment: some days    Drug use: Not on file       Allergies: Reviewed    Home Medications:  Current Outpatient Medications on File Prior to Visit   Medication Sig Dispense Refill    FLUoxetine 20 MG capsule Take 20 mg by mouth once daily.      folic acid (FOLVITE) 1 MG tablet Take 1 tablet (1 mg total) by mouth once daily. 30 tablet 0    furosemide (LASIX) 20 MG tablet Take 1 tablet (20 mg total) by mouth once daily. 60 tablet 12    lactulose (CHRONULAC) 10 gram/15 mL solution TAKE 30 ML(20 GRAMS TOTAL) BY MOUTH TWICE DAILY 1800 mL 5    magnesium oxide (MAG-OX) 400 mg (241.3 mg magnesium) tablet Take 400 mg by mouth once daily.      multivitamin (THERAGRAN) per tablet Take 1 tablet by mouth once daily.      nozaseptin (NOZIN) nasal  1 each by Each Nostril route 2 (two) times daily. 1 applicator 0    pantoprazole (PROTONIX) 20 MG tablet Take 20 mg by mouth 2 (two) times daily before meals.      spironolactone  "(ALDACTONE) 25 MG tablet Take 1 tablet (25 mg total) by mouth once daily. 30 tablet 11    thiamine (VITAMIN B-1) 50 MG tablet Take 1 tablet (50 mg total) by mouth once daily. 30 tablet 0    rifAXIMin (XIFAXAN) 550 mg Tab Take 1 tablet (550 mg total) by mouth 2 (two) times daily. (Patient not taking: Reported on 11/9/2020) 60 tablet 0     No current facility-administered medications on file prior to visit.        Physical Exam:  Vital Signs:  /74   Pulse 82   Ht 5' 5" (1.651 m)   Wt 62.6 kg (138 lb 0.1 oz)   BMI 22.97 kg/m²   Body mass index is 22.97 kg/m².  Physical Exam   Constitutional: She is oriented to person, place, and time. She appears well-developed.   HENT:   Head: Normocephalic.   Eyes: No scleral icterus.   Neck: Normal range of motion.   Cardiovascular: Normal rate.   Pulmonary/Chest: Effort normal.   Abdominal: Soft. Bowel sounds are normal. She exhibits no distension. There is no abdominal tenderness.   Musculoskeletal: Normal range of motion.   Neurological: She is alert and oriented to person, place, and time.   Skin: Skin is dry.   Umbilical surgical site is covered with bandage.  Bandage is C/D/I   Psychiatric: She has a normal mood and affect.   Vitals reviewed.      Labs: Pertinent labs reviewed.  MELD-Na score: 16 at 11/9/2020  2:20 PM  MELD score: 12 at 11/9/2020  2:20 PM  Calculated from:  Serum Creatinine: 1.1 mg/dL at 11/9/2020  2:20 PM  Serum Sodium: 133 mmol/L at 11/9/2020  2:20 PM  Total Bilirubin: 0.9 mg/dL (Rounded to 1 mg/dL) at 11/9/2020  2:20 PM  INR(ratio): 1.5 at 11/9/2020  2:20 PM  Age: 65 years  EV: EGD 1/2020, repeat yearly  HCC: US 7/2020 with simple cysts, repeat 6 months.     Assessment:  1. Ascites due to alcoholic cirrhosis    2. Secondary esophageal varices with bleeding    3. Alcoholic cirrhosis of liver with ascites    4. Umbilical hernia without obstruction and without gangrene    5. Dementia associated with alcoholism without behavioral disturbance  "       Recommendations:  Stable without any signs of post-surgical decompensation  - signs of decompensation discussed with pt and care giver  - will get updated MELD labs today for monitoring.   - If ascites returns, would need aggressive management in order to prevent a return of the hernia.   - low salt diet discussed.   - Again, discussed the importance of ETOH abstinence.  Pt is not interested in stopping at this time.   - Follow up with surgery for suture removal   - will plan for a follow up in 8 weeks unless there are concerns for decompensation.       Return to Clinic:    8 weeks with pre-clinic labs.

## 2020-11-11 ENCOUNTER — PATIENT MESSAGE (OUTPATIENT)
Dept: GASTROENTEROLOGY | Facility: CLINIC | Age: 65
End: 2020-11-11

## 2020-11-16 ENCOUNTER — PATIENT MESSAGE (OUTPATIENT)
Dept: SURGERY | Facility: CLINIC | Age: 65
End: 2020-11-16

## 2020-11-30 ENCOUNTER — OFFICE VISIT (OUTPATIENT)
Dept: SURGERY | Facility: CLINIC | Age: 65
End: 2020-11-30
Payer: MEDICARE

## 2020-11-30 VITALS
WEIGHT: 138 LBS | SYSTOLIC BLOOD PRESSURE: 118 MMHG | HEIGHT: 65 IN | BODY MASS INDEX: 22.99 KG/M2 | HEART RATE: 99 BPM | TEMPERATURE: 98 F | DIASTOLIC BLOOD PRESSURE: 84 MMHG

## 2020-11-30 DIAGNOSIS — Z09 POSTOP CHECK: Primary | ICD-10-CM

## 2020-11-30 DIAGNOSIS — K70.31 ASCITES DUE TO ALCOHOLIC CIRRHOSIS: ICD-10-CM

## 2020-11-30 PROCEDURE — 99999 PR PBB SHADOW E&M-EST. PATIENT-LVL III: ICD-10-PCS | Mod: PBBFAC,,, | Performed by: SURGERY

## 2020-11-30 PROCEDURE — 99024 POSTOP FOLLOW-UP VISIT: CPT | Mod: S$GLB,,, | Performed by: SURGERY

## 2020-11-30 PROCEDURE — 1126F AMNT PAIN NOTED NONE PRSNT: CPT | Mod: S$GLB,,, | Performed by: SURGERY

## 2020-11-30 PROCEDURE — 99999 PR PBB SHADOW E&M-EST. PATIENT-LVL III: CPT | Mod: PBBFAC,,, | Performed by: SURGERY

## 2020-11-30 PROCEDURE — 3008F PR BODY MASS INDEX (BMI) DOCUMENTED: ICD-10-PCS | Mod: CPTII,S$GLB,, | Performed by: SURGERY

## 2020-11-30 PROCEDURE — 3008F BODY MASS INDEX DOCD: CPT | Mod: CPTII,S$GLB,, | Performed by: SURGERY

## 2020-11-30 PROCEDURE — 99024 PR POST-OP FOLLOW-UP VISIT: ICD-10-PCS | Mod: S$GLB,,, | Performed by: SURGERY

## 2020-11-30 PROCEDURE — 1126F PR PAIN SEVERITY QUANTIFIED, NO PAIN PRESENT: ICD-10-PCS | Mod: S$GLB,,, | Performed by: SURGERY

## 2020-11-30 NOTE — PROGRESS NOTES
Subjective:       Patient ID: Kylahtori Ortiz is a 65 y.o. female.    Post repair of an incarcerated umbilical hernia    Chief Complaint: Post-op Evaluation    Patient underwent repair of an incarcerated umbilical hernia with a multi layer imbricated repair.  The patient has cirrhosis.  She also has some memory issues.    According to a aide age she is doing well.  Tolerating a diet and not having pain    Review of Systems   Gastrointestinal: Negative.        Objective:      Physical Exam  Vitals signs reviewed.   Constitutional:       Comments: Chronically   Cardiovascular:      Rate and Rhythm: Normal rate and regular rhythm.      Pulses: Normal pulses.      Heart sounds: Normal heart sounds.   Pulmonary:      Effort: Pulmonary effort is normal.      Breath sounds: Normal breath sounds.   Abdominal:      General: Bowel sounds are normal. Distention: Mild.      Comments: Umbilical incision is clean.  Sutures removed.  There is no erythema.  There is no evidence of leakage.    The umbilicus is now absent   Neurological:      Mental Status: She is alert.         Assessment:      doing well after repair of an incarcerated umbilical hernia  Cirrhosis with ascites   Plan:       Follow up with surgery in 2 months

## 2020-11-30 NOTE — PATIENT INSTRUCTIONS
The hernia repair is healed well.    If you ever noticed any bulging or draining from the incision please let us know.    Please use the my chart application to see us back in about 2 months

## 2021-05-03 ENCOUNTER — LAB VISIT (OUTPATIENT)
Dept: LAB | Facility: HOSPITAL | Age: 66
End: 2021-05-03
Attending: NURSE PRACTITIONER
Payer: MEDICARE

## 2021-05-03 ENCOUNTER — OFFICE VISIT (OUTPATIENT)
Dept: HEPATOLOGY | Facility: CLINIC | Age: 66
End: 2021-05-03
Payer: MEDICARE

## 2021-05-03 ENCOUNTER — TELEPHONE (OUTPATIENT)
Dept: GASTROENTEROLOGY | Facility: CLINIC | Age: 66
End: 2021-05-03

## 2021-05-03 VITALS
DIASTOLIC BLOOD PRESSURE: 80 MMHG | SYSTOLIC BLOOD PRESSURE: 122 MMHG | WEIGHT: 151.88 LBS | HEIGHT: 65 IN | BODY MASS INDEX: 25.3 KG/M2 | HEART RATE: 79 BPM | OXYGEN SATURATION: 99 %

## 2021-05-03 DIAGNOSIS — K70.31 ASCITES DUE TO ALCOHOLIC CIRRHOSIS: Primary | ICD-10-CM

## 2021-05-03 DIAGNOSIS — I85.11 SECONDARY ESOPHAGEAL VARICES WITH BLEEDING: ICD-10-CM

## 2021-05-03 DIAGNOSIS — K70.31 ASCITES DUE TO ALCOHOLIC CIRRHOSIS: ICD-10-CM

## 2021-05-03 DIAGNOSIS — K70.31 ALCOHOLIC CIRRHOSIS OF LIVER WITH ASCITES: ICD-10-CM

## 2021-05-03 LAB
AFP SERPL-MCNC: 3.5 NG/ML (ref 0–8.4)
ALBUMIN SERPL BCP-MCNC: 4.3 G/DL (ref 3.5–5.2)
ALP SERPL-CCNC: 73 U/L (ref 55–135)
ALT SERPL W/O P-5'-P-CCNC: 41 U/L (ref 10–44)
ANION GAP SERPL CALC-SCNC: 13 MMOL/L (ref 8–16)
AST SERPL-CCNC: 37 U/L (ref 10–40)
BASOPHILS # BLD AUTO: 0.03 K/UL (ref 0–0.2)
BASOPHILS NFR BLD: 0.4 % (ref 0–1.9)
BILIRUB SERPL-MCNC: 0.9 MG/DL (ref 0.1–1)
BUN SERPL-MCNC: 16 MG/DL (ref 8–23)
CALCIUM SERPL-MCNC: 9.8 MG/DL (ref 8.7–10.5)
CHLORIDE SERPL-SCNC: 95 MMOL/L (ref 95–110)
CO2 SERPL-SCNC: 24 MMOL/L (ref 23–29)
CREAT SERPL-MCNC: 1.4 MG/DL (ref 0.5–1.4)
DIFFERENTIAL METHOD: ABNORMAL
EOSINOPHIL # BLD AUTO: 0.2 K/UL (ref 0–0.5)
EOSINOPHIL NFR BLD: 1.8 % (ref 0–8)
ERYTHROCYTE [DISTWIDTH] IN BLOOD BY AUTOMATED COUNT: 14.6 % (ref 11.5–14.5)
EST. GFR  (AFRICAN AMERICAN): 45.2 ML/MIN/1.73 M^2
EST. GFR  (NON AFRICAN AMERICAN): 39.2 ML/MIN/1.73 M^2
GLUCOSE SERPL-MCNC: 100 MG/DL (ref 70–110)
HCT VFR BLD AUTO: 35.1 % (ref 37–48.5)
HGB BLD-MCNC: 11.3 G/DL (ref 12–16)
IMM GRANULOCYTES # BLD AUTO: 0.05 K/UL (ref 0–0.04)
IMM GRANULOCYTES NFR BLD AUTO: 0.6 % (ref 0–0.5)
INR PPP: 1.4 (ref 0.8–1.2)
LYMPHOCYTES # BLD AUTO: 1 K/UL (ref 1–4.8)
LYMPHOCYTES NFR BLD: 12.3 % (ref 18–48)
MCH RBC QN AUTO: 29.6 PG (ref 27–31)
MCHC RBC AUTO-ENTMCNC: 32.2 G/DL (ref 32–36)
MCV RBC AUTO: 92 FL (ref 82–98)
MONOCYTES # BLD AUTO: 0.9 K/UL (ref 0.3–1)
MONOCYTES NFR BLD: 10.4 % (ref 4–15)
NEUTROPHILS # BLD AUTO: 6.3 K/UL (ref 1.8–7.7)
NEUTROPHILS NFR BLD: 74.5 % (ref 38–73)
NRBC BLD-RTO: 0 /100 WBC
PLATELET # BLD AUTO: 189 K/UL (ref 150–450)
PMV BLD AUTO: 9.9 FL (ref 9.2–12.9)
POTASSIUM SERPL-SCNC: 4.8 MMOL/L (ref 3.5–5.1)
PROT SERPL-MCNC: 8 G/DL (ref 6–8.4)
PROTHROMBIN TIME: 15.2 SEC (ref 9–12.5)
RBC # BLD AUTO: 3.82 M/UL (ref 4–5.4)
SODIUM SERPL-SCNC: 132 MMOL/L (ref 136–145)
WBC # BLD AUTO: 8.47 K/UL (ref 3.9–12.7)

## 2021-05-03 PROCEDURE — 3288F PR FALLS RISK ASSESSMENT DOCUMENTED: ICD-10-PCS | Mod: CPTII,S$GLB,, | Performed by: NURSE PRACTITIONER

## 2021-05-03 PROCEDURE — 1159F PR MEDICATION LIST DOCUMENTED IN MEDICAL RECORD: ICD-10-PCS | Mod: S$GLB,,, | Performed by: NURSE PRACTITIONER

## 2021-05-03 PROCEDURE — 1126F PR PAIN SEVERITY QUANTIFIED, NO PAIN PRESENT: ICD-10-PCS | Mod: S$GLB,,, | Performed by: NURSE PRACTITIONER

## 2021-05-03 PROCEDURE — 85610 PROTHROMBIN TIME: CPT | Performed by: NURSE PRACTITIONER

## 2021-05-03 PROCEDURE — 1126F AMNT PAIN NOTED NONE PRSNT: CPT | Mod: S$GLB,,, | Performed by: NURSE PRACTITIONER

## 2021-05-03 PROCEDURE — 1101F PT FALLS ASSESS-DOCD LE1/YR: CPT | Mod: CPTII,S$GLB,, | Performed by: NURSE PRACTITIONER

## 2021-05-03 PROCEDURE — 99214 OFFICE O/P EST MOD 30 MIN: CPT | Mod: S$GLB,,, | Performed by: NURSE PRACTITIONER

## 2021-05-03 PROCEDURE — 82105 ALPHA-FETOPROTEIN SERUM: CPT | Performed by: NURSE PRACTITIONER

## 2021-05-03 PROCEDURE — 85025 COMPLETE CBC W/AUTO DIFF WBC: CPT | Performed by: NURSE PRACTITIONER

## 2021-05-03 PROCEDURE — 99214 PR OFFICE/OUTPT VISIT, EST, LEVL IV, 30-39 MIN: ICD-10-PCS | Mod: S$GLB,,, | Performed by: NURSE PRACTITIONER

## 2021-05-03 PROCEDURE — 1101F PR PT FALLS ASSESS DOC 0-1 FALLS W/OUT INJ PAST YR: ICD-10-PCS | Mod: CPTII,S$GLB,, | Performed by: NURSE PRACTITIONER

## 2021-05-03 PROCEDURE — 36415 COLL VENOUS BLD VENIPUNCTURE: CPT | Performed by: NURSE PRACTITIONER

## 2021-05-03 PROCEDURE — 99999 PR PBB SHADOW E&M-EST. PATIENT-LVL IV: ICD-10-PCS | Mod: PBBFAC,,, | Performed by: NURSE PRACTITIONER

## 2021-05-03 PROCEDURE — 3008F PR BODY MASS INDEX (BMI) DOCUMENTED: ICD-10-PCS | Mod: CPTII,S$GLB,, | Performed by: NURSE PRACTITIONER

## 2021-05-03 PROCEDURE — 80053 COMPREHEN METABOLIC PANEL: CPT | Performed by: NURSE PRACTITIONER

## 2021-05-03 PROCEDURE — 99999 PR PBB SHADOW E&M-EST. PATIENT-LVL IV: CPT | Mod: PBBFAC,,, | Performed by: NURSE PRACTITIONER

## 2021-05-03 PROCEDURE — 3008F BODY MASS INDEX DOCD: CPT | Mod: CPTII,S$GLB,, | Performed by: NURSE PRACTITIONER

## 2021-05-03 PROCEDURE — 1159F MED LIST DOCD IN RCRD: CPT | Mod: S$GLB,,, | Performed by: NURSE PRACTITIONER

## 2021-05-03 PROCEDURE — 3288F FALL RISK ASSESSMENT DOCD: CPT | Mod: CPTII,S$GLB,, | Performed by: NURSE PRACTITIONER

## 2021-05-03 RX ORDER — PANTOPRAZOLE SODIUM 20 MG/1
20 TABLET, DELAYED RELEASE ORAL
Qty: 30 TABLET | Refills: 5 | Status: SHIPPED | OUTPATIENT
Start: 2021-05-03 | End: 2021-07-05 | Stop reason: SDUPTHER

## 2021-05-11 ENCOUNTER — TELEPHONE (OUTPATIENT)
Dept: RADIOLOGY | Facility: HOSPITAL | Age: 66
End: 2021-05-11

## 2021-05-17 ENCOUNTER — TELEPHONE (OUTPATIENT)
Dept: RADIOLOGY | Facility: HOSPITAL | Age: 66
End: 2021-05-17

## 2021-05-26 ENCOUNTER — ANESTHESIA EVENT (OUTPATIENT)
Dept: ENDOSCOPY | Facility: HOSPITAL | Age: 66
End: 2021-05-26
Payer: MEDICARE

## 2021-05-26 ENCOUNTER — ANESTHESIA (OUTPATIENT)
Dept: ENDOSCOPY | Facility: HOSPITAL | Age: 66
End: 2021-05-26
Payer: MEDICARE

## 2021-05-26 ENCOUNTER — HOSPITAL ENCOUNTER (OUTPATIENT)
Facility: HOSPITAL | Age: 66
Discharge: HOME | End: 2021-05-26
Attending: INTERNAL MEDICINE | Admitting: INTERNAL MEDICINE
Payer: MEDICARE

## 2021-05-26 DIAGNOSIS — I85.11 SECONDARY ESOPHAGEAL VARICES WITH BLEEDING: Primary | ICD-10-CM

## 2021-05-26 PROCEDURE — 37000008 HC ANESTHESIA 1ST 15 MINUTES: Performed by: INTERNAL MEDICINE

## 2021-05-26 PROCEDURE — 43235 EGD DIAGNOSTIC BRUSH WASH: CPT | Performed by: INTERNAL MEDICINE

## 2021-05-26 PROCEDURE — 25000003 PHARM REV CODE 250: Performed by: NURSE ANESTHETIST, CERTIFIED REGISTERED

## 2021-05-26 PROCEDURE — 43235 PR EGD, FLEX, DIAGNOSTIC: ICD-10-PCS | Mod: ,,, | Performed by: INTERNAL MEDICINE

## 2021-05-26 PROCEDURE — 63600175 PHARM REV CODE 636 W HCPCS: Performed by: NURSE ANESTHETIST, CERTIFIED REGISTERED

## 2021-05-26 PROCEDURE — 43235 EGD DIAGNOSTIC BRUSH WASH: CPT | Mod: ,,, | Performed by: INTERNAL MEDICINE

## 2021-05-26 RX ORDER — SODIUM CHLORIDE, SODIUM LACTATE, POTASSIUM CHLORIDE, CALCIUM CHLORIDE 600; 310; 30; 20 MG/100ML; MG/100ML; MG/100ML; MG/100ML
INJECTION, SOLUTION INTRAVENOUS CONTINUOUS
Status: CANCELLED | OUTPATIENT
Start: 2021-05-26

## 2021-05-26 RX ORDER — LIDOCAINE HYDROCHLORIDE 10 MG/ML
INJECTION, SOLUTION EPIDURAL; INFILTRATION; INTRACAUDAL; PERINEURAL
Status: DISCONTINUED | OUTPATIENT
Start: 2021-05-26 | End: 2021-05-26

## 2021-05-26 RX ORDER — SODIUM CHLORIDE 0.9 % (FLUSH) 0.9 %
10 SYRINGE (ML) INJECTION
Status: CANCELLED | OUTPATIENT
Start: 2021-05-26

## 2021-05-26 RX ORDER — SODIUM CHLORIDE, SODIUM LACTATE, POTASSIUM CHLORIDE, CALCIUM CHLORIDE 600; 310; 30; 20 MG/100ML; MG/100ML; MG/100ML; MG/100ML
INJECTION, SOLUTION INTRAVENOUS CONTINUOUS
Status: DISCONTINUED | OUTPATIENT
Start: 2021-05-26 | End: 2021-05-26 | Stop reason: HOSPADM

## 2021-05-26 RX ORDER — SODIUM CHLORIDE, SODIUM LACTATE, POTASSIUM CHLORIDE, CALCIUM CHLORIDE 600; 310; 30; 20 MG/100ML; MG/100ML; MG/100ML; MG/100ML
INJECTION, SOLUTION INTRAVENOUS CONTINUOUS PRN
Status: DISCONTINUED | OUTPATIENT
Start: 2021-05-26 | End: 2021-05-26

## 2021-05-26 RX ORDER — PROPOFOL 10 MG/ML
VIAL (ML) INTRAVENOUS
Status: DISCONTINUED | OUTPATIENT
Start: 2021-05-26 | End: 2021-05-26

## 2021-05-26 RX ORDER — SODIUM CHLORIDE 9 MG/ML
INJECTION, SOLUTION INTRAVENOUS CONTINUOUS
Status: DISCONTINUED | OUTPATIENT
Start: 2021-05-26 | End: 2021-05-26 | Stop reason: HOSPADM

## 2021-05-26 RX ADMIN — LIDOCAINE HYDROCHLORIDE 50 MG: 10 INJECTION, SOLUTION EPIDURAL; INFILTRATION; INTRACAUDAL; PERINEURAL at 10:05

## 2021-05-26 RX ADMIN — PROPOFOL 50 MG: 10 INJECTION, EMULSION INTRAVENOUS at 10:05

## 2021-05-26 RX ADMIN — PROPOFOL 30 MG: 10 INJECTION, EMULSION INTRAVENOUS at 10:05

## 2021-05-26 RX ADMIN — SODIUM CHLORIDE, SODIUM LACTATE, POTASSIUM CHLORIDE, AND CALCIUM CHLORIDE: 600; 310; 30; 20 INJECTION, SOLUTION INTRAVENOUS at 10:05

## 2021-06-04 VITALS
HEART RATE: 82 BPM | OXYGEN SATURATION: 99 % | RESPIRATION RATE: 16 BRPM | HEIGHT: 65 IN | DIASTOLIC BLOOD PRESSURE: 80 MMHG | TEMPERATURE: 98 F | WEIGHT: 149.94 LBS | SYSTOLIC BLOOD PRESSURE: 125 MMHG | BODY MASS INDEX: 24.98 KG/M2

## 2021-10-25 ENCOUNTER — LAB VISIT (OUTPATIENT)
Dept: LAB | Facility: HOSPITAL | Age: 66
End: 2021-10-25
Attending: NURSE PRACTITIONER
Payer: MEDICARE

## 2021-10-25 ENCOUNTER — OFFICE VISIT (OUTPATIENT)
Dept: HEPATOLOGY | Facility: CLINIC | Age: 66
End: 2021-10-25
Payer: MEDICARE

## 2021-10-25 VITALS
SYSTOLIC BLOOD PRESSURE: 124 MMHG | HEART RATE: 109 BPM | BODY MASS INDEX: 26.19 KG/M2 | HEIGHT: 65 IN | DIASTOLIC BLOOD PRESSURE: 80 MMHG | WEIGHT: 157.19 LBS

## 2021-10-25 DIAGNOSIS — K70.30 ALCOHOLIC CIRRHOSIS OF LIVER WITHOUT ASCITES: Primary | ICD-10-CM

## 2021-10-25 DIAGNOSIS — K70.30 ALCOHOLIC CIRRHOSIS OF LIVER WITHOUT ASCITES: ICD-10-CM

## 2021-10-25 DIAGNOSIS — I85.11 SECONDARY ESOPHAGEAL VARICES WITH BLEEDING: ICD-10-CM

## 2021-10-25 LAB
INR PPP: 1.4 (ref 0.8–1.2)
PROTHROMBIN TIME: 14.6 SEC (ref 9–12.5)

## 2021-10-25 PROCEDURE — 3008F PR BODY MASS INDEX (BMI) DOCUMENTED: ICD-10-PCS | Mod: CPTII,S$GLB,, | Performed by: NURSE PRACTITIONER

## 2021-10-25 PROCEDURE — 99999 PR PBB SHADOW E&M-EST. PATIENT-LVL III: ICD-10-PCS | Mod: PBBFAC,,, | Performed by: NURSE PRACTITIONER

## 2021-10-25 PROCEDURE — 3074F SYST BP LT 130 MM HG: CPT | Mod: CPTII,S$GLB,, | Performed by: NURSE PRACTITIONER

## 2021-10-25 PROCEDURE — 85025 COMPLETE CBC W/AUTO DIFF WBC: CPT | Performed by: NURSE PRACTITIONER

## 2021-10-25 PROCEDURE — 3288F PR FALLS RISK ASSESSMENT DOCUMENTED: ICD-10-PCS | Mod: CPTII,S$GLB,, | Performed by: NURSE PRACTITIONER

## 2021-10-25 PROCEDURE — 3008F BODY MASS INDEX DOCD: CPT | Mod: CPTII,S$GLB,, | Performed by: NURSE PRACTITIONER

## 2021-10-25 PROCEDURE — 36415 COLL VENOUS BLD VENIPUNCTURE: CPT | Performed by: NURSE PRACTITIONER

## 2021-10-25 PROCEDURE — 3079F DIAST BP 80-89 MM HG: CPT | Mod: CPTII,S$GLB,, | Performed by: NURSE PRACTITIONER

## 2021-10-25 PROCEDURE — 99214 PR OFFICE/OUTPT VISIT, EST, LEVL IV, 30-39 MIN: ICD-10-PCS | Mod: S$GLB,,, | Performed by: NURSE PRACTITIONER

## 2021-10-25 PROCEDURE — 1126F PR PAIN SEVERITY QUANTIFIED, NO PAIN PRESENT: ICD-10-PCS | Mod: CPTII,S$GLB,, | Performed by: NURSE PRACTITIONER

## 2021-10-25 PROCEDURE — 3288F FALL RISK ASSESSMENT DOCD: CPT | Mod: CPTII,S$GLB,, | Performed by: NURSE PRACTITIONER

## 2021-10-25 PROCEDURE — 1126F AMNT PAIN NOTED NONE PRSNT: CPT | Mod: CPTII,S$GLB,, | Performed by: NURSE PRACTITIONER

## 2021-10-25 PROCEDURE — 85610 PROTHROMBIN TIME: CPT | Performed by: NURSE PRACTITIONER

## 2021-10-25 PROCEDURE — 1101F PT FALLS ASSESS-DOCD LE1/YR: CPT | Mod: CPTII,S$GLB,, | Performed by: NURSE PRACTITIONER

## 2021-10-25 PROCEDURE — 99214 OFFICE O/P EST MOD 30 MIN: CPT | Mod: S$GLB,,, | Performed by: NURSE PRACTITIONER

## 2021-10-25 PROCEDURE — 82105 ALPHA-FETOPROTEIN SERUM: CPT | Performed by: NURSE PRACTITIONER

## 2021-10-25 PROCEDURE — 1101F PR PT FALLS ASSESS DOC 0-1 FALLS W/OUT INJ PAST YR: ICD-10-PCS | Mod: CPTII,S$GLB,, | Performed by: NURSE PRACTITIONER

## 2021-10-25 PROCEDURE — 1159F MED LIST DOCD IN RCRD: CPT | Mod: CPTII,S$GLB,, | Performed by: NURSE PRACTITIONER

## 2021-10-25 PROCEDURE — 80053 COMPREHEN METABOLIC PANEL: CPT | Performed by: NURSE PRACTITIONER

## 2021-10-25 PROCEDURE — 99999 PR PBB SHADOW E&M-EST. PATIENT-LVL III: CPT | Mod: PBBFAC,,, | Performed by: NURSE PRACTITIONER

## 2021-10-25 PROCEDURE — 1159F PR MEDICATION LIST DOCUMENTED IN MEDICAL RECORD: ICD-10-PCS | Mod: CPTII,S$GLB,, | Performed by: NURSE PRACTITIONER

## 2021-10-25 PROCEDURE — 3079F PR MOST RECENT DIASTOLIC BLOOD PRESSURE 80-89 MM HG: ICD-10-PCS | Mod: CPTII,S$GLB,, | Performed by: NURSE PRACTITIONER

## 2021-10-25 PROCEDURE — 3074F PR MOST RECENT SYSTOLIC BLOOD PRESSURE < 130 MM HG: ICD-10-PCS | Mod: CPTII,S$GLB,, | Performed by: NURSE PRACTITIONER

## 2021-10-26 ENCOUNTER — PATIENT MESSAGE (OUTPATIENT)
Dept: HEPATOLOGY | Facility: CLINIC | Age: 66
End: 2021-10-26
Payer: MEDICARE

## 2021-10-26 LAB
AFP SERPL-MCNC: 3.3 NG/ML (ref 0–8.4)
ALBUMIN SERPL BCP-MCNC: 4.1 G/DL (ref 3.5–5.2)
ALP SERPL-CCNC: 77 U/L (ref 55–135)
ALT SERPL W/O P-5'-P-CCNC: 38 U/L (ref 10–44)
ANION GAP SERPL CALC-SCNC: 12 MMOL/L (ref 8–16)
AST SERPL-CCNC: 34 U/L (ref 10–40)
BASOPHILS # BLD AUTO: 0.02 K/UL (ref 0–0.2)
BASOPHILS NFR BLD: 0.2 % (ref 0–1.9)
BILIRUB SERPL-MCNC: 1.4 MG/DL (ref 0.1–1)
BUN SERPL-MCNC: 13 MG/DL (ref 8–23)
CALCIUM SERPL-MCNC: 9.5 MG/DL (ref 8.7–10.5)
CHLORIDE SERPL-SCNC: 96 MMOL/L (ref 95–110)
CO2 SERPL-SCNC: 22 MMOL/L (ref 23–29)
CREAT SERPL-MCNC: 1.2 MG/DL (ref 0.5–1.4)
DIFFERENTIAL METHOD: ABNORMAL
EOSINOPHIL # BLD AUTO: 0.1 K/UL (ref 0–0.5)
EOSINOPHIL NFR BLD: 1.4 % (ref 0–8)
ERYTHROCYTE [DISTWIDTH] IN BLOOD BY AUTOMATED COUNT: 13.2 % (ref 11.5–14.5)
EST. GFR  (AFRICAN AMERICAN): 54.4 ML/MIN/1.73 M^2
EST. GFR  (NON AFRICAN AMERICAN): 47.2 ML/MIN/1.73 M^2
GLUCOSE SERPL-MCNC: 110 MG/DL (ref 70–110)
HCT VFR BLD AUTO: 38.1 % (ref 37–48.5)
HGB BLD-MCNC: 12.9 G/DL (ref 12–16)
IMM GRANULOCYTES # BLD AUTO: 0.05 K/UL (ref 0–0.04)
IMM GRANULOCYTES NFR BLD AUTO: 0.6 % (ref 0–0.5)
LYMPHOCYTES # BLD AUTO: 1 K/UL (ref 1–4.8)
LYMPHOCYTES NFR BLD: 12.3 % (ref 18–48)
MCH RBC QN AUTO: 32.3 PG (ref 27–31)
MCHC RBC AUTO-ENTMCNC: 33.9 G/DL (ref 32–36)
MCV RBC AUTO: 95 FL (ref 82–98)
MONOCYTES # BLD AUTO: 0.7 K/UL (ref 0.3–1)
MONOCYTES NFR BLD: 9 % (ref 4–15)
NEUTROPHILS # BLD AUTO: 6.2 K/UL (ref 1.8–7.7)
NEUTROPHILS NFR BLD: 76.5 % (ref 38–73)
NRBC BLD-RTO: 0 /100 WBC
PLATELET # BLD AUTO: 163 K/UL (ref 150–450)
PMV BLD AUTO: 10.3 FL (ref 9.2–12.9)
POTASSIUM SERPL-SCNC: 4.5 MMOL/L (ref 3.5–5.1)
PROT SERPL-MCNC: 7.5 G/DL (ref 6–8.4)
RBC # BLD AUTO: 4 M/UL (ref 4–5.4)
SODIUM SERPL-SCNC: 130 MMOL/L (ref 136–145)
WBC # BLD AUTO: 8.08 K/UL (ref 3.9–12.7)

## 2021-11-03 ENCOUNTER — PATIENT MESSAGE (OUTPATIENT)
Dept: HEPATOLOGY | Facility: CLINIC | Age: 66
End: 2021-11-03
Payer: MEDICARE

## 2021-11-03 ENCOUNTER — HOSPITAL ENCOUNTER (OUTPATIENT)
Dept: RADIOLOGY | Facility: HOSPITAL | Age: 66
Discharge: HOME | End: 2021-11-03
Attending: NURSE PRACTITIONER
Payer: MEDICARE

## 2021-11-03 DIAGNOSIS — K70.30 ALCOHOLIC CIRRHOSIS OF LIVER WITHOUT ASCITES: ICD-10-CM

## 2021-11-03 PROCEDURE — 76705 ECHO EXAM OF ABDOMEN: CPT | Mod: 26,,, | Performed by: RADIOLOGY

## 2021-11-03 PROCEDURE — 76705 ECHO EXAM OF ABDOMEN: CPT | Mod: TC

## 2021-11-03 PROCEDURE — 76705 US ABDOMEN LIMITED: ICD-10-PCS | Mod: 26,,, | Performed by: RADIOLOGY

## 2022-04-18 ENCOUNTER — HOSPITAL ENCOUNTER (OUTPATIENT)
Dept: RADIOLOGY | Facility: HOSPITAL | Age: 67
Discharge: HOME | End: 2022-04-18
Attending: NURSE PRACTITIONER
Payer: MEDICARE

## 2022-04-18 DIAGNOSIS — K70.30 ALCOHOLIC CIRRHOSIS OF LIVER WITHOUT ASCITES: ICD-10-CM

## 2022-04-18 PROCEDURE — 76705 ECHO EXAM OF ABDOMEN: CPT | Mod: 26,,, | Performed by: RADIOLOGY

## 2022-04-18 PROCEDURE — 76705 US ABDOMEN LIMITED: ICD-10-PCS | Mod: 26,,, | Performed by: RADIOLOGY

## 2022-04-18 PROCEDURE — 76705 ECHO EXAM OF ABDOMEN: CPT | Mod: TC

## 2022-04-19 ENCOUNTER — PATIENT MESSAGE (OUTPATIENT)
Dept: HEPATOLOGY | Facility: CLINIC | Age: 67
End: 2022-04-19
Payer: MEDICARE

## 2022-04-19 DIAGNOSIS — E87.1 HYPONATREMIA: Primary | ICD-10-CM

## 2022-04-25 ENCOUNTER — LAB VISIT (OUTPATIENT)
Dept: LAB | Facility: HOSPITAL | Age: 67
End: 2022-04-25
Attending: NURSE PRACTITIONER
Payer: MEDICARE

## 2022-04-25 ENCOUNTER — OFFICE VISIT (OUTPATIENT)
Dept: HEPATOLOGY | Facility: CLINIC | Age: 67
End: 2022-04-25
Payer: MEDICARE

## 2022-04-25 VITALS
HEART RATE: 95 BPM | HEIGHT: 65 IN | DIASTOLIC BLOOD PRESSURE: 70 MMHG | BODY MASS INDEX: 25.45 KG/M2 | SYSTOLIC BLOOD PRESSURE: 110 MMHG | WEIGHT: 152.75 LBS

## 2022-04-25 DIAGNOSIS — E87.1 HYPONATREMIA: ICD-10-CM

## 2022-04-25 DIAGNOSIS — K70.30 ALCOHOLIC CIRRHOSIS OF LIVER WITHOUT ASCITES: Primary | ICD-10-CM

## 2022-04-25 DIAGNOSIS — N18.4 CHRONIC KIDNEY DISEASE (CKD), STAGE IV (SEVERE): ICD-10-CM

## 2022-04-25 DIAGNOSIS — I85.11 SECONDARY ESOPHAGEAL VARICES WITH BLEEDING: ICD-10-CM

## 2022-04-25 LAB
ANION GAP SERPL CALC-SCNC: 14 MMOL/L (ref 8–16)
BUN SERPL-MCNC: 14 MG/DL (ref 8–23)
CALCIUM SERPL-MCNC: 9.9 MG/DL (ref 8.7–10.5)
CHLORIDE SERPL-SCNC: 92 MMOL/L (ref 95–110)
CO2 SERPL-SCNC: 21 MMOL/L (ref 23–29)
CREAT SERPL-MCNC: 1.5 MG/DL (ref 0.5–1.4)
EST. GFR  (AFRICAN AMERICAN): 41 ML/MIN/1.73 M^2
EST. GFR  (NON AFRICAN AMERICAN): 36 ML/MIN/1.73 M^2
GLUCOSE SERPL-MCNC: 123 MG/DL (ref 70–110)
POTASSIUM SERPL-SCNC: 4.8 MMOL/L (ref 3.5–5.1)
SODIUM SERPL-SCNC: 127 MMOL/L (ref 136–145)

## 2022-04-25 PROCEDURE — 80048 BASIC METABOLIC PNL TOTAL CA: CPT | Performed by: NURSE PRACTITIONER

## 2022-04-25 PROCEDURE — 3008F BODY MASS INDEX DOCD: CPT | Mod: CPTII,S$GLB,, | Performed by: NURSE PRACTITIONER

## 2022-04-25 PROCEDURE — 3074F SYST BP LT 130 MM HG: CPT | Mod: CPTII,S$GLB,, | Performed by: NURSE PRACTITIONER

## 2022-04-25 PROCEDURE — 1101F PR PT FALLS ASSESS DOC 0-1 FALLS W/OUT INJ PAST YR: ICD-10-PCS | Mod: CPTII,S$GLB,, | Performed by: NURSE PRACTITIONER

## 2022-04-25 PROCEDURE — 99999 PR PBB SHADOW E&M-EST. PATIENT-LVL III: CPT | Mod: PBBFAC,,, | Performed by: NURSE PRACTITIONER

## 2022-04-25 PROCEDURE — 3288F FALL RISK ASSESSMENT DOCD: CPT | Mod: CPTII,S$GLB,, | Performed by: NURSE PRACTITIONER

## 2022-04-25 PROCEDURE — 3074F PR MOST RECENT SYSTOLIC BLOOD PRESSURE < 130 MM HG: ICD-10-PCS | Mod: CPTII,S$GLB,, | Performed by: NURSE PRACTITIONER

## 2022-04-25 PROCEDURE — 3008F PR BODY MASS INDEX (BMI) DOCUMENTED: ICD-10-PCS | Mod: CPTII,S$GLB,, | Performed by: NURSE PRACTITIONER

## 2022-04-25 PROCEDURE — 1126F PR PAIN SEVERITY QUANTIFIED, NO PAIN PRESENT: ICD-10-PCS | Mod: CPTII,S$GLB,, | Performed by: NURSE PRACTITIONER

## 2022-04-25 PROCEDURE — 1126F AMNT PAIN NOTED NONE PRSNT: CPT | Mod: CPTII,S$GLB,, | Performed by: NURSE PRACTITIONER

## 2022-04-25 PROCEDURE — 3288F PR FALLS RISK ASSESSMENT DOCUMENTED: ICD-10-PCS | Mod: CPTII,S$GLB,, | Performed by: NURSE PRACTITIONER

## 2022-04-25 PROCEDURE — 1159F MED LIST DOCD IN RCRD: CPT | Mod: CPTII,S$GLB,, | Performed by: NURSE PRACTITIONER

## 2022-04-25 PROCEDURE — 3078F DIAST BP <80 MM HG: CPT | Mod: CPTII,S$GLB,, | Performed by: NURSE PRACTITIONER

## 2022-04-25 PROCEDURE — 1101F PT FALLS ASSESS-DOCD LE1/YR: CPT | Mod: CPTII,S$GLB,, | Performed by: NURSE PRACTITIONER

## 2022-04-25 PROCEDURE — 3078F PR MOST RECENT DIASTOLIC BLOOD PRESSURE < 80 MM HG: ICD-10-PCS | Mod: CPTII,S$GLB,, | Performed by: NURSE PRACTITIONER

## 2022-04-25 PROCEDURE — 99999 PR PBB SHADOW E&M-EST. PATIENT-LVL III: ICD-10-PCS | Mod: PBBFAC,,, | Performed by: NURSE PRACTITIONER

## 2022-04-25 PROCEDURE — 36415 COLL VENOUS BLD VENIPUNCTURE: CPT | Performed by: NURSE PRACTITIONER

## 2022-04-25 PROCEDURE — 99214 PR OFFICE/OUTPT VISIT, EST, LEVL IV, 30-39 MIN: ICD-10-PCS | Mod: S$GLB,,, | Performed by: NURSE PRACTITIONER

## 2022-04-25 PROCEDURE — 1159F PR MEDICATION LIST DOCUMENTED IN MEDICAL RECORD: ICD-10-PCS | Mod: CPTII,S$GLB,, | Performed by: NURSE PRACTITIONER

## 2022-04-25 PROCEDURE — 99214 OFFICE O/P EST MOD 30 MIN: CPT | Mod: S$GLB,,, | Performed by: NURSE PRACTITIONER

## 2022-04-25 NOTE — PROGRESS NOTES
Clinic Follow Up:  [facility] Follow Up Note    Reason for Follow Up:  The primary encounter diagnosis was Alcoholic cirrhosis of liver without ascites. Diagnoses of Hyponatremia, Secondary esophageal varices with bleeding, and Chronic kidney disease (CKD), stage IV (severe) were also pertinent to this visit.    PCP: Santos Beaver       HPI:  This is a 66 y.o. female here for follow up of the above  Pt states that she has been feeling overall well without any new complaints.   Recent labs are stable with the exception of hyponatremia.  She has stopped the lasix and repeat labs are pending today. She has a hx of CKD for which she previously was seen by nephrology.  Has not had a recent F/U   US stable  No upper GI bleeding.  No ascites or BLE.  No overt confusion.      Review of Systems   Constitutional: Negative for chills, fever, malaise/fatigue and weight loss.   Respiratory: Negative for cough.    Cardiovascular: Negative for chest pain.   Gastrointestinal:        Per HPI   Musculoskeletal: Negative for myalgias.   Skin: Negative for itching and rash.   Neurological: Negative for headaches.   Psychiatric/Behavioral: The patient is not nervous/anxious.        Medical History:  Past Medical History:   Diagnosis Date    Alcoholic cirrhosis     Alcoholic dementia     Anemia     Ascites     CKD (chronic kidney disease)     Esophageal reflux     Esophageal varices     Hepatic encephalopathy     Hiatal hernia        Surgical History:   Past Surgical History:   Procedure Laterality Date    COLONOSCOPY      ESOPHAGOGASTRODUODENOSCOPY      ESOPHAGOGASTRODUODENOSCOPY N/A 1/6/2020    Procedure: EGD (ESOPHAGOGASTRODUODENOSCOPY);  Surgeon: Monique Wood MD;  Location: [facility];  Service: Endoscopy;  Laterality: N/A;    ESOPHAGOGASTRODUODENOSCOPY N/A 5/26/2021    Procedure: EGD (ESOPHAGOGASTRODUODENOSCOPY) EV surveillance;  Surgeon: Monique Wood MD;  Location: [facility];   "Service: Endoscopy;  Laterality: N/A;    HYSTERECTOMY      REPAIR OF INCARCERATED UMBILICAL HERNIA N/A 10/27/2020    Procedure: REPAIR, HERNIA, UMBILICAL, INCARCERATED, AGE 5 YEARS OR OLDER;  Surgeon: Shahbaz Oliva MD;  Location: [facility];  Service: General;  Laterality: N/A;       Family History:   Family History   Problem Relation Age of Onset    Cancer Mother     Arthritis Mother        Social History:   Social History     Tobacco Use    Smoking status: Never Smoker    Smokeless tobacco: Never Used   Substance Use Topics    Alcohol use: Yes     Alcohol/week: 2.0 standard drinks     Types: 2 Shots of liquor per week     Comment: some days    Drug use: Not Currently       Allergies: Reviewed    Home Medications:  Current Outpatient Medications on File Prior to Visit   Medication Sig Dispense Refill    FLUoxetine 20 MG capsule Take 20 mg by mouth once daily.      furosemide (LASIX) 20 MG tablet Take 1 tablet (20 mg total) by mouth once daily. 60 tablet 12    magnesium oxide (MAG-OX) 400 mg (241.3 mg magnesium) tablet Take 400 mg by mouth once daily.      multivitamin (THERAGRAN) per tablet Take 1 tablet by mouth once daily.      nozaseptin (NOZIN) nasal  1 each by Each Nostril route 2 (two) times daily. 1 applicator 0    pantoprazole (PROTONIX) 20 MG tablet TAKE 1 TABLET(20 MG) BY MOUTH TWICE DAILY BEFORE MEALS 180 tablet 0    rifAXIMin (XIFAXAN) 550 mg Tab Take 1 tablet (550 mg total) by mouth 2 (two) times daily. 60 tablet 5    spironolactone (ALDACTONE) 25 MG tablet Take 1 tablet (25 mg total) by mouth once daily. 30 tablet 11    thiamine (VITAMIN B-1) 50 MG tablet Take 1 tablet (50 mg total) by mouth once daily. 30 tablet 0    folic acid (FOLVITE) 1 MG tablet Take 1 tablet (1 mg total) by mouth once daily. 30 tablet 0     No current facility-administered medications on file prior to visit.       Physical Exam:  Vital Signs:  /70   Pulse 95   Ht 5' 5" (1.651 m)   Wt 69.3 kg " (152 lb 12.5 oz)   BMI 25.42 kg/m²   Body mass index is 25.42 kg/m².  Physical Exam  Vitals reviewed.   Constitutional:       Appearance: She is well-developed.   HENT:      Head: Normocephalic.   Eyes:      General: No scleral icterus.  Cardiovascular:      Rate and Rhythm: Normal rate.   Pulmonary:      Effort: Pulmonary effort is normal.   Abdominal:      General: There is no distension.   Musculoskeletal:         General: No swelling.      Cervical back: Normal range of motion.   Skin:     General: Skin is dry.   Neurological:      Mental Status: She is alert and oriented to person, place, and time.         Labs: Pertinent labs reviewed.  MELD-Na score: 21 at 4/18/2022 10:33 AM  MELD score: 13 at 4/18/2022 10:33 AM  Calculated from:  Serum Creatinine: 1.4 mg/dL at 4/18/2022 10:33 AM  Serum Sodium: 128 mmol/L at 4/18/2022 10:33 AM  Total Bilirubin: 1.2 mg/dL at 4/18/2022 10:33 AM  INR(ratio): 1.3 at 4/18/2022 10:33 AM  Age: 66 years   EV: EGD 5/21 with Grade I non-bleeding EV, repeat yearly.  Not a good candidate for BB given previous issues with hypotension.   HCC: US 4/22 without lesion or mass     Assessment:  1. Alcoholic cirrhosis of liver without ascites    2. Hyponatremia    3. Secondary esophageal varices with bleeding    4. Chronic kidney disease (CKD), stage IV (severe)        Recommendations:  Stable without any recent decompensating events  - continue with MELD labs and HCC screening every 6 months  - repeat EGD for EV surveillance this summer  - given her hx of CKD and recent issues with hyponatremia, will have her see nephrology again for further evaluation and management.   - continue to hold lasix for now.  Pt has had no return of ascites off of the lasix      Return to Clinic:  6 months with pre-clinic labs and imaging.

## 2022-04-26 ENCOUNTER — PATIENT MESSAGE (OUTPATIENT)
Dept: HEPATOLOGY | Facility: CLINIC | Age: 67
End: 2022-04-26
Payer: MEDICARE

## 2022-06-26 NOTE — DISCHARGE INSTRUCTIONS
There is okay to shower and get everything wet.    Please cover the sutures with antibiotic ointment and a Band-Aid daily.    Please call for any increasing pain, redness, drainage, or leakage of fluid from the incision.    No lifting more than 20 lb for 4 weeks.      LOW SODIUM DIET   1L FLUID RESTRICTION DAILY      100% of the time

## 2022-07-13 DIAGNOSIS — N18.4 CHRONIC KIDNEY DISEASE (CKD), STAGE IV (SEVERE): Primary | ICD-10-CM

## 2022-09-02 ENCOUNTER — PATIENT MESSAGE (OUTPATIENT)
Dept: HEPATOLOGY | Facility: CLINIC | Age: 67
End: 2022-09-02
Payer: MEDICARE

## 2022-09-03 NOTE — TELEPHONE ENCOUNTER
----- Message from Veronica Felton sent at 1/16/2020  2:53 PM CST -----  Please add new order for US Guided Paracentesis for future appts.    Thank you.  
Lower GI bleeding

## 2022-09-28 ENCOUNTER — ANESTHESIA EVENT (OUTPATIENT)
Dept: ENDOSCOPY | Facility: HOSPITAL | Age: 67
End: 2022-09-28
Payer: MEDICARE

## 2022-09-29 ENCOUNTER — HOSPITAL ENCOUNTER (OUTPATIENT)
Facility: HOSPITAL | Age: 67
Discharge: HOME | End: 2022-09-29
Attending: INTERNAL MEDICINE | Admitting: INTERNAL MEDICINE
Payer: MEDICARE

## 2022-09-29 ENCOUNTER — ANESTHESIA (OUTPATIENT)
Dept: ENDOSCOPY | Facility: HOSPITAL | Age: 67
End: 2022-09-29
Payer: MEDICARE

## 2022-09-29 DIAGNOSIS — K70.31 ALCOHOLIC CIRRHOSIS OF LIVER WITH ASCITES: Primary | ICD-10-CM

## 2022-09-29 PROCEDURE — 25000003 PHARM REV CODE 250: Performed by: NURSE ANESTHETIST, CERTIFIED REGISTERED

## 2022-09-29 PROCEDURE — 63600175 PHARM REV CODE 636 W HCPCS: Performed by: NURSE ANESTHETIST, CERTIFIED REGISTERED

## 2022-09-29 PROCEDURE — 43235 PR EGD, FLEX, DIAGNOSTIC: ICD-10-PCS | Mod: ,,, | Performed by: INTERNAL MEDICINE

## 2022-09-29 PROCEDURE — 43235 EGD DIAGNOSTIC BRUSH WASH: CPT | Mod: ,,, | Performed by: INTERNAL MEDICINE

## 2022-09-29 PROCEDURE — 37000009 HC ANESTHESIA EA ADD 15 MINS: Performed by: INTERNAL MEDICINE

## 2022-09-29 PROCEDURE — 37000008 HC ANESTHESIA 1ST 15 MINUTES: Performed by: INTERNAL MEDICINE

## 2022-09-29 PROCEDURE — 43235 EGD DIAGNOSTIC BRUSH WASH: CPT | Performed by: INTERNAL MEDICINE

## 2022-09-29 RX ORDER — PROPOFOL 10 MG/ML
VIAL (ML) INTRAVENOUS
Status: DISCONTINUED | OUTPATIENT
Start: 2022-09-29 | End: 2022-09-29

## 2022-09-29 RX ORDER — LIDOCAINE HYDROCHLORIDE 20 MG/ML
INJECTION INTRAVENOUS
Status: DISCONTINUED | OUTPATIENT
Start: 2022-09-29 | End: 2022-09-29

## 2022-09-29 RX ORDER — SODIUM CHLORIDE 9 MG/ML
INJECTION, SOLUTION INTRAVENOUS CONTINUOUS
Status: DISCONTINUED | OUTPATIENT
Start: 2022-09-29 | End: 2022-09-29 | Stop reason: HOSPADM

## 2022-09-29 RX ADMIN — PROPOFOL 130 MG: 10 INJECTION, EMULSION INTRAVENOUS at 09:09

## 2022-09-29 RX ADMIN — SODIUM CHLORIDE, SODIUM LACTATE, POTASSIUM CHLORIDE, AND CALCIUM CHLORIDE: .6; .31; .03; .02 INJECTION, SOLUTION INTRAVENOUS at 08:09

## 2022-09-29 RX ADMIN — Medication 100 MG: at 09:09

## 2022-09-29 NOTE — H&P
PRE PROCEDURE H&P    Patient Name: Kylah Ortiz  MRN: [id number]  : 1955  Date of Procedure:  2022  Referring Physician: Louise Álvarez FNP  Primary Physician: Santos Beaver MD  Procedure Physician: Flor Roper MD       Planned Procedure: EGD  Diagnosis: variceal screening  Chief Complaint: Same as above    HPI: Patient is an 66 y.o. female is here for the above.         Past Medical History:   Past Medical History:   Diagnosis Date    Alcoholic cirrhosis     Alcoholic dementia     Anemia     Ascites     CKD (chronic kidney disease)     Esophageal reflux     Esophageal varices     Hepatic encephalopathy     Hiatal hernia         Past Surgical History:  Past Surgical History:   Procedure Laterality Date    COLONOSCOPY      ESOPHAGOGASTRODUODENOSCOPY      ESOPHAGOGASTRODUODENOSCOPY N/A 2020    Procedure: EGD (ESOPHAGOGASTRODUODENOSCOPY);  Surgeon: Monique Wood MD;  Location: [facility];  Service: Endoscopy;  Laterality: N/A;    ESOPHAGOGASTRODUODENOSCOPY N/A 2021    Procedure: EGD (ESOPHAGOGASTRODUODENOSCOPY) EV surveillance;  Surgeon: Monique Wood MD;  Location: [facility];  Service: Endoscopy;  Laterality: N/A;    HYSTERECTOMY      REPAIR OF INCARCERATED UMBILICAL HERNIA N/A 10/27/2020    Procedure: REPAIR, HERNIA, UMBILICAL, INCARCERATED, AGE 5 YEARS OR OLDER;  Surgeon: Shahbaz Oliva MD;  Location: [facility];  Service: General;  Laterality: N/A;        Home Medications:  Prior to Admission medications    Medication Sig Start Date End Date Taking? Authorizing Provider   FLUoxetine 20 MG capsule Take 20 mg by mouth once daily.   Yes Historical Provider   folic acid (FOLVITE) 1 MG tablet Take 1 tablet (1 mg total) by mouth once daily. 10/31/20 9/29/22 Yes Becky Saleem NP   furosemide (LASIX) 20 MG tablet Take 1 tablet (20 mg total) by mouth once daily. 20  Yes GIA Garcia   magnesium oxide (MAG-OX) 400 mg (241.3 mg magnesium) tablet Take 400 mg by mouth  "once daily.   Yes Historical Provider   multivitamin (THERAGRAN) per tablet Take 1 tablet by mouth once daily.   Yes Historical Provider   pantoprazole (PROTONIX) 20 MG tablet TAKE 1 TABLET(20 MG) BY MOUTH TWICE DAILY BEFORE MEALS 12/10/21  Yes GIA Garcia   rifAXIMin (XIFAXAN) 550 mg Tab Take 1 tablet (550 mg total) by mouth 2 (two) times daily. 10/4/21  Yes GIA Garcia   spironolactone (ALDACTONE) 25 MG tablet Take 1 tablet (25 mg total) by mouth once daily. 10/4/21  Yes GIA Garcia   thiamine (VITAMIN B-1) 50 MG tablet Take 1 tablet (50 mg total) by mouth once daily. 10/31/20  Yes Becky Saleem NP   nozaseptin (NOZIN) nasal  1 each by Each Nostril route 2 (two) times daily. 10/31/20   Becky Saleem NP        Allergies:  Review of patient's allergies indicates:   Allergen Reactions    Hydrocodone-acetaminophen Hives and Itching        Social History:   Social History     Socioeconomic History    Marital status: Single   Tobacco Use    Smoking status: Never    Smokeless tobacco: Never   Substance and Sexual Activity    Alcohol use: Yes     Alcohol/week: 2.0 standard drinks     Types: 2 Shots of liquor per week     Comment: some days    Drug use: Not Currently    Sexual activity: Not Currently       Family History:  Family History   Problem Relation Age of Onset    Cancer Mother     Arthritis Mother        ROS: No acute cardiac events, no acute respiratory complaints.     Physical Exam (all patients):    BP (!) 169/94 (BP Location: Left arm, Patient Position: Lying)   Pulse 79   Temp 97.6 °F (36.4 °C) (Temporal)   Resp 18   Ht 5' 5" (1.651 m)   Wt 68.9 kg (152 lb)   SpO2 98%   Breastfeeding No   BMI 25.29 kg/m²   Lungs: Clear to auscultation bilaterally, respirations unlabored  Heart: Regular rate and rhythm, S1 and S2 normal, no obvious murmurs  Abdomen:         Soft, non-tender, bowel sounds normal, no masses, no organomegaly    Lab Results   Component Value Date    " WBC 4.11 04/18/2022    MCV 94 04/18/2022    RDW 11.9 04/18/2022     (L) 04/18/2022    INR 1.3 (H) 04/18/2022     (H) 04/25/2022    BUN 14 04/25/2022     (L) 04/25/2022    K 4.8 04/25/2022    CL 92 (L) 04/25/2022        SEDATION PLAN: per anesthesia      History reviewed, vital signs satisfactory, cardiopulmonary status satisfactory, sedation options, risks and plans have been discussed with the patient  All their questions were answered and the patient agrees to the sedation procedures as planned and the patient is deemed an appropriate candidate for the sedation as planned.    Procedure explained to patient, informed consent obtained and placed in chart.    Flor Roper  9/29/2022  9:03 AM

## 2022-09-29 NOTE — ANESTHESIA RELEASE NOTE
"Anesthesia Release from PACU Note    Patient: Kylah Ortiz    Procedure(s) Performed: Procedure(s) (LRB):  EGD (ESOPHAGOGASTRODUODENOSCOPY) EV surveillance (N/A)    Anesthesia type: MAC    Post pain: Adequate analgesia    Post assessment: no apparent anesthetic complications    Last Vitals:   Visit Vitals  BP (!) 169/94 (BP Location: Left arm, Patient Position: Lying)   Pulse 79   Temp 36.4 °C (97.6 °F) (Temporal)   Resp 18   Ht 5' 5" (1.651 m)   Wt 68.9 kg (152 lb)   SpO2 98%   Breastfeeding No   BMI 25.29 kg/m²       Post vital signs: stable    Level of consciousness: awake    Nausea/Vomiting: no nausea/no vomiting    Complications: none    Airway Patency: patent    Respiratory: unassisted    Cardiovascular: stable and blood pressure at baseline    Hydration: euvolemic  "

## 2022-09-29 NOTE — ANESTHESIA POSTPROCEDURE EVALUATION
Anesthesia Post Evaluation    Patient: Kylah Ortiz    Procedure(s) Performed: Procedure(s) (LRB):  EGD (ESOPHAGOGASTRODUODENOSCOPY) EV surveillance (N/A)    Final Anesthesia Type: MAC      Patient location during evaluation: PACU  Patient participation: Yes- Able to Participate  Level of consciousness: awake and alert  Post-procedure vital signs: reviewed and stable  Pain management: adequate  Airway patency: patent    PONV status at discharge: No PONV  Anesthetic complications: no      Cardiovascular status: blood pressure returned to baseline  Respiratory status: unassisted  Hydration status: euvolemic  Follow-up not needed.          Vitals Value Taken Time   /66 09/29/22 0914   Temp 98 09/29/22 0914   Pulse 66 09/29/22 0914   Resp 12 09/29/22 0914   SpO2 98 09/29/22 0914         No case tracking events are documented in the log.      Pain/Polo Score: No data recorded

## 2022-09-29 NOTE — TRANSFER OF CARE
"Anesthesia Transfer of Care Note    Patient: Kylah Ortiz    Procedure(s) Performed: Procedure(s) (LRB):  EGD (ESOPHAGOGASTRODUODENOSCOPY) EV surveillance (N/A)    Patient location: PACU    Anesthesia Type: MAC    Transport from OR: Transported from OR on room air with adequate spontaneous ventilation    Post pain: adequate analgesia    Post assessment: no apparent anesthetic complications    Post vital signs: stable    Level of consciousness: awake    Nausea/Vomiting: no nausea/vomiting    Complications: none    Transfer of care protocol was followed      Last vitals:   Visit Vitals  BP (!) 169/94 (BP Location: Left arm, Patient Position: Lying)   Pulse 79   Temp 36.4 °C (97.6 °F) (Temporal)   Resp 18   Ht 5' 5" (1.651 m)   Wt 68.9 kg (152 lb)   SpO2 98%   Breastfeeding No   BMI 25.29 kg/m²     "

## 2022-09-29 NOTE — DISCHARGE INSTRUCTIONS
DATE OF PROCEDURE:      SURGEON:  Julio Josue M.D.      Patient of Dr. Kan.      PROCEDURE PERFORMED:  Total colonoscopy with biopsy.      INSTRUMENT:  Olympus video colonoscope.      ANESTHESIA:  None.      PROBLEM:  The patient is 48-year-old gentleman with a history of chronic   diarrhea, not associated with weight loss.      POSTPROCEDURE DIAGNOSES:   1. Normal colon examination, except for the fact that he has mild melanosis       coli, diffuse.   2. Small internal hemorrhoids.      DESCRIPTION OF PROCEDURE:  Procedure was done while the patient in left lateral   position and the perianal area was unremarkable, sphincter tone was normal.  The   scope was advanced from the anal verge to the cecum, the entire colon exam   preparation was adequate.      Mucosa in the cecum was normal.  The cecal valve was normal.  The distal ileum   intubated, appeared to be normal.  The ascending colon, hepatic flexure,   transverse colon, splenic flexure, descending, sigmoid colon showed evidence of   mild hyperpigmentation consistent with mild melanosis coli, because of the   diarrhea we took multiple biopsies in a random fashion.  Rectal mucosa was   normal and otherwise anorectal junction showed small internal hemorrhoids.      EVALUATION AND RECOMMENDATION:  The patient will be maintained on lactose   restricted diet, pending biopsy report.         _____________________               ____________________________________   DATE AND TIME                       Julio Josue M.D.         TS/MEDQ-#[id number]   DD:  07/03/2018 12:37:12      DT:  07/03/2018 12:50:22      cc:  Dr. Kan            [facility]      REPORT OF OPERATION        THERESE SANCHEZ   MRN#: [id number]   ROOM:   [facility]#: [id number] Reports      See attached

## 2022-09-29 NOTE — PROVATION PATIENT INSTRUCTIONS
Discharge Summary/Instructions after an Endoscopic Procedure  Patient Name: Kylah Ortiz  Patient MRN: [id number]  Patient YOB: 1955 Thursday, September 29, 2022 Flor Roper MD  Dear patient,  As a result of recent federal legislation (The Federal Cures Act), you may   receive lab or pathology results from your procedure in your [facility]   account before your physician is able to contact you. Your physician or   their representative will relay the results to you with their   recommendations at their soonest availability.  Thank you,  RESTRICTIONS:  During your procedure today, you received medications for sedation.  These   medications may affect your judgment, balance and coordination.  Therefore,   for 24 hours, you have the following restrictions:   - DO NOT drive a car, operate machinery, make legal/financial decisions,   sign important papers or drink alcohol.    ACTIVITY:  Today: no heavy lifting, straining or running due to procedural   sedation/anesthesia.  The following day: return to full activity including work.  DIET:  Eat and drink normally unless instructed otherwise.     TREATMENT FOR COMMON SIDE EFFECTS:  - Mild abdominal pain, nausea, belching, bloating or excessive gas:  rest,   eat lightly and use a heating pad.  - Sore Throat: treat with throat lozenges and/or gargle with warm salt   water.  - Because air was used during the procedure, expelling large amounts of air   from your rectum or belching is normal.  - If a bowel prep was taken, you may not have a bowel movement for 1-3 days.    This is normal.  SYMPTOMS TO WATCH FOR AND REPORT TO YOUR PHYSICIAN:  1. Abdominal pain or bloating, other than gas cramps.  2. Chest pain.  3. Back pain.  4. Signs of infection such as: chills or fever occurring within 24 hours   after the procedure.  5. Rectal bleeding, which would show as bright red, maroon, or black stools.   (A tablespoon of blood from the rectum is not serious,  especially if   hemorrhoids are present.)  6. Vomiting.  7. Weakness or dizziness.  GO DIRECTLY TO THE NEAREST EMERGENCY ROOM IF YOU HAVE ANY OF THE FOLLOWING:      Difficulty breathing              Chills and/or fever over 101 F   Persistent vomiting and/or vomiting blood   Severe abdominal pain   Severe chest pain   Black, tarry stools   Bleeding- more than one tablespoon   Any other symptom or condition that you feel may need urgent attention  Your doctor recommends these additional instructions:  If any biopsies were taken, your doctors clinic will contact you in 1 to 2   weeks with any results.  - Patient has a contact number available for emergencies.  The signs and   symptoms of potential delayed complications were discussed with the   patient.  Return to normal activities tomorrow.  Written discharge   instructions were provided to the patient.   - Discharge patient to home (via wheelchair).   - Resume previous diet today.   - Continue present medications.   - Defer beta blocker to hepatology.   - Repeat upper endoscopy in 2 years for surveillance.  For questions, problems or results please call your physician Flor Roper MD at Work:  [phone]  If you have any questions about the above instructions, call the GI   department at [phone] or call the endoscopy unit at [phone]   from 7am until 3 pm.  [facility] [place], EMERGENCY ROOM PHONE NUMBER:   [phone]  IF A COMPLICATION OR EMERGENCY SITUATION ARISES AND YOU ARE UNABLE TO REACH   YOUR PHYSICIAN - GO DIRECTLY TO THE EMERGENCY ROOM.  I have read or have had read to me these discharge instructions for my   procedure and have received a written copy.  I understand these   instructions and will follow-up with my physician if I have any questions.     __________________________________       _____________________________________  Nurse Signature                                          Patient/Designated    Responsible Party Signature  MD Flor Lang MD  9/29/2022 9:14:17 AM  This report has been verified and signed electronically.  Dear patient,  As a result of recent federal legislation (The Federal Cures Act), you may   receive lab or pathology results from your procedure in your [facility]   account before your physician is able to contact you. Your physician or   their representative will relay the results to you with their   recommendations at their soonest availability.  Thank you,  PROVATION

## 2022-09-29 NOTE — ANESTHESIA PREPROCEDURE EVALUATION
09/29/2022  Kylah Ortiz is a 66 y.o., female.      Pre-op Assessment    I have reviewed the Patient Summary Reports.     I have reviewed the Nursing Notes. I have reviewed the NPO Status.   I have reviewed the Medications.     Review of Systems  Anesthesia Hx:  No problems with previous Anesthesia    Social:  Non-Smoker    Hematology/Oncology:  Hematology Normal   Oncology Normal     EENT/Dental:EENT/Dental Normal   Cardiovascular:  Cardiovascular Normal     Pulmonary:  Pulmonary Normal    Renal/:   Chronic Renal Disease, CKD    Hepatic/GI:   Hiatal Hernia, GERD, poorly controlled Liver Disease,    Musculoskeletal:  Musculoskeletal Normal    Neurological:  Neurology Normal    Endocrine:  Endocrine Normal    Dermatological:  Skin Normal    Psych:   Psychiatric History anxiety depression          Physical Exam  General: Well nourished    Airway:  Mallampati: II   Mouth Opening: Normal  TM Distance: Normal  Tongue: Normal  Neck ROM: Normal ROM    Dental:  Intact    Chest/Lungs:  Clear to auscultation    Heart:  Rate: Normal        Anesthesia Plan  Type of Anesthesia, risks & benefits discussed:    Anesthesia Type: MAC  Intra-op Monitoring Plan: Standard ASA Monitors  Induction:  IV  Informed Consent: Informed consent signed with the Patient and all parties understand the risks and agree with anesthesia plan.  All questions answered. Patient consented to blood products? Yes  ASA Score: 3    Ready For Surgery From Anesthesia Perspective.     .

## 2022-09-30 VITALS
DIASTOLIC BLOOD PRESSURE: 87 MMHG | OXYGEN SATURATION: 98 % | TEMPERATURE: 98 F | RESPIRATION RATE: 20 BRPM | HEIGHT: 65 IN | WEIGHT: 152 LBS | BODY MASS INDEX: 25.33 KG/M2 | HEART RATE: 77 BPM | SYSTOLIC BLOOD PRESSURE: 142 MMHG

## 2022-10-12 NOTE — H&P
[facility]  History & Physical    Subjective:      Chief Complaint/Reason for Admission: Abdominal Distension    Kylah Ortiz is a 64 y.o. female.Here for Paracentesis.    Past Medical History:   Diagnosis Date    Alcoholic cirrhosis     Alcoholic dementia     Anemia     Ascites     CKD (chronic kidney disease)     Esophageal reflux     Esophageal varices     Hepatic encephalopathy     Hiatal hernia      Past Surgical History:   Procedure Laterality Date    COLONOSCOPY      ESOPHAGOGASTRODUODENOSCOPY      ESOPHAGOGASTRODUODENOSCOPY N/A 1/6/2020    Procedure: EGD (ESOPHAGOGASTRODUODENOSCOPY);  Surgeon: Monique Wood MD;  Location: [facility];  Service: Endoscopy;  Laterality: N/A;    HYSTERECTOMY       Family History   Problem Relation Age of Onset    Cancer Mother     Arthritis Mother      Social History     Tobacco Use    Smoking status: Never Smoker    Smokeless tobacco: Never Used   Substance Use Topics    Alcohol use: Yes     Alcohol/week: 2.0 standard drinks     Types: 2 Shots of liquor per week     Comment: some days    Drug use: Not on file         (Not in a hospital admission)  Review of patient's allergies indicates:   Allergen Reactions    Hydrocodone-acetaminophen         Review of Systems   Constitutional: Negative.    Eyes: Negative.    Cardiovascular: Negative.    Gastrointestinal: Positive for abdominal pain.       Objective:      Vital Signs (Most Recent)  Temp: 97.9 °F (36.6 °C) (01/09/20 1319)  Pulse: 77 (01/09/20 1319)  Resp: 18 (01/09/20 1319)  BP: 121/67 (01/09/20 1319)  SpO2: 99 % (01/09/20 1319)    Vital Signs Range (Last 24H):  Temp:  [97.9 °F (36.6 °C)]   Pulse:  [77]   Resp:  [18]   BP: (121)/(67)   SpO2:  [99 %]     Physical Exam   HENT:   Head: Normocephalic.   Cardiovascular: Normal rate.   Pulmonary/Chest: Effort normal.   Abdominal: She exhibits distension.           Assessment:      Ascites    Plan:    Paracentesis    
[Negative] : Heme/Lymph

## 2022-10-19 ENCOUNTER — LAB VISIT (OUTPATIENT)
Dept: LAB | Facility: HOSPITAL | Age: 67
End: 2022-10-19
Attending: INTERNAL MEDICINE
Payer: MEDICARE

## 2022-10-19 DIAGNOSIS — N18.4 CHRONIC KIDNEY DISEASE (CKD), STAGE IV (SEVERE): ICD-10-CM

## 2022-10-19 LAB
ALBUMIN SERPL BCP-MCNC: 3.9 G/DL (ref 3.5–5.2)
ANION GAP SERPL CALC-SCNC: 13 MMOL/L (ref 8–16)
BASOPHILS # BLD AUTO: 0.02 K/UL (ref 0–0.2)
BASOPHILS NFR BLD: 0.4 % (ref 0–1.9)
BUN SERPL-MCNC: 7 MG/DL (ref 8–23)
CALCIUM SERPL-MCNC: 9.5 MG/DL (ref 8.7–10.5)
CHLORIDE SERPL-SCNC: 95 MMOL/L (ref 95–110)
CO2 SERPL-SCNC: 25 MMOL/L (ref 23–29)
CREAT SERPL-MCNC: 1 MG/DL (ref 0.5–1.4)
DIFFERENTIAL METHOD: ABNORMAL
EOSINOPHIL # BLD AUTO: 0.1 K/UL (ref 0–0.5)
EOSINOPHIL NFR BLD: 1.5 % (ref 0–8)
ERYTHROCYTE [DISTWIDTH] IN BLOOD BY AUTOMATED COUNT: 13.1 % (ref 11.5–14.5)
EST. GFR  (NO RACE VARIABLE): >60 ML/MIN/1.73 M^2
GLUCOSE SERPL-MCNC: 94 MG/DL (ref 70–110)
HCT VFR BLD AUTO: 43.6 % (ref 37–48.5)
HGB BLD-MCNC: 14 G/DL (ref 12–16)
IMM GRANULOCYTES # BLD AUTO: 0.03 K/UL (ref 0–0.04)
IMM GRANULOCYTES NFR BLD AUTO: 0.6 % (ref 0–0.5)
LYMPHOCYTES # BLD AUTO: 1.1 K/UL (ref 1–4.8)
LYMPHOCYTES NFR BLD: 19.9 % (ref 18–48)
MCH RBC QN AUTO: 31.9 PG (ref 27–31)
MCHC RBC AUTO-ENTMCNC: 32.1 G/DL (ref 32–36)
MCV RBC AUTO: 99 FL (ref 82–98)
MONOCYTES # BLD AUTO: 0.6 K/UL (ref 0.3–1)
MONOCYTES NFR BLD: 11.4 % (ref 4–15)
NEUTROPHILS # BLD AUTO: 3.5 K/UL (ref 1.8–7.7)
NEUTROPHILS NFR BLD: 66.2 % (ref 38–73)
NRBC BLD-RTO: 0 /100 WBC
PHOSPHATE SERPL-MCNC: 3.7 MG/DL (ref 2.7–4.5)
PLATELET # BLD AUTO: 147 K/UL (ref 150–450)
PMV BLD AUTO: 10.1 FL (ref 9.2–12.9)
POTASSIUM SERPL-SCNC: 4 MMOL/L (ref 3.5–5.1)
RBC # BLD AUTO: 4.39 M/UL (ref 4–5.4)
SODIUM SERPL-SCNC: 133 MMOL/L (ref 136–145)
WBC # BLD AUTO: 5.28 K/UL (ref 3.9–12.7)

## 2022-10-19 PROCEDURE — 85025 COMPLETE CBC W/AUTO DIFF WBC: CPT | Performed by: INTERNAL MEDICINE

## 2022-10-19 PROCEDURE — 80069 RENAL FUNCTION PANEL: CPT | Performed by: INTERNAL MEDICINE

## 2022-10-19 PROCEDURE — 36415 COLL VENOUS BLD VENIPUNCTURE: CPT | Mod: PO | Performed by: INTERNAL MEDICINE

## 2022-10-25 ENCOUNTER — OFFICE VISIT (OUTPATIENT)
Dept: HEPATOLOGY | Facility: CLINIC | Age: 67
End: 2022-10-25
Payer: MEDICARE

## 2022-10-25 VITALS
HEART RATE: 111 BPM | BODY MASS INDEX: 23.28 KG/M2 | HEIGHT: 65 IN | DIASTOLIC BLOOD PRESSURE: 78 MMHG | SYSTOLIC BLOOD PRESSURE: 130 MMHG | WEIGHT: 139.75 LBS

## 2022-10-25 DIAGNOSIS — I85.11 SECONDARY ESOPHAGEAL VARICES WITH BLEEDING: ICD-10-CM

## 2022-10-25 DIAGNOSIS — K70.30 ALCOHOLIC CIRRHOSIS OF LIVER WITHOUT ASCITES: Primary | ICD-10-CM

## 2022-10-25 PROCEDURE — 3288F PR FALLS RISK ASSESSMENT DOCUMENTED: ICD-10-PCS | Mod: CPTII,S$GLB,, | Performed by: NURSE PRACTITIONER

## 2022-10-25 PROCEDURE — 1126F AMNT PAIN NOTED NONE PRSNT: CPT | Mod: CPTII,S$GLB,, | Performed by: NURSE PRACTITIONER

## 2022-10-25 PROCEDURE — 1101F PT FALLS ASSESS-DOCD LE1/YR: CPT | Mod: CPTII,S$GLB,, | Performed by: NURSE PRACTITIONER

## 2022-10-25 PROCEDURE — 1159F PR MEDICATION LIST DOCUMENTED IN MEDICAL RECORD: ICD-10-PCS | Mod: CPTII,S$GLB,, | Performed by: NURSE PRACTITIONER

## 2022-10-25 PROCEDURE — 99214 PR OFFICE/OUTPT VISIT, EST, LEVL IV, 30-39 MIN: ICD-10-PCS | Mod: S$GLB,,, | Performed by: NURSE PRACTITIONER

## 2022-10-25 PROCEDURE — 1101F PR PT FALLS ASSESS DOC 0-1 FALLS W/OUT INJ PAST YR: ICD-10-PCS | Mod: CPTII,S$GLB,, | Performed by: NURSE PRACTITIONER

## 2022-10-25 PROCEDURE — 3288F FALL RISK ASSESSMENT DOCD: CPT | Mod: CPTII,S$GLB,, | Performed by: NURSE PRACTITIONER

## 2022-10-25 PROCEDURE — 3078F DIAST BP <80 MM HG: CPT | Mod: CPTII,S$GLB,, | Performed by: NURSE PRACTITIONER

## 2022-10-25 PROCEDURE — 99999 PR PBB SHADOW E&M-EST. PATIENT-LVL III: ICD-10-PCS | Mod: PBBFAC,,, | Performed by: NURSE PRACTITIONER

## 2022-10-25 PROCEDURE — 99214 OFFICE O/P EST MOD 30 MIN: CPT | Mod: S$GLB,,, | Performed by: NURSE PRACTITIONER

## 2022-10-25 PROCEDURE — 1126F PR PAIN SEVERITY QUANTIFIED, NO PAIN PRESENT: ICD-10-PCS | Mod: CPTII,S$GLB,, | Performed by: NURSE PRACTITIONER

## 2022-10-25 PROCEDURE — 3078F PR MOST RECENT DIASTOLIC BLOOD PRESSURE < 80 MM HG: ICD-10-PCS | Mod: CPTII,S$GLB,, | Performed by: NURSE PRACTITIONER

## 2022-10-25 PROCEDURE — 3075F PR MOST RECENT SYSTOLIC BLOOD PRESS GE 130-139MM HG: ICD-10-PCS | Mod: CPTII,S$GLB,, | Performed by: NURSE PRACTITIONER

## 2022-10-25 PROCEDURE — 1159F MED LIST DOCD IN RCRD: CPT | Mod: CPTII,S$GLB,, | Performed by: NURSE PRACTITIONER

## 2022-10-25 PROCEDURE — 99999 PR PBB SHADOW E&M-EST. PATIENT-LVL III: CPT | Mod: PBBFAC,,, | Performed by: NURSE PRACTITIONER

## 2022-10-25 PROCEDURE — 3075F SYST BP GE 130 - 139MM HG: CPT | Mod: CPTII,S$GLB,, | Performed by: NURSE PRACTITIONER

## 2022-10-25 NOTE — PROGRESS NOTES
Clinic Follow Up:  [facility] Follow Up Note    Reason for Follow Up:  The primary encounter diagnosis was Alcoholic cirrhosis of liver without ascites. A diagnosis of Secondary esophageal varices with bleeding was also pertinent to this visit.    PCP: Santos Beaver       HPI:  This is a 66 y.o. female here for follow up of the above  Pt states that she has been feeling overall well without any new complaints or decompensating events  Has appt with nephrology tomorrow  Due for MELD labs and HCC screening  Recent EGD with small, non-bleeding EV  No upper GI bleeding.  No ascites or BLE.  No overt confusion.      Review of Systems   Constitutional:  Negative for chills, fever, malaise/fatigue and weight loss.   Respiratory:  Negative for cough.    Cardiovascular:  Negative for chest pain.   Gastrointestinal:         Per HPI   Musculoskeletal:  Negative for myalgias.   Skin:  Negative for itching and rash.   Neurological:  Negative for headaches.   Psychiatric/Behavioral:  The patient is not nervous/anxious.      Medical History:  Past Medical History:   Diagnosis Date    Alcoholic cirrhosis     Alcoholic dementia     Anemia     Ascites     CKD (chronic kidney disease)     Esophageal reflux     Esophageal varices     Hepatic encephalopathy     Hiatal hernia        Surgical History:   Past Surgical History:   Procedure Laterality Date    COLONOSCOPY      ESOPHAGOGASTRODUODENOSCOPY      ESOPHAGOGASTRODUODENOSCOPY N/A 1/6/2020    Procedure: EGD (ESOPHAGOGASTRODUODENOSCOPY);  Surgeon: Monique Wood MD;  Location: [facility];  Service: Endoscopy;  Laterality: N/A;    ESOPHAGOGASTRODUODENOSCOPY N/A 5/26/2021    Procedure: EGD (ESOPHAGOGASTRODUODENOSCOPY) EV surveillance;  Surgeon: Monique Wood MD;  Location: [facility];  Service: Endoscopy;  Laterality: N/A;    ESOPHAGOGASTRODUODENOSCOPY N/A 9/29/2022    Procedure: EGD (ESOPHAGOGASTRODUODENOSCOPY) EV surveillance;  Surgeon: Flor Roper,  "MD;  Location: [facility] ENDO;  Service: Endoscopy;  Laterality: N/A;    HYSTERECTOMY      REPAIR OF INCARCERATED UMBILICAL HERNIA N/A 10/27/2020    Procedure: REPAIR, HERNIA, UMBILICAL, INCARCERATED, AGE 5 YEARS OR OLDER;  Surgeon: Shahbaz Oliva MD;  Location: [facility] OR;  Service: General;  Laterality: N/A;       Family History:   Family History   Problem Relation Age of Onset    Cancer Mother     Arthritis Mother        Social History:   Social History     Tobacco Use    Smoking status: Never    Smokeless tobacco: Never   Substance Use Topics    Alcohol use: Yes     Alcohol/week: 2.0 standard drinks     Types: 2 Shots of liquor per week     Comment: some days    Drug use: Not Currently       Allergies: Reviewed    Home Medications:  Current Outpatient Medications on File Prior to Visit   Medication Sig Dispense Refill    FLUoxetine 20 MG capsule Take 20 mg by mouth once daily.      furosemide (LASIX) 20 MG tablet Take 1 tablet (20 mg total) by mouth once daily. 60 tablet 12    magnesium oxide (MAG-OX) 400 mg (241.3 mg magnesium) tablet Take 400 mg by mouth once daily.      multivitamin (THERAGRAN) per tablet Take 1 tablet by mouth once daily.      pantoprazole (PROTONIX) 20 MG tablet TAKE 1 TABLET(20 MG) BY MOUTH TWICE DAILY BEFORE MEALS 180 tablet 0    rifAXIMin (XIFAXAN) 550 mg Tab Take 1 tablet (550 mg total) by mouth 2 (two) times daily. 60 tablet 5    spironolactone (ALDACTONE) 25 MG tablet Take 1 tablet (25 mg total) by mouth once daily. 30 tablet 11    thiamine (VITAMIN B-1) 50 MG tablet Take 1 tablet (50 mg total) by mouth once daily. 30 tablet 0    folic acid (FOLVITE) 1 MG tablet Take 1 tablet (1 mg total) by mouth once daily. 30 tablet 0     No current facility-administered medications on file prior to visit.       Physical Exam:  Vital Signs:  /78 (BP Location: Left arm, Patient Position: Sitting, BP Method: Medium (Manual))   Pulse (!) 111   Ht 5' 5" (1.651 m)   Wt 63.4 kg (139 lb 12.4 oz)   " BMI 23.26 kg/m²   Body mass index is 23.26 kg/m².  Physical Exam  Vitals reviewed.   Constitutional:       Appearance: She is well-developed.   HENT:      Head: Normocephalic.   Eyes:      General: No scleral icterus.  Cardiovascular:      Rate and Rhythm: Normal rate.   Pulmonary:      Effort: Pulmonary effort is normal.   Abdominal:      General: There is no distension.   Musculoskeletal:         General: No swelling. Normal range of motion.      Cervical back: Normal range of motion.   Skin:     General: Skin is dry.   Neurological:      Mental Status: She is alert and oriented to person, place, and time.       Labs: Pertinent labs reviewed.  MELD-Na score: 21 at 4/18/2022 10:33 AM  MELD score: 13 at 4/18/2022 10:33 AM  Calculated from:  Serum Creatinine: 1.4 mg/dL at 4/18/2022 10:33 AM  Serum Sodium: 128 mmol/L at 4/18/2022 10:33 AM  Total Bilirubin: 1.2 mg/dL at 4/18/2022 10:33 AM  INR(ratio): 1.3 at 4/18/2022 10:33 AM  Age: 66 years  EV: EGD 2022 with grade I ev, non-bleeding, repeat 2 years  HCC: due for AFP and US.       Assessment:  1. Alcoholic cirrhosis of liver without ascites    2. Secondary esophageal varices with bleeding        Recommendations:  Stable without decompensating events  - will get updated MELD labs and HCC screening now and every 6 months  - Continue with F/U with nephrology as planned  - repeat EGD for EV surveillance in 2 years     Return to Clinic:  6 months with pre-clinic labs and imaging.

## 2022-10-26 ENCOUNTER — PATIENT MESSAGE (OUTPATIENT)
Dept: HEPATOLOGY | Facility: CLINIC | Age: 67
End: 2022-10-26
Payer: MEDICARE

## 2022-10-26 ENCOUNTER — OFFICE VISIT (OUTPATIENT)
Dept: NEPHROLOGY | Facility: CLINIC | Age: 67
End: 2022-10-26
Payer: MEDICARE

## 2022-10-26 ENCOUNTER — HOSPITAL ENCOUNTER (OUTPATIENT)
Dept: RADIOLOGY | Facility: HOSPITAL | Age: 67
Discharge: HOME | End: 2022-10-26
Attending: NURSE PRACTITIONER
Payer: MEDICARE

## 2022-10-26 VITALS
HEIGHT: 65 IN | BODY MASS INDEX: 23.21 KG/M2 | SYSTOLIC BLOOD PRESSURE: 130 MMHG | DIASTOLIC BLOOD PRESSURE: 100 MMHG | RESPIRATION RATE: 20 BRPM | HEART RATE: 90 BPM | WEIGHT: 139.31 LBS

## 2022-10-26 DIAGNOSIS — K70.9 ALCOHOLIC LIVER DISEASE: Primary | ICD-10-CM

## 2022-10-26 DIAGNOSIS — I85.11 SECONDARY ESOPHAGEAL VARICES WITH BLEEDING: ICD-10-CM

## 2022-10-26 DIAGNOSIS — K70.30 ALCOHOLIC CIRRHOSIS OF LIVER WITHOUT ASCITES: ICD-10-CM

## 2022-10-26 DIAGNOSIS — E87.1 HYPONATREMIA: ICD-10-CM

## 2022-10-26 PROCEDURE — 1159F PR MEDICATION LIST DOCUMENTED IN MEDICAL RECORD: ICD-10-PCS | Mod: CPTII,S$GLB,, | Performed by: INTERNAL MEDICINE

## 2022-10-26 PROCEDURE — 99999 PR PBB SHADOW E&M-EST. PATIENT-LVL III: CPT | Mod: PBBFAC,,, | Performed by: INTERNAL MEDICINE

## 2022-10-26 PROCEDURE — 99999 PR PBB SHADOW E&M-EST. PATIENT-LVL III: ICD-10-PCS | Mod: PBBFAC,,, | Performed by: INTERNAL MEDICINE

## 2022-10-26 PROCEDURE — 1159F MED LIST DOCD IN RCRD: CPT | Mod: CPTII,S$GLB,, | Performed by: INTERNAL MEDICINE

## 2022-10-26 PROCEDURE — 76705 ECHO EXAM OF ABDOMEN: CPT | Mod: TC,PN

## 2022-10-26 PROCEDURE — 1126F AMNT PAIN NOTED NONE PRSNT: CPT | Mod: CPTII,S$GLB,, | Performed by: INTERNAL MEDICINE

## 2022-10-26 PROCEDURE — 99215 PR OFFICE/OUTPT VISIT, EST, LEVL V, 40-54 MIN: ICD-10-PCS | Mod: S$GLB,,, | Performed by: INTERNAL MEDICINE

## 2022-10-26 PROCEDURE — 99215 OFFICE O/P EST HI 40 MIN: CPT | Mod: S$GLB,,, | Performed by: INTERNAL MEDICINE

## 2022-10-26 PROCEDURE — 1126F PR PAIN SEVERITY QUANTIFIED, NO PAIN PRESENT: ICD-10-PCS | Mod: CPTII,S$GLB,, | Performed by: INTERNAL MEDICINE

## 2022-10-26 PROCEDURE — 3075F PR MOST RECENT SYSTOLIC BLOOD PRESS GE 130-139MM HG: ICD-10-PCS | Mod: CPTII,S$GLB,, | Performed by: INTERNAL MEDICINE

## 2022-10-26 PROCEDURE — 3080F DIAST BP >= 90 MM HG: CPT | Mod: CPTII,S$GLB,, | Performed by: INTERNAL MEDICINE

## 2022-10-26 PROCEDURE — 3080F PR MOST RECENT DIASTOLIC BLOOD PRESSURE >= 90 MM HG: ICD-10-PCS | Mod: CPTII,S$GLB,, | Performed by: INTERNAL MEDICINE

## 2022-10-26 PROCEDURE — 3075F SYST BP GE 130 - 139MM HG: CPT | Mod: CPTII,S$GLB,, | Performed by: INTERNAL MEDICINE

## 2022-10-26 PROCEDURE — 3066F PR DOCUMENTATION OF TREATMENT FOR NEPHROPATHY: ICD-10-PCS | Mod: CPTII,S$GLB,, | Performed by: INTERNAL MEDICINE

## 2022-10-26 PROCEDURE — 3066F NEPHROPATHY DOC TX: CPT | Mod: CPTII,S$GLB,, | Performed by: INTERNAL MEDICINE

## 2022-10-26 RX ORDER — ASCORBIC ACID 250 MG
250 TABLET ORAL DAILY
COMMUNITY

## 2022-10-26 RX ORDER — LACTULOSE 10 G/10G
10 SOLUTION ORAL DAILY
COMMUNITY

## 2022-10-26 RX ORDER — ERGOCALCIFEROL 1.25 MG/1
50000 CAPSULE ORAL
COMMUNITY

## 2022-10-26 NOTE — PROGRESS NOTES
Renal clinic consult note:  Date of pt visit: 10/26/22  Reason for f/u and chief c/o: prior DARRIAN. Suspected hepatorenal syndrome  Referring provider: Louise Álvarez NP     HPI: Pt is a 67 y/o female with h/o of advanced liver disease due to alcoholism who presents for f/u. Pt was last seen by us in Feb 2020 and was lost to f/u. Pt has had multiple fluctuations in s Cr (DARRIAN), suspected related to hemodynamic changes, fluid shifts due to liver disease, large 3rd spacing, and the use of diuretics. She may have hepatorenal syndrome. In the past she needed paracentesis about every 1-2 weeks. More recently, fluid gain was controlled and paracentesis intervals were increased. Pt is on aldactone 25 mg po qd to assist with the liver issues and her secondary hyperaldosteronism.      Pt presents with her regular care giver today for f/u. Pt has no medical c/o's, no increase in abdominal girth, no abdominal pain, no fever, no SOB, no leg swelling, no acute or new c/o's. Pt has controlled salt and fluid intake. Appears to say that she is still drinking alcohol.    To also review: pt has had several episodes of hepatic decompensation, requiting hospital admissions. She has a h/o of hepatic encephalopathy, varices with GI bleed, and ascites requiring frequent paracentesis.      PAST MEDICAL HISTORY:  DARRIAN (suspect hepatorenal syndrome, hypotension, Alcoholic cirrhosis, Alcoholic dementia, Anemia, Ascites, CKD (chronic kidney disease), Esophageal reflux, Esophageal varices, Hepatic encephalopathy, and Hiatal hernia.     PAST SURGICAL HISTORY:  She  has a past surgical history that includes Hysterectomy; Esophagogastroduodenoscopy; and Colonoscopy.     SOCIAL HISTORY:  She  reports that she has never smoked. She has never used smokeless tobacco. She reports that she drank alcohol.     FAMILY MEDICAL HISTORY:  Her family history includes Arthritis in her mother; Cancer in her mother.             Review of patient's allergies indicates:    Allergen Reactions    Hydrocodone-acetaminophen        Meds reviewed     Current Outpatient Medications:     ascorbic acid, vitamin C, (VITAMIN C) 250 MG tablet, Take 250 mg by mouth once daily., Disp: , Rfl:     ergocalciferol (VITAMIN D2) 50,000 unit Cap, Take 50,000 Units by mouth every 7 days., Disp: , Rfl:     FLUoxetine 20 MG capsule, Take 20 mg by mouth once daily., Disp: , Rfl:     folic acid (FOLVITE) 1 MG tablet, Take 1 tablet (1 mg total) by mouth once daily., Disp: 30 tablet, Rfl: 0    lactulose (CEPHULAC) 10 gram packet, Take 10 g by mouth once daily., Disp: , Rfl:     magnesium oxide (MAG-OX) 400 mg (241.3 mg magnesium) tablet, Take 400 mg by mouth once daily., Disp: , Rfl:     multivitamin (THERAGRAN) per tablet, Take 1 tablet by mouth once daily., Disp: , Rfl:     pantoprazole (PROTONIX) 20 MG tablet, TAKE 1 TABLET(20 MG) BY MOUTH TWICE DAILY BEFORE MEALS, Disp: 180 tablet, Rfl: 0    rifAXIMin (XIFAXAN) 550 mg Tab, Take 1 tablet (550 mg total) by mouth 2 (two) times daily., Disp: 60 tablet, Rfl: 5    thiamine (VITAMIN B-1) 50 MG tablet, Take 1 tablet (50 mg total) by mouth once daily., Disp: 30 tablet, Rfl: 0    furosemide (LASIX) 20 MG tablet, Take 1 tablet (20 mg total) by mouth once daily. (Patient not taking: Reported on 10/26/2022), Disp: 60 tablet, Rfl: 12    spironolactone (ALDACTONE) 25 MG tablet, Take 1 tablet (25 mg total) by mouth once daily. (Patient not taking: Reported on 10/26/2022), Disp: 30 tablet, Rfl: 11      REVIEW OF SYSTEMS:  Patient has no fever, fatigue, visual changes, chest pain, edema, cough, dyspnea, nausea, vomiting, constipation, diarrhea, arthralgias, pruritis, dizziness, weakness, depression, confusion.     PHYSICAL EXAM:  Blood pressure 130/100, pulse 90, weight 139 lbs, compared to 153 lbs 2 years ago  Gen:    WDWN female in no apparent distress  Psych: Normal mood and affect  Skin:    No rashes or ulcers  Neck:   No JVD  Chest:  Clear with no rales, rhonchi,  wheezing with normal effort  CV:      Regular with no murmurs, gallops or rubs  Abd:     Soft, nontender, + distension/ascites (not as much as last visit), positive bowel sounds  Ext:      trace edema     Labs: reviewed  BMP  Lab Results   Component Value Date     (L) 10/19/2022    K 4.0 10/19/2022    CL 95 10/19/2022    CO2 25 10/19/2022    BUN 7 (L) 10/19/2022    CREATININE 1.0 10/19/2022    CALCIUM 9.5 10/19/2022    ANIONGAP 13 10/19/2022    ESTGFRAFRICA 41 (A) 04/25/2022    EGFRNONAA 36 (A) 04/25/2022     Lab Results   Component Value Date    WBC 6.38 10/26/2022    HGB 13.4 10/26/2022    HCT 40.0 10/26/2022    MCV 98 10/26/2022     (L) 10/26/2022              IMPRESSION AND RECOMMENDATIONS: 67 y/o female with multiple prior DARRIAN episodes and fluctuations in s Cr and h/o of alcoholic cirrhosis presents for f/u:     1. Renal: currently, pt is doing well from the renal view  s Cr is in the normal range. Stable real function. No DARRIAN at present  Past DARRIAN likely due to fluid shift causing prerenal azotemia  DDX: Has hepatic disease, may have hepatorenal syndrome  Decreased reported eGFR not clinically relevant     Hypotension: BP was low in the past, due to liver disease (splanchnic vasodilation)  BP currently stable, normal  Diastolic HTN noted     K: normal. Tolerating aldactone well, continue  It appears that aldactone is helping the pt  Abdominal girth increase not occurring as fast     Hyponatremia: dilutional, stable, not wosre  Due to liver disease, stable with lasix, continue  Advised again to keep salt and fluid intake low     2. Hypotension: as above  BP normal to low as expected with liver disease and HRS  Low BP may limit the use of diuretics     3. Liver disease. in the past required multiple large volume paracenteses (once per week, 7-8 L each)  Appears to have done much better lately  No significant increase in abdominal girth noted today  No significant other 3rd spacing present today  No  recent paracentesis required, continue  Continue spironolactone. See above  Advised pt to keep salt and fluid intake low     4. Anemia: Hgb has improved  Likely dilutional in the past     Plans and recommendations:  As discussed above  Opportunity for questions provided with pt and care giver  Will need close f/u   RTC 1 year or sooner as needed.  Total time spent 40 minutes including time needed to review the records, the   patient evaluation, documentation, face-to-face discussion with the patient,   more than 50% of the time was spent on coordination of care and counseling.    Level V visit.     Hector Sanchez MD

## 2022-10-27 DIAGNOSIS — E87.6 HYPOKALEMIA: Primary | ICD-10-CM

## 2022-10-28 ENCOUNTER — PATIENT MESSAGE (OUTPATIENT)
Dept: HEPATOLOGY | Facility: CLINIC | Age: 67
End: 2022-10-28
Payer: MEDICARE

## 2022-11-01 ENCOUNTER — LAB VISIT (OUTPATIENT)
Dept: LAB | Facility: HOSPITAL | Age: 67
End: 2022-11-01
Attending: NURSE PRACTITIONER
Payer: MEDICARE

## 2022-11-01 DIAGNOSIS — E87.6 HYPOKALEMIA: ICD-10-CM

## 2022-11-01 LAB — POTASSIUM SERPL-SCNC: 3.3 MMOL/L (ref 3.5–5.1)

## 2022-11-01 PROCEDURE — 36415 COLL VENOUS BLD VENIPUNCTURE: CPT | Mod: PN | Performed by: NURSE PRACTITIONER

## 2022-11-01 PROCEDURE — 84132 ASSAY OF SERUM POTASSIUM: CPT | Performed by: NURSE PRACTITIONER

## 2022-12-08 ENCOUNTER — OFFICE VISIT (OUTPATIENT)
Dept: OPHTHALMOLOGY | Facility: CLINIC | Age: 67
End: 2022-12-08
Payer: MEDICARE

## 2022-12-08 DIAGNOSIS — H00.011 HORDEOLUM EXTERNUM OF RIGHT UPPER EYELID: ICD-10-CM

## 2022-12-08 DIAGNOSIS — H01.01A ULCERATIVE BLEPHARITIS OF UPPER AND LOWER EYELIDS OF BOTH EYES: Primary | ICD-10-CM

## 2022-12-08 DIAGNOSIS — H01.01B ULCERATIVE BLEPHARITIS OF UPPER AND LOWER EYELIDS OF BOTH EYES: Primary | ICD-10-CM

## 2022-12-08 PROCEDURE — 99999 PR PBB SHADOW E&M-EST. PATIENT-LVL I: ICD-10-PCS | Mod: PBBFAC,,, | Performed by: OPTOMETRIST

## 2022-12-08 PROCEDURE — 99203 OFFICE O/P NEW LOW 30 MIN: CPT | Mod: S$GLB,,, | Performed by: OPTOMETRIST

## 2022-12-08 PROCEDURE — 3066F PR DOCUMENTATION OF TREATMENT FOR NEPHROPATHY: ICD-10-PCS | Mod: CPTII,S$GLB,, | Performed by: OPTOMETRIST

## 2022-12-08 PROCEDURE — 99999 PR PBB SHADOW E&M-EST. PATIENT-LVL I: CPT | Mod: PBBFAC,,, | Performed by: OPTOMETRIST

## 2022-12-08 PROCEDURE — 3066F NEPHROPATHY DOC TX: CPT | Mod: CPTII,S$GLB,, | Performed by: OPTOMETRIST

## 2022-12-08 PROCEDURE — 99203 PR OFFICE/OUTPT VISIT, NEW, LEVL III, 30-44 MIN: ICD-10-PCS | Mod: S$GLB,,, | Performed by: OPTOMETRIST

## 2022-12-08 RX ORDER — AMOXICILLIN AND CLAVULANATE POTASSIUM 875; 125 MG/1; MG/1
1 TABLET, FILM COATED ORAL 2 TIMES DAILY
Qty: 20 TABLET | Refills: 0 | Status: SHIPPED | OUTPATIENT
Start: 2022-12-08 | End: 2022-12-18

## 2022-12-08 RX ORDER — NEOMYCIN SULFATE, POLYMYXIN B SULFATE, AND DEXAMETHASONE 3.5; 10000; 1 MG/G; [USP'U]/G; MG/G
OINTMENT OPHTHALMIC
Qty: 3.5 G | Refills: 0 | Status: SHIPPED | OUTPATIENT
Start: 2022-12-08 | End: 2023-01-17 | Stop reason: ALTCHOICE

## 2022-12-08 NOTE — PROGRESS NOTES
HPI    67 year old female presents for swollen eyelids started on right eye and   traveled to left eye, patient states she noted mucous discharge , started   5-6 days prior. Patient states eye pain 6/10. Not currently using any   drops or ointments. Currently using warm compresses for both eyes.   Last edited by Parveen Ramirez, OD on 12/8/2022  2:33 PM.            Assessment /Plan     For exam results, see Encounter Report.    Ulcerative blepharitis of upper and lower eyelids of both eyes  -     neomycin-polymyxin-dexamethasone (MAXITROL) 3.5 mg/g-10,000 unit/g-0.1 % Oint; Apply ointment to lids and lashes 3 times daily for 10 days  Dispense: 3.5 g; Refill: 0    Hordeolum externum of right upper eyelid  -     amoxicillin-clavulanate 875-125mg (AUGMENTIN) 875-125 mg per tablet; Take 1 tablet by mouth 2 (two) times daily. for 10 days  Dispense: 20 tablet; Refill: 0    No EOM restriction or APD involvement, start Augmentin  bid with warm compresses and maxitrol dre tid to lids and lashes.       RTC 4 days for lid f/u with Dr BROWN sooner if any changes to vision or worsening symptoms.

## 2022-12-12 ENCOUNTER — OFFICE VISIT (OUTPATIENT)
Dept: OPHTHALMOLOGY | Facility: CLINIC | Age: 67
End: 2022-12-12
Payer: MEDICARE

## 2022-12-12 DIAGNOSIS — H01.02B SQUAMOUS BLEPHARITIS OF UPPER AND LOWER EYELIDS OF BOTH EYES: ICD-10-CM

## 2022-12-12 DIAGNOSIS — H01.02A SQUAMOUS BLEPHARITIS OF UPPER AND LOWER EYELIDS OF BOTH EYES: ICD-10-CM

## 2022-12-12 DIAGNOSIS — H02.59 FLOPPY EYELID SYNDROME OF BOTH EYES: ICD-10-CM

## 2022-12-12 DIAGNOSIS — M35.01 KERATOCONJUNCTIVITIS SICCA: Primary | ICD-10-CM

## 2022-12-12 PROCEDURE — 1159F PR MEDICATION LIST DOCUMENTED IN MEDICAL RECORD: ICD-10-PCS | Mod: CPTII,S$GLB,, | Performed by: STUDENT IN AN ORGANIZED HEALTH CARE EDUCATION/TRAINING PROGRAM

## 2022-12-12 PROCEDURE — 1160F PR REVIEW ALL MEDS BY PRESCRIBER/CLIN PHARMACIST DOCUMENTED: ICD-10-PCS | Mod: CPTII,S$GLB,, | Performed by: STUDENT IN AN ORGANIZED HEALTH CARE EDUCATION/TRAINING PROGRAM

## 2022-12-12 PROCEDURE — 99204 PR OFFICE/OUTPT VISIT, NEW, LEVL IV, 45-59 MIN: ICD-10-PCS | Mod: S$GLB,,, | Performed by: STUDENT IN AN ORGANIZED HEALTH CARE EDUCATION/TRAINING PROGRAM

## 2022-12-12 PROCEDURE — 99204 OFFICE O/P NEW MOD 45 MIN: CPT | Mod: S$GLB,,, | Performed by: STUDENT IN AN ORGANIZED HEALTH CARE EDUCATION/TRAINING PROGRAM

## 2022-12-12 PROCEDURE — 3066F NEPHROPATHY DOC TX: CPT | Mod: CPTII,S$GLB,, | Performed by: STUDENT IN AN ORGANIZED HEALTH CARE EDUCATION/TRAINING PROGRAM

## 2022-12-12 PROCEDURE — 99999 PR PBB SHADOW E&M-EST. PATIENT-LVL III: CPT | Mod: PBBFAC,,, | Performed by: STUDENT IN AN ORGANIZED HEALTH CARE EDUCATION/TRAINING PROGRAM

## 2022-12-12 PROCEDURE — 1125F PR PAIN SEVERITY QUANTIFIED, PAIN PRESENT: ICD-10-PCS | Mod: CPTII,S$GLB,, | Performed by: STUDENT IN AN ORGANIZED HEALTH CARE EDUCATION/TRAINING PROGRAM

## 2022-12-12 PROCEDURE — 1159F MED LIST DOCD IN RCRD: CPT | Mod: CPTII,S$GLB,, | Performed by: STUDENT IN AN ORGANIZED HEALTH CARE EDUCATION/TRAINING PROGRAM

## 2022-12-12 PROCEDURE — 99999 PR PBB SHADOW E&M-EST. PATIENT-LVL III: ICD-10-PCS | Mod: PBBFAC,,, | Performed by: STUDENT IN AN ORGANIZED HEALTH CARE EDUCATION/TRAINING PROGRAM

## 2022-12-12 PROCEDURE — 1160F RVW MEDS BY RX/DR IN RCRD: CPT | Mod: CPTII,S$GLB,, | Performed by: STUDENT IN AN ORGANIZED HEALTH CARE EDUCATION/TRAINING PROGRAM

## 2022-12-12 PROCEDURE — 3066F PR DOCUMENTATION OF TREATMENT FOR NEPHROPATHY: ICD-10-PCS | Mod: CPTII,S$GLB,, | Performed by: STUDENT IN AN ORGANIZED HEALTH CARE EDUCATION/TRAINING PROGRAM

## 2022-12-12 PROCEDURE — 1125F AMNT PAIN NOTED PAIN PRSNT: CPT | Mod: CPTII,S$GLB,, | Performed by: STUDENT IN AN ORGANIZED HEALTH CARE EDUCATION/TRAINING PROGRAM

## 2022-12-12 NOTE — PROGRESS NOTES
HPI     Eye Problem     Additional comments: Follow-up for Ulcerative blepharitis of upper and   lower eyelids of both eyes. VA is blurry. Having some eye pain. On   ointment and antibiotic.           Comments    MAXITROL  AUGMENTIN) 875-125 mg per tablet            Last edited by Alexandro Barreto on 12/12/2022 12:12 PM.            Assessment /Plan     For exam results, see Encounter Report.    Keratoconjunctivitis sicca- - ATs QID and lid hygiene w/ baby shampoo  - Krypton 3 Fish Oils    Squamous blepharitis of upper and lower eyelids of both eyes-   Continue Maxitrol Oint. Until out  Continue Augmentin until out     Floppy eyelid syndrome of both eyes- Sending note to PCP for sleep study due to floppy eyelid syndrome       Return to clinic in 4 week R/C W/ NUNOT

## 2023-01-17 ENCOUNTER — OFFICE VISIT (OUTPATIENT)
Dept: OPHTHALMOLOGY | Facility: CLINIC | Age: 68
End: 2023-01-17
Payer: MEDICARE

## 2023-01-17 DIAGNOSIS — H01.014 ULCERATIVE BLEPHARITIS OF UPPER EYELIDS OF BOTH EYES: Primary | ICD-10-CM

## 2023-01-17 DIAGNOSIS — M35.01 KERATOCONJUNCTIVITIS SICCA: ICD-10-CM

## 2023-01-17 DIAGNOSIS — H01.011 ULCERATIVE BLEPHARITIS OF UPPER EYELIDS OF BOTH EYES: Primary | ICD-10-CM

## 2023-01-17 PROCEDURE — 99213 PR OFFICE/OUTPT VISIT, EST, LEVL III, 20-29 MIN: ICD-10-PCS | Mod: S$GLB,,, | Performed by: OPTOMETRIST

## 2023-01-17 PROCEDURE — 1159F PR MEDICATION LIST DOCUMENTED IN MEDICAL RECORD: ICD-10-PCS | Mod: CPTII,S$GLB,, | Performed by: OPTOMETRIST

## 2023-01-17 PROCEDURE — 99999 PR PBB SHADOW E&M-EST. PATIENT-LVL II: CPT | Mod: PBBFAC,,, | Performed by: OPTOMETRIST

## 2023-01-17 PROCEDURE — 99999 PR PBB SHADOW E&M-EST. PATIENT-LVL II: ICD-10-PCS | Mod: PBBFAC,,, | Performed by: OPTOMETRIST

## 2023-01-17 PROCEDURE — 99213 OFFICE O/P EST LOW 20 MIN: CPT | Mod: S$GLB,,, | Performed by: OPTOMETRIST

## 2023-01-17 PROCEDURE — 1159F MED LIST DOCD IN RCRD: CPT | Mod: CPTII,S$GLB,, | Performed by: OPTOMETRIST

## 2023-01-17 RX ORDER — ERYTHROMYCIN 5 MG/G
OINTMENT OPHTHALMIC
Qty: 3.5 G | Refills: 0 | Status: SHIPPED | OUTPATIENT
Start: 2023-01-17 | End: 2023-10-18

## 2023-01-17 NOTE — PROGRESS NOTES
HPI    Patient here today for 1 month blepharitis recheck  Patient states eyes are irritated still  Vision blurred        Last edited by Bere Alexander, PCT on 1/17/2023  9:37 AM.            Assessment /Plan     For exam results, see Encounter Report.    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  Dispense: 3.5 g; Refill: 0  Improving, continue warm compresses with lid hygiene BID with Omega 3 fish Oils. Start Erythromycin dre bid to lids and lashes for  10 days.     Keratoconjunctivitis sicca  Discussed warm compresses twice daily and lid hygiene, with preservative free artificial tears instillation four times daily. Patient to return to clinic if no improvement in symptoms with current treatment.     RTC 4 weeks annual exam with DFE sooner if any changes to vision or worsening symptoms.

## 2023-02-14 ENCOUNTER — OFFICE VISIT (OUTPATIENT)
Dept: OPHTHALMOLOGY | Facility: CLINIC | Age: 68
End: 2023-02-14
Payer: MEDICARE

## 2023-02-14 DIAGNOSIS — H52.31 ANISOMETROPIA: ICD-10-CM

## 2023-02-14 DIAGNOSIS — H01.014 ULCERATIVE BLEPHARITIS OF UPPER EYELIDS OF BOTH EYES: ICD-10-CM

## 2023-02-14 DIAGNOSIS — H01.011 ULCERATIVE BLEPHARITIS OF UPPER EYELIDS OF BOTH EYES: ICD-10-CM

## 2023-02-14 DIAGNOSIS — H25.813 COMBINED FORM OF AGE-RELATED CATARACT, BOTH EYES: ICD-10-CM

## 2023-02-14 DIAGNOSIS — H10.13 ALLERGIC CONJUNCTIVITIS OF BOTH EYES: Primary | ICD-10-CM

## 2023-02-14 PROCEDURE — 1159F PR MEDICATION LIST DOCUMENTED IN MEDICAL RECORD: ICD-10-PCS | Mod: CPTII,S$GLB,, | Performed by: OPTOMETRIST

## 2023-02-14 PROCEDURE — 92014 PR EYE EXAM, EST PATIENT,COMPREHESV: ICD-10-PCS | Mod: S$GLB,,, | Performed by: OPTOMETRIST

## 2023-02-14 PROCEDURE — 99999 PR PBB SHADOW E&M-EST. PATIENT-LVL II: ICD-10-PCS | Mod: PBBFAC,,, | Performed by: OPTOMETRIST

## 2023-02-14 PROCEDURE — 1159F MED LIST DOCD IN RCRD: CPT | Mod: CPTII,S$GLB,, | Performed by: OPTOMETRIST

## 2023-02-14 PROCEDURE — 99999 PR PBB SHADOW E&M-EST. PATIENT-LVL II: CPT | Mod: PBBFAC,,, | Performed by: OPTOMETRIST

## 2023-02-14 PROCEDURE — 92014 COMPRE OPH EXAM EST PT 1/>: CPT | Mod: S$GLB,,, | Performed by: OPTOMETRIST

## 2023-02-14 NOTE — PROGRESS NOTES
HPI    Patient here today for yearly eye exam  Vision changes since last eye exam?: None noticed  Wears OTC readers     Any eye pain today: No    Other ocular symptoms: Itchy, red, and irritated eyes    Interested in contact lens fitting today? No              Last edited by Bere Alexander, PCT on 2/14/2023 10:32 AM.            Assessment /Plan     For exam results, see Encounter Report.    Allergic conjunctivitis of both eyes  Recommend 1gtt pataday QAM for itching.     Combined form of age-related cataract, both eyes  Visually significant, gave patient option of CE/IOL eval versus observation. Patient elect for observation at this time.     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 Patient to return to clinic if no improvement in symptoms with current treatment.     Anisometropia  With possible Amblyopia component in OD, patient denies being patched as a child, but states OD has been weaker than OS. Observe at this time.     RTC 1 year for CEE with DFE sooner if any changes to vision or worsening symptoms.

## 2023-04-18 ENCOUNTER — HOSPITAL ENCOUNTER (OUTPATIENT)
Dept: RADIOLOGY | Facility: HOSPITAL | Age: 68
Discharge: HOME | End: 2023-04-18
Attending: NURSE PRACTITIONER
Payer: MEDICARE

## 2023-04-18 DIAGNOSIS — I85.11 SECONDARY ESOPHAGEAL VARICES WITH BLEEDING: ICD-10-CM

## 2023-04-18 DIAGNOSIS — K70.30 ALCOHOLIC CIRRHOSIS OF LIVER WITHOUT ASCITES: ICD-10-CM

## 2023-04-18 PROCEDURE — 76705 US ABDOMEN LIMITED: ICD-10-PCS | Mod: 26,,, | Performed by: RADIOLOGY

## 2023-04-18 PROCEDURE — 76705 ECHO EXAM OF ABDOMEN: CPT | Mod: 26,,, | Performed by: RADIOLOGY

## 2023-04-18 PROCEDURE — 76705 ECHO EXAM OF ABDOMEN: CPT | Mod: TC,PN

## 2023-04-25 ENCOUNTER — OFFICE VISIT (OUTPATIENT)
Dept: HEPATOLOGY | Facility: CLINIC | Age: 68
End: 2023-04-25
Payer: MEDICARE

## 2023-04-25 VITALS
BODY MASS INDEX: 21.89 KG/M2 | DIASTOLIC BLOOD PRESSURE: 72 MMHG | HEART RATE: 92 BPM | WEIGHT: 131.38 LBS | HEIGHT: 65 IN | SYSTOLIC BLOOD PRESSURE: 136 MMHG

## 2023-04-25 DIAGNOSIS — K70.30 ALCOHOLIC CIRRHOSIS OF LIVER WITHOUT ASCITES: Primary | ICD-10-CM

## 2023-04-25 PROCEDURE — 1159F PR MEDICATION LIST DOCUMENTED IN MEDICAL RECORD: ICD-10-PCS | Mod: CPTII,S$GLB,, | Performed by: NURSE PRACTITIONER

## 2023-04-25 PROCEDURE — 99999 PR PBB SHADOW E&M-EST. PATIENT-LVL III: CPT | Mod: PBBFAC,,, | Performed by: NURSE PRACTITIONER

## 2023-04-25 PROCEDURE — 1101F PR PT FALLS ASSESS DOC 0-1 FALLS W/OUT INJ PAST YR: ICD-10-PCS | Mod: CPTII,S$GLB,, | Performed by: NURSE PRACTITIONER

## 2023-04-25 PROCEDURE — 1159F MED LIST DOCD IN RCRD: CPT | Mod: CPTII,S$GLB,, | Performed by: NURSE PRACTITIONER

## 2023-04-25 PROCEDURE — 3288F PR FALLS RISK ASSESSMENT DOCUMENTED: ICD-10-PCS | Mod: CPTII,S$GLB,, | Performed by: NURSE PRACTITIONER

## 2023-04-25 PROCEDURE — 3008F BODY MASS INDEX DOCD: CPT | Mod: CPTII,S$GLB,, | Performed by: NURSE PRACTITIONER

## 2023-04-25 PROCEDURE — 1125F AMNT PAIN NOTED PAIN PRSNT: CPT | Mod: CPTII,S$GLB,, | Performed by: NURSE PRACTITIONER

## 2023-04-25 PROCEDURE — 3078F DIAST BP <80 MM HG: CPT | Mod: CPTII,S$GLB,, | Performed by: NURSE PRACTITIONER

## 2023-04-25 PROCEDURE — 1101F PT FALLS ASSESS-DOCD LE1/YR: CPT | Mod: CPTII,S$GLB,, | Performed by: NURSE PRACTITIONER

## 2023-04-25 PROCEDURE — 99214 OFFICE O/P EST MOD 30 MIN: CPT | Mod: S$GLB,,, | Performed by: NURSE PRACTITIONER

## 2023-04-25 PROCEDURE — 3078F PR MOST RECENT DIASTOLIC BLOOD PRESSURE < 80 MM HG: ICD-10-PCS | Mod: CPTII,S$GLB,, | Performed by: NURSE PRACTITIONER

## 2023-04-25 PROCEDURE — 99214 PR OFFICE/OUTPT VISIT, EST, LEVL IV, 30-39 MIN: ICD-10-PCS | Mod: S$GLB,,, | Performed by: NURSE PRACTITIONER

## 2023-04-25 PROCEDURE — 99999 PR PBB SHADOW E&M-EST. PATIENT-LVL III: ICD-10-PCS | Mod: PBBFAC,,, | Performed by: NURSE PRACTITIONER

## 2023-04-25 PROCEDURE — 1125F PR PAIN SEVERITY QUANTIFIED, PAIN PRESENT: ICD-10-PCS | Mod: CPTII,S$GLB,, | Performed by: NURSE PRACTITIONER

## 2023-04-25 PROCEDURE — 3008F PR BODY MASS INDEX (BMI) DOCUMENTED: ICD-10-PCS | Mod: CPTII,S$GLB,, | Performed by: NURSE PRACTITIONER

## 2023-04-25 PROCEDURE — 3288F FALL RISK ASSESSMENT DOCD: CPT | Mod: CPTII,S$GLB,, | Performed by: NURSE PRACTITIONER

## 2023-04-25 PROCEDURE — 3075F PR MOST RECENT SYSTOLIC BLOOD PRESS GE 130-139MM HG: ICD-10-PCS | Mod: CPTII,S$GLB,, | Performed by: NURSE PRACTITIONER

## 2023-04-25 PROCEDURE — 3075F SYST BP GE 130 - 139MM HG: CPT | Mod: CPTII,S$GLB,, | Performed by: NURSE PRACTITIONER

## 2023-04-25 NOTE — PROGRESS NOTES
Clinic Follow Up:  [facility] Follow Up Note    Reason for Follow Up:  The encounter diagnosis was Alcoholic cirrhosis of liver without ascites.    PCP: Santos Beaver       HPI:  This is a 67 y.o. female here for follow up of the above  Pt here with caregiver  Pt has been feeling well without decompensating events  Recent labs and US stable  No upper GI bleeding.  No ascites or BLE.  No overt confusion.      Review of Systems   Constitutional:  Negative for chills, fever, malaise/fatigue and weight loss.   Respiratory:  Negative for cough.    Cardiovascular:  Negative for chest pain.   Gastrointestinal:         Per HPI   Musculoskeletal:  Negative for myalgias.   Skin:  Negative for itching and rash.   Neurological:  Negative for headaches.   Psychiatric/Behavioral:  The patient is not nervous/anxious.      Medical History:  Past Medical History:   Diagnosis Date    Alcoholic cirrhosis     Alcoholic dementia     Anemia     Ascites     CKD (chronic kidney disease)     Esophageal reflux     Esophageal varices     Hepatic encephalopathy     Hiatal hernia        Surgical History:   Past Surgical History:   Procedure Laterality Date    COLONOSCOPY      ESOPHAGOGASTRODUODENOSCOPY      ESOPHAGOGASTRODUODENOSCOPY N/A 1/6/2020    Procedure: EGD (ESOPHAGOGASTRODUODENOSCOPY);  Surgeon: Monique Wood MD;  Location: [facility];  Service: Endoscopy;  Laterality: N/A;    ESOPHAGOGASTRODUODENOSCOPY N/A 5/26/2021    Procedure: EGD (ESOPHAGOGASTRODUODENOSCOPY) EV surveillance;  Surgeon: Monique Wood MD;  Location: [facility];  Service: Endoscopy;  Laterality: N/A;    ESOPHAGOGASTRODUODENOSCOPY N/A 9/29/2022    Procedure: EGD (ESOPHAGOGASTRODUODENOSCOPY) EV surveillance;  Surgeon: Flor Roper MD;  Location: [facility];  Service: Endoscopy;  Laterality: N/A;    HYSTERECTOMY      REPAIR OF INCARCERATED UMBILICAL HERNIA N/A 10/27/2020    Procedure: REPAIR, HERNIA, UMBILICAL, INCARCERATED, AGE 5  YEARS OR OLDER;  Surgeon: Shahbaz Oliva MD;  Location: [facility] OR;  Service: General;  Laterality: N/A;       Family History:   Family History   Problem Relation Age of Onset    Cancer Mother     Arthritis Mother        Social History:   Social History     Tobacco Use    Smoking status: Never    Smokeless tobacco: Never   Substance Use Topics    Alcohol use: Yes     Alcohol/week: 2.0 standard drinks     Types: 2 Shots of liquor per week     Comment: some days    Drug use: Not Currently       Allergies: Reviewed    Home Medications:  Current Outpatient Medications on File Prior to Visit   Medication Sig Dispense Refill    ascorbic acid, vitamin C, (VITAMIN C) 250 MG tablet Take 250 mg by mouth once daily.      ergocalciferol (ERGOCALCIFEROL) 50,000 unit Cap Take 50,000 Units by mouth every 7 days.      erythromycin (ROMYCIN) ophthalmic ointment Apply ointment to lids and lashes two times daily for 10 days. 3.5 g 0    FLUoxetine 20 MG capsule Take 20 mg by mouth once daily.      furosemide (LASIX) 20 MG tablet Take 1 tablet (20 mg total) by mouth once daily. 60 tablet 12    lactulose (CEPHULAC) 10 gram packet Take 10 g by mouth once daily.      magnesium oxide (MAG-OX) 400 mg (241.3 mg magnesium) tablet Take 400 mg by mouth once daily.      multivitamin (THERAGRAN) per tablet Take 1 tablet by mouth once daily.      pantoprazole (PROTONIX) 20 MG tablet Take 1 tablet (20 mg total) by mouth once daily. 180 tablet 0    rifAXIMin (XIFAXAN) 550 mg Tab Take 1 tablet (550 mg total) by mouth 2 (two) times daily. 60 tablet 5    spironolactone (ALDACTONE) 25 MG tablet Take 1 tablet (25 mg total) by mouth once daily. 30 tablet 11    thiamine (VITAMIN B-1) 50 MG tablet Take 1 tablet (50 mg total) by mouth once daily. 30 tablet 0    [DISCONTINUED] folic acid (FOLVITE) 1 MG tablet Take 1 tablet (1 mg total) by mouth once daily. 30 tablet 0     No current facility-administered medications on file prior to visit.       Physical  "Exam:  Vital Signs:  /72 (BP Location: Right arm, Patient Position: Sitting, BP Method: Medium (Manual))   Pulse 92   Ht 5' 5" (1.651 m)   Wt 59.6 kg (131 lb 6.3 oz)   BMI 21.87 kg/m²   Body mass index is 21.87 kg/m².  Physical Exam  Vitals reviewed.   Constitutional:       Appearance: She is well-developed.   HENT:      Head: Normocephalic.   Eyes:      General: No scleral icterus.  Cardiovascular:      Rate and Rhythm: Normal rate.   Pulmonary:      Effort: Pulmonary effort is normal.   Abdominal:      General: There is no distension.   Musculoskeletal:         General: No swelling.      Cervical back: Normal range of motion.   Skin:     General: Skin is dry.   Neurological:      Mental Status: She is alert and oriented to person, place, and time.       Labs: Pertinent labs reviewed.  MELD-Na score: 17 at 4/18/2023  9:30 AM  MELD score: 14 at 4/18/2023  9:30 AM  Calculated from:  Serum Creatinine: 1.4 mg/dL at 4/18/2023  9:30 AM  Serum Sodium: 133 mmol/L at 4/18/2023  9:30 AM  Total Bilirubin: 0.8 mg/dL (Using min of 1 mg/dL) at 4/18/2023  9:30 AM  INR(ratio): 1.5 at 4/18/2023  9:30 AM  Age: 67 years  EV: EGD 2022 with Grade I non-bleeding EV, pt declines BB  HCC: US 4/23 without lesion or mass       Assessment:  1. Alcoholic cirrhosis of liver without ascites        Recommendations:  Stable without decompensating events   Continue with MELD labs and HCC screening every 6 months  - repeat EGD for EV surveillance 2024    Return to Clinic:  6 months with pre-clinic labs and imaging.           "

## 2023-04-26 ENCOUNTER — TELEPHONE (OUTPATIENT)
Dept: HEPATOLOGY | Facility: CLINIC | Age: 68
End: 2023-04-26
Payer: MEDICARE

## 2023-04-26 NOTE — TELEPHONE ENCOUNTER
Patient caregiver stated she will see the appointment scheduled int he patient portal when it has been scheduled. LVM for caregiver to inform of appointment scheduled.     ----- Message from Sosa Albright RN sent at 4/26/2023  7:56 AM CDT -----  Patient 6 month follow-up has been scheduled. Please make patient aware.   ----- Message -----  From: Savita Hi MA  Sent: 4/26/2023   7:27 AM CDT  To: [facility] Coordinator    Per HAFSA Harper, patient needs a 6 month follow up.

## 2023-08-28 DIAGNOSIS — K70.31 ALCOHOLIC CIRRHOSIS OF LIVER WITH ASCITES: ICD-10-CM

## 2023-08-28 DIAGNOSIS — K70.31 ASCITES DUE TO ALCOHOLIC CIRRHOSIS: ICD-10-CM

## 2023-08-28 DIAGNOSIS — I85.11 SECONDARY ESOPHAGEAL VARICES WITH BLEEDING: ICD-10-CM

## 2023-08-28 RX ORDER — PANTOPRAZOLE SODIUM 20 MG/1
20 TABLET, DELAYED RELEASE ORAL
Qty: 180 TABLET | Refills: 0 | Status: SHIPPED | OUTPATIENT
Start: 2023-08-28

## 2023-10-18 ENCOUNTER — OFFICE VISIT (OUTPATIENT)
Dept: HEPATOLOGY | Facility: CLINIC | Age: 68
End: 2023-10-18
Payer: MEDICARE

## 2023-10-18 VITALS
HEART RATE: 97 BPM | WEIGHT: 134.06 LBS | HEIGHT: 65 IN | SYSTOLIC BLOOD PRESSURE: 132 MMHG | BODY MASS INDEX: 22.34 KG/M2 | DIASTOLIC BLOOD PRESSURE: 72 MMHG

## 2023-10-18 DIAGNOSIS — F10.27 DEMENTIA ASSOCIATED WITH ALCOHOLISM WITHOUT BEHAVIORAL DISTURBANCE: ICD-10-CM

## 2023-10-18 DIAGNOSIS — K70.30 ALCOHOLIC CIRRHOSIS OF LIVER WITHOUT ASCITES: Primary | ICD-10-CM

## 2023-10-18 DIAGNOSIS — I85.11 SECONDARY ESOPHAGEAL VARICES WITH BLEEDING: ICD-10-CM

## 2023-10-18 DIAGNOSIS — F10.10 ETOH ABUSE: ICD-10-CM

## 2023-10-18 PROCEDURE — 3008F PR BODY MASS INDEX (BMI) DOCUMENTED: ICD-10-PCS | Mod: CPTII,S$GLB,, | Performed by: NURSE PRACTITIONER

## 2023-10-18 PROCEDURE — 99214 PR OFFICE/OUTPT VISIT, EST, LEVL IV, 30-39 MIN: ICD-10-PCS | Mod: S$GLB,,, | Performed by: NURSE PRACTITIONER

## 2023-10-18 PROCEDURE — 3075F PR MOST RECENT SYSTOLIC BLOOD PRESS GE 130-139MM HG: ICD-10-PCS | Mod: CPTII,S$GLB,, | Performed by: NURSE PRACTITIONER

## 2023-10-18 PROCEDURE — 99214 OFFICE O/P EST MOD 30 MIN: CPT | Mod: S$GLB,,, | Performed by: NURSE PRACTITIONER

## 2023-10-18 PROCEDURE — 1159F MED LIST DOCD IN RCRD: CPT | Mod: CPTII,S$GLB,, | Performed by: NURSE PRACTITIONER

## 2023-10-18 PROCEDURE — 1101F PR PT FALLS ASSESS DOC 0-1 FALLS W/OUT INJ PAST YR: ICD-10-PCS | Mod: CPTII,S$GLB,, | Performed by: NURSE PRACTITIONER

## 2023-10-18 PROCEDURE — 3075F SYST BP GE 130 - 139MM HG: CPT | Mod: CPTII,S$GLB,, | Performed by: NURSE PRACTITIONER

## 2023-10-18 PROCEDURE — 1126F PR PAIN SEVERITY QUANTIFIED, NO PAIN PRESENT: ICD-10-PCS | Mod: CPTII,S$GLB,, | Performed by: NURSE PRACTITIONER

## 2023-10-18 PROCEDURE — 99999 PR PBB SHADOW E&M-EST. PATIENT-LVL IV: ICD-10-PCS | Mod: PBBFAC,,, | Performed by: NURSE PRACTITIONER

## 2023-10-18 PROCEDURE — 99999 PR PBB SHADOW E&M-EST. PATIENT-LVL IV: CPT | Mod: PBBFAC,,, | Performed by: NURSE PRACTITIONER

## 2023-10-18 PROCEDURE — 1101F PT FALLS ASSESS-DOCD LE1/YR: CPT | Mod: CPTII,S$GLB,, | Performed by: NURSE PRACTITIONER

## 2023-10-18 PROCEDURE — 1126F AMNT PAIN NOTED NONE PRSNT: CPT | Mod: CPTII,S$GLB,, | Performed by: NURSE PRACTITIONER

## 2023-10-18 PROCEDURE — 3008F BODY MASS INDEX DOCD: CPT | Mod: CPTII,S$GLB,, | Performed by: NURSE PRACTITIONER

## 2023-10-18 PROCEDURE — 3078F PR MOST RECENT DIASTOLIC BLOOD PRESSURE < 80 MM HG: ICD-10-PCS | Mod: CPTII,S$GLB,, | Performed by: NURSE PRACTITIONER

## 2023-10-18 PROCEDURE — 3078F DIAST BP <80 MM HG: CPT | Mod: CPTII,S$GLB,, | Performed by: NURSE PRACTITIONER

## 2023-10-18 PROCEDURE — 3288F PR FALLS RISK ASSESSMENT DOCUMENTED: ICD-10-PCS | Mod: CPTII,S$GLB,, | Performed by: NURSE PRACTITIONER

## 2023-10-18 PROCEDURE — 1159F PR MEDICATION LIST DOCUMENTED IN MEDICAL RECORD: ICD-10-PCS | Mod: CPTII,S$GLB,, | Performed by: NURSE PRACTITIONER

## 2023-10-18 PROCEDURE — 3288F FALL RISK ASSESSMENT DOCD: CPT | Mod: CPTII,S$GLB,, | Performed by: NURSE PRACTITIONER

## 2023-10-18 RX ORDER — MEMANTINE HYDROCHLORIDE 10 MG/1
TABLET ORAL
COMMUNITY

## 2023-10-18 RX ORDER — LORAZEPAM 1 MG/1
TABLET ORAL
COMMUNITY

## 2023-10-18 RX ORDER — VALSARTAN 80 MG/1
TABLET ORAL
COMMUNITY

## 2023-10-18 NOTE — PROGRESS NOTES
Clinic Follow Up:  [facility] Follow Up Note    Reason for Follow Up:  The primary encounter diagnosis was Alcoholic cirrhosis of liver without ascites. Diagnoses of ETOH abuse, Dementia associated with alcoholism without behavioral disturbance, and Secondary esophageal varices with bleeding were also pertinent to this visit.    PCP: Santos Beaver       HPI:  This is a 67 y.o. female here for follow up of the above  Pt states that she has been feeling overall well without any new complaints.   Labs and US are pending next week  Continues with ETOH intake  Denies any abdominal pain.  No nausea or vomiting.  No change in bowel pattern.  No melena or hematochezia. No weight loss.  No upper GI bleeding.  No ascites or BLE.  No overt confusion.      Review of Systems   Constitutional:  Negative for chills, fever, malaise/fatigue and weight loss.   Respiratory:  Negative for cough.    Cardiovascular:  Negative for chest pain.   Gastrointestinal:         Per HPI   Musculoskeletal:  Negative for myalgias.   Skin:  Negative for itching and rash.   Neurological:  Negative for headaches.   Psychiatric/Behavioral:  The patient is not nervous/anxious.        Medical History:  Past Medical History:   Diagnosis Date    Alcoholic cirrhosis     Alcoholic dementia     Anemia     Ascites     CKD (chronic kidney disease)     Esophageal reflux     Esophageal varices     Hepatic encephalopathy     Hiatal hernia        Surgical History:   Past Surgical History:   Procedure Laterality Date    COLONOSCOPY      ESOPHAGOGASTRODUODENOSCOPY      ESOPHAGOGASTRODUODENOSCOPY N/A 1/6/2020    Procedure: EGD (ESOPHAGOGASTRODUODENOSCOPY);  Surgeon: Monique Wood MD;  Location: [facility];  Service: Endoscopy;  Laterality: N/A;    ESOPHAGOGASTRODUODENOSCOPY N/A 5/26/2021    Procedure: EGD (ESOPHAGOGASTRODUODENOSCOPY) EV surveillance;  Surgeon: Monique Wood MD;  Location: [facility];  Service: Endoscopy;  Laterality:  N/A;    ESOPHAGOGASTRODUODENOSCOPY N/A 9/29/2022    Procedure: EGD (ESOPHAGOGASTRODUODENOSCOPY) EV surveillance;  Surgeon: Flor Roper MD;  Location: [facility] ENDO;  Service: Endoscopy;  Laterality: N/A;    HYSTERECTOMY      REPAIR OF INCARCERATED UMBILICAL HERNIA N/A 10/27/2020    Procedure: REPAIR, HERNIA, UMBILICAL, INCARCERATED, AGE 5 YEARS OR OLDER;  Surgeon: Shahbaz Oliva MD;  Location: [facility] OR;  Service: General;  Laterality: N/A;       Family History:   Family History   Problem Relation Age of Onset    Cancer Mother     Arthritis Mother        Social History:   Social History     Tobacco Use    Smoking status: Never    Smokeless tobacco: Never   Substance Use Topics    Alcohol use: Yes     Alcohol/week: 2.0 standard drinks of alcohol     Types: 2 Shots of liquor per week     Comment: some days    Drug use: Not Currently       Allergies: Reviewed    Home Medications:  Current Outpatient Medications on File Prior to Visit   Medication Sig Dispense Refill    ascorbic acid, vitamin C, (VITAMIN C) 250 MG tablet Take 250 mg by mouth once daily.      ergocalciferol (ERGOCALCIFEROL) 50,000 unit Cap Take 50,000 Units by mouth every 7 days.      FLUoxetine 20 MG capsule Take 20 mg by mouth once daily.      furosemide (LASIX) 20 MG tablet Take 1 tablet (20 mg total) by mouth once daily. 60 tablet 12    lactulose (CEPHULAC) 10 gram packet Take 10 g by mouth once daily.      magnesium oxide (MAG-OX) 400 mg (241.3 mg magnesium) tablet Take 400 mg by mouth once daily.      multivitamin (THERAGRAN) per tablet Take 1 tablet by mouth once daily.      pantoprazole (PROTONIX) 20 MG tablet TAKE 1 TABLET BY MOUTH EVERY  tablet 0    rifAXIMin (XIFAXAN) 550 mg Tab Take 1 tablet (550 mg total) by mouth 2 (two) times daily. 60 tablet 5    spironolactone (ALDACTONE) 25 MG tablet Take 1 tablet (25 mg total) by mouth once daily. 30 tablet 11    thiamine (VITAMIN B-1) 50 MG tablet Take 1 tablet (50 mg total) by mouth once  "daily. 30 tablet 0    erythromycin (ROMYCIN) ophthalmic ointment Apply ointment to lids and lashes two times daily for 10 days. 3.5 g 0    LORazepam (ATIVAN) 1 MG tablet Take 1 tablet 3 times a day by oral route.      memantine (NAMENDA) 10 MG Tab Take 1 tablet twice a day by oral route.      valsartan (DIOVAN) 80 MG tablet        No current facility-administered medications on file prior to visit.       Physical Exam:  Vital Signs:  /72 (BP Location: Right arm, Patient Position: Sitting, BP Method: Medium (Manual))   Pulse 97   Ht 5' 5" (1.651 m)   Wt 60.8 kg (134 lb 0.6 oz)   BMI 22.31 kg/m²   Body mass index is 22.31 kg/m².  Physical Exam  Vitals reviewed.   Constitutional:       Appearance: She is well-developed.   HENT:      Head: Normocephalic.   Eyes:      General: No scleral icterus.  Cardiovascular:      Rate and Rhythm: Normal rate.   Pulmonary:      Effort: Pulmonary effort is normal.   Abdominal:      General: There is no distension.   Musculoskeletal:         General: Normal range of motion.      Cervical back: Normal range of motion.   Skin:     General: Skin is dry.   Neurological:      Mental Status: She is alert and oriented to person, place, and time.         Labs: Pertinent labs reviewed.  MELD 3.0: 18 at 4/18/2023  9:30 AM  MELD-Na: 17 at 4/18/2023  9:30 AM  Calculated from:  Serum Creatinine: 1.4 mg/dL at 4/18/2023  9:30 AM  Serum Sodium: 133 mmol/L at 4/18/2023  9:30 AM  Total Bilirubin: 0.8 mg/dL (Using min of 1 mg/dL) at 4/18/2023  9:30 AM  Serum Albumin: 4.0 g/dL (Using max of 3.5 g/dL) at 4/18/2023  9:30 AM  INR(ratio): 1.5 at 4/18/2023  9:30 AM  Age at listing (hypothetical): 67 years  Sex: Female at 4/18/2023  9:30 AM  EV: EGD 2022 with small grade I EV, non-bleeding. Repeat 2024  HCC: US pending next week          Assessment:  1. Alcoholic cirrhosis of liver without ascites    2. ETOH abuse    3. Dementia associated with alcoholism without behavioral disturbance    4. Secondary " esophageal varices with bleeding        Recommendations:  Stable without new complaints.   Continue with plan for labs and US next wee  Repeat labs and US in 6 months  EGD for EV surveillance 9/2024    Return to Clinic:  6 months with pre-clinic labs and imaging.

## 2023-10-25 ENCOUNTER — HOSPITAL ENCOUNTER (OUTPATIENT)
Dept: RADIOLOGY | Facility: HOSPITAL | Age: 68
Discharge: HOME | End: 2023-10-25
Attending: NURSE PRACTITIONER
Payer: MEDICARE

## 2023-10-25 DIAGNOSIS — K70.30 ALCOHOLIC CIRRHOSIS OF LIVER WITHOUT ASCITES: ICD-10-CM

## 2023-10-25 PROCEDURE — 76705 ECHO EXAM OF ABDOMEN: CPT | Mod: TC,PN

## 2023-10-25 PROCEDURE — 76705 ECHO EXAM OF ABDOMEN: CPT | Mod: 26,,, | Performed by: RADIOLOGY

## 2023-10-25 PROCEDURE — 76705 US ABDOMEN LIMITED: ICD-10-PCS | Mod: 26,,, | Performed by: RADIOLOGY

## 2024-04-02 NOTE — ASSESSMENT & PLAN NOTE
- Hgb stable at 7.5  - No active s/s of bleeding  - Daily CBC  - Monitor and transfuse as needed to keep Hgb >7.0     palpitations

## 2024-04-29 ENCOUNTER — HOSPITAL ENCOUNTER (OUTPATIENT)
Dept: RADIOLOGY | Facility: HOSPITAL | Age: 69
Discharge: HOME | End: 2024-04-29
Attending: NURSE PRACTITIONER
Payer: MEDICARE

## 2024-04-29 DIAGNOSIS — K70.30 ALCOHOLIC CIRRHOSIS OF LIVER WITHOUT ASCITES: ICD-10-CM

## 2024-04-29 PROCEDURE — 76705 ECHO EXAM OF ABDOMEN: CPT | Mod: 26,,, | Performed by: RADIOLOGY

## 2024-04-29 PROCEDURE — 76705 ECHO EXAM OF ABDOMEN: CPT | Mod: TC,PN

## 2024-08-06 DIAGNOSIS — K70.31 ASCITES DUE TO ALCOHOLIC CIRRHOSIS: ICD-10-CM

## 2024-08-06 DIAGNOSIS — K70.31 ALCOHOLIC CIRRHOSIS OF LIVER WITH ASCITES: ICD-10-CM

## 2024-08-06 DIAGNOSIS — I85.11 SECONDARY ESOPHAGEAL VARICES WITH BLEEDING: ICD-10-CM

## 2024-08-06 DIAGNOSIS — F10.27 DEMENTIA ASSOCIATED WITH ALCOHOLISM WITHOUT BEHAVIORAL DISTURBANCE: ICD-10-CM

## 2024-08-06 RX ORDER — SPIRONOLACTONE 25 MG/1
25 TABLET ORAL DAILY
Qty: 30 TABLET | Refills: 11 | Status: SHIPPED | OUTPATIENT
Start: 2024-08-06

## 2024-08-06 RX ORDER — PANTOPRAZOLE SODIUM 20 MG/1
20 TABLET, DELAYED RELEASE ORAL DAILY
Qty: 180 TABLET | Refills: 0 | Status: SHIPPED | OUTPATIENT
Start: 2024-08-06

## 2024-10-28 DIAGNOSIS — K70.31 ASCITES DUE TO ALCOHOLIC CIRRHOSIS: Primary | ICD-10-CM

## 2024-10-29 ENCOUNTER — OFFICE VISIT (OUTPATIENT)
Dept: HEPATOLOGY | Facility: CLINIC | Age: 69
End: 2024-10-29
Payer: MEDICARE

## 2024-10-29 VITALS
SYSTOLIC BLOOD PRESSURE: 116 MMHG | DIASTOLIC BLOOD PRESSURE: 90 MMHG | HEIGHT: 65 IN | HEART RATE: 96 BPM | BODY MASS INDEX: 20.63 KG/M2 | WEIGHT: 123.81 LBS | OXYGEN SATURATION: 97 %

## 2024-10-29 DIAGNOSIS — K70.31 ASCITES DUE TO ALCOHOLIC CIRRHOSIS: Primary | ICD-10-CM

## 2024-10-29 DIAGNOSIS — F10.27 DEMENTIA ASSOCIATED WITH ALCOHOLISM WITHOUT BEHAVIORAL DISTURBANCE: ICD-10-CM

## 2024-10-29 DIAGNOSIS — I85.11 SECONDARY ESOPHAGEAL VARICES WITH BLEEDING: ICD-10-CM

## 2024-10-29 DIAGNOSIS — K76.6 PORTAL HYPERTENSION: ICD-10-CM

## 2024-10-29 DIAGNOSIS — K70.31 ALCOHOLIC CIRRHOSIS OF LIVER WITH ASCITES: ICD-10-CM

## 2024-10-29 DIAGNOSIS — K76.82 HEPATIC ENCEPHALOPATHY: ICD-10-CM

## 2024-10-29 PROCEDURE — 99999 PR PBB SHADOW E&M-EST. PATIENT-LVL III: CPT | Mod: PBBFAC,,, | Performed by: INTERNAL MEDICINE

## 2024-11-11 ENCOUNTER — HOSPITAL ENCOUNTER (OUTPATIENT)
Dept: PREADMISSION TESTING | Facility: HOSPITAL | Age: 69
Discharge: HOME | End: 2024-11-11
Attending: INTERNAL MEDICINE
Payer: MEDICARE

## 2024-11-11 DIAGNOSIS — I85.11 SECONDARY ESOPHAGEAL VARICES WITH BLEEDING: ICD-10-CM

## 2025-02-25 DIAGNOSIS — K70.31 ALCOHOLIC CIRRHOSIS OF LIVER WITH ASCITES: ICD-10-CM

## 2025-02-25 DIAGNOSIS — K70.31 ASCITES DUE TO ALCOHOLIC CIRRHOSIS: ICD-10-CM

## 2025-02-25 DIAGNOSIS — I85.11 SECONDARY ESOPHAGEAL VARICES WITH BLEEDING: ICD-10-CM

## 2025-02-26 RX ORDER — PANTOPRAZOLE SODIUM 20 MG/1
20 TABLET, DELAYED RELEASE ORAL DAILY
Qty: 180 TABLET | Refills: 0 | Status: SHIPPED | OUTPATIENT
Start: 2025-02-26

## 2025-04-15 ENCOUNTER — TELEPHONE (OUTPATIENT)
Dept: ALLERGY | Facility: CLINIC | Age: 70
End: 2025-04-15
Payer: MEDICARE

## 2025-04-15 NOTE — TELEPHONE ENCOUNTER
Calling patient to reschedule appointment date/time on 5/22 with  due to provider being out of office

## 2025-04-30 DIAGNOSIS — N18.4 CHRONIC KIDNEY DISEASE (CKD), STAGE IV (SEVERE): Primary | ICD-10-CM

## 2025-05-05 ENCOUNTER — RESULTS FOLLOW-UP (OUTPATIENT)
Dept: HEPATOLOGY | Facility: HOSPITAL | Age: 70
End: 2025-05-05

## 2025-05-05 ENCOUNTER — HOSPITAL ENCOUNTER (OUTPATIENT)
Dept: RADIOLOGY | Facility: HOSPITAL | Age: 70
Discharge: HOME | End: 2025-05-05
Attending: INTERNAL MEDICINE
Payer: MEDICARE

## 2025-05-05 DIAGNOSIS — K70.31 ALCOHOLIC CIRRHOSIS OF LIVER WITH ASCITES: ICD-10-CM

## 2025-05-05 DIAGNOSIS — I85.11 SECONDARY ESOPHAGEAL VARICES WITH BLEEDING: ICD-10-CM

## 2025-05-05 PROCEDURE — 76705 ECHO EXAM OF ABDOMEN: CPT | Mod: TC,PN

## 2025-05-05 PROCEDURE — 76705 ECHO EXAM OF ABDOMEN: CPT | Mod: 26,,, | Performed by: STUDENT IN AN ORGANIZED HEALTH CARE EDUCATION/TRAINING PROGRAM

## 2025-05-10 PROBLEM — K70.30 ALCOHOLIC CIRRHOSIS OF LIVER WITHOUT ASCITES: Status: ACTIVE | Noted: 2019-09-23

## 2025-05-10 NOTE — PROGRESS NOTES
Hepatology Note    PATIENT: Kylah Ortiz  MRN: [id number]  DATE: 5/13/2025    Provider: Hepatologist - Dr Young  Urgency of review: non-urgent  Referring provider: No ref. provider found    Diagnosis:   1. Ascites due to alcoholic cirrhosis    2. Hepatic encephalopathy    3. Alcoholic cirrhosis of liver with ascites    4. Secondary esophageal varices with bleeding    5. Portal hypertension    6. Alcoholic cirrhosis of liver without ascites        Chief complaint:   Chief Complaint   Patient presents with    Cirrhosis       Subjective:    Initial History: Kylah Ortiz is a 69 y.o. female who was folliwng to Hepatology Clinic for consultation of EtOh related cirrhosis       Diagnosed with cirrhosis: diagnosed 2021 decompensated yes               Ascites: Lasix 20 mg  spironolactone 25 daily; no LVP or SBP.                HE: xifaxin and lactulose              GI bleeding: no Beta blockers: no              Jaundice: none     Cirrhosis HCM:  HCC screening: Imaging 10/24  No Liver lesion,   Variceal screening: EGD 2022 small varix, [facility]      05/13/2025   She denies recent hematemesis, coffee ground emesis, melena, hematochezia, jaundice, confusion, LE edema, abdominal distension.  Alcohol is cutting down  Hb has dropped     10/24  Patient is new to me  Continues with ETOH intake       Prior Relevant History:    She  denies hepatotoxic medication    Review of systems:  A review of 12+ systems was conducted with pertinent positive and negative findings documented in HPI with all other systems reviewed and negative.      PFSH:  Past medical, family, and social history reviewed as documented in chart with pertinent positive medical, family, and social history detailed in HPI.    Past Medical History:   Past Medical History:   Diagnosis Date    Alcoholic cirrhosis     Alcoholic dementia     Anemia     Ascites     CKD (chronic kidney disease)     Esophageal reflux     Esophageal varices     Hepatic  encephalopathy     Hiatal hernia        Past Surgical HIstory:   Past Surgical History:   Procedure Laterality Date    COLONOSCOPY      ESOPHAGOGASTRODUODENOSCOPY      ESOPHAGOGASTRODUODENOSCOPY N/A 1/6/2020    Procedure: EGD (ESOPHAGOGASTRODUODENOSCOPY);  Surgeon: Monique Wood MD;  Location: [facility];  Service: Endoscopy;  Laterality: N/A;    ESOPHAGOGASTRODUODENOSCOPY N/A 5/26/2021    Procedure: EGD (ESOPHAGOGASTRODUODENOSCOPY) EV surveillance;  Surgeon: Monique Wood MD;  Location: [facility] ENDO;  Service: Endoscopy;  Laterality: N/A;    ESOPHAGOGASTRODUODENOSCOPY N/A 9/29/2022    Procedure: EGD (ESOPHAGOGASTRODUODENOSCOPY) EV surveillance;  Surgeon: Flor Roper MD;  Location: [facility];  Service: Endoscopy;  Laterality: N/A;    HYSTERECTOMY      REPAIR OF INCARCERATED UMBILICAL HERNIA N/A 10/27/2020    Procedure: REPAIR, HERNIA, UMBILICAL, INCARCERATED, AGE 5 YEARS OR OLDER;  Surgeon: Shahbaz Oliva MD;  Location: [facility];  Service: General;  Laterality: N/A;       Family History:   Family History   Problem Relation Name Age of Onset    Cancer Mother      Arthritis Mother       She has no known family history of liver disease.     Social History:  reports that she has never smoked. She has never been exposed to tobacco smoke. She has never used smokeless tobacco. She reports that she does not currently use alcohol after a past usage of about 5.0 standard drinks of alcohol per week. She reports that she does not currently use drugs after having used the following drugs: Marijuana.    She has significant history of Alcohol     She denies history of IV drug use/Tatto  She  denies high-risk sexual contacts, no raw seafood, no sick contacts      Allergies:  Review of patient's allergies indicates:   Allergen Reactions    Hydrocodone-acetaminophen Hives, Itching and Other (See Comments)       Medications:  Current Outpatient Medications   Medication Sig Dispense Refill    ascorbic acid, vitamin C,  (VITAMIN C) 250 MG tablet Take 250 mg by mouth once daily.      ergocalciferol (ERGOCALCIFEROL) 50,000 unit Cap Take 50,000 Units by mouth every 7 days. (Patient not taking: Reported on 10/29/2024)      FLUoxetine 20 MG capsule Take 20 mg by mouth once daily.      furosemide (LASIX) 20 MG tablet Take 1 tablet (20 mg total) by mouth once daily. (Patient not taking: Reported on 10/29/2024) 60 tablet 12    lactulose (CEPHULAC) 10 gram packet Take 10 g by mouth once daily.      LORazepam (ATIVAN) 1 MG tablet Take 1 tablet 3 times a day by oral route. (Patient not taking: Reported on 10/29/2024)      magnesium oxide (MAG-OX) 400 mg (241.3 mg magnesium) tablet Take 400 mg by mouth once daily.      memantine (NAMENDA) 10 MG Tab Take 1 tablet twice a day by oral route. (Patient not taking: Reported on 10/29/2024)      multivitamin (THERAGRAN) per tablet Take 1 tablet by mouth once daily.      pantoprazole (PROTONIX) 20 MG tablet Take 1 tablet (20 mg total) by mouth once daily. 180 tablet 0    rifAXIMin (XIFAXAN) 550 mg Tab Take 1 tablet (550 mg total) by mouth 2 (two) times daily. 60 tablet 5    spironolactone (ALDACTONE) 25 MG tablet Take 1 tablet (25 mg total) by mouth once daily. 30 tablet 11    thiamine (VITAMIN B-1) 50 MG tablet Take 1 tablet (50 mg total) by mouth once daily. (Patient not taking: Reported on 10/29/2024) 30 tablet 0    valsartan (DIOVAN) 80 MG tablet  (Patient not taking: Reported on 10/29/2024)       No current facility-administered medications for this visit.       Review of Systems   Constitutional:  Negative for fatigue, fever and unexpected weight change.   HENT:  Negative for ear pain, nosebleeds and trouble swallowing.    Eyes:  Negative for discharge and redness.   Respiratory:  Negative for cough and shortness of breath.    Cardiovascular:  Negative for palpitations and leg swelling.   Gastrointestinal:  Positive for abdominal distention. Negative for abdominal pain, diarrhea and vomiting.    Endocrine: Negative for cold intolerance and polyuria.   Genitourinary:  Negative for flank pain and hematuria.   Musculoskeletal:  Positive for back pain.   Skin:  Negative for pallor.   Neurological:  Negative for seizures and headaches.   Hematological:  Does not bruise/bleed easily.   Psychiatric/Behavioral:  Negative for confusion and hallucinations.        MELD 3.0: 17 at 5/5/2025  8:53 AM  MELD-Na: 16 at 5/5/2025  8:53 AM  Calculated from:  Serum Creatinine: 1.8 mg/dL at 5/5/2025  8:53 AM  Serum Sodium: 137 mmol/L at 5/5/2025  8:53 AM  Total Bilirubin: 0.5 mg/dL (Using min of 1 mg/dL) at 5/5/2025  8:53 AM  Serum Albumin: 3 g/dL at 5/5/2025  8:53 AM  INR(ratio): 1.4 at 5/5/2025  8:53 AM  Age at listing (hypothetical): 69 years  Sex: Female at 5/5/2025  8:53 AM       Objective:      Vitals:   There were no vitals filed for this visit.      Physical Exam  Constitutional:       Appearance: Normal appearance.   HENT:      Head: Normocephalic and atraumatic.      Right Ear: Tympanic membrane and external ear normal.      Left Ear: Tympanic membrane and external ear normal.      Mouth/Throat:      Mouth: Mucous membranes are moist.   Eyes:      Extraocular Movements: Extraocular movements intact.      Pupils: Pupils are equal, round, and reactive to light.   Cardiovascular:      Rate and Rhythm: Normal rate and regular rhythm.      Pulses: Normal pulses.      Heart sounds: Normal heart sounds.   Pulmonary:      Effort: Pulmonary effort is normal.      Breath sounds: Normal breath sounds.   Abdominal:      General: Bowel sounds are normal. There is no distension.      Palpations: Abdomen is soft. There is no mass.      Tenderness: There is no abdominal tenderness.   Musculoskeletal:         General: No swelling or deformity. Normal range of motion.      Cervical back: Normal range of motion and neck supple.   Skin:     Coloration: Skin is not jaundiced.   Neurological:      General: No focal deficit present.       Mental Status: She is alert and oriented to person, place, and time.      Cranial Nerves: No cranial nerve deficit.      Motor: Weakness present.   Psychiatric:         Mood and Affect: Mood normal.         Behavior: Behavior normal.         Laboratory Data:  No visits with results within 1 Week(s) from this visit.   Latest known visit with results is:   Lab Visit on 05/05/2025   Component Date Value Ref Range Status    Sodium 05/05/2025 137  136 - 145 mmol/L Final    Potassium 05/05/2025 4.4  3.5 - 5.1 mmol/L Final    Chloride 05/05/2025 103  95 - 110 mmol/L Final    CO2 05/05/2025 20 (L)  23 - 29 mmol/L Final    Glucose 05/05/2025 112 (H)  70 - 110 mg/dL Final    BUN 05/05/2025 13  8 - 23 mg/dL Final    Creatinine 05/05/2025 1.8 (H)  0.5 - 1.4 mg/dL Final    Calcium 05/05/2025 8.6 (L)  8.7 - 10.5 mg/dL Final    Protein Total 05/05/2025 7.7  6.0 - 8.4 gm/dL Final    Albumin 05/05/2025 3.0 (L)  3.5 - 5.2 g/dL Final    Bilirubin Total 05/05/2025 0.5  0.1 - 1.0 mg/dL Final    For infants and newborns, interpretation of results should be based   on gestational age, weight and in agreement with clinical   observations.    Premature Infant recommended reference ranges:   0-24 hours:  <8.0 mg/dL   24-48 hours: <12.0 mg/dL   3-5 days:    <15.0 mg/dL   6-29 days:   <15.0 mg/dL    ALP 05/05/2025 107  40 - 150 unit/L Final    AST 05/05/2025 12  11 - 45 unit/L Final    ALT 05/05/2025 7 (L)  10 - 44 unit/L Final    Anion Gap 05/05/2025 14  8 - 16 mmol/L Final    eGFR 05/05/2025 30 (L)  >60 mL/min/1.73/m2 Final    Estimated GFR calculated using the CKD-EPI creatinine (2021) equation.    PT 05/05/2025 15.1 (H)  9.0 - 12.5 seconds Final    INR 05/05/2025 1.4 (H)  0.8 - 1.2 Final    Coumadin Therapy:    2.0 - 3.0 for INR for all indicators except mechanical heart valves    and antiphospholipid syndromes which should use 2.5 - 3.5.    AFP 05/05/2025 <2.0  <=8.4 ng/mL Final    WBC 05/05/2025 10.92  3.90 - 12.70 K/uL Final    RBC  "05/05/2025 3.71 (L)  4.00 - 5.40 M/uL Final    HGB 05/05/2025 10.0 (L)  12.0 - 16.0 gm/dL Final    HCT 05/05/2025 32.3 (L)  37.0 - 48.5 % Final    MCV 05/05/2025 87  82 - 98 fL Final    MCH 05/05/2025 27.0  27.0 - 31.0 pg Final    MCHC 05/05/2025 31.0 (L)  32.0 - 36.0 g/dL Final    RDW 05/05/2025 14.4  11.5 - 14.5 % Final    Platelet Count 05/05/2025 317  150 - 450 K/uL Final    MPV 05/05/2025 10.5  9.2 - 12.9 fL Final    Nucleated RBC 05/05/2025 0  <=0 /100 WBC Final    Neut % 05/05/2025 67.2  38 - 73 % Final    Lymph % 05/05/2025 14.6 (L)  18 - 48 % Final    Mono % 05/05/2025 14.8  4 - 15 % Final    Eos % 05/05/2025 1.6  <=8 % Final    Basophil % 05/05/2025 0.2  <=1.9 % Final    Imm Grans % 05/05/2025 1.6 (H)  0.0 - 0.5 % Final    Neut # 05/05/2025 7.34  1.8 - 7.7 K/uL Final    Lymph # 05/05/2025 1.59  1 - 4.8 K/uL Final    Mono # 05/05/2025 1.62 (H)  0.3 - 1 K/uL Final    Eos # 05/05/2025 0.18  <=0.5 K/uL Final    Baso # 05/05/2025 0.02  <=0.2 K/uL Final    Imm Grans # 05/05/2025 0.17 (H)  0.00 - 0.04 K/uL Final    Mild elevation in immature granulocytes is non specific and can be seen in a variety of conditions including stress response, acute inflammation, trauma and pregnancy. Correlation with other laboratory and clinical findings is essential.       Lab Results   Component Value Date    INR 1.4 (H) 05/05/2025    INR 1.4 (H) 04/29/2024    INR 1.4 (H) 10/25/2023    PROTIME 15.1 (H) 05/05/2025       No results found for: "SMOOTHMUSCAB", "MITOAB"  No results found for: "IRON", "TIBC", "FERRITIN", "SATURATEDIRO"  No results found for: "HAV", "HEPAIGM", "HEPBIGM", "HEPBCAB", "HBEAG", "HEPCAB"  No results found for: "TSH"  No results found for: "LUIS"    No results found for: "ABORH"        No results found for: "LABA1C", "HGBA1C"  No results found for: "CHOL"  No results found for: "HDL"  No results found for: "LDLCALC"  No results found for: "TRIG"  No results found for: "CHOLHDL"      I personally reviewed " imaging studies and outside records..      Assessment:       1. Ascites due to alcoholic cirrhosis    2. Hepatic encephalopathy    3. Alcoholic cirrhosis of liver with ascites    4. Secondary esophageal varices with bleeding    5. Portal hypertension    6. Alcoholic cirrhosis of liver without ascites                 Plan:     Problem List Items Addressed This Visit          GI    Alcoholic cirrhosis of liver without ascites    Relevant Orders    Ambulatory referral/consult to Endo Procedure     CBC Auto Differential    AFP Tumor Marker    Comprehensive Metabolic Panel    Protime-INR    US Abdomen Limited    Ascites due to alcoholic cirrhosis - Primary    Hepatic encephalopathy    Portal hypertension    Secondary esophageal varices with bleeding       Kylah was seen today for cirrhosis.    Diagnoses and all orders for this visit:    Ascites due to alcoholic cirrhosis    Hepatic encephalopathy    Alcoholic cirrhosis of liver with ascites    Secondary esophageal varices with bleeding    Portal hypertension    Alcoholic cirrhosis of liver without ascites  -     Ambulatory referral/consult to Endo Procedure ; Future  -     CBC Auto Differential; Standing  -     AFP Tumor Marker; Standing  -     Comprehensive Metabolic Panel; Standing  -     Protime-INR; Standing  -     US Abdomen Limited; Standing            --Cirrhosis  Liver disease:                   Alcohol  MELD-Na:                         16  Child-Prater Class:             B    Cirrhosis is decompensated with:    Ascites:                                                      yes  Spontaneous bacterial peritonitis:              no  Hepatic Encephalopathy:                           yes  Portal bleeding:                                          no  Hepatocellular carcinoma:                          no    Hepatorenal syndrome:                              no  Hyponatremia:                                            no  Muscle wasting:                                           yes   Portal vein thrombosis:                               no      Transplant status:             not under evaluation  Considering co morbidities and active substance abuse      --Ascites/Edema: 2 gm Na diet. Continue Lasix 20 mg daily and Aldactone 25 mg daily. SBP Prophylaxis no  Reduce diuretics and monitor weight      - Encephalopathy: Continue lactulose. Titrate to maintain 3-4 bowel movements per day. Continue rifaximin 550mg BID.        - Varices: Plan EGD considering drop Hb Last EGD in 2022    DARRIAN  Follow PCP    -Cirrhosis Care    - HCC screening:  Imaging and AFP for HCC surveillance every 6 months.  - Immunizations: Recommend HAV and HBV vaccinations if not immune.  - Dexa Scan every 2-3 years    Rest Health Maintenance as per Primary care Physician        I have sent communication to the referring physician and/or primary care provider.      I performed this consultation using real-time Telehealth tools, including a live video connection between my location and the patient's location. Prior to initiating the consultation, I obtained informed verbal consent to perform this consultation using Telehealth tools and answered all the questions about the Telehealth interaction.    The patient location is: ([place])   The chief complaint leading to consultation is: cirrhosis      Visit type: audiovisual     Face to Face time with patient:20    70 minutes of total time spent on the encounter, which includes face to face time and non-face to face time preparing to see the patient (eg, review of tests), Obtaining and/or reviewing separately obtained history, Documenting clinical information in the electronic or other health record, Independently interpreting results (not separately reported) and communicating results to the patient/family/caregiver, or Care coordination (not separately reported).     Each patient to whom he or she provides medical services by telemedicine is:  (1) informed of  the relationship between the physician and patient and the respective role of any other health care provider with respect to management of the patient; and (2) notified that he or she may decline to receive medical services by telemedicine and may withdraw from such care at any time.    We discussed in depth the nature of the patient's disease, the management plan in details. I have provided the patient with an opportunity to ask questions and have all questions answered to his satisfaction.     Discussed with patient that it is likely that she will see results before Myself or my nurse are able to view them and report results due to the Cures Act passed 4/1/21. Results will be sent immediately to the patient who are enrolled in the patient portal. If results come through after business hours or on weekend, we will not see them until the next business day that we are in the office. If resulted during the business day, we will likely not be able to review them until after completing all patient visits in office that day.     Ezio Young MD  Transplant Hepatologist and Gastroenterologist  [facility]

## 2025-05-13 ENCOUNTER — OFFICE VISIT (OUTPATIENT)
Dept: HEPATOLOGY | Facility: CLINIC | Age: 70
End: 2025-05-13
Payer: MEDICARE

## 2025-05-13 DIAGNOSIS — K70.31 ALCOHOLIC CIRRHOSIS OF LIVER WITH ASCITES: ICD-10-CM

## 2025-05-13 DIAGNOSIS — K76.82 HEPATIC ENCEPHALOPATHY: ICD-10-CM

## 2025-05-13 DIAGNOSIS — K70.31 ASCITES DUE TO ALCOHOLIC CIRRHOSIS: Primary | ICD-10-CM

## 2025-05-13 DIAGNOSIS — K70.30 ALCOHOLIC CIRRHOSIS OF LIVER WITHOUT ASCITES: ICD-10-CM

## 2025-05-13 DIAGNOSIS — K76.6 PORTAL HYPERTENSION: ICD-10-CM

## 2025-05-13 DIAGNOSIS — I85.11 SECONDARY ESOPHAGEAL VARICES WITH BLEEDING: ICD-10-CM

## 2025-05-13 PROCEDURE — 98007 SYNCH AUDIO-VIDEO EST HI 40: CPT | Mod: 95,,, | Performed by: INTERNAL MEDICINE

## 2025-05-13 PROCEDURE — 1160F RVW MEDS BY RX/DR IN RCRD: CPT | Mod: CPTII,95,, | Performed by: INTERNAL MEDICINE

## 2025-05-13 PROCEDURE — 1159F MED LIST DOCD IN RCRD: CPT | Mod: CPTII,95,, | Performed by: INTERNAL MEDICINE

## 2025-05-15 ENCOUNTER — HOSPITAL ENCOUNTER (OUTPATIENT)
Dept: PREADMISSION TESTING | Facility: HOSPITAL | Age: 70
Discharge: HOME | End: 2025-05-15
Attending: INTERNAL MEDICINE
Payer: MEDICARE

## 2025-05-15 DIAGNOSIS — K70.30 ALCOHOLIC CIRRHOSIS OF LIVER WITHOUT ASCITES: ICD-10-CM

## 2025-05-19 ENCOUNTER — HOSPITAL ENCOUNTER (OUTPATIENT)
Dept: PREADMISSION TESTING | Facility: HOSPITAL | Age: 70
Discharge: HOME | End: 2025-05-19
Attending: COLON & RECTAL SURGERY
Payer: MEDICARE

## 2025-05-19 DIAGNOSIS — K70.30 ALCOHOLIC CIRRHOSIS OF LIVER WITHOUT ASCITES: Primary | ICD-10-CM

## 2025-06-02 ENCOUNTER — HOSPITAL ENCOUNTER (OUTPATIENT)
Dept: ENDOSCOPY | Facility: HOSPITAL | Age: 70
Discharge: HOME | End: 2025-06-02
Attending: INTERNAL MEDICINE
Payer: MEDICARE

## 2025-06-02 ENCOUNTER — ANESTHESIA (OUTPATIENT)
Dept: ENDOSCOPY | Facility: HOSPITAL | Age: 70
End: 2025-06-02
Payer: MEDICARE

## 2025-06-02 ENCOUNTER — ANESTHESIA EVENT (OUTPATIENT)
Dept: ENDOSCOPY | Facility: HOSPITAL | Age: 70
End: 2025-06-02
Payer: MEDICARE

## 2025-06-02 DIAGNOSIS — K70.30 ALCOHOLIC CIRRHOSIS OF LIVER WITHOUT ASCITES: ICD-10-CM

## 2025-06-02 PROCEDURE — 63600175 PHARM REV CODE 636 W HCPCS: Performed by: STUDENT IN AN ORGANIZED HEALTH CARE EDUCATION/TRAINING PROGRAM

## 2025-06-02 PROCEDURE — 88305 TISSUE EXAM BY PATHOLOGIST: CPT | Mod: TC | Performed by: INTERNAL MEDICINE

## 2025-06-02 PROCEDURE — 37000008 HC ANESTHESIA 1ST 15 MINUTES

## 2025-06-02 PROCEDURE — 27201012 HC FORCEPS, HOT/COLD, DISP: Performed by: INTERNAL MEDICINE

## 2025-06-02 RX ORDER — PROPOFOL 10 MG/ML
VIAL (ML) INTRAVENOUS
Status: DISCONTINUED | OUTPATIENT
Start: 2025-06-02 | End: 2025-06-02

## 2025-06-02 RX ORDER — CARVEDILOL 3.12 MG/1
3.12 TABLET ORAL 2 TIMES DAILY WITH MEALS
Qty: 180 TABLET | Refills: 3 | Status: SHIPPED | OUTPATIENT
Start: 2025-06-02 | End: 2026-06-02

## 2025-06-02 RX ORDER — LIDOCAINE HYDROCHLORIDE 20 MG/ML
INJECTION INTRAVENOUS
Status: DISCONTINUED | OUTPATIENT
Start: 2025-06-02 | End: 2025-06-02

## 2025-06-02 RX ADMIN — PROPOFOL 100 MG: 10 INJECTION, EMULSION INTRAVENOUS at 09:06

## 2025-06-02 RX ADMIN — GLYCOPYRROLATE 0.2 MG: 0.2 INJECTION, SOLUTION INTRAMUSCULAR; INTRAVITREAL at 09:06

## 2025-06-02 RX ADMIN — LIDOCAINE HYDROCHLORIDE 100 MG: 20 INJECTION INTRAVENOUS at 09:06

## 2025-06-03 VITALS
HEART RATE: 90 BPM | DIASTOLIC BLOOD PRESSURE: 62 MMHG | SYSTOLIC BLOOD PRESSURE: 102 MMHG | WEIGHT: 123.69 LBS | RESPIRATION RATE: 20 BRPM | TEMPERATURE: 98 F | OXYGEN SATURATION: 99 % | HEIGHT: 65 IN | BODY MASS INDEX: 20.61 KG/M2

## 2025-06-05 ENCOUNTER — RESULTS FOLLOW-UP (OUTPATIENT)
Dept: HEPATOLOGY | Facility: HOSPITAL | Age: 70
End: 2025-06-05

## 2025-06-05 LAB
ESTROGEN SERPL-MCNC: NORMAL PG/ML
INSULIN SERPL-ACNC: NORMAL U[IU]/ML
LAB AP CLINICAL INFORMATION: NORMAL
LAB AP GROSS DESCRIPTION: NORMAL
LAB AP PERFORMING LOCATION(S): NORMAL
LAB AP REPORT FOOTNOTES: NORMAL
T3RU NFR SERPL: NORMAL %

## 2025-06-06 DIAGNOSIS — N18.4 CHRONIC KIDNEY DISEASE (CKD), STAGE IV (SEVERE): Primary | ICD-10-CM

## 2025-06-09 ENCOUNTER — LAB VISIT (OUTPATIENT)
Dept: LAB | Facility: HOSPITAL | Age: 70
End: 2025-06-09
Attending: INTERNAL MEDICINE
Payer: MEDICARE

## 2025-06-09 ENCOUNTER — OFFICE VISIT (OUTPATIENT)
Dept: NEPHROLOGY | Facility: CLINIC | Age: 70
End: 2025-06-09
Payer: MEDICARE

## 2025-06-09 VITALS
RESPIRATION RATE: 18 BRPM | WEIGHT: 121.25 LBS | BODY MASS INDEX: 20.2 KG/M2 | HEART RATE: 97 BPM | SYSTOLIC BLOOD PRESSURE: 100 MMHG | HEIGHT: 65 IN | DIASTOLIC BLOOD PRESSURE: 70 MMHG

## 2025-06-09 DIAGNOSIS — N17.9 PRERENAL ACUTE RENAL FAILURE: Primary | ICD-10-CM

## 2025-06-09 DIAGNOSIS — N18.4 CHRONIC KIDNEY DISEASE (CKD), STAGE IV (SEVERE): ICD-10-CM

## 2025-06-09 LAB
ABSOLUTE EOSINOPHIL (OHS): 0.11 K/UL
ABSOLUTE MONOCYTE (OHS): 1.66 K/UL (ref 0.3–1)
ABSOLUTE NEUTROPHIL COUNT (OHS): 8.22 K/UL (ref 1.8–7.7)
ALBUMIN SERPL BCP-MCNC: 3.2 G/DL (ref 3.5–5.2)
ANION GAP (OHS): 10 MMOL/L (ref 8–16)
BASOPHILS # BLD AUTO: 0.02 K/UL
BASOPHILS NFR BLD AUTO: 0.2 %
BUN SERPL-MCNC: 11 MG/DL (ref 8–23)
CALCIUM SERPL-MCNC: 8.2 MG/DL (ref 8.7–10.5)
CHLORIDE SERPL-SCNC: 101 MMOL/L (ref 95–110)
CO2 SERPL-SCNC: 20 MMOL/L (ref 23–29)
CREAT SERPL-MCNC: 1.9 MG/DL (ref 0.5–1.4)
ERYTHROCYTE [DISTWIDTH] IN BLOOD BY AUTOMATED COUNT: 15.4 % (ref 11.5–14.5)
GFR SERPLBLD CREATININE-BSD FMLA CKD-EPI: 28 ML/MIN/1.73/M2
GLUCOSE SERPL-MCNC: 109 MG/DL (ref 70–110)
HCT VFR BLD AUTO: 32.4 % (ref 37–48.5)
HGB BLD-MCNC: 10.2 GM/DL (ref 12–16)
IMM GRANULOCYTES # BLD AUTO: 0.08 K/UL (ref 0–0.04)
IMM GRANULOCYTES NFR BLD AUTO: 0.7 % (ref 0–0.5)
LYMPHOCYTES # BLD AUTO: 1.53 K/UL (ref 1–4.8)
MCH RBC QN AUTO: 27.5 PG (ref 27–31)
MCHC RBC AUTO-ENTMCNC: 31.5 G/DL (ref 32–36)
MCV RBC AUTO: 87 FL (ref 82–98)
NUCLEATED RBC (/100WBC) (OHS): 0 /100 WBC
PHOSPHATE SERPL-MCNC: 4.2 MG/DL (ref 2.7–4.5)
PLATELET # BLD AUTO: 327 K/UL (ref 150–450)
PMV BLD AUTO: 9.7 FL (ref 9.2–12.9)
POTASSIUM SERPL-SCNC: 4.1 MMOL/L (ref 3.5–5.1)
RBC # BLD AUTO: 3.71 M/UL (ref 4–5.4)
RELATIVE EOSINOPHIL (OHS): 0.9 %
RELATIVE LYMPHOCYTE (OHS): 13.2 % (ref 18–48)
RELATIVE MONOCYTE (OHS): 14.3 % (ref 4–15)
RELATIVE NEUTROPHIL (OHS): 70.7 % (ref 38–73)
SODIUM SERPL-SCNC: 131 MMOL/L (ref 136–145)
WBC # BLD AUTO: 11.62 K/UL (ref 3.9–12.7)

## 2025-06-09 PROCEDURE — 80069 RENAL FUNCTION PANEL: CPT

## 2025-06-09 PROCEDURE — 36415 COLL VENOUS BLD VENIPUNCTURE: CPT | Mod: PN

## 2025-06-09 PROCEDURE — 85025 COMPLETE CBC W/AUTO DIFF WBC: CPT

## 2025-06-09 PROCEDURE — 99999 PR PBB SHADOW E&M-EST. PATIENT-LVL III: CPT | Mod: PBBFAC,,, | Performed by: INTERNAL MEDICINE

## 2025-06-09 RX ORDER — LANOLIN ALCOHOL/MO/W.PET/CERES
1 CREAM (GRAM) TOPICAL
COMMUNITY

## 2025-06-09 NOTE — PROGRESS NOTES
Renal clinic consult note:  Date of pt visit: 6/9/25  Reason for consult: elevated s Cr. DARRIAN  Referring provider: GI     HPI: Pt is a 70 y/o female, previously seen in renal clinic but lost to f/u since 2022 who was referred to renal clinic for s Cr fluctuations. Chart and h/o were reviewed. Labs reviewed. S Cr fluctuates from normal (1.0) to > 3. Pt has a h/o of  advanced liver disease due to alcoholism with GI complications.  Pt has had multiple fluctuations in s Cr (DARRIAN), suspected related to hemodynamic changes, fluid shifts due to liver disease, large 3rd spacing, and the use of diuretics. She may have hepatorenal syndrome. In the past she needed paracentesis about every 1-2 weeks. Per her care give, last paracentesis was 2 years ago. She recently had a GI procedure for esophageal varices.     Pt presents with her care giver. Pt has no medical c/o's, no increase in abdominal girth, no abdominal pain, no fever, no SOB, no leg swelling, no acute or new c/o's.      To also review: pt has had several episodes of hepatic decompensation, requiting hospital admissions. She has a h/o of hepatic encephalopathy, varices with GI bleed, and ascites requiring frequent paracentesis.        PAST MEDICAL HISTORY:  DARRIAN (suspect hepatorenal syndrome, hypotension, Alcoholic cirrhosis, Alcoholic dementia, Anemia, Ascites, CKD (chronic kidney disease), Esophageal reflux, Esophageal varices, Hepatic encephalopathy, and Hiatal hernia.     PAST SURGICAL HISTORY:  She  has a past surgical history that includes Hysterectomy; Esophagogastroduodenoscopy; and Colonoscopy.     SOCIAL HISTORY:  She  reports that she has never smoked. She has never used smokeless tobacco. She reports that she drank alcohol.     FAMILY MEDICAL HISTORY:  Her family history includes Arthritis in her mother; Cancer in her mother.             Review of patient's allergies indicates:   Allergen Reactions    Hydrocodone-acetaminophen        Meds reviewed  Current  Outpatient Medications   Medication Instructions    ascorbic acid (vitamin C) (VITAMIN C) 250 mg, Daily    carvediloL (COREG) 3.125 mg, Oral, 2 times daily with meals    ergocalciferol (ERGOCALCIFEROL) 50,000 Units, Every 7 days    ferrous sulfate (FEOSOL) Tab tablet 1 tablet, With breakfast    FLUoxetine 20 mg, Daily    furosemide (LASIX) 20 mg, Oral, Daily    iron bis-gly/FA/C/B12/Ca/succ (IRON 21/7 ORAL)     lactulose (CEPHULAC) 10 g, Daily    LORazepam (ATIVAN) 1 MG tablet Take 1 tablet 3 times a day by oral route.    magnesium oxide (MAG-OX) 400 mg, Daily    memantine (NAMENDA) 10 MG Tab Take 1 tablet twice a day by oral route.    multivitamin (THERAGRAN) per tablet 1 tablet, Daily    pantoprazole (PROTONIX) 20 mg, Oral, Daily    rifAXIMin (XIFAXAN) 550 mg, Oral, 2 times daily    spironolactone (ALDACTONE) 25 mg, Oral, Daily    thiamine (VITAMIN B-1) 50 mg, Oral, Daily           REVIEW OF SYSTEMS:  Patient has no fever, fatigue, visual changes, chest pain, edema, cough, dyspnea, nausea, vomiting, constipation, diarrhea, arthralgias, pruritis, dizziness, weakness, depression, confusion.     PHYSICAL EXAM:  Blood pressure 100/70, pulse 77, weight 121 lbs, compared to 139 lbs 3 years ago  Gen:    WDWN female in no apparent distress  Psych: Normal mood and affect  Skin:    No rashes or ulcers  Neck:   No JVD  Chest:  Clear with no rales, rhonchi, wheezing with normal effort  CV:      Regular with no murmurs, gallops or rubs  Abd:     Soft, nontender, no significant distension/ascites  Ext:      no edema     Labs: reviewed, BMP pending today  BMP  Lab Results   Component Value Date     05/05/2025    K 4.4 05/05/2025     05/05/2025    CO2 20 (L) 05/05/2025    BUN 13 05/05/2025    CREATININE 1.8 (H) 05/05/2025    CALCIUM 8.6 (L) 05/05/2025    ANIONGAP 14 05/05/2025    EGFRNORACEVR 30 (L) 05/05/2025     Lab Results   Component Value Date    WBC 10.92 05/05/2025    HGB 10.0 (L) 05/05/2025    HCT 32.3 (L)  05/05/2025    MCV 87 05/05/2025     05/05/2025       Lab Results   Component Value Date    CALCIUM 8.6 (L) 05/05/2025    PHOS 3.7 10/19/2022            IMPRESSION AND RECOMMENDATIONS: 70 y/o female with presents fro evaluation of s Cr fluctuations. Pt has a h/o  of of alcoholic cirrhosis:     1. Renal: s Cr has fluctuated from 1 to >3 in the past several years  Suspect due to fluid shift causing prerenal azotemia  Currently looks overdiuresed. Will lower lasix from 20 mg qd to qod  DDX: Has hepatic disease, may have hepatorenal syndrome  K normal  Na normal  Metabolic acidosis, mild     Hypotension: BP is low in the past, due to liver disease (splanchnic vasodilation)  Meds reviewed  On minimal dose of diuretics and coreg     Hyponatremia: Na currently normal.   Per prior labs reviewed, had dilutional hyponatremia in the past  Due to liver disease, stable with lasix, continue  Advised again to keep salt and fluid intake low     2. Liver disease. in the past required multiple large volume paracenteses (once per week, 7-8 L each)  No significant increase in abdominal girth noted today  No significant other 3rd spacing present today  No recent paracentesis required, continue  Continue spironolactone. K is normal  Advised pt to keep salt and fluid intake low     3. Anemia: Hgb above 10       Plans and recommendations:  As discussed above  Opportunity for questions provided with pt and care giver  RTC 3 months  Total time spent 60 minutes including time needed to review the records, the   patient evaluation, documentation, face-to-face discussion with the patient,   more than 50% of the time was spent on coordination of care and counseling.    Level V visit.     Hector Sanchez MD

## (undated) DEVICE — APPLICATOR CHLORAPREP ORN 26ML

## (undated) DEVICE — DRAPE LAP TIBURON 77X122IN

## (undated) DEVICE — NDL HYPODERMIC BLUNT 18G 1.5IN

## (undated) DEVICE — ELECTRODE REM PLYHSV RETURN 9

## (undated) DEVICE — MANIFOLD 4 PORT

## (undated) DEVICE — DRESSING TRANS 4X4 TEGADERM

## (undated) DEVICE — SEE MEDLINE ITEM 152622

## (undated) DEVICE — SUT MONOCRYL 4.0 PS2 CP496G

## (undated) DEVICE — DECANTER VIAL ASEPTIC TRANSFER

## (undated) DEVICE — SEE MEDLINE ITEM 157117

## (undated) DEVICE — GLOVE SURG BIOGEL LATEX SZ 7.5

## (undated) DEVICE — ADHESIVE MASTISOL VIAL 48/BX

## (undated) DEVICE — SUPPORT ULNA NERVE PROTECTOR

## (undated) DEVICE — SUT VICRYL 3-0 27 SH

## (undated) DEVICE — SYR 10CC LUER LOCK

## (undated) DEVICE — SEE MEDLINE ITEM 157027

## (undated) DEVICE — COVER OVERHEAD SURG LT BLUE

## (undated) DEVICE — CLOSURE SKIN STERI STRIP 1/2X4

## (undated) DEVICE — POSITIONER HEAD DONUT 9IN FOAM

## (undated) DEVICE — SOL 9P NACL IRR PIC IL

## (undated) DEVICE — SUT VICRYL 0 SH

## (undated) DEVICE — NDL SAFETY 25G X 1.5 ECLIPSE